# Patient Record
Sex: MALE | Race: BLACK OR AFRICAN AMERICAN | Employment: OTHER | URBAN - METROPOLITAN AREA
[De-identification: names, ages, dates, MRNs, and addresses within clinical notes are randomized per-mention and may not be internally consistent; named-entity substitution may affect disease eponyms.]

---

## 2020-03-24 ENCOUNTER — HOSPITAL ENCOUNTER (INPATIENT)
Age: 50
LOS: 1 days | Discharge: NURSING, REHAB OR LONG-TERM CARE | DRG: 552 | End: 2020-03-26
Attending: EMERGENCY MEDICINE | Admitting: INTERNAL MEDICINE
Payer: MEDICARE

## 2020-03-24 ENCOUNTER — APPOINTMENT (OUTPATIENT)
Dept: GENERAL RADIOLOGY | Age: 50
DRG: 552 | End: 2020-03-24
Payer: MEDICARE

## 2020-03-24 PROBLEM — M54.9 BACK PAIN: Status: ACTIVE | Noted: 2020-03-24

## 2020-03-24 LAB
ANION GAP SERPL CALCULATED.3IONS-SCNC: 14 MMOL/L (ref 3–16)
BASOPHILS ABSOLUTE: 0.1 K/UL (ref 0–0.2)
BASOPHILS RELATIVE PERCENT: 0.6 %
BILIRUBIN URINE: NEGATIVE
BLOOD, URINE: NEGATIVE
BUN BLDV-MCNC: 12 MG/DL (ref 7–20)
CALCIUM SERPL-MCNC: 9.8 MG/DL (ref 8.3–10.6)
CHLORIDE BLD-SCNC: 99 MMOL/L (ref 99–110)
CLARITY: CLEAR
CO2: 26 MMOL/L (ref 21–32)
COLOR: YELLOW
CREAT SERPL-MCNC: 1 MG/DL (ref 0.9–1.3)
EOSINOPHILS ABSOLUTE: 0 K/UL (ref 0–0.6)
EOSINOPHILS RELATIVE PERCENT: 0.4 %
GFR AFRICAN AMERICAN: >60
GFR NON-AFRICAN AMERICAN: >60
GLUCOSE BLD-MCNC: 105 MG/DL (ref 70–99)
GLUCOSE URINE: NEGATIVE MG/DL
HCT VFR BLD CALC: 42.2 % (ref 40.5–52.5)
HEMOGLOBIN: 14.6 G/DL (ref 13.5–17.5)
KETONES, URINE: NEGATIVE MG/DL
LEUKOCYTE ESTERASE, URINE: NEGATIVE
LYMPHOCYTES ABSOLUTE: 2.3 K/UL (ref 1–5.1)
LYMPHOCYTES RELATIVE PERCENT: 22.2 %
MCH RBC QN AUTO: 32.3 PG (ref 26–34)
MCHC RBC AUTO-ENTMCNC: 34.6 G/DL (ref 31–36)
MCV RBC AUTO: 93.2 FL (ref 80–100)
MICROSCOPIC EXAMINATION: NORMAL
MONOCYTES ABSOLUTE: 0.6 K/UL (ref 0–1.3)
MONOCYTES RELATIVE PERCENT: 5.6 %
NEUTROPHILS ABSOLUTE: 7.5 K/UL (ref 1.7–7.7)
NEUTROPHILS RELATIVE PERCENT: 71.2 %
NITRITE, URINE: NEGATIVE
PDW BLD-RTO: 12.9 % (ref 12.4–15.4)
PH UA: 5.5 (ref 5–8)
PLATELET # BLD: 217 K/UL (ref 135–450)
PMV BLD AUTO: 8.9 FL (ref 5–10.5)
POTASSIUM SERPL-SCNC: 4.5 MMOL/L (ref 3.5–5.1)
PROTEIN UA: NEGATIVE MG/DL
RBC # BLD: 4.53 M/UL (ref 4.2–5.9)
SODIUM BLD-SCNC: 139 MMOL/L (ref 136–145)
SPECIFIC GRAVITY UA: 1.01 (ref 1–1.03)
URINE TYPE: NORMAL
UROBILINOGEN, URINE: 0.2 E.U./DL
WBC # BLD: 10.5 K/UL (ref 4–11)

## 2020-03-24 PROCEDURE — 96375 TX/PRO/DX INJ NEW DRUG ADDON: CPT

## 2020-03-24 PROCEDURE — G0378 HOSPITAL OBSERVATION PER HR: HCPCS

## 2020-03-24 PROCEDURE — 85025 COMPLETE CBC W/AUTO DIFF WBC: CPT

## 2020-03-24 PROCEDURE — 99284 EMERGENCY DEPT VISIT MOD MDM: CPT

## 2020-03-24 PROCEDURE — 96372 THER/PROPH/DIAG INJ SC/IM: CPT

## 2020-03-24 PROCEDURE — 6370000000 HC RX 637 (ALT 250 FOR IP): Performed by: EMERGENCY MEDICINE

## 2020-03-24 PROCEDURE — 80048 BASIC METABOLIC PNL TOTAL CA: CPT

## 2020-03-24 PROCEDURE — 96374 THER/PROPH/DIAG INJ IV PUSH: CPT

## 2020-03-24 PROCEDURE — 6360000002 HC RX W HCPCS: Performed by: EMERGENCY MEDICINE

## 2020-03-24 PROCEDURE — 72100 X-RAY EXAM L-S SPINE 2/3 VWS: CPT

## 2020-03-24 PROCEDURE — 81003 URINALYSIS AUTO W/O SCOPE: CPT

## 2020-03-24 RX ORDER — OXYCODONE HYDROCHLORIDE AND ACETAMINOPHEN 5; 325 MG/1; MG/1
1 TABLET ORAL ONCE
Status: COMPLETED | OUTPATIENT
Start: 2020-03-24 | End: 2020-03-24

## 2020-03-24 RX ORDER — ONDANSETRON 4 MG/1
4 TABLET, ORALLY DISINTEGRATING ORAL ONCE
Status: COMPLETED | OUTPATIENT
Start: 2020-03-24 | End: 2020-03-24

## 2020-03-24 RX ORDER — ORPHENADRINE CITRATE 30 MG/ML
60 INJECTION INTRAMUSCULAR; INTRAVENOUS ONCE
Status: COMPLETED | OUTPATIENT
Start: 2020-03-24 | End: 2020-03-24

## 2020-03-24 RX ORDER — DIAZEPAM 5 MG/1
5 TABLET ORAL ONCE
Status: COMPLETED | OUTPATIENT
Start: 2020-03-24 | End: 2020-03-24

## 2020-03-24 RX ORDER — LIDOCAINE 4 G/G
1 PATCH TOPICAL ONCE
Status: COMPLETED | OUTPATIENT
Start: 2020-03-24 | End: 2020-03-25

## 2020-03-24 RX ORDER — PREDNISONE 20 MG/1
60 TABLET ORAL ONCE
Status: COMPLETED | OUTPATIENT
Start: 2020-03-24 | End: 2020-03-24

## 2020-03-24 RX ORDER — ONDANSETRON 2 MG/ML
4 INJECTION INTRAMUSCULAR; INTRAVENOUS ONCE
Status: COMPLETED | OUTPATIENT
Start: 2020-03-24 | End: 2020-03-24

## 2020-03-24 RX ORDER — ACETAMINOPHEN 325 MG/1
650 TABLET ORAL EVERY 6 HOURS PRN
COMMUNITY

## 2020-03-24 RX ADMIN — ORPHENADRINE CITRATE 60 MG: 30 INJECTION INTRAMUSCULAR; INTRAVENOUS at 18:27

## 2020-03-24 RX ADMIN — DIAZEPAM 5 MG: 5 TABLET ORAL at 19:55

## 2020-03-24 RX ADMIN — PREDNISONE 60 MG: 20 TABLET ORAL at 18:27

## 2020-03-24 RX ADMIN — ONDANSETRON 4 MG: 4 TABLET, ORALLY DISINTEGRATING ORAL at 18:26

## 2020-03-24 RX ADMIN — OXYCODONE HYDROCHLORIDE AND ACETAMINOPHEN 1 TABLET: 5; 325 TABLET ORAL at 19:55

## 2020-03-24 RX ADMIN — ONDANSETRON 4 MG: 2 INJECTION, SOLUTION INTRAMUSCULAR; INTRAVENOUS at 21:45

## 2020-03-24 RX ADMIN — HYDROMORPHONE HYDROCHLORIDE 1 MG: 1 INJECTION, SOLUTION INTRAMUSCULAR; INTRAVENOUS; SUBCUTANEOUS at 18:27

## 2020-03-24 RX ADMIN — HYDROMORPHONE HYDROCHLORIDE 1 MG: 1 INJECTION, SOLUTION INTRAMUSCULAR; INTRAVENOUS; SUBCUTANEOUS at 21:45

## 2020-03-24 ASSESSMENT — PAIN SCALES - GENERAL
PAINLEVEL_OUTOF10: 8
PAINLEVEL_OUTOF10: 10
PAINLEVEL_OUTOF10: 8
PAINLEVEL_OUTOF10: 10
PAINLEVEL_OUTOF10: 8

## 2020-03-24 ASSESSMENT — PAIN DESCRIPTION - FREQUENCY: FREQUENCY: CONTINUOUS

## 2020-03-24 ASSESSMENT — PAIN DESCRIPTION - PAIN TYPE: TYPE: ACUTE PAIN

## 2020-03-24 ASSESSMENT — PAIN DESCRIPTION - DESCRIPTORS: DESCRIPTORS: RADIATING

## 2020-03-24 ASSESSMENT — PAIN DESCRIPTION - LOCATION: LOCATION: BACK

## 2020-03-24 ASSESSMENT — PAIN DESCRIPTION - PROGRESSION: CLINICAL_PROGRESSION: GRADUALLY IMPROVING

## 2020-03-24 NOTE — ED PROVIDER NOTES
UA 1.010 1.005 - 1.030    Blood, Urine Negative Negative    pH, UA 5.5 5.0 - 8.0    Protein, UA Negative Negative mg/dL    Urobilinogen, Urine 0.2 <2.0 E.U./dL    Nitrite, Urine Negative Negative    Leukocyte Esterase, Urine Negative Negative    Microscopic Examination Not Indicated     Urine Type NotGiven        Treatment in the department:  Patient received the following while in the ED. Medications   oxyCODONE-acetaminophen (PERCOCET) 5-325 MG per tablet 1 tablet (has no administration in time range)   diazePAM (VALIUM) tablet 5 mg (has no administration in time range)   HYDROmorphone (DILAUDID) injection 1 mg (1 mg Intramuscular Given 3/24/20 1827)   orphenadrine (NORFLEX) injection 60 mg (60 mg Intramuscular Given 3/24/20 1827)   predniSONE (DELTASONE) tablet 60 mg (60 mg Oral Given 3/24/20 1827)   ondansetron (ZOFRAN-ODT) disintegrating tablet 4 mg (4 mg Oral Given 3/24/20 1826)         Repeat exam at [address] [place] [address] shows patient still unable to sit up in bed or stand due to pain. Percocet and valium ordered. Medical decision making:  Patient presents emergency department with low back pain with radicular pain down both legs. Has no significant risk factors for epidural abscess. No signs of cauda equina. He is neurologically intact however continues to have pain despite initial medications. No fractures on x-ray imaging. No UTI. He is not having any urinary retention. I suspect musculoskeletal causes. We will continue with symptomatic management to see if able to go home    8:07 PM: I discussed the history, physical, and treatment plan with Dr. Lucila Wesley. Ender Mello was signed out in stable condition. Please see Dr. Cookie Shaw note for further details, including diagnosis and disposition. Clinical Impression:  1.  Midline low back pain with bilateral sciatica, unspecified chronicity        Dispo:  Pending reassessment    Discharge vitals:  Blood pressure 124/84, pulse 92, temperature 98.6 °F (37 °C), temperature source Oral, resp. rate 18, height 6' 1\" (1.854 m), weight 135.2 kg (298 lb), SpO2 96 %. Prescriptions given:   New Prescriptions    No medications on file         This chart was created using Dragon voice recognition software.         Santiago Giraldo MD  03/24/20 2010

## 2020-03-24 NOTE — ED NOTES
Patient brought in via ambulance for back pain. Patient states that he has had back pain for the past two days that has made him bed ridden. He denies injury. He states that this has happened to him before. He is having lower mid back pain that radiates down bilateral legs. Patient states that he has a history of DDD.       Oz Servin RN  03/24/20 6911

## 2020-03-25 PROCEDURE — 6360000002 HC RX W HCPCS: Performed by: INTERNAL MEDICINE

## 2020-03-25 PROCEDURE — 2580000003 HC RX 258: Performed by: INTERNAL MEDICINE

## 2020-03-25 PROCEDURE — 97530 THERAPEUTIC ACTIVITIES: CPT

## 2020-03-25 PROCEDURE — 97162 PT EVAL MOD COMPLEX 30 MIN: CPT

## 2020-03-25 PROCEDURE — 97535 SELF CARE MNGMENT TRAINING: CPT

## 2020-03-25 PROCEDURE — 6370000000 HC RX 637 (ALT 250 FOR IP): Performed by: INTERNAL MEDICINE

## 2020-03-25 PROCEDURE — 97167 OT EVAL HIGH COMPLEX 60 MIN: CPT

## 2020-03-25 PROCEDURE — G0378 HOSPITAL OBSERVATION PER HR: HCPCS

## 2020-03-25 PROCEDURE — 96372 THER/PROPH/DIAG INJ SC/IM: CPT

## 2020-03-25 RX ORDER — SODIUM CHLORIDE 0.9 % (FLUSH) 0.9 %
10 SYRINGE (ML) INJECTION PRN
Status: DISCONTINUED | OUTPATIENT
Start: 2020-03-25 | End: 2020-03-26 | Stop reason: HOSPADM

## 2020-03-25 RX ORDER — POLYETHYLENE GLYCOL 3350 17 G/17G
17 POWDER, FOR SOLUTION ORAL DAILY PRN
Status: DISCONTINUED | OUTPATIENT
Start: 2020-03-25 | End: 2020-03-26 | Stop reason: HOSPADM

## 2020-03-25 RX ORDER — ACETAMINOPHEN 325 MG/1
650 TABLET ORAL EVERY 6 HOURS PRN
Status: DISCONTINUED | OUTPATIENT
Start: 2020-03-25 | End: 2020-03-26 | Stop reason: HOSPADM

## 2020-03-25 RX ORDER — CYCLOBENZAPRINE HCL 10 MG
10 TABLET ORAL 3 TIMES DAILY PRN
Status: DISCONTINUED | OUTPATIENT
Start: 2020-03-25 | End: 2020-03-26 | Stop reason: HOSPADM

## 2020-03-25 RX ORDER — SODIUM CHLORIDE 0.9 % (FLUSH) 0.9 %
10 SYRINGE (ML) INJECTION EVERY 12 HOURS SCHEDULED
Status: DISCONTINUED | OUTPATIENT
Start: 2020-03-25 | End: 2020-03-26 | Stop reason: HOSPADM

## 2020-03-25 RX ORDER — HYDROCODONE BITARTRATE AND ACETAMINOPHEN 7.5; 325 MG/1; MG/1
1 TABLET ORAL EVERY 4 HOURS PRN
Status: DISCONTINUED | OUTPATIENT
Start: 2020-03-25 | End: 2020-03-26 | Stop reason: HOSPADM

## 2020-03-25 RX ORDER — ONDANSETRON 2 MG/ML
4 INJECTION INTRAMUSCULAR; INTRAVENOUS EVERY 6 HOURS PRN
Status: DISCONTINUED | OUTPATIENT
Start: 2020-03-25 | End: 2020-03-26 | Stop reason: HOSPADM

## 2020-03-25 RX ORDER — ACETAMINOPHEN 650 MG/1
650 SUPPOSITORY RECTAL EVERY 6 HOURS PRN
Status: DISCONTINUED | OUTPATIENT
Start: 2020-03-25 | End: 2020-03-26 | Stop reason: HOSPADM

## 2020-03-25 RX ORDER — PROMETHAZINE HYDROCHLORIDE 12.5 MG/1
12.5 TABLET ORAL EVERY 6 HOURS PRN
Status: DISCONTINUED | OUTPATIENT
Start: 2020-03-25 | End: 2020-03-26 | Stop reason: HOSPADM

## 2020-03-25 RX ADMIN — HYDROCODONE BITARTRATE AND ACETAMINOPHEN 1 TABLET: 7.5; 325 TABLET ORAL at 18:10

## 2020-03-25 RX ADMIN — HYDROCODONE BITARTRATE AND ACETAMINOPHEN 1 TABLET: 7.5; 325 TABLET ORAL at 22:25

## 2020-03-25 RX ADMIN — Medication 10 ML: at 08:02

## 2020-03-25 RX ADMIN — HYDROCODONE BITARTRATE AND ACETAMINOPHEN 1 TABLET: 7.5; 325 TABLET ORAL at 02:05

## 2020-03-25 RX ADMIN — HYDROCODONE BITARTRATE AND ACETAMINOPHEN 1 TABLET: 7.5; 325 TABLET ORAL at 07:13

## 2020-03-25 RX ADMIN — CYCLOBENZAPRINE 10 MG: 10 TABLET, FILM COATED ORAL at 22:25

## 2020-03-25 RX ADMIN — Medication 10 ML: at 21:00

## 2020-03-25 RX ADMIN — CYCLOBENZAPRINE 10 MG: 10 TABLET, FILM COATED ORAL at 02:05

## 2020-03-25 RX ADMIN — HYDROCODONE BITARTRATE AND ACETAMINOPHEN 1 TABLET: 7.5; 325 TABLET ORAL at 11:33

## 2020-03-25 RX ADMIN — ENOXAPARIN SODIUM 40 MG: 40 INJECTION SUBCUTANEOUS at 07:59

## 2020-03-25 ASSESSMENT — PAIN DESCRIPTION - ONSET
ONSET: ON-GOING

## 2020-03-25 ASSESSMENT — PAIN SCALES - GENERAL
PAINLEVEL_OUTOF10: 6
PAINLEVEL_OUTOF10: 7
PAINLEVEL_OUTOF10: 0
PAINLEVEL_OUTOF10: 9
PAINLEVEL_OUTOF10: 8
PAINLEVEL_OUTOF10: 8
PAINLEVEL_OUTOF10: 0
PAINLEVEL_OUTOF10: 7
PAINLEVEL_OUTOF10: 6
PAINLEVEL_OUTOF10: 0

## 2020-03-25 ASSESSMENT — PAIN DESCRIPTION - LOCATION
LOCATION: BACK

## 2020-03-25 ASSESSMENT — PAIN DESCRIPTION - DESCRIPTORS
DESCRIPTORS: ACHING
DESCRIPTORS: ACHING
DESCRIPTORS: ACHING;RADIATING
DESCRIPTORS: ACHING
DESCRIPTORS: ACHING

## 2020-03-25 ASSESSMENT — PAIN DESCRIPTION - PAIN TYPE
TYPE: ACUTE PAIN

## 2020-03-25 ASSESSMENT — PAIN DESCRIPTION - FREQUENCY
FREQUENCY: CONTINUOUS

## 2020-03-25 ASSESSMENT — PAIN - FUNCTIONAL ASSESSMENT
PAIN_FUNCTIONAL_ASSESSMENT: PREVENTS OR INTERFERES SOME ACTIVE ACTIVITIES AND ADLS

## 2020-03-25 ASSESSMENT — PAIN DESCRIPTION - PROGRESSION
CLINICAL_PROGRESSION: GRADUALLY WORSENING
CLINICAL_PROGRESSION: GRADUALLY WORSENING
CLINICAL_PROGRESSION: NOT CHANGED
CLINICAL_PROGRESSION: GRADUALLY WORSENING
CLINICAL_PROGRESSION: GRADUALLY WORSENING

## 2020-03-25 ASSESSMENT — PAIN DESCRIPTION - ORIENTATION
ORIENTATION: LOWER
ORIENTATION: LOWER;MID
ORIENTATION: LOWER

## 2020-03-25 NOTE — CARE COORDINATION
Case Management Assessment           Initial Evaluation                Date / Time of Evaluation: 3/25/2020 11:56 AM                 Assessment Completed by: Markel Luna    Patient Name: Taniya Gastelum     YOB: 1970  Diagnosis: Back pain [M54.9]  Back pain [M54.9]     Date / Time: 3/24/2020  4:38 PM    Patient Admission Status: Inpatient    If patient is discharged prior to next notation, then this note serves as note for discharge by case management. Current PCP: [address] [place] Patient: No    Chart Reviewed: Yes  Patient/ Family Interviewed: No    Initial assessment completed at bedside with: patient    Hospitalization in the last 30 days: No    Emergency Contacts:  Extended Emergency Contact Information  Primary Emergency Contact: Noel Quintana [address] Phone: [phone]  Relation: Parent    Advance Directives:   Code Status: Full Code    Healthcare Power of : No  Agent:   Contact Number:     Copy present: In paper Chart:     Scanned into EMR     Financial  Payor: MEDICARE / Plan: MEDICARE PART A AND B / Product Type: *No Product type* /     Pre-cert required for SNF: no    Pharmacy  No Pharmacies Listed    Potential assistance Purchasing Medications: Potential Assistance Purchasing Medications: No  Does Patient want to participate in local refill/ meds to beds program?: No    Meds To Beds General Rules:  1. Can ONLY be done Monday- Friday between 8:30am-5pm  2. Prescription(s) must be in pharmacy by 3pm to be filled same day  3. Copy of patient's insurance/ prescription drug card and patient face sheet must be sent along with the prescription(s)  4. Cost of Rx cannot be added to hospital bill. If financial assistance is needed, please contact unit  or ;  or  CANNOT provide pharmacy voucher for patients co-pays  5.  Patients can then  the Darvin Richter served Dr. Salvador Lang whom states pt is not ready for discharge today. [facility] can accept pt tomorrow. HENS completed [id number]    The Plan for Transition of Care is related to the following treatment goals of Back pain [M54.9]  Back pain [M54.9]    The Patient and/or patient representative Phoenix Brar and his family were provided with a choice of provider and agrees with the discharge plan Yes    Freedom of choice list was provided with basic dialogue that supports the patient's individualized plan of care/goals and shares the quality data associated with the providers.  Yes      Care Transition patient: No    Markel Luna RN  [facility] [employer]  Case Management Department  Ph: [phone]   Fax:

## 2020-03-25 NOTE — PLAN OF CARE
Problem: Pain:  Goal: Patient's pain/discomfort is manageable  Description: Patient's pain/discomfort is manageable  Outcome: Ongoing   Patient complains of pain 9/10 in his back. Patient medicated per mar, will continue to monitor.

## 2020-03-25 NOTE — PLAN OF CARE
Problem: Daily Care:  Goal: Daily care needs are met  Description: Daily care needs are met  Outcome: Ongoing  Note: Patient's needs are met at this time. Will continue to monitor. Problem: Pain:  Goal: Patient's pain/discomfort is manageable  Description: Patient's pain/discomfort is manageable  3/25/2020 0817 by Reynaldo Dai RN  Outcome: Ongoing  Note: Patient medicated per [facility] for pain control. Patient resting at reassessment. Will continue to monitor. Problem: Falls - Risk of:  Goal: Will remain free from falls  Description: Will remain free from falls  Outcome: Ongoing  Note: Patient has remained free from falls. Bed is low and locked, bed alarm on, side rails up 2/4, non skid socks on patient. Call light and bedside table are within reach. Patient calls out appropriately. Will continue to monitor.

## 2020-03-25 NOTE — ED PROVIDER NOTES
viewed by me:   XR LUMBAR SPINE (2-3 VIEWS)   Final Result      Mild multilevel degenerative disc disease, with no fracture or subluxation. ED COURSE:    Medications administered:  Medications   lidocaine 4 % external patch 1 patch (1 patch Transdermal Patch Applied 3/24/20 2149)   HYDROmorphone (DILAUDID) injection 1 mg (1 mg Intramuscular Given 3/24/20 1827)   orphenadrine (NORFLEX) injection 60 mg (60 mg Intramuscular Given 3/24/20 1827)   predniSONE (DELTASONE) tablet 60 mg (60 mg Oral Given 3/24/20 1827)   ondansetron (ZOFRAN-ODT) disintegrating tablet 4 mg (4 mg Oral Given 3/24/20 1826)   oxyCODONE-acetaminophen (PERCOCET) 5-325 MG per tablet 1 tablet (1 tablet Oral Given 3/24/20 1955)   diazePAM (VALIUM) tablet 5 mg (5 mg Oral Given 3/24/20 1955)   HYDROmorphone (DILAUDID) injection 1 mg (1 mg Intravenous Given 3/24/20 2145)   ondansetron (ZOFRAN) injection 4 mg (4 mg Intravenous Given 3/24/20 2145)     Vitals:    03/24/20 1639 03/24/20 2101 03/24/20 2305   BP: 124/84 128/84 123/79   Pulse: 92 84 80   Resp: 18 18 16   Temp: 98.6 °F (37 °C) 98.1 °F (36.7 °C)    TempSrc: Oral Oral    SpO2: 96% 95% 98%   Weight: 135.2 kg (298 lb)     Height: 6' 1\" (1.854 m)       PROCEDURES:  None    CRITICAL CARE:  None    CONSULTATIONS:  Hospitalist Dr Bernadine Ganser: Gertrude Navas is a 54 y.o. male who presented because of back pain. I believe his pain is radicular in nature but after multiple trials of different medications, he is in too much discomfort to be able to sit up on his own or try to walk. He lives alone. I did speak with the hospitalist and the plan is for admission to [facility]. for further   care. FINAL IMPRESSION:    1.  Intractable low back pain      DISPOSITION Decision To Admit 03/24/2020 09:19:49 PM       Sandhya Trinh MD  03/24/20 8841

## 2020-03-26 VITALS
DIASTOLIC BLOOD PRESSURE: 79 MMHG | HEIGHT: 73 IN | RESPIRATION RATE: 18 BRPM | SYSTOLIC BLOOD PRESSURE: 138 MMHG | HEART RATE: 87 BPM | OXYGEN SATURATION: 95 % | TEMPERATURE: 97.9 F | BODY MASS INDEX: 39.49 KG/M2 | WEIGHT: 298 LBS

## 2020-03-26 PROCEDURE — 6370000000 HC RX 637 (ALT 250 FOR IP): Performed by: INTERNAL MEDICINE

## 2020-03-26 PROCEDURE — G0378 HOSPITAL OBSERVATION PER HR: HCPCS

## 2020-03-26 PROCEDURE — 1200000000 HC SEMI PRIVATE

## 2020-03-26 PROCEDURE — 6360000002 HC RX W HCPCS: Performed by: INTERNAL MEDICINE

## 2020-03-26 PROCEDURE — 2580000003 HC RX 258: Performed by: INTERNAL MEDICINE

## 2020-03-26 PROCEDURE — 96372 THER/PROPH/DIAG INJ SC/IM: CPT

## 2020-03-26 RX ORDER — CYCLOBENZAPRINE HCL 10 MG
10 TABLET ORAL 3 TIMES DAILY PRN
Qty: 9 TABLET | Refills: 0 | Status: SHIPPED | OUTPATIENT
Start: 2020-03-26 | End: 2020-03-29

## 2020-03-26 RX ORDER — HYDROCODONE BITARTRATE AND ACETAMINOPHEN 7.5; 325 MG/1; MG/1
1 TABLET ORAL EVERY 6 HOURS PRN
Qty: 12 TABLET | Refills: 0 | Status: SHIPPED | OUTPATIENT
Start: 2020-03-26 | End: 2020-03-29

## 2020-03-26 RX ADMIN — HYDROCODONE BITARTRATE AND ACETAMINOPHEN 1 TABLET: 7.5; 325 TABLET ORAL at 06:12

## 2020-03-26 RX ADMIN — ENOXAPARIN SODIUM 40 MG: 40 INJECTION SUBCUTANEOUS at 08:05

## 2020-03-26 RX ADMIN — HYDROCODONE BITARTRATE AND ACETAMINOPHEN 1 TABLET: 7.5; 325 TABLET ORAL at 12:45

## 2020-03-26 RX ADMIN — Medication 10 ML: at 08:06

## 2020-03-26 ASSESSMENT — PAIN DESCRIPTION - ORIENTATION
ORIENTATION: LOWER
ORIENTATION: LOWER

## 2020-03-26 ASSESSMENT — PAIN DESCRIPTION - FREQUENCY
FREQUENCY: CONTINUOUS
FREQUENCY: CONTINUOUS

## 2020-03-26 ASSESSMENT — PAIN SCALES - GENERAL
PAINLEVEL_OUTOF10: 8
PAINLEVEL_OUTOF10: 7

## 2020-03-26 ASSESSMENT — PAIN DESCRIPTION - PAIN TYPE
TYPE: ACUTE PAIN
TYPE: ACUTE PAIN

## 2020-03-26 ASSESSMENT — PAIN DESCRIPTION - DESCRIPTORS
DESCRIPTORS: ACHING
DESCRIPTORS: ACHING

## 2020-03-26 ASSESSMENT — PAIN DESCRIPTION - ONSET
ONSET: ON-GOING
ONSET: ON-GOING

## 2020-03-26 ASSESSMENT — PAIN DESCRIPTION - LOCATION
LOCATION: BACK
LOCATION: BACK

## 2020-03-26 ASSESSMENT — PAIN DESCRIPTION - PROGRESSION
CLINICAL_PROGRESSION: GRADUALLY WORSENING
CLINICAL_PROGRESSION: NOT CHANGED

## 2020-03-26 NOTE — PROGRESS NOTES
Physical Therapy    Facility/Department: [facility] 5T ORTHO/NEURO  Initial Assessment    NAME: Catia Gill  : 1970  MRN: [id number]    Date of Service: 3/25/2020    Discharge Recommendations:Sandra Kapoor scored a 10/24 on the AM-PAC short mobility form. Current research shows that an AM-PAC score of 17 or less is typically not associated with a discharge to the patient's home setting. Based on the patients AM-PAC score and their current functional mobility deficits, it is recommended that the patient have 3-5 sessions per week of Physical Therapy at d/c to increase the patients independence. If patient discharges prior to next session this note will serve as a discharge summary. Please see below for the latest assessment towards goals. PT Equipment Recommendations  Equipment Needed: (defer for now)    Assessment   Assessment: 55 yo admitted to observation 3/24/20 for intractable back pain. Pt reports back pain is insidious onset and he had to call 911 to get out of apartment. Pt tolerated PT session poorly due to c/o back and LE pain despite max effort. Pt reports he did walk to BR earlier with nursing staff/rolling walker which may have exacerbated his pain. Pt hopeful to return home but reports he would be unable to care for himself with pain. Safety concerns for pt to return home at this time. Pt would benefit from continued skilled IP PT at discharge. Anticipate pt will progress well with pain controlled. Pt may benefit From ortho/neuro consult. Treatment Diagnosis: mobility impairment due to intractable back pain  Decision Making: Medium Complexity  Patient Education: Pt educated on PT role, importance of OOB mobility, pain control and he verbalized understanding. REQUIRES PT FOLLOW UP: Yes  Activity Tolerance  Activity Tolerance: Patient limited by pain       Patient Diagnosis(es): The encounter diagnosis was Intractable low back pain.      has a past medical history of H/O degenerative
Report called to Daniel at [facility]
clear buttocks only)  Stand to sit: Dependent/Total(max A of 2)     Cognition  Overall Cognitive Status: WNL        Sensation  Overall Sensation Status: (grossly intact B LE-pt c/o numbness B feet and tingling down both LEs R>L)        LUE AROM (degrees)  LUE AROM : WFL  Left Hand AROM (degrees)  Left Hand AROM: WFL  RUE AROM (degrees)  RUE AROM : WFL  Right Hand AROM (degrees)  Right Hand AROM: WFL        Hand Dominance  Hand Dominance: Right     Patient participated in OT eval and tx including ADLs and transfer        Plan   Plan  Times per week: 2-5  Times per day: Daily  Current Treatment Recommendations: Balance Training, Functional Mobility Training, Endurance Training, Self-Care / ADL    G-Code     OutComes Score                                                  AM-PAC Score        AM-[facility] Inpatient Daily Activity Raw Score: 14 (03/25/20 0931)  AM-PAC Inpatient ADL T-Scale Score : 33.39 (03/25/20 0931)  ADL Inpatient CMS 0-100% Score: 59.67 (03/25/20 0931)  ADL Inpatient CMS G-Code Modifier : CK (03/25/20 0931)    Goals  Short term goals  Time Frame for Short term goals: discharge  Short term goal 1: pt to transfer sit><stand with mod A of 2  Short term goal 2: pt to scoot to R and L  with Dani, while seated on side of bed  Short term goal 3: pt to do simple grooming/hygiene tasks indep while seated  Short term goal 4: pt to roll in bed,  to L and R with SBA  Patient Goals   Patient goals : to get pain relief       Therapy Time   Individual Concurrent Group Co-treatment   Time In 0830         Time Out 0909         Minutes 39           Timed Code Treatment Minutes:   24    Total Treatment Minutes:  [address], OTR/L [facility] hospital 19

## 2020-03-26 NOTE — CARE COORDINATION
Case Management Assessment            Discharge Note                    Date / Time of Note: 3/26/2020 11:44 AM                  Discharge Note Completed by: Marcela Jones    Patient Name: Valeri Arnold   YOB: 1970  Diagnosis: Back pain [M54.9]  Back pain [M54.9]  Back pain [M54.9]   Date / Time: 3/24/2020  4:38 PM    Current PCP: [address] [place] patient: No    Hospitalization in the last 30 days: No    Advance Directives:  Code Status: Full Code  [place] DNR form completed and on chart: No    Financial:  Payor: MEDICARE / Plan: MEDICARE PART A AND B / Product Type: *No Product type* /      Pharmacy:  No Pharmacies [address] medications?: Potential Assistance Purchasing Medications: No  Assistance provided by Case Management: None at this time    Does patient want to participate in local refill/ meds to beds program?: No    Meds To [facility] Rules:  1. Can ONLY be done Monday- Friday between 8:30am-5pm  2. Prescription(s) must be in pharmacy by 3pm to be filled same day  3. Copy of patient's insurance/ prescription drug card and patient face sheet must be sent along with the prescription(s)  4. Cost of Rx cannot be added to hospital bill. If financial assistance is needed, please contact unit  or ;  or  CANNOT provide pharmacy voucher for patients co-pays  5.  Patients can then  the prescription on their way out of the hospital at discharge, or pharmacy can deliver to the bedside if staff is available. (payment due at time of pick-up or delivery - cash, check, or card accepted)     Able to afford home medications/ co-pay costs: Yes    ADLS:  Current PT AM-PAC Score: 10 /24  Current OT AM-PAC Score: 14 /24      DISCHARGE Disposition: [place] (SNF): [facility] Phone: [phone] Fax: [phone]    LOC at discharge: Skilled  HARPAL Completed: Yes    Notification completed in HENS/PAS?:  Yes : CM has completed HENS online through secure website for SNF admission at [facility]. Document ID #: [id number]   [id number]  SW just received a call from Ciara/Fransisco at [facility] that Pt's HENS is incomplete by NIEVES Olvera on 3/25/2020. SW was able to look at the incomplete HENS and was able to delete the #[id number] and complete and submit a new HENS at this time for Pt - HENS #[id number]. JAYSHREE called Ciara back and advised her of above. IMM Completed:   Not Indicated    Transportation:  Transportation PLAN for discharge: EMS transportation   Mode of Transport: [address]  Reason for medical transport:   Name of [address] [place] [address],[facility] 530: [address]  Time of Transport: [address] form completed: Yes    Home Care:  [address] ordered at discharge: No  [address]: Not Applicable  Orders faxed: No    Durable Medical Equipment:  DME Provider:   Equipment obtained during hospitalization:     Home Oxygen and Respiratory Equipment:  Oxygen needed at discharge? [address]: Not Applicable  Portable tank available for discharge?: Not Indicated    Dialysis:  Dialysis patient: No    Dialysis Center:  Not Applicable    Hospice Services:  Location: Not Applicable  Agency: Not Applicable    Consents signed: Not Indicated    Referrals made at [facility] for outpatient continued care:  Not Applicable    Additional CM Notes:   SW following Pt today and DC order placed. JAYSHREE spoke with Ciara at [facility] - can accept Pt this afternoon. HARPAL/AVS faxed to [place] [phone]. Pt aware and in agreement with plan. Staff RN also aware of DC plan.      The Plan for Transition of Care is related to the following treatment goals of Back pain [M54.9]  Back pain [M54.9]  Back pain [M54.9]    The Patient and/or patient representative Randall Fish and his family were provided with a choice of provider and agrees with the discharge plan yes    Freedom of choice list was provided with basic dialogue that

## 2020-03-26 NOTE — DISCHARGE INSTR - COC
Continuity of Care Form    Patient Name: Gertrude Navas   :  1970  MRN:  [id number]    Admit date:  3/24/2020  Discharge date:  3/26/2020    Code Status Order: Full Code   Advance Directives:   [facility] Documentation     Date/Time Healthcare Directive Type of Healthcare Directive Copy in [address] Agent's Name Healthcare Agent's Phone Number    20 [phone]  No, patient does not have an advance directive for healthcare treatment -- -- -- -- --          Admitting Physician:  Tee Figueredo MD  PCP: [address] [place] [address]    Discharging Nurse: [facility] Unit/Room#: 3865/7424-29  Discharging Unit Phone Number: [phone]    Emergency Contact:   Extended Emergency Contact Information  Primary Emergency Contact: Noel Quintana [address] [place] [address] Phone: [phone]  Relation: Parent    Past Surgical History:  History reviewed. No pertinent surgical history. Immunization History: There is no immunization history on file for this patient.     Active Problems:  Patient Active Problem List   Diagnosis Code    Back pain M54.9       Isolation/Infection:   Isolation          No Isolation        Patient Infection Status     None to display          Nurse Assessment:  Last Vital Signs: /81   Pulse 79   Temp 98.2 °F (36.8 °C) (Oral)   Resp 17   Ht 6' 1\" (1.854 m)   Wt 298 lb (135.2 kg)   SpO2 92%   BMI 39.32 kg/m²     Last documented pain score (0-10 scale): Pain Level: 8  Last Weight:   Wt Readings from Last 1 Encounters:   20 298 lb (135.2 kg)     Mental Status:  oriented and alert    IV Access:  - None    Nursing Mobility/ADLs:  Walking   Assisted  Transfer  Assisted  Bathing  Assisted  Dressing  Assisted  Toileting  Assisted  Feeding  Independent  Med Admin  Assisted  Med Delivery   whole    Wound Care Documentation and Therapy:        Elimination:  Continence:   · Bowel: treat; Follow up with PCP    Physician Certification: I certify the above information and transfer of Kaur Chan  is necessary for the continuing treatment of the diagnosis listed and that he requires [facility] for less 30 days.      Update Admission H&P: No change in H&P    PHYSICIAN SIGNATURE:  Electronically signed by Madisyn Christian MD on 3/26/20 at 11:12 AM EDT

## 2020-03-26 NOTE — DISCHARGE SUMMARY
review of medications and discharge plan. Signed:    Ethan Woods MD   3/26/2020      Thank you [address] [place] [address] for the opportunity to be involved in this patient's care. If you have any questions or concerns please feel free to contact me at [phone].

## 2021-12-12 ENCOUNTER — HOSPITAL ENCOUNTER (INPATIENT)
Age: 51
LOS: 19 days | Discharge: HOME | DRG: 871 | End: 2021-12-31
Attending: EMERGENCY MEDICINE | Admitting: INTERNAL MEDICINE
Payer: COMMERCIAL

## 2021-12-12 ENCOUNTER — APPOINTMENT (OUTPATIENT)
Dept: GENERAL RADIOLOGY | Age: 51
DRG: 871 | End: 2021-12-12
Payer: COMMERCIAL

## 2021-12-12 ENCOUNTER — APPOINTMENT (OUTPATIENT)
Dept: CT IMAGING | Age: 51
DRG: 871 | End: 2021-12-12
Payer: COMMERCIAL

## 2021-12-12 DIAGNOSIS — E87.20 METABOLIC ACIDOSIS: ICD-10-CM

## 2021-12-12 DIAGNOSIS — R73.9 HYPERGLYCEMIA: ICD-10-CM

## 2021-12-12 DIAGNOSIS — E11.10 DKA, TYPE 2, NOT AT GOAL (HCC): ICD-10-CM

## 2021-12-12 DIAGNOSIS — E08.10 DIABETIC KETOACIDOSIS WITHOUT COMA ASSOCIATED WITH DIABETES MELLITUS DUE TO UNDERLYING CONDITION (HCC): Primary | ICD-10-CM

## 2021-12-12 DIAGNOSIS — D72.820 LYMPHOCYTOSIS: ICD-10-CM

## 2021-12-12 DIAGNOSIS — I95.9 HYPOTENSION, UNSPECIFIED HYPOTENSION TYPE: ICD-10-CM

## 2021-12-12 LAB
A/G RATIO: 1 (ref 1.1–2.2)
ALBUMIN SERPL-MCNC: 4.1 G/DL (ref 3.4–5)
ALP BLD-CCNC: 132 U/L (ref 40–129)
ALT SERPL-CCNC: 46 U/L (ref 10–40)
AMYLASE: 1026 U/L (ref 25–115)
ANION GAP SERPL CALCULATED.3IONS-SCNC: 29 MMOL/L (ref 3–16)
ANION GAP SERPL CALCULATED.3IONS-SCNC: 31 MMOL/L (ref 3–16)
ANION GAP SERPL CALCULATED.3IONS-SCNC: 42 MMOL/L (ref 3–16)
ANION GAP SERPL CALCULATED.3IONS-SCNC: 44 MMOL/L (ref 3–16)
AST SERPL-CCNC: 35 U/L (ref 15–37)
BANDED NEUTROPHILS RELATIVE PERCENT: 9 % (ref 0–7)
BASE EXCESS VENOUS: -28.3 MMOL/L
BASOPHILS ABSOLUTE: 0 K/UL (ref 0–0.2)
BASOPHILS ABSOLUTE: 0.1 K/UL (ref 0–0.2)
BASOPHILS RELATIVE PERCENT: 0 %
BASOPHILS RELATIVE PERCENT: 0.5 %
BETA-HYDROXYBUTYRATE: >8 MMOL/L (ref 0–0.27)
BILIRUB SERPL-MCNC: 0.5 MG/DL (ref 0–1)
BILIRUBIN URINE: ABNORMAL
BLOOD, URINE: ABNORMAL
BUN BLDV-MCNC: 35 MG/DL (ref 7–20)
BUN BLDV-MCNC: 41 MG/DL (ref 7–20)
BUN BLDV-MCNC: 44 MG/DL (ref 7–20)
BUN BLDV-MCNC: 49 MG/DL (ref 7–20)
CALCIUM SERPL-MCNC: 7.2 MG/DL (ref 8.3–10.6)
CALCIUM SERPL-MCNC: 7.4 MG/DL (ref 8.3–10.6)
CALCIUM SERPL-MCNC: 8.9 MG/DL (ref 8.3–10.6)
CALCIUM SERPL-MCNC: 9.4 MG/DL (ref 8.3–10.6)
CARBOXYHEMOGLOBIN: 0.9 %
CHLORIDE BLD-SCNC: 75 MMOL/L (ref 99–110)
CHLORIDE BLD-SCNC: 77 MMOL/L (ref 99–110)
CHLORIDE BLD-SCNC: 85 MMOL/L (ref 99–110)
CHLORIDE BLD-SCNC: 90 MMOL/L (ref 99–110)
CLARITY: ABNORMAL
CO2: 4 MMOL/L (ref 21–32)
CO2: 5 MMOL/L (ref 21–32)
COLOR: YELLOW
CREAT SERPL-MCNC: 3 MG/DL (ref 0.9–1.3)
CREAT SERPL-MCNC: 3.1 MG/DL (ref 0.9–1.3)
CREAT SERPL-MCNC: 3.2 MG/DL (ref 0.9–1.3)
CREAT SERPL-MCNC: 3.4 MG/DL (ref 0.9–1.3)
EKG ATRIAL RATE: 97 BPM
EKG DIAGNOSIS: NORMAL
EKG Q-T INTERVAL: 394 MS
EKG QRS DURATION: 78 MS
EKG QTC CALCULATION (BAZETT): 495 MS
EKG R AXIS: -14 DEGREES
EKG T AXIS: 16 DEGREES
EKG VENTRICULAR RATE: 95 BPM
EOSINOPHILS ABSOLUTE: 0 K/UL (ref 0–0.6)
EOSINOPHILS ABSOLUTE: 0.2 K/UL (ref 0–0.6)
EOSINOPHILS RELATIVE PERCENT: 0 %
EOSINOPHILS RELATIVE PERCENT: 1 %
EPITHELIAL CELLS, UA: 2 /HPF (ref 0–5)
GFR AFRICAN AMERICAN: 23
GFR AFRICAN AMERICAN: 25
GFR AFRICAN AMERICAN: 26
GFR AFRICAN AMERICAN: 27
GFR NON-AFRICAN AMERICAN: 19
GFR NON-AFRICAN AMERICAN: 21
GFR NON-AFRICAN AMERICAN: 21
GFR NON-AFRICAN AMERICAN: 22
GLUCOSE BLD-MCNC: 302 MG/DL (ref 70–99)
GLUCOSE BLD-MCNC: 374 MG/DL (ref 70–99)
GLUCOSE BLD-MCNC: 383 MG/DL (ref 70–99)
GLUCOSE BLD-MCNC: 395 MG/DL (ref 70–99)
GLUCOSE BLD-MCNC: 592 MG/DL (ref 70–99)
GLUCOSE BLD-MCNC: 644 MG/DL (ref 70–99)
GLUCOSE BLD-MCNC: 671 MG/DL (ref 70–99)
GLUCOSE BLD-MCNC: 694 MG/DL (ref 70–99)
GLUCOSE BLD-MCNC: 781 MG/DL (ref 70–99)
GLUCOSE BLD-MCNC: 786 MG/DL (ref 70–99)
GLUCOSE BLD-MCNC: 788 MG/DL (ref 70–99)
GLUCOSE BLD-MCNC: 793 MG/DL (ref 70–99)
GLUCOSE BLD-MCNC: 881 MG/DL (ref 70–99)
GLUCOSE BLD-MCNC: 900 MG/DL (ref 70–99)
GLUCOSE BLD-MCNC: >600 MG/DL (ref 70–99)
GLUCOSE URINE: >=1000 MG/DL
HCO3 VENOUS: 6 MMOL/L (ref 23–29)
HCT VFR BLD CALC: 45 % (ref 40.5–52.5)
HCT VFR BLD CALC: 54.1 % (ref 40.5–52.5)
HEMATOLOGY PATH CONSULT: YES
HEMOGLOBIN: 14.6 G/DL (ref 13.5–17.5)
HEMOGLOBIN: 16.4 G/DL (ref 13.5–17.5)
HYALINE CASTS: 13 /LPF (ref 0–8)
KETONES, URINE: 40 MG/DL
LACTIC ACID, SEPSIS: 3.6 MMOL/L (ref 0.4–1.9)
LACTIC ACID, SEPSIS: 8.5 MMOL/L (ref 0.4–1.9)
LACTIC ACID: 2 MMOL/L (ref 0.4–2)
LEUKOCYTE ESTERASE, URINE: NEGATIVE
LIPASE: >3000 U/L (ref 13–60)
LYMPHOCYTES ABSOLUTE: 1.2 K/UL (ref 1–5.1)
LYMPHOCYTES ABSOLUTE: 1.6 K/UL (ref 1–5.1)
LYMPHOCYTES RELATIVE PERCENT: 5.6 %
LYMPHOCYTES RELATIVE PERCENT: 8 %
MAGNESIUM: 2.3 MG/DL (ref 1.8–2.4)
MAGNESIUM: 2.7 MG/DL (ref 1.8–2.4)
MAGNESIUM: 3.8 MG/DL (ref 1.8–2.4)
MCH RBC QN AUTO: 30.3 PG (ref 26–34)
MCH RBC QN AUTO: 30.4 PG (ref 26–34)
MCHC RBC AUTO-ENTMCNC: 30.3 G/DL (ref 31–36)
MCHC RBC AUTO-ENTMCNC: 32.4 G/DL (ref 31–36)
MCV RBC AUTO: 100.1 FL (ref 80–100)
MCV RBC AUTO: 93.7 FL (ref 80–100)
METAMYELOCYTES RELATIVE PERCENT: 2 %
METHEMOGLOBIN VENOUS: 0.7 %
MICROSCOPIC EXAMINATION: YES
MONOCYTES ABSOLUTE: 1.7 K/UL (ref 0–1.3)
MONOCYTES ABSOLUTE: 2.2 K/UL (ref 0–1.3)
MONOCYTES RELATIVE PERCENT: 11 %
MONOCYTES RELATIVE PERCENT: 7.6 %
MYELOCYTE PERCENT: 1 %
NEUTROPHILS ABSOLUTE: 16.1 K/UL (ref 1.7–7.7)
NEUTROPHILS ABSOLUTE: 19 K/UL (ref 1.7–7.7)
NEUTROPHILS RELATIVE PERCENT: 68 %
NEUTROPHILS RELATIVE PERCENT: 86.3 %
NITRITE, URINE: NEGATIVE
O2 SAT, VEN: 73 %
O2 THERAPY: ABNORMAL
PCO2, VEN: 34.7 MMHG (ref 40–50)
PDW BLD-RTO: 13.4 % (ref 12.4–15.4)
PDW BLD-RTO: 14.9 % (ref 12.4–15.4)
PERFORMED ON: ABNORMAL
PH UA: 5 (ref 5–8)
PH VENOUS: 6.83 (ref 7.35–7.45)
PHOSPHORUS: 12.2 MG/DL (ref 2.5–4.9)
PHOSPHORUS: 2.7 MG/DL (ref 2.5–4.9)
PHOSPHORUS: 5.1 MG/DL (ref 2.5–4.9)
PLATELET # BLD: 185 K/UL (ref 135–450)
PLATELET # BLD: 257 K/UL (ref 135–450)
PMV BLD AUTO: 10.4 FL (ref 5–10.5)
PMV BLD AUTO: 10.8 FL (ref 5–10.5)
PO2, VEN: 56 MMHG
POTASSIUM REFLEX MAGNESIUM: 4.2 MMOL/L (ref 3.5–5.1)
POTASSIUM SERPL-SCNC: 4.2 MMOL/L (ref 3.5–5.1)
POTASSIUM SERPL-SCNC: 5.1 MMOL/L (ref 3.5–5.1)
POTASSIUM SERPL-SCNC: 5.5 MMOL/L (ref 3.5–5.1)
PRO-BNP: 81 PG/ML (ref 0–124)
PROTEIN UA: 100 MG/DL
RBC # BLD: 4.8 M/UL (ref 4.2–5.9)
RBC # BLD: 5.41 M/UL (ref 4.2–5.9)
RBC # BLD: NORMAL 10*6/UL
RBC UA: ABNORMAL /HPF (ref 0–4)
SARS-COV-2, NAAT: NOT DETECTED
SODIUM BLD-SCNC: 121 MMOL/L (ref 136–145)
SODIUM BLD-SCNC: 123 MMOL/L (ref 136–145)
SODIUM BLD-SCNC: 124 MMOL/L (ref 136–145)
SODIUM BLD-SCNC: 124 MMOL/L (ref 136–145)
SPECIFIC GRAVITY UA: 1.02 (ref 1–1.03)
TCO2 CALC VENOUS: 7 MMOL/L
TOTAL CK: 185 U/L (ref 39–308)
TOTAL PROTEIN: 8.2 G/DL (ref 6.4–8.2)
TRIGL SERPL-MCNC: 361 MG/DL (ref 0–150)
URINE TYPE: ABNORMAL
UROBILINOGEN, URINE: 0.2 E.U./DL
WBC # BLD: 20.1 K/UL (ref 4–11)
WBC # BLD: 22 K/UL (ref 4–11)
WBC UA: 2 /HPF (ref 0–5)

## 2021-12-12 PROCEDURE — 2500000003 HC RX 250 WO HCPCS

## 2021-12-12 PROCEDURE — 96365 THER/PROPH/DIAG IV INF INIT: CPT

## 2021-12-12 PROCEDURE — 6360000002 HC RX W HCPCS: Performed by: STUDENT IN AN ORGANIZED HEALTH CARE EDUCATION/TRAINING PROGRAM

## 2021-12-12 PROCEDURE — 6370000000 HC RX 637 (ALT 250 FOR IP): Performed by: INTERNAL MEDICINE

## 2021-12-12 PROCEDURE — 6370000000 HC RX 637 (ALT 250 FOR IP): Performed by: PHYSICIAN ASSISTANT

## 2021-12-12 PROCEDURE — 2500000003 HC RX 250 WO HCPCS: Performed by: PHYSICIAN ASSISTANT

## 2021-12-12 PROCEDURE — 83690 ASSAY OF LIPASE: CPT

## 2021-12-12 PROCEDURE — 2000000000 HC ICU R&B

## 2021-12-12 PROCEDURE — 83880 ASSAY OF NATRIURETIC PEPTIDE: CPT

## 2021-12-12 PROCEDURE — 99283 EMERGENCY DEPT VISIT LOW MDM: CPT

## 2021-12-12 PROCEDURE — 87086 URINE CULTURE/COLONY COUNT: CPT

## 2021-12-12 PROCEDURE — 87040 BLOOD CULTURE FOR BACTERIA: CPT

## 2021-12-12 PROCEDURE — 87635 SARS-COV-2 COVID-19 AMP PRB: CPT

## 2021-12-12 PROCEDURE — 83735 ASSAY OF MAGNESIUM: CPT

## 2021-12-12 PROCEDURE — 83605 ASSAY OF LACTIC ACID: CPT

## 2021-12-12 PROCEDURE — 85025 COMPLETE CBC W/AUTO DIFF WBC: CPT

## 2021-12-12 PROCEDURE — 2580000003 HC RX 258: Performed by: SURGERY

## 2021-12-12 PROCEDURE — 80053 COMPREHEN METABOLIC PANEL: CPT

## 2021-12-12 PROCEDURE — 02HV33Z INSERTION OF INFUSION DEVICE INTO SUPERIOR VENA CAVA, PERCUTANEOUS APPROACH: ICD-10-PCS | Performed by: EMERGENCY MEDICINE

## 2021-12-12 PROCEDURE — 2580000003 HC RX 258: Performed by: INTERNAL MEDICINE

## 2021-12-12 PROCEDURE — 82550 ASSAY OF CK (CPK): CPT

## 2021-12-12 PROCEDURE — 36556 INSERT NON-TUNNEL CV CATH: CPT

## 2021-12-12 PROCEDURE — 36592 COLLECT BLOOD FROM PICC: CPT

## 2021-12-12 PROCEDURE — 71045 X-RAY EXAM CHEST 1 VIEW: CPT

## 2021-12-12 PROCEDURE — 82803 BLOOD GASES ANY COMBINATION: CPT

## 2021-12-12 PROCEDURE — 81001 URINALYSIS AUTO W/SCOPE: CPT

## 2021-12-12 PROCEDURE — 2500000003 HC RX 250 WO HCPCS: Performed by: STUDENT IN AN ORGANIZED HEALTH CARE EDUCATION/TRAINING PROGRAM

## 2021-12-12 PROCEDURE — 84478 ASSAY OF TRIGLYCERIDES: CPT

## 2021-12-12 PROCEDURE — 82947 ASSAY GLUCOSE BLOOD QUANT: CPT

## 2021-12-12 PROCEDURE — 94761 N-INVAS EAR/PLS OXIMETRY MLT: CPT

## 2021-12-12 PROCEDURE — 2580000003 HC RX 258: Performed by: PHYSICIAN ASSISTANT

## 2021-12-12 PROCEDURE — 84100 ASSAY OF PHOSPHORUS: CPT

## 2021-12-12 PROCEDURE — 6360000002 HC RX W HCPCS: Performed by: INTERNAL MEDICINE

## 2021-12-12 PROCEDURE — 93010 ELECTROCARDIOGRAM REPORT: CPT | Performed by: INTERNAL MEDICINE

## 2021-12-12 PROCEDURE — 82150 ASSAY OF AMYLASE: CPT

## 2021-12-12 PROCEDURE — 2700000000 HC OXYGEN THERAPY PER DAY

## 2021-12-12 PROCEDURE — 82010 KETONE BODYS QUAN: CPT

## 2021-12-12 PROCEDURE — 74176 CT ABD & PELVIS W/O CONTRAST: CPT

## 2021-12-12 PROCEDURE — 2500000003 HC RX 250 WO HCPCS: Performed by: INTERNAL MEDICINE

## 2021-12-12 PROCEDURE — 93005 ELECTROCARDIOGRAM TRACING: CPT | Performed by: PHYSICIAN ASSISTANT

## 2021-12-12 PROCEDURE — 36415 COLL VENOUS BLD VENIPUNCTURE: CPT

## 2021-12-12 PROCEDURE — 2580000003 HC RX 258: Performed by: STUDENT IN AN ORGANIZED HEALTH CARE EDUCATION/TRAINING PROGRAM

## 2021-12-12 RX ORDER — DEXTROSE AND SODIUM CHLORIDE 5; .45 G/100ML; G/100ML
INJECTION, SOLUTION INTRAVENOUS CONTINUOUS PRN
Status: DISCONTINUED | OUTPATIENT
Start: 2021-12-12 | End: 2021-12-15

## 2021-12-12 RX ORDER — 0.9 % SODIUM CHLORIDE 0.9 %
1000 INTRAVENOUS SOLUTION INTRAVENOUS ONCE
Status: COMPLETED | OUTPATIENT
Start: 2021-12-12 | End: 2021-12-12

## 2021-12-12 RX ORDER — DEXTROSE MONOHYDRATE 25 G/50ML
12.5 INJECTION, SOLUTION INTRAVENOUS PRN
Status: DISCONTINUED | OUTPATIENT
Start: 2021-12-12 | End: 2021-12-31 | Stop reason: HOSPADM

## 2021-12-12 RX ORDER — POTASSIUM CHLORIDE 7.45 MG/ML
10 INJECTION INTRAVENOUS PRN
Status: DISCONTINUED | OUTPATIENT
Start: 2021-12-12 | End: 2021-12-14

## 2021-12-12 RX ORDER — SODIUM CHLORIDE 9 MG/ML
INJECTION, SOLUTION INTRAVENOUS CONTINUOUS
Status: DISCONTINUED | OUTPATIENT
Start: 2021-12-12 | End: 2021-12-12

## 2021-12-12 RX ORDER — DEXTROSE MONOHYDRATE 50 MG/ML
100 INJECTION, SOLUTION INTRAVENOUS PRN
Status: DISCONTINUED | OUTPATIENT
Start: 2021-12-12 | End: 2021-12-31 | Stop reason: HOSPADM

## 2021-12-12 RX ORDER — NICOTINE POLACRILEX 4 MG
15 LOZENGE BUCCAL PRN
Status: DISCONTINUED | OUTPATIENT
Start: 2021-12-12 | End: 2021-12-31 | Stop reason: HOSPADM

## 2021-12-12 RX ORDER — HEPARIN SODIUM 5000 [USP'U]/ML
5000 INJECTION, SOLUTION INTRAVENOUS; SUBCUTANEOUS EVERY 8 HOURS SCHEDULED
Status: DISCONTINUED | OUTPATIENT
Start: 2021-12-12 | End: 2021-12-13

## 2021-12-12 RX ORDER — ONDANSETRON 2 MG/ML
4 INJECTION INTRAMUSCULAR; INTRAVENOUS EVERY 6 HOURS PRN
Status: DISCONTINUED | OUTPATIENT
Start: 2021-12-12 | End: 2021-12-18

## 2021-12-12 RX ORDER — MAGNESIUM SULFATE 1 G/100ML
1000 INJECTION INTRAVENOUS PRN
Status: DISCONTINUED | OUTPATIENT
Start: 2021-12-12 | End: 2021-12-23

## 2021-12-12 RX ORDER — POLYETHYLENE GLYCOL 3350 17 G/17G
17 POWDER, FOR SOLUTION ORAL DAILY PRN
Status: DISCONTINUED | OUTPATIENT
Start: 2021-12-12 | End: 2021-12-12

## 2021-12-12 RX ORDER — SODIUM CHLORIDE 9 MG/ML
INJECTION, SOLUTION INTRAVENOUS CONTINUOUS
Status: DISCONTINUED | OUTPATIENT
Start: 2021-12-12 | End: 2021-12-14

## 2021-12-12 RX ADMIN — HEPARIN SODIUM 5000 UNITS: 5000 INJECTION INTRAVENOUS; SUBCUTANEOUS at 13:39

## 2021-12-12 RX ADMIN — SODIUM CHLORIDE 27.97 UNITS/HR: 9 INJECTION, SOLUTION INTRAVENOUS at 22:31

## 2021-12-12 RX ADMIN — ONDANSETRON 4 MG: 2 INJECTION INTRAMUSCULAR; INTRAVENOUS at 23:05

## 2021-12-12 RX ADMIN — SODIUM PHOSPHATE, MONOBASIC, MONOHYDRATE 10 MMOL: 276; 142 INJECTION, SOLUTION INTRAVENOUS at 22:12

## 2021-12-12 RX ADMIN — POTASSIUM CHLORIDE 10 MEQ: 7.46 INJECTION, SOLUTION INTRAVENOUS at 15:37

## 2021-12-12 RX ADMIN — POTASSIUM CHLORIDE 10 MEQ: 7.46 INJECTION, SOLUTION INTRAVENOUS at 16:53

## 2021-12-12 RX ADMIN — CEFEPIME HYDROCHLORIDE 1000 MG: 1 INJECTION, POWDER, FOR SOLUTION INTRAMUSCULAR; INTRAVENOUS at 12:10

## 2021-12-12 RX ADMIN — Medication 2 MCG/MIN: at 10:01

## 2021-12-12 RX ADMIN — SODIUM CHLORIDE: 9 INJECTION, SOLUTION INTRAVENOUS at 12:09

## 2021-12-12 RX ADMIN — SODIUM CHLORIDE 22.1 UNITS/HR: 9 INJECTION, SOLUTION INTRAVENOUS at 16:53

## 2021-12-12 RX ADMIN — SODIUM CHLORIDE 1000 ML: 9 INJECTION, SOLUTION INTRAVENOUS at 10:04

## 2021-12-12 RX ADMIN — SODIUM BICARBONATE 25 MEQ: 84 INJECTION, SOLUTION INTRAVENOUS at 10:54

## 2021-12-12 RX ADMIN — HEPARIN SODIUM 5000 UNITS: 5000 INJECTION INTRAVENOUS; SUBCUTANEOUS at 22:06

## 2021-12-12 RX ADMIN — POTASSIUM CHLORIDE 10 MEQ: 7.46 INJECTION, SOLUTION INTRAVENOUS at 19:19

## 2021-12-12 RX ADMIN — SODIUM BICARBONATE 100 MEQ: 84 INJECTION, SOLUTION INTRAVENOUS at 20:30

## 2021-12-12 RX ADMIN — MEROPENEM 500 MG: 500 INJECTION, POWDER, FOR SOLUTION INTRAVENOUS at 20:25

## 2021-12-12 RX ADMIN — METRONIDAZOLE 500 MG: 500 INJECTION, SOLUTION INTRAVENOUS at 20:59

## 2021-12-12 RX ADMIN — SODIUM CHLORIDE: 9 INJECTION, SOLUTION INTRAVENOUS at 16:25

## 2021-12-12 RX ADMIN — POTASSIUM CHLORIDE 10 MEQ: 7.46 INJECTION, SOLUTION INTRAVENOUS at 14:32

## 2021-12-12 RX ADMIN — POTASSIUM CHLORIDE 10 MEQ: 7.46 INJECTION, SOLUTION INTRAVENOUS at 18:12

## 2021-12-12 RX ADMIN — SODIUM BICARBONATE: 84 INJECTION, SOLUTION INTRAVENOUS at 20:28

## 2021-12-12 RX ADMIN — SODIUM CHLORIDE 13.09 UNITS/HR: 9 INJECTION, SOLUTION INTRAVENOUS at 10:46

## 2021-12-12 RX ADMIN — SODIUM CHLORIDE 1000 ML: 9 INJECTION, SOLUTION INTRAVENOUS at 19:49

## 2021-12-12 RX ADMIN — SODIUM CHLORIDE: 9 INJECTION, SOLUTION INTRAVENOUS at 12:03

## 2021-12-12 ASSESSMENT — PAIN DESCRIPTION - FREQUENCY: FREQUENCY: CONTINUOUS

## 2021-12-12 ASSESSMENT — PAIN SCALES - GENERAL
PAINLEVEL_OUTOF10: 0
PAINLEVEL_OUTOF10: 5

## 2021-12-12 ASSESSMENT — PAIN DESCRIPTION - DESCRIPTORS: DESCRIPTORS: DULL

## 2021-12-12 ASSESSMENT — PAIN DESCRIPTION - LOCATION: LOCATION: ABDOMEN

## 2021-12-12 ASSESSMENT — PAIN DESCRIPTION - ONSET: ONSET: ON-GOING

## 2021-12-12 ASSESSMENT — PAIN DESCRIPTION - PAIN TYPE: TYPE: ACUTE PAIN

## 2021-12-12 ASSESSMENT — PAIN - FUNCTIONAL ASSESSMENT: PAIN_FUNCTIONAL_ASSESSMENT: PREVENTS OR INTERFERES SOME ACTIVE ACTIVITIES AND ADLS

## 2021-12-12 ASSESSMENT — PAIN DESCRIPTION - ORIENTATION: ORIENTATION: MID

## 2021-12-12 NOTE — ED PROVIDER NOTES
allergies. FAMILY HISTORY     No family history on file. SOCIAL HISTORY       Social History     Socioeconomic History    Marital status: Single     Spouse name: Not on file    Number of children: Not on file    Years of education: Not on file    Highest education level: Not on file   Occupational History    Not on file   Tobacco Use    Smoking status: Never Smoker    Smokeless tobacco: Never Used   Substance and Sexual Activity    Alcohol use: Yes     Comment: social    Drug use: Never    Sexual activity: Not on file   Other Topics Concern    Not on file   Social History Narrative    Not on file     Social Determinants of Health     Financial Resource Strain:     Difficulty of Paying Living Expenses: Not on file   Food Insecurity:     Worried About Running Out of Food in the Last Year: Not on file    Bienvenido of Food in the Last Year: Not on file   Transportation Needs:     Lack of Transportation (Medical): Not on file    Lack of Transportation (Non-Medical):  Not on file   Physical Activity:     Days of Exercise per Week: Not on file    Minutes of Exercise per Session: Not on file   Stress:     Feeling of Stress : Not on file   Social Connections:     Frequency of Communication with Friends and Family: Not on file    Frequency of Social Gatherings with Friends and Family: Not on file    Attends [employer] Services: Not on file    Active Member of [address] [place] [address] [place] or Organizations: Not on file    Attends Club or Organization Meetings: Not on file    Marital Status: Not on file   Intimate Partner Violence:     Fear of Current or Ex-Partner: Not on file    Emotionally Abused: Not on file    Physically Abused: Not on file    Sexually Abused: Not on file   Housing Stability:     Unable to Pay for Housing in the Last Year: Not on file    Number of [place] in the Last Year: Not on file    Unstable Housing in the Last Year: Not on file       SCREENINGS    [place] Coma Scale  Eye Opening: Spontaneous  Best Verbal Response: Oriented  Best Motor Response: Obeys commands  [place] Coma Scale Score: 15      PHYSICAL EXAM    (up to 7 for level 4, 8 or more for level 5)     ED Triage Vitals [12/12/21 0911]   BP Temp Temp src Pulse Resp SpO2 Height Weight   (!) 70/21 -- -- 99 (!) 36 96 % -- 288 lb 9.3 oz (130.9 kg)       Physical Exam    General: Alert and awake ×3. Nontoxic appearance. Well-developed well-nourished 51-year-old black male in respiratory distress. Patient is hypotensive. Patient still conscious answering questions. HEENT: Normocephalic atraumatic. Neck is supple. Airway intact. No adenopathy  Cardiac: Regular rate and rhythm with no murmurs rubs or gallops  Pulmonary: Lungs are clear in all lung fields. No wheezing. No Rales. Abdomen: Soft and nontender. Negative hepatosplenomegaly. Bowel sounds are active  Extremities: Moving all extremities. No calf tenderness. Peripheral pulses all intact  Skin: No skin lesions. No rashes  Neurologic: Cranial nerves II through XII was grossly intact. Nonfocal neurological exam  Psychiatric: Patient is pleasant. Mood is appropriate. DIAGNOSTIC RESULTS     EKG (Per Emergency Physician):     Accelerated junctional rhythm and rate about 95 low voltage some depression noted probably abnormal.    RADIOLOGY (Per Emergency Physician): Interpretation per the Radiologist below, if available at the time of this note:  CT ABDOMEN PELVIS WO CONTRAST Additional Contrast? None    Result Date: 12/12/2021  EXAMINATION: CT OF THE ABDOMEN AND PELVIS WITHOUT CONTRAST 12/12/2021 1:51 pm TECHNIQUE: CT of the abdomen and pelvis was performed without the administration of intravenous contrast. Multiplanar reformatted images are provided for review. Dose modulation, iterative reconstruction, and/or weight based adjustment of the mA/kV was utilized to reduce the radiation dose to as low as reasonably achievable. COMPARISON: None.  HISTORY: Shock, nausea/vomiting. FINDINGS: Lower Chest: Bibasilar atelectasis/scarring. Organs: Extensive pancreatic edema extending throughout the retroperitoneum. No focal fluid collection. Hepatomegaly with severe steatosis. Hyperdensity within the gallbladder could represent sludge. The kidneys, adrenals, and spleen are unremarkable. GI/Bowel: The distal esophagus is dilated and filled with fluid. Stomach is distended but unremarkable. Duodenal inflammation is likely reactive. No bowel obstruction. Normal appendix. Diverticulosis throughout the colon without acute diverticulitis. Pelvis: Weiss catheter within the urinary bladder. Prostate is unremarkable. No lymphadenopathy or free fluid. Peritoneum/Retroperitoneum: No significant lymphadenopathy. Atherosclerotic disease of the distal abdominal aorta without aneurysm. Bones/Soft Tissues: Moderate-the right mild osteoarthrosis of the left hip. 1. Severe acute interstitial pancreatitis. No focal fluid collection. 2. Hepatomegaly with severe steatosis. 3. Possible gallbladder sludge. 4. Colonic diverticulosis without acute diverticulitis. 5. The distal esophagus is dilated and filled with fluid. RECOMMENDATIONS: Unavailable     XR CHEST PORTABLE    Result Date: 12/12/2021  EXAMINATION: ONE XRAY VIEW OF THE CHEST 12/12/2021 10:16 am COMPARISON: Chest x-ray dated 08/16/2006. HISTORY: ORDERING SYSTEM PROVIDED HISTORY: other TECHNOLOGIST PROVIDED HISTORY: Reason for exam:->other Reason for Exam: Hypotension,SOB FINDINGS: HEART/MEDIASTINUM: The cardiomediastinal silhouette is within normal limits. PLEURA/LUNGS: There are no focal consolidations or pleural effusions. There is no appreciable pneumothorax. There are low lung volumes. There is elevation of the right hemidiaphragm. There is right basilar atelectasis. BONES/SOFT TISSUE: No acute abnormality. No radiographic evidence of acute pulmonary disease.        ED BEDSIDE ULTRASOUND:   Performed by ED Physician - none    LABS:  Labs Reviewed   LACTATE, SEPSIS - Abnormal; Notable for the following components:       Result Value    Lactic Acid, Sepsis 8.5 (*)     All other components within normal limits    Narrative:     Ilya Morales tel. [phone],  Chemistry results called to and read back by Lynda Young RN, 12/12/2021 10:32,  by [facility]  Performed at:  [facility]  [address], [address] [facility] [employer] [address]   Phone [phone]   LACTATE, SEPSIS - Abnormal; Notable for the following components:    Lactic Acid, Sepsis 3.6 (*)     All other components within normal limits    Narrative:     Performed at:  [address] [facility] [employer] [address]   Phone [phone]   BETA-HYDROXYBUTYRATE - Abnormal; Notable for the following components:    Beta-Hydroxybutyrate >8.00 (*)     All other components within normal limits    Narrative:     Ilya Morales tel. [phone],  Chemistry results called to and read back by Lynda Young RN, 12/12/2021 10:32,  by [facility]  Performed at:  [facility]  [address], [address] [facility] [employer] [address]   Phone [phone]   CBC WITH AUTO DIFFERENTIAL - Abnormal; Notable for the following components:    WBC 20.1 (*)     Hematocrit 54.1 (*)     .1 (*)     MCHC 30.3 (*)     MPV 10.8 (*)     Neutrophils Absolute 16.1 (*)     Monocytes Absolute 2.2 (*)     Bands Relative 9 (*)     Metamyelocytes Relative 2 (*)     Myelocyte Percent 1 (*)     All other components within normal limits    Narrative:     Performed at:  [facility]  [address], [address] [facility] [employer] [address]   Phone [phone]   COMPREHENSIVE METABOLIC PANEL W/ REFLEX TO MG FOR LOW K - Abnormal; Notable for the following components:    Sodium 124 (*)     Chloride 75 (*)     CO2 5 (*)     Anion Gap 44 (*)     Glucose 881 (*)     BUN 44 (*)     CREATININE 3.4 (*)     GFR Non- at:  [facility]  [address] [facility] [employer] [address]   Phone [phone]   BASIC METABOLIC PANEL - Abnormal; Notable for the following components:    Sodium 121 (*)     Chloride 85 (*)     CO2 5 (*)     Anion Gap 31 (*)     Glucose 694 (*)     BUN 41 (*)     CREATININE 3.0 (*)     GFR Non- 22 (*)     GFR  27 (*)     Calcium 7.4 (*)     All other components within normal limits    Narrative:     Isatu Friasos  SKK2W tel. [phone],  Chemistry results called to and read back by Dahlia Salinas RN. , 12/12/2021  17:02, by Rabia Sanchez  Performed at:  [facility]  [address] [facility] [employer] [address]   Phone [phone]   BASIC METABOLIC PANEL - Abnormal; Notable for the following components:    Sodium 124 (*)     Potassium 5.5 (*)     Chloride 90 (*)     CO2 5 (*)     Anion Gap 29 (*)     Glucose 592 (*)     BUN 49 (*)     CREATININE 3.1 (*)     GFR Non- 21 (*)     GFR  26 (*)     Calcium 7.2 (*)     All other components within normal limits    Narrative:     Isatu Astorga  SKK2W tel. [phone],  Chemistry results called to and read back by Sonia Lopez RN., 12/12/2021  20:30, by Rabia Sanchez  Performed at:  [facility]  [address] [facility] [employer] [address]   Phone [phone]   MAGNESIUM - Abnormal; Notable for the following components:    Magnesium 3.80 (*)     All other components within normal limits    Narrative:     Diamond Palm tel. [phone],  Chemistry results called to and read back by Scottie Isaac RN, 12/12/2021 13:35,  by [facility]  Chemistry results called to and read back by Scottie Isaac RN, 12/12/2021 13:34,  by [facility]  Performed at:  [facility]  [address] [facility] [employer] [address]   Phone [phone]   MAGNESIUM - Abnormal; Notable for the following components:    Magnesium 2.70 (*)     All other components within normal limits    Narrative:     Aundria Friday tel. [phone],  Chemistry results called to and read back by Cristiane Hodge RN. , 12/12/2021  17:02, by Chago Peacock  Performed at:  [address] [facility] [employer] [address]   Phone [phone]   PHOSPHORUS - Abnormal; Notable for the following components:    Phosphorus 12.2 (*)     All other components within normal limits    Narrative:     Aundria Friday tel. [phone],  Chemistry results called to and read back by Renetta Gaitan RN, 12/12/2021 13:35,  by [facility]  Chemistry results called to and read back by Renetta Gaitan RN, 12/12/2021 13:34,  by [facility]  Performed at:  [address] [facility] [employer] [address]   Phone [phone]   PHOSPHORUS - Abnormal; Notable for the following components:    Phosphorus 5.1 (*)     All other components within normal limits    Narrative:     Aundria Friday tel. [phone],  Chemistry results called to and read back by Cristiane Hodge RN. , 12/12/2021  17:02, by Chago Peacock  Performed at:  [address] [employer] [address]   Phone [phone]   BASIC METABOLIC PANEL - Abnormal; Notable for the following components:    Sodium 131 (*)     Chloride 94 (*)     CO2 12 (*)     Anion Gap 25 (*)     Glucose 350 (*)     BUN 47 (*)     CREATININE 2.8 (*)     GFR Non- 24 (*)     GFR  29 (*)     Calcium 7.0 (*)     All other components within normal limits    Narrative:     CALL  [place]  SKK2 tel. [phone], Previous panic on this admission - call  not needed per SOP  Previous panic on this admission - call not needed per SOP, 12/13/2021 00:09,  by BLUSH  Performed at:  [address] [employer] [address]   Phone [phone]   PHOSPHORUS - Abnormal; Notable for the following components:    Phosphorus 1.1 (*)     All other components within normal limits    Narrative:     CALL  [place]  SKK2W tel. [phone], Previous panic on this admission - call  not needed per SOP  Previous panic on this admission - call not needed per SOP, 12/13/2021 00:09,  by BLUSH  Performed at:  [facility]  [address] [facility] [employer] [address]   Phone [phone]   GLUCOSE - Abnormal; Notable for the following components:    Glucose 781 (*)     All other components within normal limits    Narrative:     Art George tel. [phone],  Chemistry results called to and read back by Ernesto Gilford RN, 12/12/2021 13:47,  by [facility]  Performed at:  [address] [facility] [employer] [address]   Phone [phone]   GLUCOSE - Abnormal; Notable for the following components:    Glucose 786 (*)     All other components within normal limits    Narrative:     Art George tel. [phone],  Chemistry results called to and read back by Ernesto Gilford RN. , 12/12/2021  14:56, by Db Ross  Performed at:  [address] [facility] [employer] [address]   Phone [phone]   GLUCOSE - Abnormal; Notable for the following components:    Glucose 788 (*)     All other components within normal limits    Narrative:     Bennie Lake  SKK2W tel. [phone],  Chemistry results called to and read back by Delories Mcburney RN. , 12/12/2021 16:00,  by Db Ross  Performed at:  [address] [employer] [address]   Phone [phone]   URINALYSIS - Abnormal; Notable for the following components:    Clarity, UA CLOUDY (*)     Glucose, Ur >=1000 (*)     Bilirubin Urine MODERATE (*)     Ketones, Urine 40 (*)     Blood, Urine LARGE (*)     Protein,  (*)     All other components within normal limits    Narrative:     Performed at:  [facility]  [address] [place] [address] [place] [address]   Phone [phone]   BASIC METABOLIC PANEL - Abnormal; Notable for the following components:    Sodium 130 (*)     Chloride 94 (*)     CO2 16 (*)     Anion Gap 20 (*)     Glucose 275 (*)     BUN 47 (*)     CREATININE 2.6 (*)     GFR Non- 26 (*)     GFR  32 (*)     Calcium 7.0 (*)     All other components within normal limits    Narrative:     Performed at:  [facility]  [address], [place] [employer]   Phone [phone]   PHOSPHORUS - Abnormal; Notable for the following components:    Phosphorus 1.5 (*)     All other components within normal limits    Narrative:     Performed at:  [facility]  [address], [place] [address]   Phone [phone]   MICROSCOPIC URINALYSIS - Abnormal; Notable for the following components:    Hyaline Casts, UA 13 (*)     All other components within normal limits    Narrative:     Performed at:  [facility]  [address], [place] [address]   Phone [phone]   GLUCOSE - Abnormal; Notable for the following components:    Glucose 671 (*)     All other components within normal limits    Narrative:     Osvaldo Montero  SKK2W tel. [phone],  Chemistry results called to and read back by Mariposa Sauer RN. , 12/12/2021 18:22,  by Nabeel Beckman  Performed at:  [facility]  [address], [place] [address]   Phone [phone]   CBC WITH AUTO DIFFERENTIAL - Abnormal; Notable for the following components:    WBC 17.4 (*)     Hemoglobin 13.3 (*)     Hematocrit 38.5 (*)     Neutrophils Absolute 14.6 (*)     Lymphocytes Absolute 0.6 (*)     Monocytes Absolute 2.1 (*)     All other components within normal limits    Narrative:     Performed at:  [place] [address], [place] [address]   Phone [phone]   GLUCOSE - Abnormal; Notable for the following components:    Glucose 644 (*)     All other components within normal limits    Narrative:     Paula Whittaker tel. [phone],  Chemistry results called to and read back by Mariposa Sauer RN. , 12/12/2021 19:04,  by Nabeel Beckman  Performed at:  [address] [facility] [address]   Phone [phone]   LIPASE - Abnormal; Notable for the following components:    Lipase >3,000.0 (*)     All other components within normal limits    Narrative:     Performed at:  [address] [address]   Phone [phone]   AMYLASE - Abnormal; Notable for the following components:    Amylase 1,026 (*)     All other components within normal limits    Narrative:     Osvaldo Montero  SKK2W tel. [phone],  Chemistry results called to and read back by Herber Gray RN., 12/12/2021  20:30, by Nabeel Beckman  Performed at:  [address] [address]   Phone [phone]   CBC WITH AUTO DIFFERENTIAL - Abnormal; Notable for the following components:    WBC 22.0 (*)     Neutrophils Absolute 19.0 (*)     Monocytes Absolute 1.7 (*)     All other components within normal limits    Narrative:     Performed at:  [address] [employer]   Phone [phone]   TRIGLYCERIDES - Abnormal; Notable for the following components:    Triglycerides 361 (*)     All other components within normal limits    Narrative:     Performed at:  [address] [employer] [address]   Phone [phone]   OCCULT BLOOD GASTRIC / DUODENUM - Abnormal; Notable for the following components:    Occult Blood, Other   (*)     Value: Result: POSITIVE  Normal range: Negative      All other components within normal limits    Narrative:     ORDER#: [id number]                          ORDERED BY: Via Crocodile Goldsarah beth Strange, GUI  SOURCE: Duodenal Aspirate                  COLLECTED:  12/13/21 00:47  ANTIBIOTICS AT MEET.:                      RECEIVED :  12/13/21 01:09  Performed at:  [facility]  [address] [facility] [employer] [address]   Phone [phone]   POCT GLUCOSE - Abnormal; Notable for the following components:    POC Glucose >600 (*)     All other components within normal limits    Narrative:     Performed at:  [facility]  [address], [place] [facility] [employer] [address]   Phone [phone]   POCT GLUCOSE - Abnormal; Notable for the following components:    POC Glucose 374 (*)     All other components within normal limits    Narrative:     Performed at:  [facility]  [address] [place], [place] [facility] [employer] [address]   Phone [phone]   POCT GLUCOSE - Abnormal; Notable for the following components:    POC Glucose 395 (*)     All other components within normal limits    Narrative:     Performed at:  [facility]  [address], [place] [facility] [employer] [address]   Phone [phone]   POCT GLUCOSE - Abnormal; Notable for the following components:    POC Glucose 383 (*)     All other components within normal limits    Narrative:     Performed at:  [facility]  [address], [place] [facility] [employer] [address]   Phone [phone]   POCT GLUCOSE - Abnormal; Notable for the following components:    POC Glucose 302 (*)     All other components within normal limits    Narrative:     Performed at:  [facility]  [address], [place] [employer] [address]   Phone [phone]   POCT GLUCOSE - Abnormal; Notable for the following components:    POC Glucose 282 (*)     All other components within normal limits    Narrative:     Performed at:  [facility]  [address], [place] [employer] [address]   Phone [phone]   POCT GLUCOSE - Abnormal; Notable for the following components:    POC Glucose 270 (*)     All other components within normal limits    Narrative:     Performed at:  [place] [address] [place], [place] [facility] [employer] [address]   Phone [phone]   POCT GLUCOSE - Abnormal; Notable for the following components:    POC Glucose 241 (*)     All other components within normal limits    Narrative:     Performed at:  [facility]  [address] [place], [place] [facility] [employer] [address]   Phone [phone]   POCT GLUCOSE - Abnormal; Notable for the following components:    POC Glucose 250 (*)     All other components within normal limits    Narrative:     Performed at:  [address] [facility] [employer] [address]   Phone [phone]   POCT GLUCOSE - Abnormal; Notable for the following components:    POC Glucose 250 (*)     All other components within normal limits    Narrative:     Performed at:  [address] [employer] [address]   Phone [phone]   POCT GLUCOSE - Abnormal; Notable for the following components:    POC Glucose 317 (*)     All other components within normal limits    Narrative:     Performed at:  [address] [facility] [employer] [address]   Phone [phone]   POCT GLUCOSE - Abnormal; Notable for the following components:    POC Glucose 332 (*)     All other components within normal limits    Narrative:     Performed at:  [address] [employer] [address]   Phone [phone]   POCT GLUCOSE - Abnormal; Notable for the following components:    POC Glucose 338 (*)     All other components within normal limits    Narrative:     Performed at:  [address] [employer] [address]   Phone [phone]   COVID-19, RAPID    Narrative:     Performed at:  [facility]  [address], [facility] [employer] [address]   Phone [phone]   CULTURE, BLOOD 1   CULTURE, BLOOD 2   CULTURE, URINE   BRAIN NATRIURETIC PEPTIDE    Narrative:     Performed at:  [place] [address] [facility] [employer] [address]   Phone [phone]   CK    Narrative:     Alexander Whiteer tel. [phone],  Chemistry results called to and read back by Luan Wells RN, 12/12/2021 13:35,  by [facility]  Chemistry results called to and read back by Luan Wells RN, 12/12/2021 13:34,  by [facility]  Performed at:  [place] [address] [facility] [employer] [address]   Phone [phone]   MAGNESIUM    Narrative:     Alexander Hernadez tel. [phone],  Chemistry results called to and read back by Jonathan Andrade RN., 12/12/2021  20:30, by Qian Munoz  Performed at:  [place] [address] [facility] [employer] [address]   Phone [phone]   PHOSPHORUS    Narrative:     Alexander Whiteer tel. [phone],  Chemistry results called to and read back by Jonathan Andrade RN., 12/12/2021  20:30, by Qian Munoz  Performed at:  [place] [address], [facility] [place] [address]   Phone [phone]   LACTIC ACID, PLASMA    Narrative:     Performed at:  [address] [place] [address]   Phone [phone]   MAGNESIUM    Narrative:     Vijay Pruett  SKK2W tel. [phone], Previous panic on this admission - call  not needed per SOP  Previous panic on this admission - call not needed per SOP, 12/13/2021 00:09,  by BLUSH  Performed at:  [facility]  [address], [facility] [place] [address]   Phone [phone]   LACTIC ACID, PLASMA    Narrative:     Performed at:  [facility]  [place] [address] technique: All elements of maximal sterile technique followed      Skin preparation:  2% chlorhexidine    Skin preparation agent: Skin preparation agent completely dried prior to procedure    Anesthesia (see MAR for exact dosages): Anesthesia method:  Local infiltration    Local anesthetic:  Lidocaine 1% w/o epi  Procedure details:     Location:  R femoral    Site selection rationale:  [place]    Patient position:  Flat    Procedural supplies:  Triple lumen    Catheter size:  7 Fr    Landmarks identified: yes      Ultrasound guidance: yes      Sterile ultrasound techniques: Sterile gel and sterile probe covers were used      Number of attempts:  1    Successful placement: yes    Post-procedure details:     Post-procedure:  Dressing applied and line sutured    Assessment:  Blood return through all ports    Patient tolerance of procedure:   Tolerated well, no immediate complications          EMERGENCY DEPARTMENT COURSE and DIFFERENTIAL DIAGNOSIS/MDM:   Vitals:    Vitals:    12/13/21 0700 12/13/21 0729 12/13/21 0741 12/13/21 0745   BP: 107/62 92/68  95/70   Pulse: 113 110  114   Resp: 21 25 23   Temp:       TempSrc:       SpO2: 92% (!) 88% 92% 91%   Weight:           Medications   norepinephrine (LEVOPHED) 16 mg in dextrose 5% 250 mL infusion (0 mcg/min IntraVENous Stopped 12/13/21 0506)   glucose (GLUTOSE) 40 % oral gel 15 g (has no administration in time range)   dextrose 50 % IV solution (has no administration in time range)   glucagon (rDNA) injection 1 mg (has no administration in time range)   dextrose 5 % solution (has no administration in time range)   insulin regular (HUMULIN R;NOVOLIN R) 100 Units in sodium chloride 0.9 % 100 mL infusion (22.24 Units/hr IntraVENous Rate/Dose Change 12/13/21 0713)   dextrose 50 % IV solution (has no administration in time range)   potassium chloride 10 mEq/100 mL IVPB (Peripheral Line) (10 mEq IntraVENous New Bag 12/13/21 0741)   magnesium sulfate 1000 mg in dextrose 5% 100 mL IVPB (has no administration in time range)   sodium phosphate 10 mmol in dextrose 5 % 250 mL IVPB (0 mmol IntraVENous Stopped 12/13/21 0223)     Or   sodium phosphate 15 mmol in dextrose 5 % 250 mL IVPB ( IntraVENous See Alternative 12/13/21 0223)     Or   sodium phosphate 20 mmol in dextrose 5 % 500 mL IVPB ( IntraVENous See Alternative 12/13/21 0223)   dextrose 5 % and 0.45 % sodium chloride infusion (has no administration in time range)   vancomycin (VANCOCIN) intermittent dosing (placeholder) ( Other Canceled Entry 12/12/21 1203)   0.9 % sodium chloride infusion ( IntraVENous Stopped 12/12/21 1210)   vasopressin 20 Units in dextrose 5 % 100 mL infusion (0 Units/min IntraVENous Held 12/12/21 1731)   metronidazole (FLAGYL) 500 mg in NaCl 100 mL IVPB premix (0 mg IntraVENous Stopped 12/13/21 0545)   meropenem (MERREM) 500 mg IVPB (mini-bag) (0 mg IntraVENous Stopped 12/13/21 0429)   ondansetron (ZOFRAN) injection 4 mg (4 mg IntraVENous Given 12/12/21 2305)   pantoprazole (PROTONIX) 80 mg in sodium chloride 0.9 % 100 mL infusion (8 mg/hr IntraVENous New Bag 12/13/21 0312)   sodium bicarbonate 100 mEq in dextrose 5 % 1,000 mL infusion ( IntraVENous New Bag 12/13/21 0340)   0.9 % sodium chloride bolus (0 mLs IntraVENous Stopped 12/12/21 1104)   sodium bicarbonate 8.4 % injection 25 mEq (25 mEq IntraVENous Given 12/12/21 1054)   vancomycin (VANCOCIN) 1500 mg in dextrose 5 % 250 mL IVPB (0 mg IntraVENous Stopped 12/12/21 1509)   0.9 % sodium chloride bolus (0 mLs IntraVENous Stopped 12/12/21 2056)   sodium bicarbonate 8.4 % injection 100 mEq (100 mEq IntraVENous Given 12/12/21 2030)   pantoprazole (PROTONIX) 80 mg in sodium chloride 0.9 % 100 mL bolus (0 mg IntraVENous Stopped 12/13/21 0313)       MDM. Patient is a 29-year-old male not a diabetic presents with a high sugar and hypertension. Patient has clue small breathing. Was very short of breath. Patient slightly altered. Looks very lethargic.   Unknown if he is septic. Patient has no source at this time but will need to be admitted. Discussed with the hospitalist.  He did require central line placement and IV fluids his it was life-threatening with metabolic acidosis his pH was 6.8. He was fluid resuscitated with about 4 L normal saline central line placed. X-ray pending. Patient will need to go to the ICU. Hospitalist discussion at length. REVAL:         CRITICAL CARE TIME   Total CriticalCare time was 40 minutes, excluding separately reportable procedures. There was a high probability of clinically significant/life threatening deterioration in the patient's condition which required my urgent intervention. CONSULTS:  PHARMACY TO DOSE VANCOMYCIN  IP CONSULT TO CRITICAL CARE  IP CONSULT TO GI  IP CONSULT TO GENERAL SURGERY  IP CONSULT TO GENERAL SURGERY  IP CONSULT TO NEPHROLOGY    PROCEDURES:  Unless otherwise noted below, none     [unfilled]    FINAL IMPRESSION      1. Diabetic ketoacidosis without coma associated with diabetes mellitus due to underlying condition ([facility] [address]    2. Metabolic acidosis    3. Hypotension, unspecified hypotension type    4. Hyperglycemia    5. Lymphocytosis          DISPOSITION/PLAN   DISPOSITION        PATIENT REFERRED TO:  No follow-up provider specified. DISCHARGE MEDICATIONS:  Current Discharge Medication List             (Please note:  Portions of this note were completed with a voice recognition program.Efforts were made to edit the dictations but occasionally words and phrases are mis-transcribed.)  Form v2016. J.[id number]    Jesus SANTOS MD (electronically signed)  Emergency Medicine Provider       Misty José MD  12/13/21 3762

## 2021-12-12 NOTE — ED NOTES
Pt arrived via squad c/o trouble breathing. Pt tachypneic, breathing 35 RPM. SpO 97%. Placed on 6 liters NC. BP 70/21. PA and MD notified. Pt AOx4. 20ga IV access left AC, fluid bolus initiated. EMS reported [id number] blood glucose and no history of diabetes. Pt reports heavy thirst and urination that last 2 days. Blood work collected and sent. Providers at bedside placing a central line.      Marylee Meres, RN  12/12/21 0112

## 2021-12-12 NOTE — ED NOTES
Notified You Norman RN ICU that we were en route. ED RN transported pt to ICU on monitor for bedside report, pt tolerated well, no additional needs at this time.       Wing Owen RN  12/12/21 8644

## 2021-12-12 NOTE — ED PROVIDER NOTES
[facility]        Pt Name: Marcos Rosenthal  MRN: [id number]  [place] 1970  Date of evaluation: 12/12/2021  Provider: Dian Richter  PCP: [id number] [place] [address]  Note Started: 10:50 AM EST      JACE. I have evaluated this patient. My supervising physician was available for consultation. Triage CHIEF COMPLAINT       Chief Complaint   Patient presents with    Shortness of Breath     trouble breathing since today. Feeling unwell since yesterday. EMS blood glucose 548. Pt denies DM history. HISTORY OF PRESENT ILLNESS   (Location/Symptom, Timing/Onset, Context/Setting, Quality, Duration, Modifying Factors, Severity)  Note limiting factors. Chief Complaint: Shortness of breath    Marcos Rosenthal is a 54 y.o. male who presents and I am called emergently into the room to help evaluate him. He is stating that he has shortness of breath yesterday and today and has no energy cannot get up. He denies any recent cough or congestion fevers or chills. No abdominal pain nausea or vomiting. He states he is not certain what is going on but he just feels very sick. Patient denying any pain in the chest.  No extremity acute loss of sensation or movement but positive for crushing fatigue throughout. When asked about his super elevated glucose found in the squad, he indicates he has never had high sugars before or any diagnosis of diabetes. Nursing Notes were all reviewed and agreed with or any disagreements were addressed in the HPI. REVIEW OF SYSTEMS    (2-9 systems for level 4, 10 or more for level 5)     Review of Systems  Positive history as above. Secondary to emergent nature of this patient's visit requiring active intervention for his life and safety, no further review of systems able to be obtained at this time.       PAST MEDICAL HISTORY     Past Medical History:   Diagnosis Date    H/O degenerative disc disease        SURGICAL HISTORY   No past surgical history on file. CURRENTMEDICATIONS       Previous Medications    ACETAMINOPHEN (TYLENOL) 325 MG TABLET    Take 650 mg by mouth every 6 hours as needed for Pain       ALLERGIES     Patient has no known allergies. FAMILYHISTORY     No family history on file. SOCIAL HISTORY       Social History     Socioeconomic History    Marital status: Single     Spouse name: Not on file    Number of children: Not on file    Years of education: Not on file    Highest education level: Not on file   Occupational History    Not on file   Tobacco Use    Smoking status: Never Smoker    Smokeless tobacco: Never Used   Substance and Sexual Activity    Alcohol use: Yes     Comment: social    Drug use: Never    Sexual activity: Not on file   Other Topics Concern    Not on file   Social History Narrative    Not on file     Social Determinants of Health     Financial Resource Strain:     Difficulty of Paying Living Expenses: Not on file   Food Insecurity:     Worried About Running Out of Food in the Last Year: Not on file    Bienvenido of Food in the Last Year: Not on file   Transportation Needs:     Lack of Transportation (Medical): Not on file    Lack of Transportation (Non-Medical):  Not on file   Physical Activity:     Days of Exercise per Week: Not on file    Minutes of Exercise per Session: Not on file   Stress:     Feeling of Stress : Not on file   Social Connections:     Frequency of Communication with Friends and Family: Not on file    Frequency of Social Gatherings with Friends and Family: Not on file    Attends Holiness Services: Not on file    Active Member of Clubs or Organizations: Not on file    Attends Club or Organization Meetings: Not on file    Marital Status: Not on file   Intimate Partner Violence:     Fear of Current or Ex-Partner: Not on file    Emotionally Abused: Not on file    Physically Abused: Not on file    Sexually Abused: Not normal.         DIAGNOSTIC RESULTS   LABS:    Labs Reviewed   LACTATE, SEPSIS - Abnormal; Notable for the following components:       Result Value    Lactic Acid, Sepsis 8.5 (*)     All other components within normal limits    Narrative:     Yvonne Jeffries tel. [phone],  Chemistry results called to and read back by Mann Levine RN, 12/12/2021 10:32,  by Radha Romano  Performed at:  [address] [address]   Phone [phone]   BETA-HYDROXYBUTYRATE - Abnormal; Notable for the following components:    Beta-Hydroxybutyrate >8.00 (*)     All other components within normal limits    Narrative:     Yvonne Jeffries tel. [phone],  Chemistry results called to and read back by Mann Levine RN, 12/12/2021 10:32,  by Radha Romano  Performed at:  [address] [address]   Phone [phone]   CBC WITH AUTO DIFFERENTIAL - Abnormal; Notable for the following components:    WBC 20.1 (*)     Hematocrit 54.1 (*)     .1 (*)     MCHC 30.3 (*)     MPV 10.8 (*)     All other components within normal limits    Narrative:     Performed at:  [address] [address]   Phone [phone]   COMPREHENSIVE METABOLIC PANEL W/ REFLEX TO MG FOR LOW K - Abnormal; Notable for the following components:    Sodium 124 (*)     Chloride 75 (*)     CO2 5 (*)     Anion Gap 44 (*)     Glucose 881 (*)     BUN 44 (*)     CREATININE 3.4 (*)     GFR Non- 19 (*)     GFR African American 23 (*)     Albumin/Globulin Ratio 1.0 (*)     Alkaline Phosphatase 132 (*)     ALT 46 (*)     All other components within normal limits    Narrative:     Yvonne Jeffries tel. [phone],  Chemistry results called to and read back by Mann Levine RN, 12/12/2021 10:32,  by Radha Romano  Chemistry results called to and read back by Mann Levine RN, 12/12/2021 10:32,  by POWHE  Performed at:  [place] [address] [address] [facility] [place] [address]   Phone [phone]   BLOOD GAS, VENOUS - Abnormal; Notable for the following components:    pH, Angelito 6.831 (*)     pCO2, Angelito 34.7 (*)     HCO3, Venous 6 (*)     All other components within normal limits    Narrative:     Kennedy Saleh tel. [phone],  Chemistry results called to and read back by Lan Almendarez RN, 12/12/2021 09:58,  by Vivi Seth  Performed at:  [address] [place] [address]   Phone [phone]   POCT GLUCOSE - Abnormal; Notable for the following components:    POC Glucose >600 (*)     All other components within normal limits    Narrative:     Performed at:  [address] [place] [address]   Phone [phone], RAPID    Narrative:     Performed at:  [address] [place] [address]   Phone [phone]   CULTURE, BLOOD 1   CULTURE, BLOOD 2   BRAIN NATRIURETIC PEPTIDE    Narrative:     Performed at:  [address] [place] [address]   Phone [phone]   LACTATE, SEPSIS   PROCALCITONIN   POCT GLUCOSE   POCT GLUCOSE   POCT GLUCOSE   POCT GLUCOSE   POCT GLUCOSE   POCT GLUCOSE   POCT GLUCOSE   POCT GLUCOSE   POCT GLUCOSE   POCT GLUCOSE   POCT GLUCOSE   POCT GLUCOSE   POCT GLUCOSE       When ordered, only abnormal lab results are displayed. All other labs were within normal range or not returned as of this dictation. EKG: When ordered, EKG's are interpreted by the Emergency Department Physician in the absence of a cardiologist.  Please see their note for interpretation of EKG.       RADIOLOGY:   Non-plain film images such as CT, Ultrasound and MRI are read by the radiologist. Plain radiographic images are visualized andpreliminarily interpreted by the ED Provider with the below findings:        Interpretation [facility] Radiologist below, if available at the time of this note:    XR CHEST PORTABLE   Final Result   No radiographic evidence of acute pulmonary disease. XR CHEST PORTABLE    Result Date: 12/12/2021  EXAMINATION: ONE XRAY VIEW OF THE CHEST 12/12/2021 10:16 am COMPARISON: Chest x-ray dated 08/16/2006. HISTORY: ORDERING SYSTEM PROVIDED HISTORY: other TECHNOLOGIST PROVIDED HISTORY: Reason for exam:->other Reason for Exam: Hypotension,SOB FINDINGS: HEART/MEDIASTINUM: The cardiomediastinal silhouette is within normal limits. PLEURA/LUNGS: There are no focal consolidations or pleural effusions. There is no appreciable pneumothorax. There are low lung volumes. There is elevation of the right hemidiaphragm. There is right basilar atelectasis. BONES/SOFT TISSUE: No acute abnormality. No radiographic evidence of acute pulmonary disease. PROCEDURES   Unless otherwise noted below, none     Procedures    CRITICAL CARE TIME   Critical Care  There was a high probability of life-threatening clinical deterioration in the patient's condition requiring my urgent intervention. Total critical care time with the patient was 40 minutes excluding separately reportable procedures.   Critical care require due to patients diabetic ketoacidosis requiring large amounts of IV hydration, Levophed drip, bicarb and insulin drip and admission to the hospitalist.    CONSULTS:  [address] and DIFFERENTIAL DIAGNOSIS/MDM:   Vitals:    Vitals:    12/12/21 0911 12/12/21 1004 12/12/21 1015 12/12/21 1016   BP: (!) 70/21 (!) 83/44 (!) 86/55    Pulse: 99 89  99   Resp: (!) 36 24  24   SpO2: 96% 97% 95% (!) 81%   Weight: 288 lb 9.3 oz (130.9 kg)          Patient was given thefollowing medications:  Medications   0.9 % sodium chloride bolus (1,000 mLs IntraVENous New Bag 12/12/21 1004)   norepinephrine (LEVOPHED) 16 mg in dextrose 5% 250 mL infusion (4.053 mcg/min IntraVENous Rate/Dose Change 12/12/21 1047)   glucose (GLUTOSE) 40 % oral gel 15 g (has no administration in time range)   dextrose 50 % IV solution (has no administration in time range)   glucagon (rDNA) injection 1 mg (has no administration in time range)   dextrose 5 % solution (has no administration in time range)   insulin regular (HUMULIN R;NOVOLIN R) 100 Units in sodium chloride 0.9 % 100 mL infusion (13.09 Units/hr IntraVENous New Bag 12/12/21 1046)   sodium bicarbonate 8.4 % injection 25 mEq (25 mEq IntraVENous Given 12/12/21 1054)          I was called emergently into the patient's room by nursing because patient having difficulty breathing, with shortness of shortness of breath and hypotension. Patient does have the elevated blood glucose in the 500s and electronic medical record reveals  previous blood work but no prior elevated glucose. Patient with Kussmaul breathing, hypotensive, 2 IVs begun with fluids open wide and septic work-up as well as diabetic ketoacidosis work-up begun. Patient is placed in reverse Trendelenburg. I also got Dr. Ramos Ma and he placed a central line. Now Levophed pressor begun. Patient's beta hydroxybutyrate comes back quite elevated at greater than 8, lactic acid level 8.5, glucose 881 and an anion gap of 44 with CO2 v critically low at 5. Venous gas comes back at 6.83, critically low. Bicarb bolus given as well as beginning the ED insulin infusion for DKA protocol. On recheck patient's blood pressure is improving and he is more comfortable. Left-sided ptosis with white cell count around 20,000 comes back and this is also consistent with new onset diabetes with diabetic ketoacidosis. No source of infection present at this time. Chest x-ray negative as above. Patient is admitted to the hospitalist.  He is in fair condition at this time. FINAL IMPRESSION      1.  Diabetic ketoacidosis without coma associated with diabetes mellitus due to underlying condition ([facility] [address]    2. Metabolic acidosis    3. Hypotension, unspecified hypotension type    4. Hyperglycemia    5. Lymphocytosis          DISPOSITION/PLAN   DISPOSITION Admitted 12/12/2021 10:46:46 AM      PATIENT REFERREDTO: Chest x-ray negative as above. No follow-up provider specified.     DISCHARGE MEDICATIONS:  New Prescriptions    No medications on file       DISCONTINUED MEDICATIONS:  Discontinued Medications    No medications on file              (Please note that portions ofthis note were completed with a voice recognition program.  Efforts were made to edit the dictations but occasionally words are mis-transcribed.)    Cherrie Banerjee PA-C (electronically signed)             Cherrie Banerjee PA-C  12/12/21 1106

## 2021-12-12 NOTE — PROGRESS NOTES
1254: Unable to obtain pts temperature. Pt 96.6 degrees in ED. Clinton hugger applied. 1400: Clinton hugger removed. Pt temp 97.3  1628: Dr. Jimmy Garza made aware of current BG and pressor requirements. Call to pharmacy to discuss BGs and what meds can be taken out of dextrose. 1637: Message to Dr. Lillian Mendoza to make him aware of consult. 1830: Dr. Jimmy Garza made aware of Pts CT results. See new orders for GI results. 1900: Discuss pts status with Dr. Shelagh Cooks with GI. See new orders. He states to have hospitalist call him  1915: Updates to Dr. Elon Nageotte in regards to pts status. See new orders. 1930: Handoff with Angela Park RN.

## 2021-12-12 NOTE — CONSULTS
Clinical Pharmacy Note  Vancomycin Consult    Attila Castorena is a 54 y.o. male ordered Vancomycin for sepsis; consult received from Dr. Maggie Rubin to manage therapy. Also receiving cefepime. Patient Active Problem List   Diagnosis    Back pain    DKA, type 2, not at goal [facility])       Allergies:  Patient has no known allergies. Temp max:  Temp (24hrs), Av.6 °F (35.9 °C), Min:96.6 °F (35.9 °C), Max:96.6 °F (35.9 °C)      Recent Labs     21  0934   WBC 20.1*       Recent Labs     21  0934   BUN 44*   CREATININE 3.4*       No intake or output data in the 24 hours ending 21 1202    Culture Results:  pending    Ht Readings from Last 1 Encounters:   20 6' 1\" (1.854 m)        Wt Readings from Last 1 Encounters:   21 288 lb 9.3 oz (130.9 kg)         CrCl cannot be calculated (Unknown ideal weight. ). Assessment/Plan:    Vanco day # 1  SCr 3.4 mg/dL today  Will give 1500 mg x 1 now and then check a random vanco level tomorrow am.     Thank you for the consult. Yamile Wilcox, PharmD.   2021  12:03 PM

## 2021-12-12 NOTE — H&P
Hospital Medicine History & Physical      PCP: [address] [place] [address]    Date of Admission: 12/12/2021    Date of Service: Pt seen/examined on 12/12/2021 and Admitted to Inpatient. Chief Complaint:  Increased thirst, urination, nausea, vomiting      History Of Present Illness: The patient is a 54 y.o. male with no significant past medical history who presents to [facility] with increased thirst, urination, nausea, and vomiting. Patient stated that over the past several days, he has had intractable nausea, vomiting, with increased thirst and urination. He denies fever, chills, chest pain, shortness of breath, constipation, diarrhea, and dysuria. In the ED, labs were significant for a sodium of 123, chloride of 77, bicarb of 4, BUN/Cr of 35/3.2, glucose of 900, lactic acid of 8.5, WBC count of 20.1K with a pH of 6.831 and beta-hydroxyturate > 8. CXR showed no acute disease. Past Medical History:        Diagnosis Date    H/O degenerative disc disease        Past Surgical History:    No past surgical history on file. Medications Prior to Admission:    Prior to Admission medications    Medication Sig Start Date End Date Taking? Authorizing Provider   acetaminophen (TYLENOL) 325 MG tablet Take 650 mg by mouth every 6 hours as needed for Pain    Historical Provider, MD       Allergies:  Patient has no known allergies. Social History:  The patient currently lives at home. TOBACCO:   reports that he has never smoked. He has never used smokeless tobacco.  ETOH:   reports current alcohol use. Family History:  Reviewed in detail and negative for DM, Early CAD, Cancer, CVA. Positive as follows:    No family history on file. REVIEW OF SYSTEMS:   Positive for and as noted in the HPI. All other systems reviewed and negative.     PHYSICAL EXAM:    BP (!) 86/55   Pulse 99   Resp 24   Wt 288 lb 9.3 oz (130.9 kg)   SpO2 (!) 81%   BMI 38.07 kg/m²     General appearance: No apparent distress appears stated age and cooperative. HEENT Normal cephalic, atraumatic without obvious deformity. Pupils equal, round, and reactive to light. Extra ocular muscles intact. Conjunctivae/corneas clear. Neck: Supple, No jugular venous distention/bruits. Trachea midline without thyromegaly or adenopathy with full range of motion. Lungs: Clear to auscultation, bilaterally without Rales/Wheezes/Rhonchi with good respiratory effort. Heart: Regular rate and rhythm with Normal S1/S2 without murmurs, rubs or gallops, point of maximum impulse non-displaced  Abdomen: Soft, non-tender or non-distended without rigidity or guarding and positive bowel sounds all four quadrants. Extremities: No clubbing, cyanosis, or edema bilaterally. Full range of motion without deformity and normal gait intact. Skin: Skin color, texture, turgor normal.  No rashes or lesions. Neurologic: Alert and oriented X 3, neurovascularly intact with sensory/motor intact upper extremities/lower extremities, bilaterally. Cranial nerves: II-XII intact, grossly non-focal.  Mental status: Alert, oriented, thought content appropriate. Capillary Refill: Acceptable  < 3 seconds  Peripheral Pulses: +3 Easily felt, not easily obliterated with pressure      CXR:  I have reviewed the CXR with the following interpretation: no acute disease  EKG:  I have reviewed the EKG with the following interpretation: sinus rhythm    XR CHEST PORTABLE   Final Result   No radiographic evidence of acute pulmonary disease.          CT ABDOMEN PELVIS WO CONTRAST Additional Contrast? None    (Results Pending)         CBC   Recent Labs     12/12/21  0934   WBC 20.1*   HGB 16.4   HCT 54.1*         RENAL  Recent Labs     12/12/21  0934   *   K 4.2   CL 75*   CO2 5*   BUN 44*   CREATININE 3.4*     LFT'S  Recent Labs 12/12/21  0934   AST 35   ALT 46*   BILITOT 0.5   ALKPHOS 132*     COAG  No results for input(s): INR in the last 72 hours. CARDIAC ENZYMES  No results for input(s): CKTOTAL, CKMB, CKMBINDEX, TROPONINI in the last 72 hours. U/A:    Lab Results   Component Value Date    COLORU Yellow 03/24/2020    CLARITYU Clear 03/24/2020    SPECGRAV 1.010 03/24/2020    LEUKOCYTESUR Negative 03/24/2020    BLOODU Negative 03/24/2020    GLUCOSEU Negative 03/24/2020       ABG  No results found for: NSZ9YUF, BEART, J9VCLUZJ, PHART, THGBART, JMH5GDJ, PO2ART, KFV8LUP        Active Hospital Problems    Diagnosis Date Noted    DKA, type 2, not at goal [facility]) [E11.10] 12/12/2021         PHYSICIANS CERTIFICATION:    I certify that Kaylah Boston is expected to be hospitalized for more than 2 midnights based on the following assessment and plan:      ASSESSMENT/PLAN:      DKA - suspect undiagnosed type 2 DM  -check Hgb A1c  -NPO  -DKA protocol  -insulin gtt  -BMP, Mg, and Phos q4h and replace PRN  -NS IVF  -switch to D5 0.45% NS once blood glucose level is below 250    Anion gap metabolic acidosis likely 2/2 DKA  -management as above    SIRS - with elevated HR, WBC count  -placed on empiric Vanc and cefepime  -check urinalysis, urine culture and CT Ab/Pelvis without contrast    Shock -   -Levophed gtt  -empiric Vanc and cefepime until infectious workup returns    CHAPIN - suspect prerenal  -IVF  -Levophed gtt  -trend  -avoid nephrotoxic medications    Obesity - BMI 38.68  -nutritional counseling provided  -weight loss encouraged  -complicating medical management      DVT Prophylaxis: heparin  Diet: Diet NPO  Code Status: Full Code  PT/OT Eval Status: defer    Dispo - admit ICU inpatient    I spent 45 minutes of critical care time due to life threatening DKA requiring admission to the ICU. Ana Laura Jaime MD    Thank you [employer] [facility] [address] for the opportunity to be involved in this patient's care.  If you have any questions or concerns please feel free to contact me at [phone].

## 2021-12-13 ENCOUNTER — APPOINTMENT (OUTPATIENT)
Dept: ULTRASOUND IMAGING | Age: 51
DRG: 871 | End: 2021-12-13
Payer: COMMERCIAL

## 2021-12-13 LAB
ANION GAP SERPL CALCULATED.3IONS-SCNC: 15 MMOL/L (ref 3–16)
ANION GAP SERPL CALCULATED.3IONS-SCNC: 15 MMOL/L (ref 3–16)
ANION GAP SERPL CALCULATED.3IONS-SCNC: 18 MMOL/L (ref 3–16)
ANION GAP SERPL CALCULATED.3IONS-SCNC: 19 MMOL/L (ref 3–16)
ANION GAP SERPL CALCULATED.3IONS-SCNC: 20 MMOL/L (ref 3–16)
ANION GAP SERPL CALCULATED.3IONS-SCNC: 25 MMOL/L (ref 3–16)
BASOPHILS ABSOLUTE: 0 K/UL (ref 0–0.2)
BASOPHILS RELATIVE PERCENT: 0.1 %
BUN BLDV-MCNC: 40 MG/DL (ref 7–20)
BUN BLDV-MCNC: 43 MG/DL (ref 7–20)
BUN BLDV-MCNC: 47 MG/DL (ref 7–20)
BUN BLDV-MCNC: 51 MG/DL (ref 7–20)
CALCIUM SERPL-MCNC: 6.8 MG/DL (ref 8.3–10.6)
CALCIUM SERPL-MCNC: 7 MG/DL (ref 8.3–10.6)
CALCIUM SERPL-MCNC: 7 MG/DL (ref 8.3–10.6)
CALCIUM SERPL-MCNC: 7.1 MG/DL (ref 8.3–10.6)
CALCIUM SERPL-MCNC: 7.3 MG/DL (ref 8.3–10.6)
CALCIUM SERPL-MCNC: 7.9 MG/DL (ref 8.3–10.6)
CHLORIDE BLD-SCNC: 91 MMOL/L (ref 99–110)
CHLORIDE BLD-SCNC: 93 MMOL/L (ref 99–110)
CHLORIDE BLD-SCNC: 94 MMOL/L (ref 99–110)
CHLORIDE BLD-SCNC: 95 MMOL/L (ref 99–110)
CO2: 12 MMOL/L (ref 21–32)
CO2: 16 MMOL/L (ref 21–32)
CO2: 17 MMOL/L (ref 21–32)
CO2: 19 MMOL/L (ref 21–32)
CO2: 21 MMOL/L (ref 21–32)
CO2: 21 MMOL/L (ref 21–32)
CREAT SERPL-MCNC: 2.3 MG/DL (ref 0.9–1.3)
CREAT SERPL-MCNC: 2.4 MG/DL (ref 0.9–1.3)
CREAT SERPL-MCNC: 2.5 MG/DL (ref 0.9–1.3)
CREAT SERPL-MCNC: 2.6 MG/DL (ref 0.9–1.3)
CREAT SERPL-MCNC: 2.6 MG/DL (ref 0.9–1.3)
CREAT SERPL-MCNC: 2.8 MG/DL (ref 0.9–1.3)
EOSINOPHILS ABSOLUTE: 0 K/UL (ref 0–0.6)
EOSINOPHILS RELATIVE PERCENT: 0 %
ESTIMATED AVERAGE GLUCOSE: 309.2 MG/DL
GFR AFRICAN AMERICAN: 29
GFR AFRICAN AMERICAN: 32
GFR AFRICAN AMERICAN: 32
GFR AFRICAN AMERICAN: 33
GFR AFRICAN AMERICAN: 35
GFR AFRICAN AMERICAN: 37
GFR NON-AFRICAN AMERICAN: 24
GFR NON-AFRICAN AMERICAN: 26
GFR NON-AFRICAN AMERICAN: 26
GFR NON-AFRICAN AMERICAN: 28
GFR NON-AFRICAN AMERICAN: 29
GFR NON-AFRICAN AMERICAN: 30
GLUCOSE BLD-MCNC: 166 MG/DL (ref 70–99)
GLUCOSE BLD-MCNC: 167 MG/DL (ref 70–99)
GLUCOSE BLD-MCNC: 184 MG/DL (ref 70–99)
GLUCOSE BLD-MCNC: 195 MG/DL (ref 70–99)
GLUCOSE BLD-MCNC: 202 MG/DL (ref 70–99)
GLUCOSE BLD-MCNC: 204 MG/DL (ref 70–99)
GLUCOSE BLD-MCNC: 209 MG/DL (ref 70–99)
GLUCOSE BLD-MCNC: 215 MG/DL (ref 70–99)
GLUCOSE BLD-MCNC: 221 MG/DL (ref 70–99)
GLUCOSE BLD-MCNC: 230 MG/DL (ref 70–99)
GLUCOSE BLD-MCNC: 230 MG/DL (ref 70–99)
GLUCOSE BLD-MCNC: 237 MG/DL (ref 70–99)
GLUCOSE BLD-MCNC: 237 MG/DL (ref 70–99)
GLUCOSE BLD-MCNC: 241 MG/DL (ref 70–99)
GLUCOSE BLD-MCNC: 250 MG/DL (ref 70–99)
GLUCOSE BLD-MCNC: 250 MG/DL (ref 70–99)
GLUCOSE BLD-MCNC: 254 MG/DL (ref 70–99)
GLUCOSE BLD-MCNC: 254 MG/DL (ref 70–99)
GLUCOSE BLD-MCNC: 265 MG/DL (ref 70–99)
GLUCOSE BLD-MCNC: 267 MG/DL (ref 70–99)
GLUCOSE BLD-MCNC: 270 MG/DL (ref 70–99)
GLUCOSE BLD-MCNC: 275 MG/DL (ref 70–99)
GLUCOSE BLD-MCNC: 282 MG/DL (ref 70–99)
GLUCOSE BLD-MCNC: 298 MG/DL (ref 70–99)
GLUCOSE BLD-MCNC: 317 MG/DL (ref 70–99)
GLUCOSE BLD-MCNC: 330 MG/DL (ref 70–99)
GLUCOSE BLD-MCNC: 332 MG/DL (ref 70–99)
GLUCOSE BLD-MCNC: 338 MG/DL (ref 70–99)
GLUCOSE BLD-MCNC: 350 MG/DL (ref 70–99)
HBA1C MFR BLD: 12.4 %
HCT VFR BLD CALC: 38.5 % (ref 40.5–52.5)
HEMATOLOGY PATH CONSULT: NO
HEMATOLOGY PATH CONSULT: NORMAL
HEMOGLOBIN: 13.3 G/DL (ref 13.5–17.5)
LACTIC ACID: 1.5 MMOL/L (ref 0.4–2)
LACTIC ACID: 1.7 MMOL/L (ref 0.4–2)
LYMPHOCYTES ABSOLUTE: 0.6 K/UL (ref 1–5.1)
LYMPHOCYTES RELATIVE PERCENT: 3.7 %
MAGNESIUM: 2 MG/DL (ref 1.8–2.4)
MAGNESIUM: 2.1 MG/DL (ref 1.8–2.4)
MAGNESIUM: 2.1 MG/DL (ref 1.8–2.4)
MCH RBC QN AUTO: 30.7 PG (ref 26–34)
MCHC RBC AUTO-ENTMCNC: 34.5 G/DL (ref 31–36)
MCV RBC AUTO: 89.2 FL (ref 80–100)
MONOCYTES ABSOLUTE: 2.1 K/UL (ref 0–1.3)
MONOCYTES RELATIVE PERCENT: 12 %
NEUTROPHILS ABSOLUTE: 14.6 K/UL (ref 1.7–7.7)
NEUTROPHILS RELATIVE PERCENT: 84.2 %
OCCULT BLOOD, OTHER: ABNORMAL
PDW BLD-RTO: 13.1 % (ref 12.4–15.4)
PERFORMED ON: ABNORMAL
PH, GASTRIC: ABNORMAL
PHOSPHORUS: 1.1 MG/DL (ref 2.5–4.9)
PHOSPHORUS: 1.2 MG/DL (ref 2.5–4.9)
PHOSPHORUS: 1.5 MG/DL (ref 2.5–4.9)
PHOSPHORUS: 1.6 MG/DL (ref 2.5–4.9)
PHOSPHORUS: 1.6 MG/DL (ref 2.5–4.9)
PHOSPHORUS: 1.8 MG/DL (ref 2.5–4.9)
PLATELET # BLD: 140 K/UL (ref 135–450)
PMV BLD AUTO: 10.1 FL (ref 5–10.5)
POTASSIUM SERPL-SCNC: 3.7 MMOL/L (ref 3.5–5.1)
POTASSIUM SERPL-SCNC: 3.7 MMOL/L (ref 3.5–5.1)
POTASSIUM SERPL-SCNC: 3.8 MMOL/L (ref 3.5–5.1)
POTASSIUM SERPL-SCNC: 3.8 MMOL/L (ref 3.5–5.1)
POTASSIUM SERPL-SCNC: 4 MMOL/L (ref 3.5–5.1)
POTASSIUM SERPL-SCNC: 4 MMOL/L (ref 3.5–5.1)
RBC # BLD: 4.31 M/UL (ref 4.2–5.9)
SODIUM BLD-SCNC: 128 MMOL/L (ref 136–145)
SODIUM BLD-SCNC: 129 MMOL/L (ref 136–145)
SODIUM BLD-SCNC: 130 MMOL/L (ref 136–145)
SODIUM BLD-SCNC: 130 MMOL/L (ref 136–145)
SODIUM BLD-SCNC: 131 MMOL/L (ref 136–145)
SODIUM BLD-SCNC: 131 MMOL/L (ref 136–145)
URINE CULTURE, ROUTINE: NORMAL
VANCOMYCIN RANDOM: 8.9 UG/ML
WBC # BLD: 17.4 K/UL (ref 4–11)

## 2021-12-13 PROCEDURE — APPNB180 APP NON BILLABLE TIME > 60 MINS: Performed by: PHYSICIAN ASSISTANT

## 2021-12-13 PROCEDURE — 76705 ECHO EXAM OF ABDOMEN: CPT

## 2021-12-13 PROCEDURE — 2500000003 HC RX 250 WO HCPCS: Performed by: STUDENT IN AN ORGANIZED HEALTH CARE EDUCATION/TRAINING PROGRAM

## 2021-12-13 PROCEDURE — 2580000003 HC RX 258: Performed by: INTERNAL MEDICINE

## 2021-12-13 PROCEDURE — 36592 COLLECT BLOOD FROM PICC: CPT

## 2021-12-13 PROCEDURE — 6360000002 HC RX W HCPCS: Performed by: STUDENT IN AN ORGANIZED HEALTH CARE EDUCATION/TRAINING PROGRAM

## 2021-12-13 PROCEDURE — 83735 ASSAY OF MAGNESIUM: CPT

## 2021-12-13 PROCEDURE — 6370000000 HC RX 637 (ALT 250 FOR IP): Performed by: INTERNAL MEDICINE

## 2021-12-13 PROCEDURE — 2580000003 HC RX 258: Performed by: STUDENT IN AN ORGANIZED HEALTH CARE EDUCATION/TRAINING PROGRAM

## 2021-12-13 PROCEDURE — 6360000002 HC RX W HCPCS: Performed by: INTERNAL MEDICINE

## 2021-12-13 PROCEDURE — 2580000003 HC RX 258: Performed by: NURSE PRACTITIONER

## 2021-12-13 PROCEDURE — 6360000002 HC RX W HCPCS

## 2021-12-13 PROCEDURE — 2500000003 HC RX 250 WO HCPCS: Performed by: INTERNAL MEDICINE

## 2021-12-13 PROCEDURE — 2000000000 HC ICU R&B

## 2021-12-13 PROCEDURE — 80048 BASIC METABOLIC PNL TOTAL CA: CPT

## 2021-12-13 PROCEDURE — 83036 HEMOGLOBIN GLYCOSYLATED A1C: CPT

## 2021-12-13 PROCEDURE — 94761 N-INVAS EAR/PLS OXIMETRY MLT: CPT

## 2021-12-13 PROCEDURE — 2500000003 HC RX 250 WO HCPCS: Performed by: NURSE PRACTITIONER

## 2021-12-13 PROCEDURE — 85025 COMPLETE CBC W/AUTO DIFF WBC: CPT

## 2021-12-13 PROCEDURE — APPSS180 APP SPLIT SHARED TIME > 60 MINUTES: Performed by: PHYSICIAN ASSISTANT

## 2021-12-13 PROCEDURE — 99223 1ST HOSP IP/OBS HIGH 75: CPT | Performed by: SURGERY

## 2021-12-13 PROCEDURE — 83605 ASSAY OF LACTIC ACID: CPT

## 2021-12-13 PROCEDURE — C9113 INJ PANTOPRAZOLE SODIUM, VIA: HCPCS | Performed by: STUDENT IN AN ORGANIZED HEALTH CARE EDUCATION/TRAINING PROGRAM

## 2021-12-13 PROCEDURE — 82271 OCCULT BLOOD OTHER SOURCES: CPT

## 2021-12-13 PROCEDURE — 84100 ASSAY OF PHOSPHORUS: CPT

## 2021-12-13 PROCEDURE — 80202 ASSAY OF VANCOMYCIN: CPT

## 2021-12-13 PROCEDURE — 99291 CRITICAL CARE FIRST HOUR: CPT | Performed by: INTERNAL MEDICINE

## 2021-12-13 RX ORDER — CALCIUM GLUCONATE 20 MG/ML
2000 INJECTION, SOLUTION INTRAVENOUS ONCE
Status: COMPLETED | OUTPATIENT
Start: 2021-12-13 | End: 2021-12-13

## 2021-12-13 RX ORDER — HEPARIN SODIUM 5000 [USP'U]/ML
5000 INJECTION, SOLUTION INTRAVENOUS; SUBCUTANEOUS 2 TIMES DAILY
Status: DISCONTINUED | OUTPATIENT
Start: 2021-12-13 | End: 2021-12-31 | Stop reason: HOSPADM

## 2021-12-13 RX ADMIN — POTASSIUM CHLORIDE 10 MEQ: 7.46 INJECTION, SOLUTION INTRAVENOUS at 07:41

## 2021-12-13 RX ADMIN — HEPARIN SODIUM 5000 UNITS: 5000 INJECTION INTRAVENOUS; SUBCUTANEOUS at 21:08

## 2021-12-13 RX ADMIN — SODIUM CHLORIDE 80 MG: 9 INJECTION, SOLUTION INTRAVENOUS at 02:36

## 2021-12-13 RX ADMIN — POTASSIUM CHLORIDE 10 MEQ: 7.46 INJECTION, SOLUTION INTRAVENOUS at 02:04

## 2021-12-13 RX ADMIN — POTASSIUM CHLORIDE 10 MEQ: 7.46 INJECTION, SOLUTION INTRAVENOUS at 22:15

## 2021-12-13 RX ADMIN — POTASSIUM CHLORIDE 10 MEQ: 7.46 INJECTION, SOLUTION INTRAVENOUS at 16:02

## 2021-12-13 RX ADMIN — SODIUM PHOSPHATE, MONOBASIC, MONOHYDRATE 20 MMOL: 276; 142 INJECTION, SOLUTION INTRAVENOUS at 02:19

## 2021-12-13 RX ADMIN — MEROPENEM 500 MG: 500 INJECTION, POWDER, FOR SOLUTION INTRAVENOUS at 03:30

## 2021-12-13 RX ADMIN — ONDANSETRON 4 MG: 2 INJECTION INTRAMUSCULAR; INTRAVENOUS at 09:45

## 2021-12-13 RX ADMIN — SODIUM PHOSPHATE, MONOBASIC, MONOHYDRATE 15 MMOL: 276; 142 INJECTION, SOLUTION INTRAVENOUS at 14:34

## 2021-12-13 RX ADMIN — SODIUM BICARBONATE: 84 INJECTION, SOLUTION INTRAVENOUS at 10:38

## 2021-12-13 RX ADMIN — POTASSIUM CHLORIDE 10 MEQ: 7.46 INJECTION, SOLUTION INTRAVENOUS at 14:56

## 2021-12-13 RX ADMIN — SODIUM BICARBONATE: 84 INJECTION, SOLUTION INTRAVENOUS at 01:19

## 2021-12-13 RX ADMIN — METRONIDAZOLE 500 MG: 500 INJECTION, SOLUTION INTRAVENOUS at 04:23

## 2021-12-13 RX ADMIN — MEROPENEM 500 MG: 500 INJECTION, POWDER, FOR SOLUTION INTRAVENOUS at 15:12

## 2021-12-13 RX ADMIN — SODIUM CHLORIDE 25.56 UNITS/HR: 9 INJECTION, SOLUTION INTRAVENOUS at 20:11

## 2021-12-13 RX ADMIN — SODIUM CHLORIDE 8 MG/HR: 9 INJECTION, SOLUTION INTRAVENOUS at 11:32

## 2021-12-13 RX ADMIN — SODIUM PHOSPHATE, MONOBASIC, MONOHYDRATE 15 MMOL: 276; 142 INJECTION, SOLUTION INTRAVENOUS at 21:20

## 2021-12-13 RX ADMIN — SODIUM CHLORIDE 5.34 UNITS/HR: 9 INJECTION, SOLUTION INTRAVENOUS at 02:22

## 2021-12-13 RX ADMIN — SODIUM CHLORIDE 19.47 UNITS/HR: 9 INJECTION, SOLUTION INTRAVENOUS at 10:05

## 2021-12-13 RX ADMIN — POTASSIUM CHLORIDE 10 MEQ: 7.46 INJECTION, SOLUTION INTRAVENOUS at 10:36

## 2021-12-13 RX ADMIN — POTASSIUM CHLORIDE 10 MEQ: 7.46 INJECTION, SOLUTION INTRAVENOUS at 17:06

## 2021-12-13 RX ADMIN — POTASSIUM CHLORIDE 10 MEQ: 7.46 INJECTION, SOLUTION INTRAVENOUS at 23:16

## 2021-12-13 RX ADMIN — POTASSIUM CHLORIDE 10 MEQ: 7.46 INJECTION, SOLUTION INTRAVENOUS at 05:13

## 2021-12-13 RX ADMIN — POTASSIUM CHLORIDE 10 MEQ: 7.46 INJECTION, SOLUTION INTRAVENOUS at 12:04

## 2021-12-13 RX ADMIN — POTASSIUM CHLORIDE 10 MEQ: 7.46 INJECTION, SOLUTION INTRAVENOUS at 18:07

## 2021-12-13 RX ADMIN — SODIUM CHLORIDE 27.15 UNITS/HR: 9 INJECTION, SOLUTION INTRAVENOUS at 15:28

## 2021-12-13 RX ADMIN — POTASSIUM CHLORIDE 10 MEQ: 7.46 INJECTION, SOLUTION INTRAVENOUS at 00:33

## 2021-12-13 RX ADMIN — MEROPENEM 500 MG: 500 INJECTION, POWDER, FOR SOLUTION INTRAVENOUS at 21:13

## 2021-12-13 RX ADMIN — POTASSIUM CHLORIDE 10 MEQ: 7.46 INJECTION, SOLUTION INTRAVENOUS at 09:34

## 2021-12-13 RX ADMIN — POTASSIUM CHLORIDE 10 MEQ: 7.46 INJECTION, SOLUTION INTRAVENOUS at 13:51

## 2021-12-13 RX ADMIN — ONDANSETRON 4 MG: 2 INJECTION INTRAMUSCULAR; INTRAVENOUS at 21:08

## 2021-12-13 RX ADMIN — VANCOMYCIN HYDROCHLORIDE 1250 MG: 10 INJECTION, POWDER, LYOPHILIZED, FOR SOLUTION INTRAVENOUS at 18:08

## 2021-12-13 RX ADMIN — SODIUM BICARBONATE: 84 INJECTION, SOLUTION INTRAVENOUS at 03:40

## 2021-12-13 RX ADMIN — POTASSIUM CHLORIDE 10 MEQ: 7.46 INJECTION, SOLUTION INTRAVENOUS at 06:18

## 2021-12-13 RX ADMIN — CALCIUM GLUCONATE 2000 MG: 20 INJECTION, SOLUTION INTRAVENOUS at 09:20

## 2021-12-13 RX ADMIN — SODIUM CHLORIDE 8 MG/HR: 9 INJECTION, SOLUTION INTRAVENOUS at 03:12

## 2021-12-13 RX ADMIN — CALCIUM GLUCONATE 2000 MG: 20 INJECTION, SOLUTION INTRAVENOUS at 14:01

## 2021-12-13 RX ADMIN — MEROPENEM 500 MG: 500 INJECTION, POWDER, FOR SOLUTION INTRAVENOUS at 08:38

## 2021-12-13 RX ADMIN — POTASSIUM CHLORIDE 10 MEQ: 7.46 INJECTION, SOLUTION INTRAVENOUS at 19:07

## 2021-12-13 RX ADMIN — POTASSIUM CHLORIDE 10 MEQ: 7.46 INJECTION, SOLUTION INTRAVENOUS at 21:13

## 2021-12-13 RX ADMIN — SODIUM PHOSPHATE, MONOBASIC, MONOHYDRATE 20 MMOL: 276; 142 INJECTION, SOLUTION INTRAVENOUS at 09:36

## 2021-12-13 RX ADMIN — SODIUM BICARBONATE: 84 INJECTION, SOLUTION INTRAVENOUS at 18:47

## 2021-12-13 RX ADMIN — SODIUM CHLORIDE 8 MG/HR: 9 INJECTION, SOLUTION INTRAVENOUS at 21:39

## 2021-12-13 RX ADMIN — POTASSIUM CHLORIDE 10 MEQ: 7.46 INJECTION, SOLUTION INTRAVENOUS at 03:28

## 2021-12-13 ASSESSMENT — PAIN DESCRIPTION - LOCATION: LOCATION: ABDOMEN

## 2021-12-13 ASSESSMENT — PAIN DESCRIPTION - ORIENTATION: ORIENTATION: MID

## 2021-12-13 ASSESSMENT — PAIN SCALES - GENERAL
PAINLEVEL_OUTOF10: 4
PAINLEVEL_OUTOF10: 0
PAINLEVEL_OUTOF10: 0

## 2021-12-13 ASSESSMENT — PAIN DESCRIPTION - PAIN TYPE: TYPE: ACUTE PAIN

## 2021-12-13 NOTE — PROGRESS NOTES
Physician Progress Note      Thony Isaac  [facility] #:                  [id number]  :                       1970  ADMIT DATE:       2021 9:08 AM  DISCH DATE:  RESPONDING  PROVIDER #:        Melisa Kilgore MD          QUERY TEXT:    Patient admitted with DKA and SIRS. If possible, please document in progress   notes and discharge summary if you are evaluating and/or treating any of the   following: The medical record reflects the following:  Risk Factors: DKA  Clinical Indicators: per H&P \"SIRS - with elevated HR, WBC count. .. Sandy Gin CHAPIN\"  Treatment: ICU admit, Insulin continuous infusion , IVFs and lab monitoring  Options provided:  -- SIRS of non-infectious origin due to DKA with CHAPIN (associated acute organ   dysfunction)  -- Other - I will add my own diagnosis  -- Disagree - Not applicable / Not valid  -- Disagree - Clinically unable to determine / Unknown  -- Refer to Clinical Documentation Reviewer    PROVIDER RESPONSE TEXT:    Septic shock    Query created by: Estephanie Estrada on 2021 5:40 PM      Electronically signed by:  Melisa Kilgore MD 2021 4:23 PM

## 2021-12-13 NOTE — PROGRESS NOTES
Clinical Pharmacy Note  Vancomycin Consult    Mikki Durand is a 54 y.o. male ordered Vancomycin for sepsis; consult received from Dr. Kylah Jackson to manage therapy. Also receiving meropenem. Patient Active Problem List   Diagnosis    Back pain    DKA, type 2, not at goal [facility])       Allergies:  Patient has no known allergies. Temp max:  Temp (24hrs), Av.7 °F (36.5 °C), Min:97.6 °F (36.4 °C), Max:97.7 °F (36.5 °C)      Recent Labs     21  0934 21  1942 21  0423   WBC 20.1* 22.0* 17.4*       Recent Labs     21  0845 21  1210 21  1610   BUN 47* 51* 43*   CREATININE 2.5* 2.6* 2.4*         Intake/Output Summary (Last 24 hours) at 2021 1731  Last data filed at 2021 1609  Gross per 24 hour   Intake 9921.3 ml   Output 2220 ml   Net 7701.3 ml       Culture Results:  pending    Ht Readings from Last 1 Encounters:   21 6' 1\" (1.854 m)        Wt Readings from Last 1 Encounters:   21 296 lb 11.8 oz (134.6 kg)         Estimated Creatinine Clearance: 54 mL/min (A) (based on SCr of 2.4 mg/dL (H)). Assessment/Plan:    Vanco day #2  Vanc random level 8.9mg/L 23 hours post 1500mg dose   SCr 2.4mg/dL today  Result of level received 5.5 hours after drawn   Will give 1250 mg x 1 now @ 1800  and then check a random vanco level tomorrow at 1200. Thank you for the consult.      Destini Luna, [address] [place] [address], [address] [place] [address] 2021 5:35 PM

## 2021-12-13 NOTE — CONSULTS
 Drug use: Never    Sexual activity: Not on file   Other Topics Concern    Not on file   Social History Narrative    Not on file     Social Determinants of Health     Financial Resource Strain:     Difficulty of Paying Living Expenses: Not on file   Food Insecurity:     Worried About Running Out of Food in the Last Year: Not on file    Bienvenido of Food in the Last Year: Not on file   Transportation Needs:     Lack of Transportation (Medical): Not on file    Lack of Transportation (Non-Medical):  Not on file   Physical Activity:     Days of Exercise per Week: Not on file    Minutes of Exercise per Session: Not on file   Stress:     Feeling of Stress : Not on file   Social Connections:     Frequency of Communication with Friends and Family: Not on file    Frequency of Social Gatherings with Friends and Family: Not on file    Attends Synagogue Services: Not on file    Active Member of [address] [place] [address] [email or web address] or Organizations: Not on file    Attends Club or Organization Meetings: Not on file    Marital Status: Not on file   Intimate Partner Violence:     Fear of Current or Ex-Partner: Not on file    Emotionally Abused: Not on file    Physically Abused: Not on file    Sexually Abused: Not on file   Housing Stability:     Unable to Pay for Housing in the Last Year: Not on file    Number of [place] in the Last Year: Not on file    Unstable Housing in the Last Year: Not on file       MEDICATIONS   SCHEDULED:  vancomycin (VANCOCIN) intermittent dosing (placeholder), , RX Placeholder  metroNIDAZOLE, 500 mg, Q8H  meropenem, 500 mg, Q6H      FLUIDS/DRIPS:     pantoprazole 8 mg/hr (12/13/21 0312)    sodium bicarbonate infusion 150 mL/hr at 12/13/21 0340    norepinephrine Stopped (12/13/21 0506)    dextrose      insulin 22.24 Units/hr (12/13/21 0713)    dextrose 5 % and 0.45 % NaCl      sodium chloride Stopped (12/12/21 1210)    vasopressin (Septic Shock) infusion Stopped (12/12/21 9583)     PRNs: glucose, 15 g, PRN  dextrose, 12.5 g, PRN  glucagon (rDNA), 1 mg, PRN  dextrose, 100 mL/hr, PRN  dextrose, 12.5 g, PRN  potassium chloride, 10 mEq, PRN  magnesium sulfate, 1,000 mg, PRN  sodium phosphate IVPB, 10 mmol, PRN   Or  sodium phosphate IVPB, 15 mmol, PRN   Or  sodium phosphate IVPB, 20 mmol, PRN  dextrose 5 % and 0.45 % NaCl, , Continuous PRN  ondansetron, 4 mg, Q6H PRN      ALLERGIES:  He No Known Allergies    REVIEW OF SYSTEMS   Pertinent ROS noted in HPI    PHYSICAL EXAM     Vitals:    12/13/21 0729 12/13/21 0741 12/13/21 0745 12/13/21 0800   BP: 92/68  95/70 99/63   Pulse: 110  114 113   Resp: 25  23 25   Temp:       TempSrc:       SpO2: (!) 88% 92% 91% 93%   Weight:           I/O last 3 completed shifts: In: 3997.9 [I.V.:2445.7; IV Piggyback:1552.1]  Out: 2864 [Urine:1710]      Physical Exam:  General appearance: alert, cooperative  Eyes: Anicteric  Head: Normocephalic, without obvious abnormality  Lungs: clear to auscultation bilaterally, Normal Effort  Heart: regular rate and rhythm  Abdomen: soft, non-distended, TTP RUQ  Extremities: atraumatic, no cyanosis or edema  Skin: warm and dry, no jaundice  Neuro: Grossly intact, A&OX3      LABS AND IMAGING   Laboratory   Recent Labs     12/12/21  0934 12/12/21 1942 12/13/21  0423   WBC 20.1* 22.0* 17.4*   HGB 16.4 14.6 13.3*   HCT 54.1* 45.0 38.5*   .1* 93.7 89.2    185 140     Recent Labs     12/12/21 1942 12/12/21  2332 12/13/21  0423   * 131* 130*   K 5.5* 4.0 4.0   CL 90* 94* 94*   CO2 5* 12* 16*   PHOS 2.7 1.1* 1.5*   BUN 49* 47* 47*   CREATININE 3.1* 2.8* 2.6*     Recent Labs     12/12/21  0934   AST 35   ALT 46*   BILITOT 0.5   ALKPHOS 132*     Recent Labs     12/12/21  1942   LIPASE >3,000.0*   AMYLASE 1,026*     No results for input(s): PROTIME, INR in the last 72 hours. Imaging  CT ABDOMEN PELVIS WO CONTRAST Additional Contrast? None   Final Result   1. Severe acute interstitial pancreatitis. No focal fluid collection.    2. Hepatomegaly with severe steatosis. 3. Possible gallbladder sludge. 4. Colonic diverticulosis without acute diverticulitis. 5. The distal esophagus is dilated and filled with fluid. RECOMMENDATIONS:   Unavailable         XR CHEST PORTABLE   Final Result   No radiographic evidence of acute pulmonary disease. US GALLBLADDER RUQ    (Results Pending)         ASSESSMENT AND RECOMMENDATIONS   54 y.o. male who presented on 12/12/2021 with shortness of breath, nausea, vomiting, increased thirst and urination. Found with DKA, CHAPIN, hypotension/shock, leukocytosis, and acute pancreatitis. GI consulted regarding acute pancreatitis    IMPRESSION:  1. Acute pancreatitis  -lipase >3000. CT with severe acute interstitial pancreatitis.  No focal fluid collection. Etiology unclear at this time. TG level elevated at 361 which does not seem significant to cause this degree of pancreatitis. Ca normal.  CT with gallbladder sludge and lfts mildly elevated therefore checking RUQ US. No hx of heavy alcohol use. 2. Coffee ground emesis  -1 episode yesterday. No melena.  hgb 13.3. Will monitor for now. Agree with PPI gtt. 3. DKA  4. CHAPIN  5. Septic shock  -on levophed and broad spectum antibiotics    RECOMMENDATIONS:    RUQ US  Continue PPI gtt  Continue antibiotics and IVFs  DKA management per Primary and critical care team    If you have any questions or need any further information, please feel free to contact our consult team.  Thank you for allowing us to participate in the care of [facility]. The note was completed using Dragon voice recognition transcription. Every effort was made to ensure accuracy; however, inadvertent transcription errors may be present despite my best efforts to edit errors. Payton Foy PA-C    Attending physician addendum:    I have personally seen and examined the patient, reviewed the patient's medical record and pertinent labs and clinical imaging.   I have personally staffed the case with Venkatesh TODD. I agree with her consultation note, exam findings, assessment and plans  as written above. I have made appropriate modifications and edited her assessment and plan where needed to reflect my impression and plans for this patient. Antolin Chery is a 55 YO Male who presents with nausea, vomiting, and increase thrist. Patient was found to be in DKA with BG over 900. Patient was found to have acute pancreatitis based on Lipase/CT results. Etiology unclear at this point. His Triglyceride level was 396 and typically level needs to be >500 to cause pancreatitis but may have been higher prior to admission. Check RUQ US. LFT normal so unlikely to be bilary source. He denies any significant alcohol use and denies any medication. Pressors have been weaned off. Continue to keep NPO. Continue IV fluids as ordered. We will continue antibiotics for now pending US results. He has also had some coffee ground emesis likely related to vomiting/esophagitis. Recommend continuing on PPI for now. We will defer EGD until more stable unless worsening bleeding. Mangement of DKA per primary team.     Thank you for allowing me to participate in this patient's care. If there are any questions or concerns regarding this patient, or the plan we have set in place, please feel free to contact me at [phone].      Juan Lozano MD

## 2021-12-13 NOTE — PROGRESS NOTES
Hospitalist Progress Note      PCP: [address] [place] [address]    Date of Admission: 12/12/2021    Chief Complaint: increased thirst, urination, nausea, vomiting    Hospital Course: The patient is a 54 y.o. male with no significant past medical history who presents to [facility] with increased thirst, urination, nausea, and vomiting. Patient stated that over the past several days, he has had intractable nausea, vomiting, with increased thirst and urination. He denies fever, chills, chest pain, shortness of breath, constipation, diarrhea, and dysuria.     In the ED, labs were significant for a sodium of 123, chloride of 77, bicarb of 4, BUN/Cr of 35/3.2, glucose of 900, lactic acid of 8.5, WBC count of 20.1K with a pH of 6.831 and beta-hydroxyturate > 8. CXR showed no acute disease. Subjective: CT scan showed severe interstitial pancreatitis. GI and general surgery were both consulted. Abx were broadened and fluid composition changed overnight. Also had an episode of coffee ground emesis and Protonix gtt was started. Patient this morning states that he is feeling better. Still with some abdominal pain but can't localize where the pain is located. Some nausea as well.        Medications:  Reviewed    Infusion Medications    pantoprazole 8 mg/hr (12/13/21 0921)    sodium bicarbonate infusion 150 mL/hr at 12/13/21 0340    norepinephrine Stopped (12/13/21 0506)    dextrose      insulin 0.158 Units/kg/hr (12/13/21 0921)    dextrose 5 % and 0.45 % NaCl      sodium chloride Stopped (12/12/21 1210)    vasopressin (Septic Shock) infusion Stopped (12/12/21 1731)     Scheduled Medications    calcium gluconate-NaCl  2,000 mg IntraVENous Once    sodium phosphate IVPB  20 mmol IntraVENous Once    vancomycin (VANCOCIN) intermittent dosing (placeholder)   Other RX Placeholder    metroNIDAZOLE  500 mg IntraVENous Q8H    meropenem  500 mg IntraVENous Q6H     PRN Meds: glucose, dextrose, glucagon (rDNA), dextrose, dextrose, potassium chloride, magnesium sulfate, sodium phosphate IVPB **OR** sodium phosphate IVPB **OR** sodium phosphate IVPB, dextrose 5 % and 0.45 % NaCl, ondansetron      Intake/Output Summary (Last 24 hours) at 12/13/2021 0932  Last data filed at 12/13/2021 3860  Gross per 24 hour   Intake 7519.61 ml   Output 1935 ml   Net 5584.61 ml       Exam:    /77   Pulse 115   Temp 97.7 °F (36.5 °C) (Axillary)   Resp 20   Wt 296 lb 11.8 oz (134.6 kg)   SpO2 92%   BMI 39.15 kg/m²     General appearance: No apparent distress, appears stated age and cooperative. HEENT: Pupils equal, round, and reactive to light. Conjunctivae/corneas clear. Neck: Supple, with full range of motion. No jugular venous distention. Trachea midline. Respiratory:  Normal respiratory effort. Clear to auscultation, bilaterally without Rales/Wheezes/Rhonchi. Cardiovascular: Regular rate and rhythm with normal S1/S2 without murmurs, rubs or gallops. Abdomen: Soft, non-tender, non-distended with normal bowel sounds. Musculoskeletal: No clubbing, cyanosis or edema bilaterally. Full range of motion without deformity. Skin: Skin color, texture, turgor normal.  No rashes or lesions. Neurologic:  Neurovascularly intact without any focal sensory/motor deficits.  Cranial nerves: II-XII intact, grossly non-focal.  Psychiatric: Alert and oriented, thought content appropriate, normal insight  Capillary Refill: Brisk,< 3 seconds   Peripheral Pulses: +2 palpable, equal bilaterally       Labs:   Recent Labs     12/12/21  0934 12/12/21  1942 12/13/21 0423   WBC 20.1* 22.0* 17.4*   HGB 16.4 14.6 13.3*   HCT 54.1* 45.0 38.5*    185 140     Recent Labs     12/12/21  2332 12/13/21  0423 12/13/21  0845   * 130* 128*   K 4.0 4.0 3.7   CL 94* 94* 93*   CO2 12* 16* 17*   BUN 47* 47* 47*   CREATININE 2.8* 2.6* 2.5*   CALCIUM 7.0* 7.0* 6.8*   PHOS 1.1* 1.5* 1.2*     Recent Labs     12/12/21  0934   AST 35   ALT 46*   BILITOT 0.5 ALKPHOS 132*     No results for input(s): INR in the last 72 hours. Recent Labs     12/12/21  1150   CKTOTAL 185       Urinalysis:      Lab Results   Component Value Date    NITRU Negative 12/12/2021    WBCUA 2 12/12/2021    RBCUA 0-2 12/12/2021    BLOODU LARGE 12/12/2021    SPECGRAV 1.023 12/12/2021    GLUCOSEU >=1000 12/12/2021       Radiology:  CT ABDOMEN PELVIS WO CONTRAST Additional Contrast? None   Final Result   1. Severe acute interstitial pancreatitis. No focal fluid collection. 2. Hepatomegaly with severe steatosis. 3. Possible gallbladder sludge. 4. Colonic diverticulosis without acute diverticulitis. 5. The distal esophagus is dilated and filled with fluid. RECOMMENDATIONS:   Unavailable         XR CHEST PORTABLE   Final Result   No radiographic evidence of acute pulmonary disease.          US GALLBLADDER RUQ    (Results Pending)           Assessment/Plan:    Active Hospital Problems    Diagnosis Date Noted    DKA, type 2, not at goal [facility]) [E11.10] 12/12/2021       DKA - suspect undiagnosed type 2 DM  -check Hgb A1c  -NPO  -DKA protocol  -insulin gtt  -BMP, Mg, and Phos q4h and replace PRN  -IVF with bicarb  -switch to D5 0.45% NS once blood glucose level is below 250     Anion gap metabolic acidosis likely 2/2 DKA  -management as above     Severe interstitial pancreatitis  -NPO  -aggressive IVF  -GI and general surgery consulted  -check RUQ U/S today     Septic Shock -   -Levophed gtt  -Vanc, meropenem, and Flagyl     CHAPIN - suspect prerenal  -IVF  -Levophed gtt  -trend  -avoid nephrotoxic medications  -nephrology following    Coffee ground emesis  -start PPI gtt  -GI following  -Hgb stable     Obesity - BMI 38.68  -nutritional counseling provided  -weight loss encouraged  -complicating medical management      DVT Prophylaxis: SCDs  Diet: Diet NPO  Code Status: Full Code    PT/OT Eval Status: defer    Dispo - continue care ICU    Maren Rogers MD

## 2021-12-13 NOTE — FLOWSHEET NOTE
12/13/21 1723   Provider Notification   Reason for Communication Critical Value (comment)  (positive blood culture)   Provider Name Courtney Mckeon   Provider Notification Physician   Method of Communication Other (Comment)  (perfect serve)   Response No new orders   Notification Time [phone]

## 2021-12-13 NOTE — PLAN OF CARE
Problem: Falls - Risk of:  Goal: Will remain free from falls  Description: Will remain free from falls  Outcome: Ongoing  Note: A: Sound com dome light, bed/chair alarm, bed in lowest position, wheels of bed/chair locked, non skid footwear, call light in reach, fall precaution education, intentional rounding     Problem: Pain:  Goal: Pain level will decrease  Description: Pain level will decrease  Outcome: Ongoing  Note: A: Assess for pain using appropriate scale. Reposition/non-pharmaceutical interventions and/or medicate as needed for pain. Reassess for effectiveness of intervention. Notify physician of unmanaged pain. Notify surgeon of increased abdominal pain. Goal: Control of acute pain  Description: Control of acute pain  Outcome: Ongoing     Problem: Infection - Central Venous Catheter-Associated Bloodstream Infection:  Goal: Will show no infection signs and symptoms  Description: Will show no infection signs and symptoms  Outcome: Ongoing  Note: A: Assess need for continued use. Assess and educate for signs/symptoms of infection. Occlusive dressing with antimicrobial patch in place. Alcohol swab cap on unused port. Aseptic technique when accessing ports. Problem: Discharge Planning:  Goal: Participates in care planning  Description: Participates in care planning  Outcome: Ongoing  Note: A: Will assess for potential needs at discharge. Will consult SW as needed. Problem: Cardiac Output - Decreased:  Goal: Hemodynamic stability will improve  Description: Hemodynamic stability will improve  Outcome: Ongoing  Note: A: Continuous cardiac telemetry monitor. VS per routine. Administer treatments and medications as ordered. Monitor patient's weight. Repeat 12 EKGs as ordered for rhythm changes or chest pain. Notify physician of hemodynamic or rhythm changes.        Problem: Fluid Volume - Imbalance:  Goal: Absence of imbalanced fluid volume signs and symptoms  Description: Absence of imbalanced fluid volume signs and symptoms  Outcome: Ongoing  Note: A: Strict I/O, daily weight, assess for edema. Fluid replacement per nephrology orders       Problem: Serum Glucose Level - Abnormal:  Goal: Ability to maintain appropriate glucose levels will improve to within specified parameters  Description: Ability to maintain appropriate glucose levels will improve to within specified parameters  Outcome: Ongoing  Note: A: Glucose monitoring per DKA insulin gtt protocol orders. Adjust calculation multiplier to titrate insulin gtt to keep Glucose within ordered parameters. Notify physician of hypoglycemic episodes. Goal: Ability to maintain appropriate glucose levels will improve  Description: Ability to maintain appropriate glucose levels will improve  Outcome: Ongoing     Problem: Tissue Perfusion, Altered:  Goal: Circulatory function within specified parameters  Description: Circulatory function within specified parameters  Outcome: Ongoing  Note: A: Assess peripheral color, skin temp, sensation, pulses, and cap refill. Notify physician of changes. Problem: Tissue Perfusion - Cardiopulmonary, Altered:  Goal: Hemodynamic stability will improve  Description: Hemodynamic stability will improve  Outcome: Ongoing  Note: A: Continuous cardiac telemetry monitor. VS per routine. Administer treatments and medications as ordered. Monitor patient's weight. Repeat 12 EKGs as ordered for rhythm changes or chest pain. Notify physician of hemodynamic or rhythm changes. Problem: Urinary Elimination:  Goal: Complications related to the disease process, condition or treatment will be avoided or minimized  Description: Complications related to the disease process, condition or treatment will be avoided or minimized  Outcome: Ongoing  Note: A: Assess need for continued use. Assess and educate for signs/symptoms of infection. Monitor quality and quantity of urine output.  Stat lock in place, drainage bag hanging below level of bladder, routine catheter care. Problem: Injury - Acid Base Imbalance:  Goal: Acid-base balance  Description: Acid-base balance  Outcome: Ongoing  Note: A: Nephrology following. Review lab results. Notify physician of abnormal results.        Problem: Tissue Perfusion - Renal, Altered:  Goal: Electrolytes within specified parameters  Description: Electrolytes within specified parameters  Outcome: Ongoing  Note: A: Electrolyte replacement per DKA replacement orders  Goal: Urine creatinine clearance will be within specified parameters  Description: Urine creatinine clearance will be within specified parameters  Outcome: Ongoing  Goal: Serum creatinine will be within specified parameters  Description: Serum creatinine will be within specified parameters  Outcome: Ongoing  Note: A: Nephrology following  Goal: Ability to achieve a balanced intake and output will improve  Description: Ability to achieve a balanced intake and output will improve  Outcome: Ongoing  Note: A: Fluid replacement per nephrology orders

## 2021-12-13 NOTE — PROGRESS NOTES
Late Entry d/t care:  Assessment completed @ 6256. Pt drowsy, awakens easily, states some improvement of nausea and general malaise from yesterday. Binu Price PA bedside for GI rounds @ 0820  Dr Kaitlin Reyes and Jimmy Ornelas CNP bedside for critical care rounds @ [facility] bedside for surgical rounds @ 4868  DKA labs drawn per central line and sent to lab  Dr Gage Landin bedside for hospitalist rounds @ 3596  Dr Leighton Abrams bedside for nephrology rounds @ 3317  Status reviewed, questions answered and new orders noted with provider rounds. Replacing electrolytes per PRN orders see eMAR.

## 2021-12-13 NOTE — CONSULTS
Pulmonary Critical Care Consult Note     Patient's name:  Morgan Howard  Medical Record Number: [id number]  Patient's account/billing number: [de-identified]  Patient's YOB: 1970  Age: 54 y.o. Date of Admission: 12/12/2021  9:08 AM  Date of Consult: 12/13/2021      Primary Care Physician: [address]      Code Status: Full Code    Reason for consult: septic shock    Assessment and Plan     1. Septic shock  2. Acute pancreatitis  3. Severe DKA  4. ARF  5. Bibasilar atelectasis       Plan:  NPO  NG if persistent vomiting  IV hydration, Hco3 gtt  DKA protocol, check A1c level. Broad spectrum antibiotics, meropenem. Pressors to keep map > 65  Avoid nephrotoxic's. GI and DVT prophylaxis. CC time 35 min       HISTORY OF PRESENT ILLNESS:   /Ms. Morgan Howard is a 54 y.o. -American gentleman with past medical history stated below significant for chronic lower back pain, smoker, no prior history of diabetes, no history of excessive alcohol use presented with generalized fatigue, intractable nausea vomiting, increased urination and thirst, no fever no chills no chest pain no excessive coughing no diarrhea. Found to have acute pancreatitis and DKA. Hypotensive requiring pressors. Past Medical History:        Diagnosis Date    H/O degenerative disc disease        Past Surgical History:  No past surgical history on file. Allergies:    No Known Allergies      Home Meds:   Prior to Admission medications    Medication Sig Start Date End Date Taking? Authorizing Provider   acetaminophen (TYLENOL) 325 MG tablet Take 650 mg by mouth every 6 hours as needed for Pain    Historical Provider, MD       Family History:   No family history on file. Social History:   TOBACCO:   reports that he has never smoked. He has never used smokeless tobacco.  ETOH:   reports current alcohol use.   DRUGS:  reports no history of drug 17.4*   HGB 13.3*        BMP:    Recent Labs     12/12/21  2332 12/12/21  2332 12/13/21  0423 12/13/21  0423 12/13/21  0845   *   < > 130*   < > 128*   K 4.0   < > 4.0   < > 3.7   CL 94*   < > 94*   < > 93*   CO2 12*   < > 16*   < > 17*   BUN 47*   < > 47*   < > 47*   CREATININE 2.8*  --  2.6*  --  2.5*   GLUCOSE 350*   < > 275*   < > 298*    < > = values in this interval not displayed. S. Calcium:  Recent Labs     12/13/21  0845   CALCIUM 6.8*     S. Ionized Calcium:No results for input(s): IONCA in the last 72 hours. S. Magnesium:  Recent Labs     12/13/21  0845   MG 2.00     S. Phosphorus:  Recent Labs     12/13/21  0845   PHOS 1.2*     S. Glucose:  Recent Labs     12/13/21  0825 12/13/21  0914 12/13/21  1002   POCGLU 330* 267* 237*     Glycosylated hemoglobin A1C: No results for input(s): LABA1C in the last 72 hours. INR: No results for input(s): INR in the last 72 hours. Hepatic functions:   Recent Labs     12/12/21  0934   ALKPHOS 132*   ALT 46*   AST 35   PROT 8.2   BILITOT 0.5   LABALBU 4.1     Pancreatic functions:  Recent Labs     12/12/21  1942 12/12/21  2332 12/13/21  0423   LACTA  --    < > 1.5   AMYLASE 1,026*  --   --     < > = values in this interval not displayed. S. Lactic Acid:   Recent Labs     12/13/21  0423   LACTA 1.5     Cardiac enzymes:  Recent Labs     12/12/21  1150   CKTOTAL 185     BNP:No results for input(s): BNP in the last 72 hours. Lipid profile:   Recent Labs     12/12/21  2134   TRIG 361*     Blood Gases: No results found for: PH, PCO2, PO2, HCO3, O2SAT  Thyroid functions: No results found for: TSH       Radiology Review:  Pertinent images / reports were reviewed as a part of this visit. CT Chest w/ contrast: No results found for this or any previous visit. CT Chest w/o contrast: No results found for this or any previous visit. CTPA: No results found for this or any previous visit.       CXR PA/LAT: No results found for this or any previous visit.      CXR portable: Results for orders placed during the hospital encounter of 12/12/21    XR CHEST PORTABLE    Narrative  EXAMINATION:  ONE XRAY VIEW OF THE CHEST    12/12/2021 10:16 am    COMPARISON:  Chest x-ray dated 08/16/2006. HISTORY:  ORDERING SYSTEM PROVIDED HISTORY: other  TECHNOLOGIST PROVIDED HISTORY:  Reason for exam:->other  Reason for Exam: Hypotension,SOB    FINDINGS:  HEART/MEDIASTINUM: The cardiomediastinal silhouette is within normal limits. PLEURA/LUNGS: There are no focal consolidations or pleural effusions. There  is no appreciable pneumothorax. There are low lung volumes. There is  elevation of the right hemidiaphragm. There is right basilar atelectasis. BONES/SOFT TISSUE: No acute abnormality. Impression  No radiographic evidence of acute pulmonary disease.                      Tanisha Gray MD, M.D.            12/13/2021, 11:05 AM

## 2021-12-13 NOTE — CONSULTS
Nephrology Consult Note   [facility]      Chief Complaint: Nausea/vomiting/pancreatitis    History of Present Illness: Mr Eusebia Cantor is a is a 54 y.o. male with no significant past medical history who presents to [facility] with increased thirst, urination, nausea, and vomiting. He was noted to have a BS level of 900 mg/dl. CT scan abdomen and pelvis revealed severe acute Pancreatitis. He was Hyperkalemic, with serum HCO level of 5 and creatinine of 3.4 mg/dl. Patient was started on insulin gtt, aggressively volume resuscitated - santiago was placed     Subjective:    Resting in bed; appears sick; no shortness of breath    ROS: + nausea, + vomiting, + abdominal distension, + abdominal pain. All other ROS negative     Scheduled Meds:   calcium gluconate-NaCl  2,000 mg IntraVENous Once    sodium phosphate IVPB  20 mmol IntraVENous Once    vancomycin (VANCOCIN) intermittent dosing (placeholder)   Other RX Placeholder    metroNIDAZOLE  500 mg IntraVENous Q8H    meropenem  500 mg IntraVENous Q6H        pantoprazole 8 mg/hr (21 0921)    sodium bicarbonate infusion 150 mL/hr at 21 0340    norepinephrine Stopped (21 0506)    dextrose      insulin 0.158 Units/kg/hr (21 0921)    dextrose 5 % and 0.45 % NaCl      sodium chloride Stopped (21 1210)    vasopressin (Septic Shock) infusion Stopped (21 1731)       PRN Meds:.glucose, dextrose, glucagon (rDNA), dextrose, dextrose, potassium chloride, magnesium sulfate, sodium phosphate IVPB **OR** sodium phosphate IVPB **OR** sodium phosphate IVPB, dextrose 5 % and 0.45 % NaCl, ondansetron    Physical Exam:    TEMPERATURE:  Current - Temp: 97.7 °F (36.5 °C);  Max - Temp  Av.3 °F (36.3 °C)  Min: 96.6 °F (35.9 °C)  Max: 97.7 °F (36.5 °C)  RESPIRATIONS RANGE: Resp  Av.9  Min: 18  Max: 30  PULSE RANGE: Pulse  Av  Min: 89  Max: 117  BLOOD PRESSURE RANGE:  Systolic (18ZWH), WJO:725 , Min:73 , QPQ:739   ; Diastolic (24hrs), Av, Min:41, Max:139    24HR INTAKE/OUTPUT:      Intake/Output Summary (Last 24 hours) at 2021 0943  Last data filed at 2021 9961  Gross per 24 hour   Intake 7519.61 ml   Output 1935 ml   Net 5584.61 ml         Patient Vitals for the past 96 hrs (Last 3 readings):   Weight   21 0400 296 lb 11.8 oz (134.6 kg)   21 1237 293 lb 3.4 oz (133 kg)   21 0911 288 lb 9.3 oz (130.9 kg)       General: Alert, Awake, NAD, Obese  HEENT: Normocephalic, atraumatic, Nose and ears appear externally without deformity, MMM  Neck: No Thyromegaly, Trachea is midline, No Carotid bruit  Chest: clear to auscultation, no intercostal retractions  CVS: RRR, no murmur, no rub  Abdomen: distended, generalized tenderness, no bruit appreciated  Extremities: no edema, no cyanosis. Skin: normal texture, normal skin turgor, no rash  Musculoskeletal: normal ROM, no joint swelling, no visible deformity  Neurological: CN intact, no focal motor neurological deficit  Psych: flat affect  : santiago in place; no hematuria noted    Allergies:  No Known Allergies     Past Medical History:   Diagnosis Date    H/O degenerative disc disease      No past surgical history on file. No family history on file.     Social History     Socioeconomic History    Marital status: Single     Spouse name: Not on file    Number of children: Not on file    Years of education: Not on file    Highest education level: Not on file   Occupational History    Not on file   Tobacco Use    Smoking status: Never Smoker    Smokeless tobacco: Never Used   Substance and Sexual Activity    Alcohol use: Yes     Comment: social    Drug use: Never    Sexual activity: Not on file   Other Topics Concern    Not on file   Social History Narrative    Not on file     Social Determinants of Health     Financial Resource Strain:     Difficulty of Paying Living Expenses: Not on file   Food Insecurity:     Worried About [address] in the Last Year: Not on file    Ran Out of Food in the Last Year: Not on file   Transportation Needs:     Lack of Transportation (Medical): Not on file    Lack of Transportation (Non-Medical):  Not on file   Physical Activity:     Days of Exercise per Week: Not on file    Minutes of Exercise per Session: Not on file   Stress:     Feeling of Stress : Not on file   Social Connections:     Frequency of Communication with Friends and Family: Not on file    Frequency of Social Gatherings with Friends and Family: Not on file    Attends [employer] Services: Not on file    Active Member of [address] [place] [address] [employer] or Organizations: Not on file    Attends Club or Organization Meetings: Not on file    Marital Status: Not on file   Intimate Partner Violence:     Fear of Current or Ex-Partner: Not on file    Emotionally Abused: Not on file    Physically Abused: Not on file    Sexually Abused: Not on file   Housing Stability:     Unable to Pay for Housing in the Last Year: Not on file    Number of [place] in the Last Year: Not on file    Unstable Housing in the Last Year: Not on file       LAB DATA:    CBC:   Lab Results   Component Value Date    WBC 17.4 12/13/2021    RBC 4.31 12/13/2021    HGB 13.3 12/13/2021    HCT 38.5 12/13/2021    MCV 89.2 12/13/2021    MCH 30.7 12/13/2021    MCHC 34.5 12/13/2021    RDW 13.1 12/13/2021     12/13/2021    MPV 10.1 12/13/2021     BMP:    Lab Results   Component Value Date     12/13/2021    K 3.7 12/13/2021    K 4.2 12/12/2021    CL 93 12/13/2021    CO2 17 12/13/2021    BUN 47 12/13/2021    CREATININE 2.5 12/13/2021    CALCIUM 6.8 12/13/2021    GFRAA 33 12/13/2021    LABGLOM 28 12/13/2021    GLUCOSE 298 12/13/2021     Ionized Calcium:  No results found for: IONCA  Magnesium:    Lab Results   Component Value Date    MG 2.00 12/13/2021     Phosphorus:    Lab Results   Component Value Date    PHOS 1.2 12/13/2021     U/A:    Lab Results   Component Value Date    COLORU YELLOW 12/12/2021 PHUR 5.0 12/12/2021    WBCUA 2 12/12/2021    RBCUA 0-2 12/12/2021    CLARITYU CLOUDY 12/12/2021    SPECGRAV 1.023 12/12/2021    LEUKOCYTESUR Negative 12/12/2021    UROBILINOGEN 0.2 12/12/2021    BILIRUBINUR MODERATE 12/12/2021    BLOODU LARGE 12/12/2021    GLUCOSEU >=1000 12/12/2021         IMPRESSION/RECOMMENDATIONS:      Active Problems:    DKA, type 2, not at goal [facility])  Resolved Problems:    * No resolved hospital problems. *    1. CHAPIN - likely prerenal - occurring in the setting of DKA/pancreatitis  - Continue aggressive volume resc - change composition  - no immediate indication for RRT   - CT scan on admission did not reveal Hydronephrosis  - maintain santiago  - avoid exposure to Nephrotoxic agents    2. Critical life threatening acidosis   - Secondary to DKA and CHAPIN  - Continue HCO gtt  - Improving    3.  Hyperkalemia - improving with medical management    Total critical care time spent 35 mins

## 2021-12-13 NOTE — CONSULTS
Surgery Consult Note     Katey Head PA-C  Pt Name: Milan Flowers  MRN: [id number]  [place]: 1970  Date of evaluation: 12/13/2021  Primary Care Physician: [facility] [address] [place] [address]  IMPRESSIONS:   1. DKA: in ICU glucose improving  2. CHAPIN: Improving with IVF  3. Pancreatitis: Lipase: >3,000; Amylase: 1,026  4. Severe acute interstitial pancreatitis on CT. 5. Leukocytosis: WBC count 20.1-->22.0-->17.4  6. Hypotensive, Tachycardia: improved. 7. BMI: 39.15  PLANS:   1. Monitor and control pain  2. Monitor Pancreatic enzymes  3. Ultrasound today  4. DKA, CHAPIN, shock being followed by critical care team, nephrology. SUBJECTIVE:   History of Chief Complaint:    Milan Flowers is a 54 y.o. male who presents with nausea, emesis, mild abdominal pain, increased thirst and urination. These symptoms began several days ago. They had been increasing in intensity and he came into the ER yesterday. He was found to have DKA and shock and was admitted to the ICU. Started on DKA protocol and on levophed. He was also found to have elevated LFT's and pancreatic enzymes. He had a CT scan performed yesterday and that showed him to have severe acute interstitial pancreatitis. He does admits to occasionally consuming alcohol but nothing frequently. HE did have a few beers prior to the pain starting. He denies having ever been diagnoses with pancreatitis in the past.  He denies having any other pain. Denies any CP, SOB, cough, lightheadedness or dizziness. Past Medical History  Reviewed  has a past medical history of H/O degenerative disc disease. Past Surgical History  Reviewed has no past surgical history on file. Medications  Prior to Admission medications    Medication Sig Start Date End Date Taking?  Authorizing Provider   acetaminophen (TYLENOL) 325 MG tablet Take 650 mg by mouth every 6 hours as needed for Pain    Historical Provider, MD    Scheduled Meds:   vancomycin (VANCOCIN) intermittent dosing (placeholder) Other RX Placeholder    metroNIDAZOLE  500 mg IntraVENous Q8H    meropenem  500 mg IntraVENous Q6H     Continuous Infusions:   pantoprazole 8 mg/hr (12/13/21 0312)    sodium bicarbonate infusion 150 mL/hr at 12/13/21 0340    norepinephrine Stopped (12/13/21 0506)    dextrose      insulin 24.3 Units/hr (12/13/21 0825)    dextrose 5 % and 0.45 % NaCl      sodium chloride Stopped (12/12/21 1210)    vasopressin (Septic Shock) infusion Stopped (12/12/21 1731)     PRN Meds:.glucose, dextrose, glucagon (rDNA), dextrose, dextrose, potassium chloride, magnesium sulfate, sodium phosphate IVPB **OR** sodium phosphate IVPB **OR** sodium phosphate IVPB, dextrose 5 % and 0.45 % NaCl, ondansetron  Allergies  has No Known Allergies. Family History  Reviewedfamily history is not on file. Social History   reports that he has never smoked. He has never used smokeless tobacco. He reports current alcohol use. He reports that he does not use drugs. EDUCATION  Patient educated about their illness/diagnosis, stated above, and all questions answered. We discussed the importance of nutrition, medications they are taking, and healthy lifestyle. Review of Systems:  General Denies any fever or chills  HEENT Denies any diplopia, tinnitus or vertigo  Resp Denies any shortness of breath, cough or wheezing  Cardiac Denies any chest pain, palpitations, claudication or edema  GI Denies any melena, hematochezia, hematemesis or pyrosis   Denies any frequency, urgency, hesitancy or incontinence  Heme Denies bruising or bleeding easily  Neuro Denies any focal motor or sensory deficits  OBJECTIVE:   VITALS:  weight is 296 lb 11.8 oz (134.6 kg). His axillary temperature is 97.7 °F (36.5 °C). His blood pressure is 99/63 and his pulse is 113. His respiration is 25 and oxygen saturation is 93%. CONSTITUTIONAL: Alert and oriented times 3, no acute distress and cooperative to examination with proper mood and affect.   SKIN: Skin color, texture, turgor normal. No rashes or lesions. LYMPH: no cervical nodes, no inguinal nodes  HEENT: Head is normocephalic, atraumatic. EOMI, PERRLA. NECK: Supple, symmetrical, trachea midline, no adenopathy, thyroid symmetric, not enlarged and no tenderness, skin normal.  CHEST/LUNGS: chest symmetric with normal A/P diameter, normal respiratory rate and rhythm  CARDIOVASCULAR: Tachycardia  ABDOMEN: Normal shape. Tenderness: non-tender at this time. RECTAL: deferred, not clinically indicated  NEUROLOGIC: There are no focalizing motor or sensory deficits. CN II-XII are grossly intact. [facility] EXTREMITIES: no cyanosis, no clubbing and no edema. LABS:     Recent Labs     12/12/21  0934 12/12/21  1150 12/12/21  1607 12/12/21  1611 12/12/21  1942 12/12/21  2332 12/13/21  0423   WBC 20.1*  --   --   --  22.0*  --  17.4*   HGB 16.4  --   --   --  14.6  --  13.3*   HCT 54.1*  --   --   --  45.0  --  38.5*     --   --   --  185  --  140   *   < >  --    < > 124* 131* 130*   K 4.2   < >  --    < > 5.5* 4.0 4.0   CL 75*   < >  --    < > 90* 94* 94*   CO2 5*   < >  --    < > 5* 12* 16*   BUN 44*   < >  --    < > 49* 47* 47*   CREATININE 3.4*   < >  --    < > 3.1* 2.8* 2.6*   MG  --    < >  --    < > 2.30 2.10 2.00   PHOS  --    < >  --    < > 2.7 1.1* 1.5*   CALCIUM 8.9   < >  --    < > 7.2* 7.0* 7.0*   AST 35  --   --   --   --   --   --    ALT 46*  --   --   --   --   --   --    BILITOT 0.5  --   --   --   --   --   --    NITRU  --   --  Negative  --   --   --   --    COLORU  --   --  YELLOW  --   --   --   --     < > = values in this interval not displayed.      Recent Labs     12/12/21  0934 12/12/21 1942   ALKPHOS 132*  --    ALT 46*  --    AST 35  --    BILITOT 0.5  --    LABALBU 4.1  --    AMYLASE  --  1,026*   LIPASE  --  >3,000.0*     CBC:   Lab Results   Component Value Date    WBC 17.4 12/13/2021    RBC 4.31 12/13/2021    HGB 13.3 12/13/2021    HCT 38.5 12/13/2021    MCV 89.2 12/13/2021    MCH 30.7 12/13/2021    MCHC 34.5 12/13/2021    RDW 13.1 12/13/2021     12/13/2021    MPV 10.1 12/13/2021     CMP:    Lab Results   Component Value Date     12/13/2021    K 4.0 12/13/2021    K 4.2 12/12/2021    CL 94 12/13/2021    CO2 16 12/13/2021    BUN 47 12/13/2021    CREATININE 2.6 12/13/2021    GFRAA 32 12/13/2021    AGRATIO 1.0 12/12/2021    LABGLOM 26 12/13/2021    GLUCOSE 275 12/13/2021    PROT 8.2 12/12/2021    LABALBU 4.1 12/12/2021    CALCIUM 7.0 12/13/2021    BILITOT 0.5 12/12/2021    ALKPHOS 132 12/12/2021    AST 35 12/12/2021    ALT 46 12/12/2021     Urine Culture:  No components found for: CURINE  Blood Culture:  No components found for: CBLOOD, CFUNGUSBL  RADIOLOGY:     CT scan abd/pelvis:   1. Severe acute interstitial pancreatitis.  No focal fluid collection. 2. Hepatomegaly with severe steatosis. 3. Possible gallbladder sludge. 4. Colonic diverticulosis without acute diverticulitis. 5. The distal esophagus is dilated and filled with fluid. Thank you for the interesting evaluation. Further recommendations to follow. Angelia Gulydia Manual Manuel  General and Vascular Surgery [phone]  Electronically signed by Lilian Martin PA-C on 12/13/2021 at 8:35 AM    Agree with above note. The patient was personally seen and examined. Addy Lopez is a 55 yo male with previously undiagnosed DM who presented with worsening nausea and abdominal pain. No fevers or chills. No change in bowel habits. He was found to have DKA and in shock requiring levophed gtt. Further workup revealed pancreatitis. No prior abdominal surgeries.     NAD, appears stated age  [de-identified], regular  Normal respiratory effort, no accessory muscle use  Abd soft, Minimally distended, no significant tenderness  No cyanosis or clubbing    WBC 17.4, down from 22 last evening  Na up to 129  HCO3 up to 19 from 5 on admission  Cr down to 2.6 from 3.4 on admission  Lactic acid down to 2 from 8.5 on admission  Glucose down to 230 from 881 on admission    CT abd/pelvis personally reveiwed, showing pancreatitis without bubble of gas to suggest infectious process  RUQ US shows no gallstones    A/P: 55 yo male with DKA due to undiagnosed DM, acute pancreatis, shock with acute kidney injury    Acute pancreatitis - continue aggressive IV hydration. Trend lipase. RUQ US negative for gallstones    Acute kidney injury - Good UOP, trend Cr. Continue santiago catheter    Acidosis - Bicarb correcting with NaHCO3    Shock - secondary to acute pancreatitis - resolved.   On vancomycin, meropenem    DKA, undiagnosed DM - on insulin gtt, management per primary team    Cece Coronel MD

## 2021-12-13 NOTE — PROGRESS NOTES
Patient had a GI consult and was admitted to ICU for combination of severe pancreatitis with combination of possible septic shock requiring pressors in addition was in DKA and started on insulin IV infusion, I spoke with the GI physician Dr. Dylon Tyson, he had concern for possible severe pancreatic necrosis and possible worsening infection and requested that I speak with general surgeon on-call for any further evaluation or management options. He felt as though patient may need to be transferred for possible intervention at another facility. I did speak with the on-call physician on general surgery, Dr. Carisa Vo, we discussed the patient's overall clinical status, nephrology consult was also thought to be a good option for patient's critical status. General surgery did agree to broaden patient's antibiotic coverage and keep an eye on his acidosis and see for any improvements but that he would follow in the morning, did not feel the patient required any urgent surgical intervention and that there was less concern for possible necrotic pancreas given the lack of free gas on the CT abdomen findings although it was noted to be without contrast.  Patient was placed on a bicarbonate IV infusion in sterile water and progressed to 250/h for hydration with the insulin infusion per DKA protocol. After sugars were improving, decided to change patient to a 100 M EQ infusion with D5 solution after sugars had come down while patient was on the insulin drip. Patient was having good urine output and although was on pressor seem to be stabilizing per repeat discussions with nursing. Nursing notified me later on that patient did have possible emesis with dark coloration, it was sent for evaluation for blood and did come back occult positive. Decide to start patient on Protonix IV bolus and infusion for possible GI bleed. GI is following and would request them to evaluate for possible endoscopy in the morning.

## 2021-12-14 LAB
ANION GAP SERPL CALCULATED.3IONS-SCNC: 12 MMOL/L (ref 3–16)
ANION GAP SERPL CALCULATED.3IONS-SCNC: 13 MMOL/L (ref 3–16)
ANION GAP SERPL CALCULATED.3IONS-SCNC: 13 MMOL/L (ref 3–16)
ANION GAP SERPL CALCULATED.3IONS-SCNC: 14 MMOL/L (ref 3–16)
ANION GAP SERPL CALCULATED.3IONS-SCNC: 14 MMOL/L (ref 3–16)
ANION GAP SERPL CALCULATED.3IONS-SCNC: 16 MMOL/L (ref 3–16)
BASOPHILS ABSOLUTE: 0 K/UL (ref 0–0.2)
BASOPHILS RELATIVE PERCENT: 0.1 %
BUN BLDV-MCNC: 31 MG/DL (ref 7–20)
BUN BLDV-MCNC: 31 MG/DL (ref 7–20)
BUN BLDV-MCNC: 33 MG/DL (ref 7–20)
BUN BLDV-MCNC: 35 MG/DL (ref 7–20)
BUN BLDV-MCNC: 37 MG/DL (ref 7–20)
BUN BLDV-MCNC: 42 MG/DL (ref 7–20)
CALCIUM IONIZED: 0.94 MMOL/L (ref 1.12–1.32)
CALCIUM SERPL-MCNC: 6.2 MG/DL (ref 8.3–10.6)
CALCIUM SERPL-MCNC: 6.3 MG/DL (ref 8.3–10.6)
CALCIUM SERPL-MCNC: 6.6 MG/DL (ref 8.3–10.6)
CALCIUM SERPL-MCNC: 6.7 MG/DL (ref 8.3–10.6)
CALCIUM SERPL-MCNC: 6.8 MG/DL (ref 8.3–10.6)
CALCIUM SERPL-MCNC: 6.9 MG/DL (ref 8.3–10.6)
CHLORIDE BLD-SCNC: 94 MMOL/L (ref 99–110)
CHLORIDE BLD-SCNC: 94 MMOL/L (ref 99–110)
CHLORIDE BLD-SCNC: 96 MMOL/L (ref 99–110)
CHLORIDE BLD-SCNC: 97 MMOL/L (ref 99–110)
CHLORIDE BLD-SCNC: 98 MMOL/L (ref 99–110)
CHLORIDE BLD-SCNC: 98 MMOL/L (ref 99–110)
CO2: 21 MMOL/L (ref 21–32)
CO2: 23 MMOL/L (ref 21–32)
CO2: 26 MMOL/L (ref 21–32)
CO2: 27 MMOL/L (ref 21–32)
CREAT SERPL-MCNC: 2.2 MG/DL (ref 0.9–1.3)
CREAT SERPL-MCNC: 2.3 MG/DL (ref 0.9–1.3)
CREAT SERPL-MCNC: 2.5 MG/DL (ref 0.9–1.3)
EOSINOPHILS ABSOLUTE: 0 K/UL (ref 0–0.6)
EOSINOPHILS RELATIVE PERCENT: 0 %
GFR AFRICAN AMERICAN: 33
GFR AFRICAN AMERICAN: 37
GFR AFRICAN AMERICAN: 39
GFR NON-AFRICAN AMERICAN: 28
GFR NON-AFRICAN AMERICAN: 30
GFR NON-AFRICAN AMERICAN: 32
GLUCOSE BLD-MCNC: 130 MG/DL (ref 70–99)
GLUCOSE BLD-MCNC: 134 MG/DL (ref 70–99)
GLUCOSE BLD-MCNC: 140 MG/DL (ref 70–99)
GLUCOSE BLD-MCNC: 141 MG/DL (ref 70–99)
GLUCOSE BLD-MCNC: 145 MG/DL (ref 70–99)
GLUCOSE BLD-MCNC: 151 MG/DL (ref 70–99)
GLUCOSE BLD-MCNC: 154 MG/DL (ref 70–99)
GLUCOSE BLD-MCNC: 159 MG/DL (ref 70–99)
GLUCOSE BLD-MCNC: 160 MG/DL (ref 70–99)
GLUCOSE BLD-MCNC: 160 MG/DL (ref 70–99)
GLUCOSE BLD-MCNC: 166 MG/DL (ref 70–99)
GLUCOSE BLD-MCNC: 169 MG/DL (ref 70–99)
GLUCOSE BLD-MCNC: 170 MG/DL (ref 70–99)
GLUCOSE BLD-MCNC: 172 MG/DL (ref 70–99)
GLUCOSE BLD-MCNC: 179 MG/DL (ref 70–99)
GLUCOSE BLD-MCNC: 188 MG/DL (ref 70–99)
GLUCOSE BLD-MCNC: 188 MG/DL (ref 70–99)
GLUCOSE BLD-MCNC: 197 MG/DL (ref 70–99)
GLUCOSE BLD-MCNC: 233 MG/DL (ref 70–99)
GLUCOSE BLD-MCNC: 240 MG/DL (ref 70–99)
GLUCOSE BLD-MCNC: 243 MG/DL (ref 70–99)
GLUCOSE BLD-MCNC: 305 MG/DL (ref 70–99)
GLUCOSE BLD-MCNC: 341 MG/DL (ref 70–99)
GLUCOSE BLD-MCNC: 366 MG/DL (ref 70–99)
GLUCOSE BLD-MCNC: 367 MG/DL (ref 70–99)
GLUCOSE BLD-MCNC: 379 MG/DL (ref 70–99)
GLUCOSE BLD-MCNC: 388 MG/DL (ref 70–99)
HCT VFR BLD CALC: 32.3 % (ref 40.5–52.5)
HEMOGLOBIN: 10.9 G/DL (ref 13.5–17.5)
LYMPHOCYTES ABSOLUTE: 1.3 K/UL (ref 1–5.1)
LYMPHOCYTES RELATIVE PERCENT: 9.9 %
MAGNESIUM: 1.5 MG/DL (ref 1.8–2.4)
MAGNESIUM: 1.7 MG/DL (ref 1.8–2.4)
MAGNESIUM: 1.9 MG/DL (ref 1.8–2.4)
MAGNESIUM: 2 MG/DL (ref 1.8–2.4)
MAGNESIUM: 2.1 MG/DL (ref 1.8–2.4)
MAGNESIUM: 2.2 MG/DL (ref 1.8–2.4)
MCH RBC QN AUTO: 30.1 PG (ref 26–34)
MCHC RBC AUTO-ENTMCNC: 33.8 G/DL (ref 31–36)
MCV RBC AUTO: 89 FL (ref 80–100)
MONOCYTES ABSOLUTE: 1.1 K/UL (ref 0–1.3)
MONOCYTES RELATIVE PERCENT: 8.5 %
NEUTROPHILS ABSOLUTE: 10.9 K/UL (ref 1.7–7.7)
NEUTROPHILS RELATIVE PERCENT: 81.5 %
PDW BLD-RTO: 13.1 % (ref 12.4–15.4)
PERFORMED ON: ABNORMAL
PH VENOUS: 7.43 (ref 7.35–7.45)
PHOSPHORUS: 1.7 MG/DL (ref 2.5–4.9)
PHOSPHORUS: 1.7 MG/DL (ref 2.5–4.9)
PHOSPHORUS: 1.9 MG/DL (ref 2.5–4.9)
PHOSPHORUS: 2 MG/DL (ref 2.5–4.9)
PHOSPHORUS: 2 MG/DL (ref 2.5–4.9)
PHOSPHORUS: 2.8 MG/DL (ref 2.5–4.9)
PLATELET # BLD: 109 K/UL (ref 135–450)
PMV BLD AUTO: 10.6 FL (ref 5–10.5)
POTASSIUM SERPL-SCNC: 3 MMOL/L (ref 3.5–5.1)
POTASSIUM SERPL-SCNC: 3 MMOL/L (ref 3.5–5.1)
POTASSIUM SERPL-SCNC: 3.3 MMOL/L (ref 3.5–5.1)
POTASSIUM SERPL-SCNC: 3.4 MMOL/L (ref 3.5–5.1)
POTASSIUM SERPL-SCNC: 3.4 MMOL/L (ref 3.5–5.1)
POTASSIUM SERPL-SCNC: 3.7 MMOL/L (ref 3.5–5.1)
RBC # BLD: 3.62 M/UL (ref 4.2–5.9)
SODIUM BLD-SCNC: 131 MMOL/L (ref 136–145)
SODIUM BLD-SCNC: 131 MMOL/L (ref 136–145)
SODIUM BLD-SCNC: 132 MMOL/L (ref 136–145)
SODIUM BLD-SCNC: 135 MMOL/L (ref 136–145)
SODIUM BLD-SCNC: 136 MMOL/L (ref 136–145)
SODIUM BLD-SCNC: 137 MMOL/L (ref 136–145)
TRIGL SERPL-MCNC: 164 MG/DL (ref 0–150)
VANCOMYCIN RANDOM: 10.2 UG/ML
WBC # BLD: 13.4 K/UL (ref 4–11)

## 2021-12-14 PROCEDURE — 99232 SBSQ HOSP IP/OBS MODERATE 35: CPT | Performed by: SURGERY

## 2021-12-14 PROCEDURE — APPNB45 APP NON BILLABLE 31-45 MINUTES: Performed by: NURSE PRACTITIONER

## 2021-12-14 PROCEDURE — 99291 CRITICAL CARE FIRST HOUR: CPT | Performed by: INTERNAL MEDICINE

## 2021-12-14 PROCEDURE — 6360000002 HC RX W HCPCS: Performed by: INTERNAL MEDICINE

## 2021-12-14 PROCEDURE — 2580000003 HC RX 258: Performed by: INTERNAL MEDICINE

## 2021-12-14 PROCEDURE — 6370000000 HC RX 637 (ALT 250 FOR IP): Performed by: INTERNAL MEDICINE

## 2021-12-14 PROCEDURE — 94761 N-INVAS EAR/PLS OXIMETRY MLT: CPT

## 2021-12-14 PROCEDURE — 2580000003 HC RX 258: Performed by: STUDENT IN AN ORGANIZED HEALTH CARE EDUCATION/TRAINING PROGRAM

## 2021-12-14 PROCEDURE — APPNB45 APP NON BILLABLE 31-45 MINUTES: Performed by: PHYSICIAN ASSISTANT

## 2021-12-14 PROCEDURE — 85025 COMPLETE CBC W/AUTO DIFF WBC: CPT

## 2021-12-14 PROCEDURE — 36415 COLL VENOUS BLD VENIPUNCTURE: CPT

## 2021-12-14 PROCEDURE — 36592 COLLECT BLOOD FROM PICC: CPT

## 2021-12-14 PROCEDURE — 2000000000 HC ICU R&B

## 2021-12-14 PROCEDURE — 82330 ASSAY OF CALCIUM: CPT

## 2021-12-14 PROCEDURE — 2500000003 HC RX 250 WO HCPCS: Performed by: STUDENT IN AN ORGANIZED HEALTH CARE EDUCATION/TRAINING PROGRAM

## 2021-12-14 PROCEDURE — 80048 BASIC METABOLIC PNL TOTAL CA: CPT

## 2021-12-14 PROCEDURE — 83735 ASSAY OF MAGNESIUM: CPT

## 2021-12-14 PROCEDURE — 2500000003 HC RX 250 WO HCPCS: Performed by: INTERNAL MEDICINE

## 2021-12-14 PROCEDURE — 2500000003 HC RX 250 WO HCPCS: Performed by: NURSE PRACTITIONER

## 2021-12-14 PROCEDURE — 84100 ASSAY OF PHOSPHORUS: CPT

## 2021-12-14 PROCEDURE — C9113 INJ PANTOPRAZOLE SODIUM, VIA: HCPCS | Performed by: STUDENT IN AN ORGANIZED HEALTH CARE EDUCATION/TRAINING PROGRAM

## 2021-12-14 PROCEDURE — 2700000000 HC OXYGEN THERAPY PER DAY

## 2021-12-14 PROCEDURE — APPSS45 APP SPLIT SHARED TIME 31-45 MINUTES: Performed by: NURSE PRACTITIONER

## 2021-12-14 PROCEDURE — 80202 ASSAY OF VANCOMYCIN: CPT

## 2021-12-14 PROCEDURE — 6360000002 HC RX W HCPCS: Performed by: STUDENT IN AN ORGANIZED HEALTH CARE EDUCATION/TRAINING PROGRAM

## 2021-12-14 PROCEDURE — 84478 ASSAY OF TRIGLYCERIDES: CPT

## 2021-12-14 RX ORDER — CALCIUM GLUCONATE 20 MG/ML
1000 INJECTION, SOLUTION INTRAVENOUS ONCE
Status: COMPLETED | OUTPATIENT
Start: 2021-12-14 | End: 2021-12-14

## 2021-12-14 RX ORDER — POTASSIUM CHLORIDE 29.8 MG/ML
20 INJECTION INTRAVENOUS
Status: ACTIVE | OUTPATIENT
Start: 2021-12-14 | End: 2021-12-14

## 2021-12-14 RX ORDER — MORPHINE SULFATE 2 MG/ML
2 INJECTION, SOLUTION INTRAMUSCULAR; INTRAVENOUS EVERY 4 HOURS PRN
Status: DISCONTINUED | OUTPATIENT
Start: 2021-12-14 | End: 2021-12-23

## 2021-12-14 RX ORDER — POTASSIUM CHLORIDE 29.8 MG/ML
20 INJECTION INTRAVENOUS PRN
Status: DISCONTINUED | OUTPATIENT
Start: 2021-12-14 | End: 2021-12-15

## 2021-12-14 RX ORDER — MORPHINE SULFATE 4 MG/ML
4 INJECTION, SOLUTION INTRAMUSCULAR; INTRAVENOUS EVERY 4 HOURS PRN
Status: DISCONTINUED | OUTPATIENT
Start: 2021-12-14 | End: 2021-12-23

## 2021-12-14 RX ADMIN — HEPARIN SODIUM 5000 UNITS: 5000 INJECTION INTRAVENOUS; SUBCUTANEOUS at 20:26

## 2021-12-14 RX ADMIN — POTASSIUM CHLORIDE 10 MEQ: 7.46 INJECTION, SOLUTION INTRAVENOUS at 01:42

## 2021-12-14 RX ADMIN — POTASSIUM CHLORIDE 20 MEQ: 29.8 INJECTION, SOLUTION INTRAVENOUS at 18:48

## 2021-12-14 RX ADMIN — SODIUM BICARBONATE: 84 INJECTION, SOLUTION INTRAVENOUS at 17:16

## 2021-12-14 RX ADMIN — POTASSIUM CHLORIDE 20 MEQ: 29.8 INJECTION, SOLUTION INTRAVENOUS at 11:12

## 2021-12-14 RX ADMIN — SODIUM CHLORIDE 8 MG/HR: 9 INJECTION, SOLUTION INTRAVENOUS at 11:56

## 2021-12-14 RX ADMIN — POTASSIUM CHLORIDE 10 MEQ: 7.46 INJECTION, SOLUTION INTRAVENOUS at 05:43

## 2021-12-14 RX ADMIN — POTASSIUM CHLORIDE 10 MEQ: 7.46 INJECTION, SOLUTION INTRAVENOUS at 02:45

## 2021-12-14 RX ADMIN — POTASSIUM CHLORIDE 20 MEQ: 29.8 INJECTION, SOLUTION INTRAVENOUS at 21:59

## 2021-12-14 RX ADMIN — HEPARIN SODIUM 5000 UNITS: 5000 INJECTION INTRAVENOUS; SUBCUTANEOUS at 08:44

## 2021-12-14 RX ADMIN — CALCIUM GLUCONATE 1000 MG: 20 INJECTION, SOLUTION INTRAVENOUS at 11:58

## 2021-12-14 RX ADMIN — SODIUM BICARBONATE: 84 INJECTION, SOLUTION INTRAVENOUS at 14:12

## 2021-12-14 RX ADMIN — ONDANSETRON 4 MG: 2 INJECTION INTRAMUSCULAR; INTRAVENOUS at 05:51

## 2021-12-14 RX ADMIN — SODIUM BICARBONATE: 84 INJECTION, SOLUTION INTRAVENOUS at 03:43

## 2021-12-14 RX ADMIN — POTASSIUM CHLORIDE 10 MEQ: 7.46 INJECTION, SOLUTION INTRAVENOUS at 04:13

## 2021-12-14 RX ADMIN — ONDANSETRON 4 MG: 2 INJECTION INTRAMUSCULAR; INTRAVENOUS at 14:20

## 2021-12-14 RX ADMIN — SODIUM CHLORIDE 39 UNITS/HR: 9 INJECTION, SOLUTION INTRAVENOUS at 14:35

## 2021-12-14 RX ADMIN — SODIUM CHLORIDE 16.2 UNITS/HR: 9 INJECTION, SOLUTION INTRAVENOUS at 18:27

## 2021-12-14 RX ADMIN — MEROPENEM 500 MG: 500 INJECTION, POWDER, FOR SOLUTION INTRAVENOUS at 03:35

## 2021-12-14 RX ADMIN — CALCIUM CHLORIDE 1000 MG: 100 INJECTION, SOLUTION INTRAVENOUS; INTRAVENTRICULAR at 02:04

## 2021-12-14 RX ADMIN — ONDANSETRON 4 MG: 2 INJECTION INTRAMUSCULAR; INTRAVENOUS at 22:00

## 2021-12-14 RX ADMIN — SODIUM CHLORIDE 26.03 UNITS/HR: 9 INJECTION, SOLUTION INTRAVENOUS at 01:07

## 2021-12-14 RX ADMIN — SODIUM CHLORIDE 24.3 UNITS/HR: 9 INJECTION, SOLUTION INTRAVENOUS at 05:35

## 2021-12-14 RX ADMIN — MEROPENEM 500 MG: 500 INJECTION, POWDER, FOR SOLUTION INTRAVENOUS at 10:28

## 2021-12-14 RX ADMIN — Medication 1250 MG: at 17:36

## 2021-12-14 RX ADMIN — SODIUM CHLORIDE 8 MG/HR: 9 INJECTION, SOLUTION INTRAVENOUS at 21:56

## 2021-12-14 RX ADMIN — POTASSIUM CHLORIDE 20 MEQ: 29.8 INJECTION, SOLUTION INTRAVENOUS at 08:33

## 2021-12-14 RX ADMIN — MAGNESIUM SULFATE HEPTAHYDRATE 1000 MG: 1 INJECTION, SOLUTION INTRAVENOUS at 08:29

## 2021-12-14 RX ADMIN — POTASSIUM CHLORIDE 20 MEQ: 29.8 INJECTION, SOLUTION INTRAVENOUS at 16:34

## 2021-12-14 RX ADMIN — MEROPENEM 500 MG: 500 INJECTION, POWDER, FOR SOLUTION INTRAVENOUS at 20:23

## 2021-12-14 RX ADMIN — SODIUM CHLORIDE 0.45 UNITS/KG/HR: 9 INJECTION, SOLUTION INTRAVENOUS at 12:06

## 2021-12-14 RX ADMIN — SODIUM CHLORIDE 20 UNITS/HR: 9 INJECTION, SOLUTION INTRAVENOUS at 20:47

## 2021-12-14 RX ADMIN — SODIUM CHLORIDE 59 UNITS/HR: 9 INJECTION, SOLUTION INTRAVENOUS at 10:12

## 2021-12-14 RX ADMIN — SODIUM PHOSPHATE, MONOBASIC, MONOHYDRATE 15 MMOL: 276; 142 INJECTION, SOLUTION INTRAVENOUS at 02:03

## 2021-12-14 RX ADMIN — MAGNESIUM SULFATE HEPTAHYDRATE 1000 MG: 1 INJECTION, SOLUTION INTRAVENOUS at 05:42

## 2021-12-14 RX ADMIN — MEROPENEM 500 MG: 500 INJECTION, POWDER, FOR SOLUTION INTRAVENOUS at 14:34

## 2021-12-14 RX ADMIN — POTASSIUM CHLORIDE 20 MEQ: 29.8 INJECTION, SOLUTION INTRAVENOUS at 22:58

## 2021-12-14 RX ADMIN — POTASSIUM CHLORIDE 10 MEQ: 7.46 INJECTION, SOLUTION INTRAVENOUS at 06:55

## 2021-12-14 RX ADMIN — SODIUM PHOSPHATE, MONOBASIC, MONOHYDRATE 15 MMOL: 276; 142 INJECTION, SOLUTION INTRAVENOUS at 06:07

## 2021-12-14 RX ADMIN — SODIUM PHOSPHATE, MONOBASIC, MONOHYDRATE 15 MMOL: 276; 142 INJECTION, SOLUTION INTRAVENOUS at 23:21

## 2021-12-14 ASSESSMENT — PAIN SCALES - GENERAL
PAINLEVEL_OUTOF10: 0

## 2021-12-14 NOTE — PROGRESS NOTES
Clinical Pharmacy Note  Vancomycin Consult    Uriel Keller is a 54 y.o. male ordered Vancomycin; consult received from Dr. Alejandro Allen to manage therapy. Also receiving meropenem. Patient Active Problem List   Diagnosis    Back pain    DKA, type 2, not at goal [facility])    Idiopathic acute pancreatitis       Allergies:  Patient has no known allergies. Temp max:  Temp (24hrs), Av.3 °F (36.8 °C), Min:98 °F (36.7 °C), Max:99 °F (37.2 °C)      Recent Labs     21  1942 21  0423 21  0416   WBC 22.0* 17.4* 13.4*       Recent Labs     21  0416 21  0800 21  1200   BUN 37* 31* 35*   CREATININE 2.3* 2.2* 2.3*         Intake/Output Summary (Last 24 hours) at 2021 1603  Last data filed at 2021 1400  Gross per 24 hour   Intake 6175.31 ml   Output 2765 ml   Net 3410.31 ml       Culture Results:  pending    Ht Readings from Last 1 Encounters:   21 6' 1\" (1.854 m)        Wt Readings from Last 1 Encounters:   21 296 lb 11.8 oz (134.6 kg)         Estimated Creatinine Clearance: 56 mL/min (A) (based on SCr of 2.3 mg/dL (H)). Assessment:  Day # 2 of vancomycin. Current regimen: Intermittent dosing based on levels due to CHAPIN  Random level: 10.2 mg/L    Plan:  Vancomycin 1250 mg IV x 1 today  Random vancomycin level tomorrow in AM.    Thank you for the consult.      Rivera Braxton, [facility], PharmD, BCCCP

## 2021-12-14 NOTE — PROGRESS NOTES
Hospitalist Progress Note      PCP: [address] [place] [address]    Date of Admission: 12/12/2021    Chief Complaint: increased thirst, urination, nausea, vomiting    Hospital Course: The patient is a 54 y.o. male with no significant past medical history who presents to [facility] with increased thirst, urination, nausea, and vomiting. Patient stated that over the past several days, he has had intractable nausea, vomiting, with increased thirst and urination. He denies fever, chills, chest pain, shortness of breath, constipation, diarrhea, and dysuria.     In the ED, labs were significant for a sodium of 123, chloride of 77, bicarb of 4, BUN/Cr of 35/3.2, glucose of 900, lactic acid of 8.5, WBC count of 20.1K with a pH of 6.831 and beta-hydroxyturate > 8. CXR showed no acute disease. Subjective:     Feels better. Abd pain improved. Remains on insulin gtt.            Medications:  Reviewed    Infusion Medications    pantoprazole 8 mg/hr (12/14/21 1156)    sodium bicarbonate infusion 150 mL/hr at 12/14/21 0343    dextrose      insulin 0.451 Units/kg/hr (12/14/21 1206)    dextrose 5 % and 0.45 % NaCl      sodium chloride Stopped (12/12/21 1210)     Scheduled Medications    calcium gluconate-NaCl  1,000 mg IntraVENous Once    heparin (porcine)  5,000 Units SubCUTAneous BID    vancomycin (VANCOCIN) intermittent dosing (placeholder)   Other RX Placeholder    meropenem  500 mg IntraVENous Q6H     PRN Meds: potassium chloride, glucose, dextrose, glucagon (rDNA), dextrose, dextrose, magnesium sulfate, sodium phosphate IVPB **OR** sodium phosphate IVPB **OR** sodium phosphate IVPB, dextrose 5 % and 0.45 % NaCl, ondansetron      Intake/Output Summary (Last 24 hours) at 12/14/2021 1222  Last data filed at 12/14/2021 0935  Gross per 24 hour   Intake 7550.17 ml   Output 2695 ml   Net 4855.17 ml       Exam:    BP 97/67   Pulse 92   Temp 98 °F (36.7 °C) (Oral)   Resp 20   Ht 6' 1\" (1.854 m)   Wt 296 lb 11.8 oz (134.6 kg)   SpO2 91%   BMI 39.15 kg/m²     General appearance: No apparent distress, appears stated age and cooperative. HEENT: Pupils equal, round, and reactive to light. Conjunctivae/corneas clear. Neck: Supple, with full range of motion. No jugular venous distention. Trachea midline. Respiratory:  Normal respiratory effort. Clear to auscultation, bilaterally without Rales/Wheezes/Rhonchi. Cardiovascular: Regular rate and rhythm with normal S1/S2 without murmurs, rubs or gallops. Abdomen: Soft, non-tender, non-distended with normal bowel sounds. Musculoskeletal: No clubbing, cyanosis or edema bilaterally. Full range of motion without deformity. Skin: Skin color, texture, turgor normal.  No rashes or lesions. Neurologic:  Neurovascularly intact without any focal sensory/motor deficits. Cranial nerves: II-XII intact, grossly non-focal.  Psychiatric: Alert and oriented, thought content appropriate, normal insight  Capillary Refill: Brisk,< 3 seconds   Peripheral Pulses: +2 palpable, equal bilaterally       Labs:   Recent Labs     12/12/21  1942 12/13/21  0423 12/14/21  0416   WBC 22.0* 17.4* 13.4*   HGB 14.6 13.3* 10.9*   HCT 45.0 38.5* 32.3*    140 109*     Recent Labs     12/14/21  0007 12/14/21  0416 12/14/21  0800   * 135* 131*   K 3.4* 3.0* 3.3*   CL 96* 98* 94*   CO2 23 23 23   BUN 42* 37* 31*   CREATININE 2.5* 2.3* 2.2*   CALCIUM 6.9* 6.8* 6.7*   PHOS 1.9* 2.0* 2.8     Recent Labs     12/12/21  0934   AST 35   ALT 46*   BILITOT 0.5   ALKPHOS 132*     No results for input(s): INR in the last 72 hours. Recent Labs     12/12/21  1150   CKTOTAL 185       Urinalysis:      Lab Results   Component Value Date    NITRU Negative 12/12/2021    WBCUA 2 12/12/2021    RBCUA 0-2 12/12/2021    BLOODU LARGE 12/12/2021    SPECGRAV 1.023 12/12/2021    GLUCOSEU >=1000 12/12/2021       Radiology:  [address]   Final Result   Pancreas not well visualized.   This is likely due to the peripancreatic   inflammatory changes seen on recent CT. Hepatic steatosis. CT ABDOMEN PELVIS WO CONTRAST Additional Contrast? None   Final Result   1. Severe acute interstitial pancreatitis. No focal fluid collection. 2. Hepatomegaly with severe steatosis. 3. Possible gallbladder sludge. 4. Colonic diverticulosis without acute diverticulitis. 5. The distal esophagus is dilated and filled with fluid. RECOMMENDATIONS:   Unavailable         XR CHEST PORTABLE   Final Result   No radiographic evidence of acute pulmonary disease. Assessment/Plan:    Active Hospital Problems    Diagnosis Date Noted    Idiopathic acute pancreatitis [K85.00]     DKA, type 2, not at goal [facility]) [E11.10] 12/12/2021       DKA - suspect undiagnosed type 2 DM  -check Hgb A1c - 12.4  -NPO  -DKA protocol  -insulin gtt  -IVF with bicarb  -switch to D5 0.45% NS once blood glucose level is below 250  - check CARI and islet cell Abs         Anion gap metabolic acidosis likely 2/2 DKA  -management as above       Severe interstitial pancreatitis  -NPO  -aggressive IVF  -GI and general surgery consulted  -check RUQ U/S - pancreas not well visualized due to surrounding inflammatory changes.        Septic Shock -   -Levophed gtt  -Vanc, meropenem,   - no need for Flagyl       CHAPIN - suspect prerenal  -IVF  -Levophed gtt  -avoid nephrotoxic medications  -nephrology following      Coffee ground emesis  -started PPI  -GI following  -Hgb stable       Obesity - BMI 38.68  -nutritional counseling provided  -weight loss encouraged  -complicating medical management      DVT Prophylaxis: SCDs  Diet: ADULT DIET;  Clear Liquid; 5 carb choices (75 gm/meal)  Code Status: Full Code    PT/OT Eval Status: defer    Dispo - continue care ICU    Andreia Hartley MD

## 2021-12-14 NOTE — PROGRESS NOTES
Pulmonary Critical Care progress Note     Patient's name:  Addy Lopez  Medical Record Number: [id number]  Patient's account/billing number: [de-identified]  Patient's YOB: 1970  Age: 54 y.o. Date of Admission: 12/12/2021  9:08 AM  Date of Consult: 12/14/2021      Primary Care Physician: [address]      Code Status: Full Code    Reason for consult: septic shock    Assessment and Plan     1. Septic shock  2. Acute pancreatitis  3. Severe DKA  4. ARF  5. Bibasilar atelectasis   6. Hypokalemia       Plan:  DKA protocol, check A1c level. Broad spectrum antibiotics, meropenem. Pressors to keep map > 65  Avoid nephrotoxic's. GI and DVT prophylaxis. CC time 35 min     Subjective:  Still c/o nausea but no vomiting  On insuline gtt  Off pressors      REVIEW OF SYSTEMS:  Review of Systems -   General ROS: Fatigue. Psychological ROS: negative  Ophthalmic ROS: negative  ENT ROS: negative  Allergy and Immunology ROS: negative  Hematological and Lymphatic ROS: negative  Endocrine ROS: negative  Breast ROS: negative  Respiratory ROS: no cough, shortness of breath, or wheezing  Cardiovascular ROS: no chest pain or dyspnea on exertion  Gastrointestinal ROS:nausea, no vomiting, mild abdominal pain  Genito-Urinary ROS: negative  Musculoskeletal ROS: negative  Neurological ROS: negative  Dermatological ROS: negative        Physical Exam:    Vitals: /75   Pulse 103   Temp 98.3 °F (36.8 °C) (Oral)   Resp 22   Ht 6' 1\" (1.854 m)   Wt 296 lb 11.8 oz (134.6 kg)   SpO2 97%   BMI 39.15 kg/m²     Last Body weight:   Wt Readings from Last 3 Encounters:   12/13/21 296 lb 11.8 oz (134.6 kg)   03/24/20 298 lb (135.2 kg)       Body Mass Index : Body mass index is 39.15 kg/m².       Intake and Output summary:     Intake/Output Summary (Last 24 hours) at 12/14/2021 1033  Last data filed at 12/14/2021 0935  Gross per 24 hour   Intake 7550.17 ml   Output 2815 ml Net 4735.17 ml       Physical Examination:     PHYSICAL EXAM:    Gen: Moderate acute distress  Eyes: PERRL. Anicteric sclera. No conjunctival injection. ENT: No discharge. Posterior oropharynx clear. External appearance of ears and nose normal.  Neck: Trachea midline. No mass, no lymphadenopathy    Resp: No excessive work of breathing, diminished at the bases. CV: Regular rate. Regular rhythm. No murmur or rub. No edema. GI: Soft, distended, sluggish bowel movement   skin: Warm, dry, w/o erythema. Lymph: No cervical or supraclavicular LAD. M/S: No cyanosis. No clubbing. Neuro:  CN 2-12 tested, no focal neurologic deficit. Moves all extremities  Psych: Awake and lethargic, Oriented x 3. Judgement and insight appropriate. Mood stable. Laboratory findings:-    CBC:   Recent Labs     12/14/21 0416   WBC 13.4*   HGB 10.9*   *     BMP:    Recent Labs     12/14/21  0007 12/14/21  0007 12/14/21  0416 12/14/21  0416 12/14/21  0800   *   < > 135*   < > 131*   K 3.4*   < > 3.0*   < > 3.3*   CL 96*   < > 98*   < > 94*   CO2 23   < > 23   < > 23   BUN 42*   < > 37*   < > 31*   CREATININE 2.5*  --  2.3*  --  2.2*   GLUCOSE 179*   < > 160*   < > 243*    < > = values in this interval not displayed. S. Calcium:  Recent Labs     12/14/21  0800   CALCIUM 6.7*     S. Ionized Calcium:No results for input(s): IONCA in the last 72 hours. S. Magnesium:  Recent Labs     12/14/21  0800   MG 2.10     S. Phosphorus:  Recent Labs     12/14/21  0800   PHOS 2.8     S. Glucose:  Recent Labs     12/14/21  0801 12/14/21  0924 12/14/21  1014   POCGLU 240* 341* 366*     Glycosylated hemoglobin A1C:   Recent Labs     12/13/21  1210   LABA1C 12.4     INR: No results for input(s): INR in the last 72 hours.   Hepatic functions:   Recent Labs     12/12/21  0934   ALKPHOS 132*   ALT 46*   AST 35   PROT 8.2   BILITOT 0.5   LABALBU 4.1     Pancreatic functions:  Recent Labs     12/12/21 1942 12/12/21  4760

## 2021-12-14 NOTE — PROGRESS NOTES
INPATIENT PROGRESS NOTE        IDENTIFYING DATA/REASON FOR CONSULTATION   PATIENT:  Dank Inman  MRN:  [id number]  ADMIT DATE: 2021  TIME OF EVALUATION: 2021 9:16 AM  HOSPITAL STAY:   LOS: 2 days   CONSULTING PHYSICIAN: Don Grimes MD   REASON FOR CONSULTATION: Severe pancreatitis    Subjective:    Patient seen in follow up for pancreatitis. Remains on insulin drip. Off pressors. He has no new complaints. MEDICATIONS   SCHEDULED:  potassium chloride, 20 mEq, Q1H  calcium gluconate-NaCl, 1,000 mg, Once  heparin (porcine), 5,000 Units, BID  vancomycin (VANCOCIN) intermittent dosing (placeholder), , RX Placeholder  meropenem, 500 mg, Q6H      FLUIDS/DRIPS:     pantoprazole 8 mg/hr (21 0552)    sodium bicarbonate infusion 150 mL/hr at 21 0343    dextrose      insulin 36 Units/hr (21 0802)    dextrose 5 % and 0.45 % NaCl      sodium chloride Stopped (21 1210)     PRNs: potassium chloride, 20 mEq, PRN  glucose, 15 g, PRN  dextrose, 12.5 g, PRN  glucagon (rDNA), 1 mg, PRN  dextrose, 100 mL/hr, PRN  dextrose, 12.5 g, PRN  magnesium sulfate, 1,000 mg, PRN  sodium phosphate IVPB, 10 mmol, PRN   Or  sodium phosphate IVPB, 15 mmol, PRN   Or  sodium phosphate IVPB, 20 mmol, PRN  dextrose 5 % and 0.45 % NaCl, , Continuous PRN  ondansetron, 4 mg, Q6H PRN      ALLERGIES:  No Known Allergies      PHYSICAL EXAM   VITALS:  /75   Pulse 103   Temp 98.3 °F (36.8 °C) (Oral)   Resp 22   Ht 6' 1\" (1.854 m)   Wt 296 lb 11.8 oz (134.6 kg)   SpO2 97%   BMI 39.15 kg/m²   TEMPERATURE:  Current - Temp: 98.3 °F (36.8 °C);  Max - Temp  Av.4 °F (36.9 °C)  Min: 98 °F (36.7 °C)  Max: 99 °F (37.2 °C)    Physical Exam:  General appearance: alert, cooperative, no distress, appears stated age  Eyes: Anicteric  Head: Normocephalic, without obvious abnormality  Lungs: clear to auscultation bilaterally, Normal Effort  Heart: regular rate and rhythm, normal S1 and S2, no murmurs or rubs  Abdomen: soft, non-distended, non-tender. Bowel sounds normal.   Extremities: atraumatic, no cyanosis or edema  Skin: warm and dry, no jaundice  Neuro: Grossly intact, A&OX3    LABS AND IMAGING   Laboratory   Recent Labs     12/12/21 1942 12/13/21 0423 12/14/21 0416   WBC 22.0* 17.4* 13.4*   HGB 14.6 13.3* 10.9*   HCT 45.0 38.5* 32.3*   MCV 93.7 89.2 89.0    140 109*     Recent Labs     12/14/21  0007 12/14/21  0416 12/14/21  0800   * 135* 131*   K 3.4* 3.0* 3.3*   CL 96* 98* 94*   CO2 23 23 23   PHOS 1.9* 2.0* 2.8   BUN 42* 37* 31*   CREATININE 2.5* 2.3* 2.2*     Recent Labs     12/12/21  0934   AST 35   ALT 46*   BILITOT 0.5   ALKPHOS 132*     Recent Labs     12/12/21 1942   LIPASE >3,000.0*   AMYLASE 1,026*     No results for input(s): PROTIME, INR in the last 72 hours. Imaging  US GALLBLADDER RUQ   Final Result   Pancreas not well visualized. This is likely due to the peripancreatic   inflammatory changes seen on recent CT. Hepatic steatosis. CT ABDOMEN PELVIS WO CONTRAST Additional Contrast? None   Final Result   1. Severe acute interstitial pancreatitis. No focal fluid collection. 2. Hepatomegaly with severe steatosis. 3. Possible gallbladder sludge. 4. Colonic diverticulosis without acute diverticulitis. 5. The distal esophagus is dilated and filled with fluid. RECOMMENDATIONS:   Unavailable         XR CHEST PORTABLE   Final Result   No radiographic evidence of acute pulmonary disease. Endoscopy      ASSESSMENT AND RECOMMENDATIONS   Morgan Artist is a 54 y.o. male who presented on 12/12/21 with  with shortness of breath, nausea, vomiting, increased thirst and urination. Found with acute pancreatitis, DKA, and hypotension/shock requiring pressors. GI consulted regarding acute pancreatitis    1. Acute pancreatitis  -lipase >3000. CT with severe acute interstitial pancreatitis.  No focal fluid collection.   Etiology unclear, was found with TG levels of 361. Usually this would not be significant enough to cause this degree of pancreatitis but wonder if it was higher prior to starting him on insulin. Ca normal.  CT with gallbladder sludge and lfts mildly elevated however US shows normal gallbladder and bile ducts, no stones  2. Coffee ground emesis  -1 episode. No melena.  hgb 13.3-10.9. On PPI gtt. Monitor for now. defer EGD until more stable unless worsening bleeding. 3. DKA: remains on insulin gtt  4. CHAPIN  5. Shock: off pressors     RECOMMENDATIONS:    Continue antibiotics and IVFs  Continue PPI gtt. Monitor h/h and for signs of active gi bleeding  Ok from GI standpoint to start clear liquids once off insulin drip    If you have any questions or need any further information, please feel free to contact us [phone]. Thank you for allowing us to participate in the care of Cristiane Watkins. The note was completed using Dragon voice recognition transcription. Every effort was made to ensure accuracy; however, inadvertent transcription errors may be present despite my best efforts to edit errors. Jodi TODD    Attending physician addendum:    I have personally seen and examined the patient, reviewed the patient's medical record and pertinent labs and clinical imaging. I have personally staffed the case with Jodi TODD. I agree with her consultation note, exam findings, assessment and plans  as written above. I have made appropriate modifications and edited her assessment and plan where needed to reflect my impression and plans for this patient. Cristiane Watkins is a 55 YO Male who presents with nausea, vomiting, and increase thrist. Patient was found to be in DKA with BG over 900. Patient was found to have acute pancreatitis based on Lipase/CT results. Acute pancreatitis- Concern related to hypertriglyceridemia.  Triglyceride level was 396 and typically level needs to be >500 to cause pancreatitis but may have been higher prior to admission. RUQ US normal GB. He denies any significant alcohol use and denies any medication. Clinically improving. Weaned off pressors. Tachycardia improving. Continue IV fluids. Advance diet to clears. Coffee Ground Emesis: H/H have dropped but no further bleeding. Continue PPI drip. Gram Negative Bacteremia: Continue Meropenem. Management per Primary team.   DKA Management per primary team.     Thank you for allowing me to participate in this patient's care. If there are any questions or concerns regarding this patient, or the plan we have set in place, please feel free to contact me at [phone].      Sonya Moore MD

## 2021-12-14 NOTE — PROGRESS NOTES
Dr. Kathie medina. Discussed that glucose uncontrolled despite insulin gtt, new orders to change IVF (see MAR).

## 2021-12-14 NOTE — PROGRESS NOTES
[facility] General Surgery                                   Daily Progress Note                                                         Pt Name: Antolin Chery  Medical Record Number: [id number]  Date of Birth 1970   Today's Date: 12/14/2021  Chief Complaint   Patient presents with    Shortness of Breath     trouble breathing since today. Feeling unwell since yesterday. EMS blood glucose 548. Pt denies DM history. ASSESSMENT/PLAN  A/P: 53 yo male with DKA due to undiagnosed DM, acute pancreatis, shock with acute kidney injury     Acute pancreatitis - continue aggressive IV hydration. Trend lipase. RUQ US negative for gallstones. OK for clear liquid diet.      Acute kidney injury - Good UOP, trend Cr. Continue santiago catheter     Acidosis - Bicarb correcting with NaHCO3     Shock - secondary to acute pancreatitis - resolved. On vancomycin, meropenem     DKA, undiagnosed DM - on insulin gtt, management per primary team            Debra Stark is resting in bed, no acute distress. Abdominal pain only with palpation. Denies nausea though has an emesis bag in bed next to him. OBJECTIVE  VITALS:  height is 6' 1\" (1.854 m) and weight is 296 lb 11.8 oz (134.6 kg). His oral temperature is 98.3 °F (36.8 °C). His blood pressure is 124/75 and his pulse is 103. His respiration is 22 and oxygen saturation is 97%. INTAKE/OUTPUT:      Intake/Output Summary (Last 24 hours) at 12/14/2021 0900  Last data filed at 12/14/2021 0600  Gross per 24 hour   Intake 75413.92 ml   Output 2060 ml   Net 9011.92 ml     GENERAL: alert, cooperative, no distress  LUNGS: clear to ausculation, without wheezes, rales or rhonci  HEART: normal rate and regular rhythm  ABDOMEN: Soft,  non distended. Mild to moderate periumbilical pain with palpation. EXTREMITY: no cyanosis, clubbing or edema    I/O last 3 completed shifts:   In: 62712.9 [I.V.:5954.6; IV Piggyback:5117.4]  Out: 2285 [Urine:2285]      LABS  Recent Labs     12/12/21  0934 12/12/21  1150 12/12/21  1607 12/12/21  1611 12/14/21  0416 12/14/21  0416 12/14/21  0800   WBC 20.1*  --   --    < > 13.4*  --   --    HGB 16.4  --   --    < > 10.9*  --   --    HCT 54.1*  --   --    < > 32.3*  --   --      --   --    < > 109*  --   --    *   < >  --    < > 135*   < > 131*   K 4.2   < >  --    < > 3.0*   < > 3.3*   CL 75*   < >  --    < > 98*   < > 94*   CO2 5*   < >  --    < > 23   < > 23   BUN 44*   < >  --    < > 37*   < > 31*   CREATININE 3.4*   < >  --    < > 2.3*   < > 2.2*   MG  --    < >  --    < > 1.50*   < > 2.10   PHOS  --    < >  --    < > 2.0*   < > 2.8   CALCIUM 8.9   < >  --    < > 6.8*   < > 6.7*   AST 35  --   --   --   --   --   --    ALT 46*  --   --   --   --   --   --    BILITOT 0.5  --   --   --   --   --   --    NITRU  --   --  Negative  --   --   --   --    COLORU  --   --  YELLOW  --   --   --   --     < > = values in this interval not displayed. EDUCATION  Patient educated about Disease Process, Medications, Smoking Cessation, Oxygenation, Incentive Spirometry and Deep Breath and Cough, Diabetes, Hyperlipidemia, Smoking Cessation, Nutrition, Exercise and Hypertension    Electronically signed by MICKI Traylor CNP on 12/14/2021 at 9:00 [address] [place] [address],[facility]   [phone] Office  [phone] Fax    Agree with above note. The patient was personally seen and examined. Antolin Chery is doing better. Minimal epigastric pain. Denies nausea. + flatus, no BM. Abd soft, ND, minimal epigastric tenderness, no peritonitis    WBC 13.4  Cr down to 2.2  Glucose 172-388 today    RUQ US negative for gallstones    A/P: 55 yo male with DKA, severe acute pancreatitis, acute kidney injury, shock - resolved    Severe acute pancreatitis - leukocytosis improving. Pain improving. Having some GI function. Continue IV hydration. Advance to clears.   RUQ negative for gallstones, no plans for cholecystectomy. Triglycerides elevated on admission. No clear role for antibiotics. Recommend to stop after 5 days. Acute kidney injury - continue IV hydration.   Cr improving    DKA - glucose control per primary team    Shock - resolved, suspect from hypovolemia secondary to pancreatitis and DKA    Torin Jj MD

## 2021-12-14 NOTE — PROGRESS NOTES
Nephrology Progress Note   [facility]      Chief Complaint: Nausea/vomiting/pancreatitis    History of Present Illness: Mr Sommer Orozco is a is a 54 y.o. male with no significant past medical history who presents to [facility] with increased thirst, urination, nausea, and vomiting. He was noted to have a BS level of 900 mg/dl. CT scan abdomen and pelvis revealed severe acute Pancreatitis. He was Hyperkalemic, with serum HCO level of 5 and creatinine of 3.4 mg/dl. Patient was started on insulin gtt, aggressively volume resuscitated - santiago was placed     Subjective:    Resting in bed; appears sick; no shortness of breath    ROS: + nausea, + vomiting, + abdominal distension, + abdominal pain. All other ROS negative     Scheduled Meds:   calcium gluconate-NaCl  1,000 mg IntraVENous Once    heparin (porcine)  5,000 Units SubCUTAneous BID    vancomycin (VANCOCIN) intermittent dosing (placeholder)   Other RX Placeholder    meropenem  500 mg IntraVENous Q6H        sodium bicarbonate infusion      pantoprazole 8 mg/hr (21 1156)    dextrose      insulin 0.451 Units/kg/hr (21 1206)    dextrose 5 % and 0.45 % NaCl      sodium chloride Stopped (21 1210)       PRN Meds:.potassium chloride, glucose, dextrose, glucagon (rDNA), dextrose, dextrose, magnesium sulfate, sodium phosphate IVPB **OR** sodium phosphate IVPB **OR** sodium phosphate IVPB, dextrose 5 % and 0.45 % NaCl, ondansetron    Physical Exam:    TEMPERATURE:  Current - Temp: 98 °F (36.7 °C);  Max - Temp  Av.5 °F (36.9 °C)  Min: 98 °F (36.7 °C)  Max: 99 °F (37.2 °C)  RESPIRATIONS RANGE: Resp  Av.5  Min: 14  Max: 28  PULSE RANGE: Pulse  Av.3  Min: 92  Max: 122  BLOOD PRESSURE RANGE:  Systolic (24VOU), AHX:114 , Min:94 , HAV:399   ; Diastolic (89CTK), IOT:94, Min:65, Max:95    24HR INTAKE/OUTPUT:      Intake/Output Summary (Last 24 hours) at 2021 1228  Last data filed at 2021 0935  Gross per 24 hour   Intake 7550.17 ml   Output 2580 ml   Net 4970.17 ml         Patient Vitals for the past 96 hrs (Last 3 readings):   Weight   12/13/21 0400 296 lb 11.8 oz (134.6 kg)   12/12/21 1237 293 lb 3.4 oz (133 kg)   12/12/21 0911 288 lb 9.3 oz (130.9 kg)       General: Alert, Awake, NAD, Obese  HEENT: Normocephalic, atraumatic, Nose and ears appear externally without deformity, MMM  Neck: No Thyromegaly, Trachea is midline, No Carotid bruit  Chest: clear to auscultation, no intercostal retractions  CVS: RRR, no murmur, no rub  Abdomen: distended, generalized tenderness, no bruit appreciated  Extremities: no edema, no cyanosis. Skin: normal texture, normal skin turgor, no rash  Musculoskeletal: normal ROM, no joint swelling, no visible deformity  Neurological: CN intact, no focal motor neurological deficit  Psych: flat affect  : santiago in place; no hematuria noted    Allergies:  No Known Allergies     Past Medical History:   Diagnosis Date    H/O degenerative disc disease      No past surgical history on file. No family history on file. Social History     Socioeconomic History    Marital status: Single     Spouse name: Not on file    Number of children: Not on file    Years of education: Not on file    Highest education level: Not on file   Occupational History    Not on file   Tobacco Use    Smoking status: Never Smoker    Smokeless tobacco: Never Used   Substance and Sexual Activity    Alcohol use: Yes     Comment: social    Drug use: Never    Sexual activity: Not on file   Other Topics Concern    Not on file   Social History Narrative    Not on file     Social Determinants of Health     Financial Resource Strain:     Difficulty of Paying Living Expenses: Not on file   Food Insecurity:     Worried About Running Out of Food in the Last Year: Not on file    Bienvenido of Food in the Last Year: Not on file   Transportation Needs:     Lack of Transportation (Medical):  Not on file    Lack of Transportation (Non-Medical):  Not on file   Physical Activity:     Days of Exercise per Week: Not on file    Minutes of Exercise per Session: Not on file   Stress:     Feeling of Stress : Not on file   Social Connections:     Frequency of Communication with Friends and Family: Not on file    Frequency of Social Gatherings with Friends and Family: Not on file    Attends Holiness Services: Not on file    Active Member of [address] [place] [address] or Organizations: Not on file    Attends Club or Organization Meetings: Not on file    Marital Status: Not on file   Intimate Partner Violence:     Fear of Current or Ex-Partner: Not on file    Emotionally Abused: Not on file    Physically Abused: Not on file    Sexually Abused: Not on file   Housing Stability:     Unable to Pay for Housing in the Last Year: Not on file    Number of [place] in the Last Year: Not on file    Unstable Housing in the Last Year: Not on file       LAB DATA:    CBC:   Lab Results   Component Value Date    WBC 13.4 12/14/2021    RBC 3.62 12/14/2021    HGB 10.9 12/14/2021    HCT 32.3 12/14/2021    MCV 89.0 12/14/2021    MCH 30.1 12/14/2021    MCHC 33.8 12/14/2021    RDW 13.1 12/14/2021     12/14/2021    MPV 10.6 12/14/2021     BMP:    Lab Results   Component Value Date     12/14/2021    K 3.3 12/14/2021    K 4.2 12/12/2021    CL 94 12/14/2021    CO2 23 12/14/2021    BUN 31 12/14/2021    CREATININE 2.2 12/14/2021    CALCIUM 6.7 12/14/2021    GFRAA 39 12/14/2021    LABGLOM 32 12/14/2021    GLUCOSE 243 12/14/2021     Ionized Calcium:  No results found for: IONCA  Magnesium:    Lab Results   Component Value Date    MG 2.10 12/14/2021     Phosphorus:    Lab Results   Component Value Date    PHOS 2.8 12/14/2021     U/A:    Lab Results   Component Value Date    COLORU YELLOW 12/12/2021    PHUR 5.0 12/12/2021    WBCUA 2 12/12/2021    RBCUA 0-2 12/12/2021    CLARITYU CLOUDY 12/12/2021    SPECGRAV 1.023 12/12/2021    LEUKOCYTESUR Negative 12/12/2021 UROBILINOGEN 0.2 12/12/2021    BILIRUBINUR MODERATE 12/12/2021    BLOODU LARGE 12/12/2021    GLUCOSEU >=1000 12/12/2021         IMPRESSION/RECOMMENDATIONS:      Active Problems:    DKA, type 2, not at goal [facility])    Idiopathic acute pancreatitis  Resolved Problems:    * No resolved hospital problems. *    1. CHAPIN - likely prerenal - occurring in the setting of DKA/pancreatitis  - Continue aggressive volume resc - change composition  - no immediate indication for RRT   - CT scan on admission did not reveal Hydronephrosis  - maintain santiago  - avoid exposure to Nephrotoxic agents    2. Critical life threatening acidosis   - Secondary to DKA and CHAPIN  - Continue HCO gtt  - Improving    3. Hyperkalemia - Resolved - now hypokalemic - replace    4.  Pancreatitis - management per Medicine    Total critical care time spent 35 mins

## 2021-12-15 LAB
ANION GAP SERPL CALCULATED.3IONS-SCNC: 10 MMOL/L (ref 3–16)
ANION GAP SERPL CALCULATED.3IONS-SCNC: 11 MMOL/L (ref 3–16)
ANION GAP SERPL CALCULATED.3IONS-SCNC: 11 MMOL/L (ref 3–16)
ANION GAP SERPL CALCULATED.3IONS-SCNC: 14 MMOL/L (ref 3–16)
BASOPHILS ABSOLUTE: 0.1 K/UL (ref 0–0.2)
BASOPHILS RELATIVE PERCENT: 0.6 %
BLOOD CULTURE, ROUTINE: ABNORMAL
BUN BLDV-MCNC: 19 MG/DL (ref 7–20)
BUN BLDV-MCNC: 24 MG/DL (ref 7–20)
BUN BLDV-MCNC: 24 MG/DL (ref 7–20)
BUN BLDV-MCNC: 28 MG/DL (ref 7–20)
CALCIUM IONIZED: 0.86 MMOL/L (ref 1.12–1.32)
CALCIUM IONIZED: 0.94 MMOL/L (ref 1.12–1.32)
CALCIUM SERPL-MCNC: 5.9 MG/DL (ref 8.3–10.6)
CALCIUM SERPL-MCNC: 6.1 MG/DL (ref 8.3–10.6)
CALCIUM SERPL-MCNC: 6.2 MG/DL (ref 8.3–10.6)
CALCIUM SERPL-MCNC: 6.5 MG/DL (ref 8.3–10.6)
CHLORIDE BLD-SCNC: 100 MMOL/L (ref 99–110)
CHLORIDE BLD-SCNC: 97 MMOL/L (ref 99–110)
CHLORIDE BLD-SCNC: 98 MMOL/L (ref 99–110)
CHLORIDE BLD-SCNC: 99 MMOL/L (ref 99–110)
CO2: 26 MMOL/L (ref 21–32)
CO2: 26 MMOL/L (ref 21–32)
CO2: 28 MMOL/L (ref 21–32)
CO2: 28 MMOL/L (ref 21–32)
CREAT SERPL-MCNC: 1.8 MG/DL (ref 0.9–1.3)
CREAT SERPL-MCNC: 2 MG/DL (ref 0.9–1.3)
CREAT SERPL-MCNC: 2 MG/DL (ref 0.9–1.3)
CREAT SERPL-MCNC: 2.1 MG/DL (ref 0.9–1.3)
EOSINOPHILS ABSOLUTE: 0 K/UL (ref 0–0.6)
EOSINOPHILS RELATIVE PERCENT: 0.1 %
GFR AFRICAN AMERICAN: 41
GFR AFRICAN AMERICAN: 43
GFR AFRICAN AMERICAN: 43
GFR AFRICAN AMERICAN: 49
GFR NON-AFRICAN AMERICAN: 34
GFR NON-AFRICAN AMERICAN: 36
GFR NON-AFRICAN AMERICAN: 36
GFR NON-AFRICAN AMERICAN: 40
GLUCOSE BLD-MCNC: 124 MG/DL (ref 70–99)
GLUCOSE BLD-MCNC: 125 MG/DL (ref 70–99)
GLUCOSE BLD-MCNC: 127 MG/DL (ref 70–99)
GLUCOSE BLD-MCNC: 133 MG/DL (ref 70–99)
GLUCOSE BLD-MCNC: 134 MG/DL (ref 70–99)
GLUCOSE BLD-MCNC: 136 MG/DL (ref 70–99)
GLUCOSE BLD-MCNC: 136 MG/DL (ref 70–99)
GLUCOSE BLD-MCNC: 146 MG/DL (ref 70–99)
GLUCOSE BLD-MCNC: 154 MG/DL (ref 70–99)
GLUCOSE BLD-MCNC: 155 MG/DL (ref 70–99)
GLUCOSE BLD-MCNC: 157 MG/DL (ref 70–99)
GLUCOSE BLD-MCNC: 161 MG/DL (ref 70–99)
GLUCOSE BLD-MCNC: 164 MG/DL (ref 70–99)
GLUCOSE BLD-MCNC: 172 MG/DL (ref 70–99)
GLUCOSE BLD-MCNC: 184 MG/DL (ref 70–99)
GLUCOSE BLD-MCNC: 187 MG/DL (ref 70–99)
GLUCOSE BLD-MCNC: 197 MG/DL (ref 70–99)
GLUCOSE BLD-MCNC: 316 MG/DL (ref 70–99)
HCT VFR BLD CALC: 29.3 % (ref 40.5–52.5)
HEMOGLOBIN: 9.9 G/DL (ref 13.5–17.5)
LIPASE: 466 U/L (ref 13–60)
LYMPHOCYTES ABSOLUTE: 1.6 K/UL (ref 1–5.1)
LYMPHOCYTES RELATIVE PERCENT: 10.4 %
MAGNESIUM: 1.9 MG/DL (ref 1.8–2.4)
MAGNESIUM: 1.9 MG/DL (ref 1.8–2.4)
MAGNESIUM: 2 MG/DL (ref 1.8–2.4)
MAGNESIUM: 2.1 MG/DL (ref 1.8–2.4)
MCH RBC QN AUTO: 30.4 PG (ref 26–34)
MCHC RBC AUTO-ENTMCNC: 33.9 G/DL (ref 31–36)
MCV RBC AUTO: 89.7 FL (ref 80–100)
MONOCYTES ABSOLUTE: 1.2 K/UL (ref 0–1.3)
MONOCYTES RELATIVE PERCENT: 7.7 %
NEUTROPHILS ABSOLUTE: 12.4 K/UL (ref 1.7–7.7)
NEUTROPHILS RELATIVE PERCENT: 81.2 %
ORGANISM: ABNORMAL
PDW BLD-RTO: 13.2 % (ref 12.4–15.4)
PERFORMED ON: ABNORMAL
PH VENOUS: 7.45 (ref 7.35–7.45)
PH VENOUS: 7.45 (ref 7.35–7.45)
PHOSPHORUS: 2.1 MG/DL (ref 2.5–4.9)
PHOSPHORUS: 2.2 MG/DL (ref 2.5–4.9)
PHOSPHORUS: 2.2 MG/DL (ref 2.5–4.9)
PHOSPHORUS: 2.3 MG/DL (ref 2.5–4.9)
PLATELET # BLD: 121 K/UL (ref 135–450)
PMV BLD AUTO: 10.3 FL (ref 5–10.5)
POTASSIUM SERPL-SCNC: 3.6 MMOL/L (ref 3.5–5.1)
POTASSIUM SERPL-SCNC: 3.9 MMOL/L (ref 3.5–5.1)
POTASSIUM SERPL-SCNC: 4 MMOL/L (ref 3.5–5.1)
POTASSIUM SERPL-SCNC: 4.9 MMOL/L (ref 3.5–5.1)
RBC # BLD: 3.27 M/UL (ref 4.2–5.9)
SODIUM BLD-SCNC: 135 MMOL/L (ref 136–145)
SODIUM BLD-SCNC: 135 MMOL/L (ref 136–145)
SODIUM BLD-SCNC: 139 MMOL/L (ref 136–145)
SODIUM BLD-SCNC: 139 MMOL/L (ref 136–145)
VANCOMYCIN RANDOM: 11.3 UG/ML
WBC # BLD: 15.3 K/UL (ref 4–11)

## 2021-12-15 PROCEDURE — 2060000000 HC ICU INTERMEDIATE R&B

## 2021-12-15 PROCEDURE — 2580000003 HC RX 258: Performed by: INTERNAL MEDICINE

## 2021-12-15 PROCEDURE — 2500000003 HC RX 250 WO HCPCS: Performed by: INTERNAL MEDICINE

## 2021-12-15 PROCEDURE — 83690 ASSAY OF LIPASE: CPT

## 2021-12-15 PROCEDURE — 6360000002 HC RX W HCPCS: Performed by: INTERNAL MEDICINE

## 2021-12-15 PROCEDURE — 2580000003 HC RX 258: Performed by: STUDENT IN AN ORGANIZED HEALTH CARE EDUCATION/TRAINING PROGRAM

## 2021-12-15 PROCEDURE — 80048 BASIC METABOLIC PNL TOTAL CA: CPT

## 2021-12-15 PROCEDURE — 2700000000 HC OXYGEN THERAPY PER DAY

## 2021-12-15 PROCEDURE — 83735 ASSAY OF MAGNESIUM: CPT

## 2021-12-15 PROCEDURE — 97162 PT EVAL MOD COMPLEX 30 MIN: CPT

## 2021-12-15 PROCEDURE — 97116 GAIT TRAINING THERAPY: CPT

## 2021-12-15 PROCEDURE — APPNB45 APP NON BILLABLE 31-45 MINUTES: Performed by: NURSE PRACTITIONER

## 2021-12-15 PROCEDURE — 36415 COLL VENOUS BLD VENIPUNCTURE: CPT

## 2021-12-15 PROCEDURE — 94761 N-INVAS EAR/PLS OXIMETRY MLT: CPT

## 2021-12-15 PROCEDURE — 6360000002 HC RX W HCPCS: Performed by: STUDENT IN AN ORGANIZED HEALTH CARE EDUCATION/TRAINING PROGRAM

## 2021-12-15 PROCEDURE — 85025 COMPLETE CBC W/AUTO DIFF WBC: CPT

## 2021-12-15 PROCEDURE — 99233 SBSQ HOSP IP/OBS HIGH 50: CPT | Performed by: INTERNAL MEDICINE

## 2021-12-15 PROCEDURE — 97530 THERAPEUTIC ACTIVITIES: CPT

## 2021-12-15 PROCEDURE — 6370000000 HC RX 637 (ALT 250 FOR IP): Performed by: PHYSICIAN ASSISTANT

## 2021-12-15 PROCEDURE — 6370000000 HC RX 637 (ALT 250 FOR IP): Performed by: INTERNAL MEDICINE

## 2021-12-15 PROCEDURE — 93971 EXTREMITY STUDY: CPT

## 2021-12-15 PROCEDURE — APPSS45 APP SPLIT SHARED TIME 31-45 MINUTES: Performed by: NURSE PRACTITIONER

## 2021-12-15 PROCEDURE — 97166 OT EVAL MOD COMPLEX 45 MIN: CPT

## 2021-12-15 PROCEDURE — APPNB45 APP NON BILLABLE 31-45 MINUTES: Performed by: PHYSICIAN ASSISTANT

## 2021-12-15 PROCEDURE — C9113 INJ PANTOPRAZOLE SODIUM, VIA: HCPCS | Performed by: STUDENT IN AN ORGANIZED HEALTH CARE EDUCATION/TRAINING PROGRAM

## 2021-12-15 PROCEDURE — 82330 ASSAY OF CALCIUM: CPT

## 2021-12-15 PROCEDURE — 80202 ASSAY OF VANCOMYCIN: CPT

## 2021-12-15 PROCEDURE — 99232 SBSQ HOSP IP/OBS MODERATE 35: CPT | Performed by: SURGERY

## 2021-12-15 PROCEDURE — 84100 ASSAY OF PHOSPHORUS: CPT

## 2021-12-15 PROCEDURE — 36592 COLLECT BLOOD FROM PICC: CPT

## 2021-12-15 RX ORDER — INSULIN GLARGINE 100 [IU]/ML
10 INJECTION, SOLUTION SUBCUTANEOUS 2 TIMES DAILY
Status: DISCONTINUED | OUTPATIENT
Start: 2021-12-15 | End: 2021-12-16

## 2021-12-15 RX ORDER — CALCIUM GLUCONATE 20 MG/ML
2000 INJECTION, SOLUTION INTRAVENOUS ONCE
Status: COMPLETED | OUTPATIENT
Start: 2021-12-15 | End: 2021-12-15

## 2021-12-15 RX ORDER — FENOFIBRATE 160 MG/1
160 TABLET ORAL DAILY
Status: DISCONTINUED | OUTPATIENT
Start: 2021-12-15 | End: 2021-12-31 | Stop reason: HOSPADM

## 2021-12-15 RX ADMIN — MEROPENEM 500 MG: 500 INJECTION, POWDER, FOR SOLUTION INTRAVENOUS at 15:04

## 2021-12-15 RX ADMIN — ONDANSETRON 4 MG: 2 INJECTION INTRAMUSCULAR; INTRAVENOUS at 04:20

## 2021-12-15 RX ADMIN — POTASSIUM CHLORIDE 20 MEQ: 29.8 INJECTION, SOLUTION INTRAVENOUS at 02:57

## 2021-12-15 RX ADMIN — MEROPENEM 500 MG: 500 INJECTION, POWDER, FOR SOLUTION INTRAVENOUS at 21:45

## 2021-12-15 RX ADMIN — POTASSIUM CHLORIDE 20 MEQ: 29.8 INJECTION, SOLUTION INTRAVENOUS at 09:06

## 2021-12-15 RX ADMIN — SODIUM CHLORIDE 9.88 UNITS/HR: 9 INJECTION, SOLUTION INTRAVENOUS at 09:31

## 2021-12-15 RX ADMIN — POTASSIUM CHLORIDE 20 MEQ: 29.8 INJECTION, SOLUTION INTRAVENOUS at 06:52

## 2021-12-15 RX ADMIN — SODIUM CHLORIDE 13.3 UNITS/HR: 9 INJECTION, SOLUTION INTRAVENOUS at 00:56

## 2021-12-15 RX ADMIN — POTASSIUM CHLORIDE 20 MEQ: 29.8 INJECTION, SOLUTION INTRAVENOUS at 10:54

## 2021-12-15 RX ADMIN — MORPHINE SULFATE 2 MG: 2 INJECTION, SOLUTION INTRAMUSCULAR; INTRAVENOUS at 02:44

## 2021-12-15 RX ADMIN — Medication: at 15:15

## 2021-12-15 RX ADMIN — CALCIUM GLUCONATE 2000 MG: 20 INJECTION, SOLUTION INTRAVENOUS at 06:52

## 2021-12-15 RX ADMIN — SODIUM PHOSPHATE, MONOBASIC, MONOHYDRATE 15 MMOL: 276; 142 INJECTION, SOLUTION INTRAVENOUS at 10:56

## 2021-12-15 RX ADMIN — INSULIN GLARGINE 10 UNITS: 100 INJECTION, SOLUTION SUBCUTANEOUS at 14:51

## 2021-12-15 RX ADMIN — POTASSIUM CHLORIDE 20 MEQ: 29.8 INJECTION, SOLUTION INTRAVENOUS at 04:48

## 2021-12-15 RX ADMIN — HEPARIN SODIUM 5000 UNITS: 5000 INJECTION INTRAVENOUS; SUBCUTANEOUS at 08:34

## 2021-12-15 RX ADMIN — HEPARIN SODIUM 5000 UNITS: 5000 INJECTION INTRAVENOUS; SUBCUTANEOUS at 21:45

## 2021-12-15 RX ADMIN — SODIUM BICARBONATE: 84 INJECTION, SOLUTION INTRAVENOUS at 00:51

## 2021-12-15 RX ADMIN — MEROPENEM 500 MG: 500 INJECTION, POWDER, FOR SOLUTION INTRAVENOUS at 04:48

## 2021-12-15 RX ADMIN — POTASSIUM CHLORIDE 20 MEQ: 29.8 INJECTION, SOLUTION INTRAVENOUS at 12:11

## 2021-12-15 RX ADMIN — POTASSIUM CHLORIDE 20 MEQ: 29.8 INJECTION, SOLUTION INTRAVENOUS at 13:50

## 2021-12-15 RX ADMIN — ONDANSETRON 4 MG: 2 INJECTION INTRAMUSCULAR; INTRAVENOUS at 15:05

## 2021-12-15 RX ADMIN — INSULIN LISPRO 4 UNITS: 100 INJECTION, SOLUTION INTRAVENOUS; SUBCUTANEOUS at 21:47

## 2021-12-15 RX ADMIN — MEROPENEM 500 MG: 500 INJECTION, POWDER, FOR SOLUTION INTRAVENOUS at 08:39

## 2021-12-15 RX ADMIN — FENOFIBRATE 160 MG: 160 TABLET ORAL at 15:05

## 2021-12-15 RX ADMIN — SODIUM BICARBONATE: 84 INJECTION, SOLUTION INTRAVENOUS at 09:02

## 2021-12-15 RX ADMIN — CALCIUM CHLORIDE 1000 MG: 100 INJECTION, SOLUTION INTRAVENOUS; INTRAVENTRICULAR at 15:01

## 2021-12-15 RX ADMIN — SODIUM CHLORIDE 8 MG/HR: 9 INJECTION, SOLUTION INTRAVENOUS at 21:44

## 2021-12-15 RX ADMIN — SODIUM CHLORIDE 8 MG/HR: 9 INJECTION, SOLUTION INTRAVENOUS at 08:25

## 2021-12-15 ASSESSMENT — PAIN DESCRIPTION - FREQUENCY: FREQUENCY: CONTINUOUS

## 2021-12-15 ASSESSMENT — PAIN DESCRIPTION - PAIN TYPE: TYPE: CHRONIC PAIN

## 2021-12-15 ASSESSMENT — PAIN SCALES - GENERAL
PAINLEVEL_OUTOF10: 0
PAINLEVEL_OUTOF10: 0
PAINLEVEL_OUTOF10: 6
PAINLEVEL_OUTOF10: 0
PAINLEVEL_OUTOF10: 0

## 2021-12-15 ASSESSMENT — PAIN DESCRIPTION - DESCRIPTORS: DESCRIPTORS: ACHING

## 2021-12-15 ASSESSMENT — PAIN DESCRIPTION - ORIENTATION: ORIENTATION: LOWER

## 2021-12-15 ASSESSMENT — PAIN DESCRIPTION - ONSET: ONSET: ON-GOING

## 2021-12-15 ASSESSMENT — PAIN DESCRIPTION - LOCATION: LOCATION: BACK

## 2021-12-15 NOTE — PROGRESS NOTES
Hospitalist Progress Note      PCP: [address] [place] [address]    Date of Admission: 12/12/2021    Chief Complaint: increased thirst, urination, nausea, vomiting    Hospital Course: The patient is a 54 y.o. male with no significant past medical history who presents to [facility] with increased thirst, urination, nausea, and vomiting. Patient stated that over the past several days, he has had intractable nausea, vomiting, with increased thirst and urination. He denies fever, chills, chest pain, shortness of breath, constipation, diarrhea, and dysuria.     In the ED, labs were significant for a sodium of 123, chloride of 77, bicarb of 4, BUN/Cr of 35/3.2, glucose of 900, lactic acid of 8.5, WBC count of 20.1K with a pH of 6.831 and beta-hydroxyturate > 8. CXR showed no acute disease. Subjective:     Continues to feels better and abd pain improving. Remains on bicarb infusion and insulin gtt. Medications:  Reviewed    Infusion Medications    sodium bicarbonate infusion 150 mL/hr at 12/15/21 0902    pantoprazole 8 mg/hr (12/15/21 0825)    dextrose      insulin 16.12 Units/hr (12/15/21 1344)     Scheduled Medications    fenofibrate  160 mg Oral Daily    heparin (porcine)  5,000 Units SubCUTAneous BID    meropenem  500 mg IntraVENous Q6H     PRN Meds: potassium chloride, morphine **OR** morphine, glucose, dextrose, glucagon (rDNA), dextrose, dextrose, magnesium sulfate, sodium phosphate IVPB **OR** sodium phosphate IVPB **OR** sodium phosphate IVPB, ondansetron      Intake/Output Summary (Last 24 hours) at 12/15/2021 1355  Last data filed at 12/15/2021 0934  Gross per 24 hour   Intake 5233.89 ml   Output 2970 ml   Net 2263.89 ml       Exam:    BP (!) 168/99   Pulse 106   Temp 98.6 °F (37 °C) (Oral)   Resp 19   Ht 6' 1\" (1.854 m)   Wt (!) 336 lb 6.8 oz (152.6 kg)   SpO2 97%   BMI 44.39 kg/m²     General appearance: No apparent distress, appears stated age and cooperative.   HEENT: Pupils equal, round, and reactive to light. Conjunctivae/corneas clear. Neck: Supple, with full range of motion. No jugular venous distention. Trachea midline. Respiratory:  Normal respiratory effort. Clear to auscultation, bilaterally without Rales/Wheezes/Rhonchi. Cardiovascular: Regular rate and rhythm with normal S1/S2 without murmurs, rubs or gallops. Abdomen: Soft, non-tender, non-distended with normal bowel sounds. Musculoskeletal: No clubbing, cyanosis or edema bilaterally. Full range of motion without deformity. Skin: Skin color, texture, turgor normal.  No rashes or lesions. Neurologic:  Neurovascularly intact without any focal sensory/motor deficits. Cranial nerves: II-XII intact, grossly non-focal.  Psychiatric: Alert and oriented, thought content appropriate, normal insight  Capillary Refill: Brisk,< 3 seconds   Peripheral Pulses: +2 palpable, equal bilaterally       Labs:   Recent Labs     12/13/21  0423 12/14/21  0416 12/15/21  0530   WBC 17.4* 13.4* 15.3*   HGB 13.3* 10.9* 9.9*   HCT 38.5* 32.3* 29.3*    109* 121*     Recent Labs     12/15/21  0530 12/15/21  0821 12/15/21  1204    135* 135*   K 3.9 4.0 4.9    97* 98*   CO2 28 28 26   BUN 24* 19 24*   CREATININE 2.0* 1.8* 2.0*   CALCIUM 5.9* 6.5* 6.2*   PHOS 2.1* 2.2* 2.3*     No results for input(s): AST, ALT, BILIDIR, BILITOT, ALKPHOS in the last 72 hours. No results for input(s): INR in the last 72 hours. No results for input(s): Bennetta Downy in the last 72 hours. Urinalysis:      Lab Results   Component Value Date    NITRU Negative 12/12/2021    WBCUA 2 12/12/2021    RBCUA 0-2 12/12/2021    BLOODU LARGE 12/12/2021    SPECGRAV 1.023 12/12/2021    GLUCOSEU >=1000 12/12/2021       Radiology:  [address]   Final Result   Pancreas not well visualized. This is likely due to the peripancreatic   inflammatory changes seen on recent CT. Hepatic steatosis.          CT ABDOMEN PELVIS WO CONTRAST Additional Contrast? None   Final Result   1. Severe acute interstitial pancreatitis. No focal fluid collection. 2. Hepatomegaly with severe steatosis. 3. Possible gallbladder sludge. 4. Colonic diverticulosis without acute diverticulitis. 5. The distal esophagus is dilated and filled with fluid. RECOMMENDATIONS:   Unavailable         XR CHEST PORTABLE   Final Result   No radiographic evidence of acute pulmonary disease. VL Extremity Venous Right    (Results Pending)           Assessment/Plan:    Active Hospital Problems    Diagnosis Date Noted    Idiopathic acute pancreatitis [K85.00]     DKA, type 2, not at goal [facility]) [E11.10] 12/12/2021       DKA resolved - suspect undiagnosed type 2 DM  -check Hgb A1c - 12.4  -NPO - to full liquid diet per GI  -DKA protocol  -insulin gtt  - check CARI and islet cell Abs   - if we are able to come off d5 infusion - will likely be able to transition to SQ insulin.          Anion gap metabolic acidosis likely 2/2 DKA  -management as above       Severe interstitial pancreatitis  -NPO - to full liquid  -aggressive IVF  -GI and general surgery consulted  -check RUQ U/S - pancreas not well visualized due to surrounding inflammatory changes. - pt reports 3 beers and hard liquor shots prior to onset of pancreatitis, but denies chronic alcohol abuse.        Septic Shock - resolved   -Levophed gtt  -Vanc, meropenem,   - no need for Flagyl   - culture with diphtheroids - likely contaminant.        CHAPIN - CR platoed around 2  -IVF  -avoid nephrotoxic medications  -nephrology following      Coffee ground emesis  -cont PPI  -GI following  -Hgb stable       Obesity - BMI 38.68  -nutritional counseling provided  -weight loss encouraged  -complicating medical management      Hypocalcemia - replete IV. Check iCa - trend. DVT Prophylaxis: SCDs  Diet: ADULT DIET;  Full Liquid; 5 carb choices (75 gm/meal)  Code Status: Full Code    PT/OT Eval Status: defer    Dispo - continue care ICU    Amanda Ballard MD

## 2021-12-15 NOTE — PROGRESS NOTES
Physical Therapy    Facility/Department: [address] ICU  Initial Assessment    NAME: Milan Flowers  : 1970  MRN: [id number]    Date of Service: 12/15/2021    Discharge Recommendations:  Continue to assess pending progress   PT Equipment Recommendations  Other: Will monitor for potential equipt needs. Assessment   Body structures, Functions, Activity limitations: Decreased functional mobility ; Decreased strength; Decreased safe awareness; Decreased endurance  Assessment: 53 y/o male admit 2021 with Acute Pancreatitis,CHAPIN, DKA and Shock. PMH insign otherwise. PTA Pt living alone in apt setting; independent daily care and functional mobility. Pt lori oob, few steps to chair with Leartis Ano assist; abit delayed response initially although improving as proceed oob. Fatigued following. At this time, need to consider cont PT Services (3-5) upon d/c. Will cont to monitor pt's progress. Prognosis: Good  Decision Making: Medium Complexity  History: 53 y/o male admit 2021 with Acute Pancreatitis,CHAPIN, DKA and Shock. PMH insign otherwise. Exam: See above. Clinical Presentation: See above. Patient Education: Role of PT, POC, Need to call for assist, Safe use of Walker. Barriers to Learning: Cognitive, Safety Awareness. REQUIRES PT FOLLOW UP: Yes  Activity Tolerance  Activity Tolerance: Patient limited by cognitive status; Patient limited by endurance  Activity Tolerance: Pt lori oob, few steps to chair with Leartis Ano assist; abit delayed response initially although improving as proceed oob. Fatigued following. Patient Diagnosis(es): The primary encounter diagnosis was Diabetic ketoacidosis without coma associated with diabetes mellitus due to underlying condition ([facility] [address] Diagnoses of Metabolic acidosis, Hypotension, unspecified hypotension type, Hyperglycemia, and Lymphocytosis were also pertinent to this visit. has a past medical history of H/O degenerative disc disease.    has no past surgical history on file. Restrictions  Restrictions/Precautions  Restrictions/Precautions: Fall Risk  Position Activity Restriction  Other position/activity restrictions: O2 3L via NC. Diet : Clear Liquid. Vision/Hearing  Vision: Impaired  Vision Exceptions: Wears glasses at all times  Hearing: Exceptions to [facility]  Hearing Exceptions: Hard of hearing/hearing concerns; No hearing aid     Subjective  General  Chart Reviewed: Yes  Patient assessed for rehabilitation services?: Yes  Additional Pertinent Hx: 55 y/o male admit 12/12/2021 with Acute Pancreatitis,CHAPIN, DKA and Shock. PMH insign otherwise. Family / Caregiver Present: No  Referring Practitioner: Benjamin Ryan NP. Diagnosis: Acute Pancreatitis, CHAPIN, DKA and Shock. Other (Comment): Pt follows simple commands; delayed processing at times. Subjective  Subjective: Pt agreeable to PT Eval/Rx. Pain Screening  Patient Currently in Pain: Denies          Orientation  Orientation  Overall Orientation Status: Impaired  Orientation Level: Oriented to person; Oriented to time; Oriented to situation (Pt aware of hospital setting; confused to recent events/situation.)  Social/Functional History  Social/Functional History  Lives With: Alone  Type of Home: Apartment  Home Layout: Two level, Laundry in basement (1 story with basement.)  Home Access: Stairs to enter with rails  Entrance Stairs - Number of Steps: Primarily enters garage then has 12 PATRICE to main level with bed/bathroom; or could go in front door with 1 PATRICE.   Bathroom Shower/Tub: Tub/Shower unit  Bathroom Toilet:  (Comfort height toilet)  Bathroom Accessibility: Accessible  Home Equipment:  (No DME.)  Receives Help From: Home health (HHA 5 days/week for 2-3 hours for house chores)  ADL Assistance: Independent  Homemaking Assistance: Independent  Ambulation Assistance: Independent (Without assist device pta.)  Transfer Assistance: Independent  Active : Yes  Occupation: On disability  Additional Comments: [de-identified] y/o father and brother stay in the same building. Cognition   Cognition  Overall Cognitive Status: Exceptions  Arousal/Alertness: Inconsistent responses to stimuli  Following Commands: Follows one step commands with increased time; Follows one step commands with repetition  Attention Span: Attends with cues to redirect  Memory: Decreased recall of recent events  Safety Judgement: Decreased awareness of need for safety; Decreased awareness of need for assistance  Problem Solving: Decreased awareness of errors  Insights: Decreased awareness of deficits  Initiation: Requires cues for some  Sequencing: Requires cues for some    Objective          AROM RLE (degrees)  RLE AROM: WFL  AROM LLE (degrees)  LLE AROM : WFL  AROM RUE (degrees)  RUE AROM : WFL  AROM LUE (degrees)  LUE AROM : WFL  Strength RLE  Strength RLE: WFL  Strength LLE  Strength LLE: WFL  Strength RUE  Strength RUE: WFL  Strength LUE  Strength LUE: WFL     Sensation  Overall Sensation Status: WFL  Bed mobility  Supine to Sit: Moderate assistance; 2 Person assistance (HOB elevated.)  Comment: Slow/delayed with mvt to eob. Transfers  Sit to Stand: Minimal Assistance  Stand to sit: Minimal Assistance  Comment: Cues for safe hand placement with use of Walker. Additional Transfer from chair with Ledell Jews assist.  Ambulation  Ambulation?: Yes  Ambulation 1  Surface: level tile  Device: Rolling Walker  Distance: 4-5 steps bed to chair with Ledell Jews assist.  Cues for walker negotiation. Diminished step length/clearance; fatigued following. Balance  Comments: EOB CGA. Stand with Jordan Eaton assist.  Additional Stand/Alternating LE lift with Walker at chair ~ 1 min Min assist.        Plan   Plan  Times per week: 3-5x week while in acute care setting.   Current Treatment Recommendations: Strengthening, Functional Mobility Training, Transfer Training, Gait Training, Safety Education & Training, Patient/Caregiver Education & Training  Safety Devices  Type of devices: Call light within reach, Chair alarm in place, Left in chair, Nurse notified      AM-PAC Score  AM-PAC Inpatient Mobility Raw Score : 15 (12/15/21 1453)  AM-PAC Inpatient T-Scale Score : 39.45 (12/15/21 1453)  Mobility Inpatient CMS 0-100% Score: 57.7 (12/15/21 1453)  Mobility Inpatient CMS G-Code Modifier : CK (12/15/21 1453)          Goals  Short term goals  Time Frame for Short term goals: Upon d/c acute care setting. Short term goal 1: Bed Mob Supervision. Short term goal 2: Transfers with/without assist device SBA. Short term goal 3: Amb with/without assist device 50' SBA. Short term goal 4: Pt participating in approp Strength Exs. Patient Goals   Patient goals : Return home.        Therapy Time   Individual Concurrent Group Co-treatment   Time In 21          Time Out 1130         Minutes [address] [facility] Zulema Halee

## 2021-12-15 NOTE — CARE COORDINATION
Case Management Assessment           Daily Note                 Date/ Time of Note: 12/15/2021 10:30 AM         Note completed by: Emilee Gerardo RN    Patient Name: Lynda Pearl  YOB: 1970    Diagnosis:Lymphocytosis [S35.095]  Metabolic acidosis [T38.4]  Hyperglycemia [R73.9]  DKA, type 2, not at goal [facility]) [E11.10]  Hypotension, unspecified hypotension type [I95.9]  Diabetic ketoacidosis without coma associated with diabetes mellitus due to underlying condition ([facility] [address] [E08.10]  Patient Admission Status: Inpatient    Date of Admission:12/12/2021  9:08 AM Length of Stay: 3 GLOS: GMLOS: 4.8    Current Plan of Care: Continue insulin gtt, bicarb gtt, protonix gtt, RLE doppler studies today    Discharge Plan: Home alone v/s SNF    Case Management Notes:  CM continues to follow for DC planning needs. Pt remains in the ICU on multiple gtt's.  He will need PT/OT eval's once medically stable to assist with plans for home v/s SNF    Emilee Gerardo RN  Case Management  [phone]

## 2021-12-15 NOTE — PROGRESS NOTES
Occupational Therapy   Occupational Therapy Initial Assessment  Date: 12/15/2021   Patient Name: Emre Uribe  MRN: [id number]     : 1970    Date of Service: 12/15/2021    Discharge Recommendations:  Patient would benefit from continued therapy after discharge, 3-5 sessions per week  OT Equipment Recommendations  Other: defer to MD facility    Emre Uribe scored a 15/24 on the AM-PAC ADL Inpatient form. Current research shows that an AM-PAC score of 17 or less is typically not associated with a discharge to the patient's home setting. Based on the patient's AM-PAC score and their current ADL deficits, it is recommended that the patient have 3-5 sessions per week of Occupational Therapy at d/c to increase the patient's independence. Please see assessment section for further patient specific details. If patient discharges prior to next session this note will serve as a discharge summary. Please see below for the latest assessment towards goals. Assessment   Performance deficits / Impairments: Decreased functional mobility ; Decreased strength; Decreased endurance; Decreased ADL status; Decreased safe awareness; Decreased high-level IADLs; Decreased balance  Assessment: 53 y/o male admitted 2021 with DKA, severe acute pancreatitis, acute kidney injury, and shock. PTA pt reports lives at home alone and was independent with ADLs and functional mobility. Today, pt required min A to stand and take a few steps bed > chair with RW. Pt fatigued easily. Pt reporting intermittent dizziness with position changes that resolved with times. Pt with functional UE ROM for self care and anticipate will require up to max A for LB ADLs at this time. Pt is functioning below baseline and benefit from skilled therapy (3-5); however, if makes good progress may be safe to return home with family.   Prognosis: Good  Decision Making: Medium Complexity  OT Education: OT Role; Transfer Training; Plan of Care  REQUIRES Tub/Shower unit  [employer]:  (comfort height toilet)  Bathroom Accessibility: Accessible  Home Equipment:  (no DME)  Receives Help From: Home health (HHA 5 days/week for 2-3 hours for house chores)  ADL Assistance: Independent  Homemaking Assistance: Independent  Ambulation Assistance: Independent (no AD)  Transfer Assistance: Independent  Active : Yes  Occupation: On disability  Additional Comments: [de-identified] y/o father and brother stay in the same building       Objective   Vision: Impaired  Vision Exceptions: Wears glasses at all times  [facility]  Hearing Exceptions: Hard of hearing/hearing concerns; No hearing aid    Orientation  Overall Orientation Status:  [facility] setting but unable to [address])  Orientation Level: Oriented to person; Oriented to time; Oriented to situation     Balance  Sitting Balance: Contact guard assistance (EOB in prep for transfer)  Standing Balance: Minimal assistance  Standing Balance  Time: stood prn to transfer to chair; stood ~ 1 min for marching in place with RW support  Functional Mobility  Functional Mobility Comments: few steps bed > chair with Rw and min A     ADL  Toileting: Dependent/Total (santiago)  Additional Comments: Anticipate pt will require max A for LB bathing/dressing, SBA for UB bathing/dressing and grooming when seated based on balance and endurance observed        Bed mobility  Supine to Sit: 2 Person assistance; Moderate assistance (HOB elevated)  Sit to Supine:  (pt in chair at end of session)  Transfers  Sit to stand: Minimal assistance  Stand to sit: Minimal assistance  Transfer Comments: stood from bed and chair to/from RW- cuing for hand placement     Cognition  Overall Cognitive Status: Exceptions  Arousal/Alertness: Inconsistent responses to stimuli  Following Commands: Follows one step commands with increased time;  Follows one step commands with repetition  Attention Span: Attends with cues to redirect  Memory: Decreased recall of recent events  Safety Judgement: Decreased awareness of need for safety; Decreased awareness of need for assistance  Problem Solving: Decreased awareness of errors  Insights: Decreased awareness of deficits  Initiation: Requires cues for some  Sequencing: Requires cues for some  Perception  Overall Perceptual Status: WFL     Sensation  Overall Sensation Status: WFL        LUE AROM (degrees)  LUE AROM : WFL  Left Hand AROM (degrees)  Left Hand AROM: WFL  RUE AROM (degrees)  RUE AROM : WFL  Right Hand AROM (degrees)  Right Hand AROM: WFL        Plan   Plan  Times per week: 3-5  Current Treatment Recommendations: Strengthening, Endurance Training, Balance Training, Gait Training, Functional Mobility Training, Self-Care / ADL, ROM    AM-PAC Score  AM-PAC Inpatient Daily Activity Raw Score: 15 (12/15/21 1407)  AM-PAC Inpatient ADL T-Scale Score : 34.69 (12/15/21 1407)  ADL Inpatient CMS 0-100% Score: 56.46 (12/15/21 1407)  ADL Inpatient CMS G-Code Modifier : CK (12/15/21 1407)    Goals  Short term goals  Time Frame for Short term goals: Prior to DC: Short term goal 1: Pt will complete ADL transfers/mobility with supervision  Short term goal 2: Pt will complete toileting with supervision  Short term goal 3: Pt will complete LB dressing with supervision  Short term goal 4: Pt will tolerate standing > 5 min for functional task with supervision  Short term goal 5: Pt will complete UE exercises in all planes to inc strength/endurance  Patient Goals   Patient goals : to return home       Therapy Time   Individual Concurrent Group Co-treatment   Time In 1050         Time Out 1130         Minutes 40         Timed Code Treatment Minutes: 25 Minutes     This note to serve as OT d/c summary if pt is d/c-ed prior to next therapy session.     Phan Lang, OTR/L

## 2021-12-15 NOTE — PROGRESS NOTES
Pulmonary Critical Care progress Note     Patient's name:  Kurt Keating  Medical Record Number: [id number]  Patient's account/billing number: [de-identified]  Patient's YOB: 1970  Age: 54 y.o. Date of Admission: 12/12/2021  9:08 AM  Date of Consult: 12/15/2021      Primary Care Physician: [address] [facility] [address]      Code Status: Full Code    Reason for consult: septic shock    Assessment and Plan     1. Septic shock  2. Acute pancreatitis  3. Severe DKA  4. ARF  5. Bibasilar atelectasis   6. Hypokalemia       Plan:  DKA protocol  Replace lytes  Broad spectrum antibiotics, meropenem x 5 days. d/c vanc. Avoid nephrotoxic's. GI and DVT prophylaxis. Advance activities     Subjective:  No nausea or vomiting today   On insuline gtt  Off pressors      REVIEW OF SYSTEMS:  Review of Systems -   General ROS: Fatigue. Psychological ROS: negative  Ophthalmic ROS: negative  ENT ROS: negative  Allergy and Immunology ROS: negative  Hematological and Lymphatic ROS: negative  Endocrine ROS: negative  Breast ROS: negative  Respiratory ROS: no cough, shortness of breath, or wheezing  Cardiovascular ROS: no chest pain or dyspnea on exertion  Gastrointestinal ROS: no n v or D. Genito-Urinary ROS: negative  Musculoskeletal ROS: negative  Neurological ROS: negative  Dermatological ROS: negative        Physical Exam:    Vitals: /72   Pulse 101   Temp 98.2 °F (36.8 °C) (Oral)   Resp 16   Ht 6' 1\" (1.854 m)   Wt (!) 336 lb 6.8 oz (152.6 kg)   SpO2 96%   BMI 44.39 kg/m²     Last Body weight:   Wt Readings from Last 3 Encounters:   12/15/21 (!) 336 lb 6.8 oz (152.6 kg)   03/24/20 298 lb (135.2 kg)       Body Mass Index : Body mass index is 44.39 kg/m².       Intake and Output summary:     Intake/Output Summary (Last 24 hours) at 12/15/2021 1206  Last data filed at 12/15/2021 0934  Gross per 24 hour   Intake 5233.89 ml   Output 2970 ml   Net 2263.89 ml       Physical Examination:     PHYSICAL EXAM:    Gen: Moderate acute distress  Eyes: PERRL. Anicteric sclera. No conjunctival injection. ENT: No discharge. Posterior oropharynx clear. External appearance of ears and nose normal.  Neck: Trachea midline. No mass, no lymphadenopathy    Resp: No excessive work of breathing, diminished at the bases. CV: Regular rate. Regular rhythm. No murmur or rub. No edema. GI: Soft, distended, sluggish bowel movement   skin: Warm, dry, w/o erythema. Lymph: No cervical or supraclavicular LAD. M/S: No cyanosis. No clubbing. Neuro:  CN 2-12 tested, no focal neurologic deficit. Moves all extremities  Psych: Awake and lethargic, Oriented x 3. Judgement and insight appropriate. Mood stable. Laboratory findings:-    CBC:   Recent Labs     12/15/21  0530   WBC 15.3*   HGB 9.9*   *     BMP:    Recent Labs     12/15/21  0045 12/15/21  0045 12/15/21  0530 12/15/21  0530 12/15/21  0821      < > 139   < > 135*   K 3.6   < > 3.9   < > 4.0   CL 99   < > 100   < > 97*   CO2 26   < > 28   < > 28   BUN 28*   < > 24*   < > 19   CREATININE 2.1*  --  2.0*  --  1.8*   GLUCOSE 155*   < > 154*   < > 124*    < > = values in this interval not displayed. S. Calcium:  Recent Labs     12/15/21  0821   CALCIUM 6.5*     S. Ionized Calcium:No results for input(s): IONCA in the last 72 hours. S. Magnesium:  Recent Labs     12/15/21  0821   MG 2.10     S. Phosphorus:  Recent Labs     12/15/21  0821   PHOS 2.2*     S. Glucose:  Recent Labs     12/15/21  0819 12/15/21  0929 12/15/21  1053   POCGLU 133* 136* 164*     Glycosylated hemoglobin A1C:   Recent Labs     12/13/21  1210   LABA1C 12.4     INR: No results for input(s): INR in the last 72 hours. Hepatic functions:   No results for input(s): ALKPHOS, ALT, AST, PROT, BILITOT, BILIDIR, LABALBU in the last 72 hours.   Pancreatic functions:  Recent Labs     12/12/21  1942 12/12/21  2332 12/13/21  0423   LACTA  --    < > 1.5   AMYLASE 1,026*  -- --     < > = values in this interval not displayed. S. Lactic Acid:   Recent Labs     12/13/21  0423   LACTA 1.5     Cardiac enzymes:  No results for input(s): CKTOTAL, CKMB, CKMBINDEX, TROPONINI in the last 72 hours. BNP:No results for input(s): BNP in the last 72 hours. Lipid profile:   Recent Labs     12/14/21  0800   TRIG 164*     Blood Gases: No results found for: PH, PCO2, PO2, HCO3, O2SAT  Thyroid functions: No results found for: TSH       Radiology Review:  Pertinent images / reports were reviewed as a part of this visit. CT Chest w/ contrast: No results found for this or any previous visit. CT Chest w/o contrast: No results found for this or any previous visit. CTPA: No results found for this or any previous visit. CXR PA/LAT: No results found for this or any previous visit. CXR portable: Results for orders placed during the hospital encounter of 12/12/21    XR CHEST PORTABLE    Narrative  EXAMINATION:  ONE XRAY VIEW OF THE CHEST    12/12/2021 10:16 am    COMPARISON:  Chest x-ray dated 08/16/2006. HISTORY:  ORDERING SYSTEM PROVIDED HISTORY: other  TECHNOLOGIST PROVIDED HISTORY:  Reason for exam:->other  Reason for Exam: Hypotension,SOB    FINDINGS:  HEART/MEDIASTINUM: The cardiomediastinal silhouette is within normal limits. PLEURA/LUNGS: There are no focal consolidations or pleural effusions. There  is no appreciable pneumothorax. There are low lung volumes. There is  elevation of the right hemidiaphragm. There is right basilar atelectasis. BONES/SOFT TISSUE: No acute abnormality. Impression  No radiographic evidence of acute pulmonary disease.                      Fanny Thurman MD, M.D.            12/15/2021, 12:06 PM

## 2021-12-15 NOTE — CARE COORDINATION
[facility]  Diabetes Education   Progress Note       NAME:  [address] [place] [address] RECORD NUMBER:  [id number]  AGE: 54 y.o. GENDER: male  : 1970  TODAY'S DATE:  12/15/2021    Subjective   Reason for Diabetic Education Evaluation and Assessment: general diabetes support    Jamin Suarez agrees to meet with me for diabetes education. He reports that diabetes is a new diagnosis. Visit Type: evaluation      Mikki Durand is a 54 y.o. male referred by:     [] Physician  [] Nursing  [x] Chart Review   [] Other:     PAST MEDICAL HISTORY        Diagnosis Date    H/O degenerative disc disease        PAST SURGICAL HISTORY    No past surgical history on file. FAMILY HISTORY    No family history on file. SOCIAL HISTORY    Social History     Tobacco Use    Smoking status: Never Smoker    Smokeless tobacco: Never Used   Substance Use Topics    Alcohol use: Yes     Comment: social    Drug use: Never       ALLERGIES    No Known Allergies    MEDICATIONS     heparin (porcine)  5,000 Units SubCUTAneous BID    meropenem  500 mg IntraVENous Q6H       Objective        Patient Active Problem List   Diagnosis Code    Back pain M54.9    DKA, type 2, not at goal [facility]) E11.10    Idiopathic acute pancreatitis K85.00        /72   Pulse 101   Temp 98.2 °F (36.8 °C) (Oral)   Resp 16   Ht 6' 1\" (1.854 m)   Wt (!) 336 lb 6.8 oz (152.6 kg)   SpO2 96%   BMI 44.39 kg/m²     HgBA1c:    Lab Results   Component Value Date    LABA1C 12.4 2021       Recent Labs     12/15/21  0436 12/15/21  0528 12/15/21  0705 12/15/21  0819   POCGLU 136* 157* 127* 133*       BUN/Creatinine:    Lab Results   Component Value Date    BUN 19 12/15/2021    CREATININE 1.8 12/15/2021       Assessment        Diabetes Management and Education    Does the patient have a Primary Care Physician? Yes, [address]       Does the patient require new medication instruction? Yes, anticipate insulin at discharge. diabetes needs.          Teaching Time Diabetes Education:  20 minutes     Electronically signed by Champ [place] on 12/15/2021 at 10:23 AM

## 2021-12-15 NOTE — PROGRESS NOTES
1700 - Perfect Serve message sent to Dr. Hema Ctoter regarding patient's ionized calcium of 0.94. Awaiting response. 1750 - Message read, no new orders received. 1841 - Right femoral TLC catheter removed per protocol. 22g PIV placed in left hand. 1850 - Report called to RN receiving patient in room 5129.      Electronically signed by Tacos Geronimo RN on 12/15/2021 at 6:59 PM

## 2021-12-15 NOTE — PROGRESS NOTES
[facility] General Surgery                                   Daily Progress Note                                                         Pt Name: Angela Stubbs  Medical Record Number: [id number]  Date of Birth 1970   Today's Date: 12/15/2021  Chief Complaint   Patient presents with    Shortness of Breath     trouble breathing since today. Feeling unwell since yesterday. EMS blood glucose 548. Pt denies DM history. ASSESSMENT/PLAN  /P: 55 yo male with DKA, severe acute pancreatitis, acute kidney injury, shock - resolved     Severe acute pancreatitis - leukocytosis 13.4-->15.3. Pain improving. Having some GI function. Continue IV hydration. OK for clear liquids. RUQ negative for gallstones, no plans for cholecystectomy. Triglycerides elevated on admission. No clear role for antibiotics. Recommend to stop after 5 days.     Acute kidney injury - continue IV hydration. Cr improving     DKA - glucose control per primary team     Shock - resolved, suspect from hypovolemia secondary to pancreatitis and DKA        SUBJECTIVE  Rice Camps is resting in bed, no acute distress. Minimal abdominal pain. Hungry. Passing flatus. OBJECTIVE  VITALS:  height is 6' 1\" (1.854 m) and weight is 336 lb 6.8 oz (152.6 kg) (abnormal). His oral temperature is 98.2 °F (36.8 °C). His blood pressure is 113/72 and his pulse is 101. His respiration is 16 and oxygen saturation is 96%. INTAKE/OUTPUT:      Intake/Output Summary (Last 24 hours) at 12/15/2021 0837  Last data filed at 12/15/2021 5915  Gross per 24 hour   Intake 4440.52 ml   Output 3390 ml   Net 1050.52 ml     GENERAL: alert, cooperative, no distress  LUNGS: clear to ausculation, without wheezes, rales or rhonci  HEART: normal rate and regular rhythm  ABDOMEN: Soft,  non distended. Mild to moderate periumbilical pain with palpation.    EXTREMITY: no cyanosis, clubbing or edema    I/O last 3 completed shifts: In: 4440.5 [P.O.:240; I.V.:2569.9; IV Piggyback:1630.6]  Out: 3635 [Urine:3635]      LABS  Recent Labs     12/12/21  0934 12/12/21  1150 12/12/21  1607 12/12/21  1611 12/15/21  0530   WBC 20.1*  --   --    < > 15.3*   HGB 16.4  --   --    < > 9.9*   HCT 54.1*  --   --    < > 29.3*     --   --    < > 121*   *   < >  --    < > 139   K 4.2   < >  --    < > 3.9   CL 75*   < >  --    < > 100   CO2 5*   < >  --    < > 28   BUN 44*   < >  --    < > 24*   CREATININE 3.4*   < >  --    < > 2.0*   MG  --    < >  --    < > 1.90   PHOS  --    < >  --    < > 2.1*   CALCIUM 8.9   < >  --    < > 5.9*   AST 35  --   --   --   --    ALT 46*  --   --   --   --    BILITOT 0.5  --   --   --   --    NITRU  --   --  Negative  --   --    COLORU  --   --  YELLOW  --   --     < > = values in this interval not displayed. EDUCATION  Patient educated about Disease Process, Medications, Smoking Cessation, Oxygenation, Incentive Spirometry and Deep Breath and Cough, Diabetes, Hyperlipidemia, Smoking Cessation, Nutrition, Exercise and Hypertension    Electronically signed by MICKI Dumont CNP on 12/15/2021 at 8:37 AM      [facility]   [phone] Office  [phone] Fax    Agree with above note. The patient was personally seen and examined. Jessica Mcgrath is doing better. Pain improving. Minimal nausea yesterday, no nausea today. + flatus, no BM. Abd soft, ND, NT    WBC up to 15.3  Cr 2  Lipase 466    A/P: 55 yo male with acute pancreatitis, acute kidney injury, DKA    Acute pancreatitis - lipase improving. Monitor leukocytosis. Continue IV hydration. Tolerating clears, advance to full liquids    Acute kidney injury - Cr 2.   Nephrology on board    DKA - per primary team, on lantus and SSI    Ambulate/OOB    Taylor Armendariz MD

## 2021-12-15 NOTE — PROGRESS NOTES
Nephrology Progress Note   [facility]      Chief Complaint: Nausea/vomiting/pancreatitis    History of Present Illness: Mr Ilda Black is a is a 54 y.o. male with no significant past medical history who presents to [facility] with increased thirst, urination, nausea, and vomiting. He was noted to have a BS level of 900 mg/dl. CT scan abdomen and pelvis revealed severe acute Pancreatitis. He was Hyperkalemic, with serum HCO level of 5 and creatinine of 3.4 mg/dl. Patient was started on insulin gtt, aggressively volume resuscitated - santiago was placed     Subjective:    Resting in recliner; diet advanced to clears; feeling better; no shortness of breath    ROS: + nausea, + vomiting, + abdominal distension, + abdominal pain. All other ROS negative     Scheduled Meds:   heparin (porcine)  5,000 Units SubCUTAneous BID    meropenem  500 mg IntraVENous Q6H        sodium bicarbonate infusion 150 mL/hr at 12/15/21 0902    pantoprazole 8 mg/hr (12/15/21 0825)    dextrose      insulin 16.51 Units/hr (12/15/21 1201)       PRN Meds:.potassium chloride, morphine **OR** morphine, glucose, dextrose, glucagon (rDNA), dextrose, dextrose, magnesium sulfate, sodium phosphate IVPB **OR** sodium phosphate IVPB **OR** sodium phosphate IVPB, ondansetron    Physical Exam:    TEMPERATURE:  Current - Temp: 98.2 °F (36.8 °C);  Max - Temp  Av.3 °F (37.4 °C)  Min: 98 °F (36.7 °C)  Max: 100.8 °F (38.2 °C)  RESPIRATIONS RANGE: Resp  Av.8  Min: 14  Max: 20  PULSE RANGE: Pulse  Av.7  Min: 90  Max: 115  BLOOD PRESSURE RANGE:  Systolic (97ZIT), JMA:521 , Min:106 , KOD:468   ; Diastolic (25XXU), WMV:34, Min:55, Max:87    24HR INTAKE/OUTPUT:      Intake/Output Summary (Last 24 hours) at 12/15/2021 1210  Last data filed at 12/15/2021 0934  Gross per 24 hour   Intake 5233.89 ml   Output 2970 ml   Net 2263.89 ml         Patient Vitals for the past 96 hrs (Last 3 readings):   Weight   12/15/21 0600 (!) 336 lb 6.8 oz (152.6 kg) 12/13/21 0400 296 lb 11.8 oz (134.6 kg)   12/12/21 1237 293 lb 3.4 oz (133 kg)       General: Alert, Awake, NAD, Obese  HEENT: Normocephalic, atraumatic, Nose and ears appear externally without deformity, MMM  Neck: No Thyromegaly, Trachea is midline, No Carotid bruit  Chest: clear to auscultation, no intercostal retractions  CVS: RRR, no murmur, no rub  Abdomen: distended, generalized tenderness, no bruit appreciated  Extremities: no edema, no cyanosis. Skin: normal texture, normal skin turgor, no rash  Musculoskeletal: normal ROM, no joint swelling, no visible deformity  Neurological: CN intact, no focal motor neurological deficit  Psych: normal affect  : santiago in place; no hematuria noted    Allergies:  No Known Allergies     Past Medical History:   Diagnosis Date    H/O degenerative disc disease      No past surgical history on file. No family history on file. Social History     Socioeconomic History    Marital status: Single     Spouse name: Not on file    Number of children: Not on file    Years of education: Not on file    Highest education level: Not on file   Occupational History    Not on file   Tobacco Use    Smoking status: Never Smoker    Smokeless tobacco: Never Used   Substance and Sexual Activity    Alcohol use: Yes     Comment: social    Drug use: Never    Sexual activity: Not on file   Other Topics Concern    Not on file   Social History Narrative    Not on file     Social Determinants of Health     Financial Resource Strain:     Difficulty of Paying Living Expenses: Not on file   Food Insecurity:     Worried About Running Out of Food in the Last Year: Not on file    Bienvenido of Food in the Last Year: Not on file   Transportation Needs:     Lack of Transportation (Medical): Not on file    Lack of Transportation (Non-Medical):  Not on file   Physical Activity:     Days of Exercise per Week: Not on file    Minutes of Exercise per Session: Not on file   Stress:     Active Problems:    DKA, type 2, not at goal [facility])    Idiopathic acute pancreatitis  Resolved Problems:    * No resolved hospital problems. *    1. CHAPIN - likely prerenal - occurring in the setting of DKA/pancreatitis  - Continue aggressive volume resc   - CT scan on admission did not reveal Hydronephrosis  - avoid exposure to Nephrotoxic agents    2. Critical life threatening acidosis   - Secondary to DKA and CHAPIN  - Continue HCO gtt  - Improving    3. Hyperkalemia - Resolved    4.  Pancreatitis - management per Medicine

## 2021-12-15 NOTE — PROGRESS NOTES
INPATIENT PROGRESS NOTE        IDENTIFYING DATA/REASON FOR CONSULTATION   PATIENT:  Addy Lopez  MRN:  [id number]  ADMIT DATE: 2021  TIME OF EVALUATION: 12/15/2021 10:59 AM  HOSPITAL STAY:   LOS: 3 days   CONSULTING PHYSICIAN: Kathie Lundberg MD   REASON FOR CONSULTATION: Severe pancreatitis    Subjective:    Patient seen in follow up for pancreatitis. Remains on insulin drip. Off pressors. He has no new complaints. abd pain improving. No fevers    MEDICATIONS   SCHEDULED:  heparin (porcine), 5,000 Units, BID  meropenem, 500 mg, Q6H      FLUIDS/DRIPS:     sodium bicarbonate infusion 150 mL/hr at 12/15/21 0902    pantoprazole 8 mg/hr (12/15/21 0825)    dextrose      insulin 13.52 Units/hr (12/15/21 1053)    dextrose 5 % and 0.45 % NaCl       PRNs: potassium chloride, 20 mEq, PRN  morphine, 2 mg, Q4H PRN   Or  morphine, 4 mg, Q4H PRN  glucose, 15 g, PRN  dextrose, 12.5 g, PRN  glucagon (rDNA), 1 mg, PRN  dextrose, 100 mL/hr, PRN  dextrose, 12.5 g, PRN  magnesium sulfate, 1,000 mg, PRN  sodium phosphate IVPB, 10 mmol, PRN   Or  sodium phosphate IVPB, 15 mmol, PRN   Or  sodium phosphate IVPB, 20 mmol, PRN  dextrose 5 % and 0.45 % NaCl, , Continuous PRN  ondansetron, 4 mg, Q6H PRN      ALLERGIES:  No Known Allergies      PHYSICAL EXAM   VITALS:  /72   Pulse 101   Temp 98.2 °F (36.8 °C) (Oral)   Resp 16   Ht 6' 1\" (1.854 m)   Wt (!) 336 lb 6.8 oz (152.6 kg)   SpO2 96%   BMI 44.39 kg/m²   TEMPERATURE:  Current - Temp: 98.2 °F (36.8 °C); Max - Temp  Av.1 °F (37.3 °C)  Min: 98 °F (36.7 °C)  Max: 100.8 °F (38.2 °C)    Physical Exam:  General appearance: alert, cooperative, no distress, appears stated age  Eyes: Anicteric  Head: Normocephalic, without obvious abnormality  Lungs: clear to auscultation bilaterally, Normal Effort  Heart: regular rate and rhythm, normal S1 and S2, no murmurs or rubs  Abdomen: soft, non-distended, non-tender.  Bowel sounds normal.   Extremities: atraumatic, no cyanosis or edema  Skin: warm and dry, no jaundice  Neuro: Grossly intact, A&OX3    LABS AND IMAGING   Laboratory   Recent Labs     12/13/21  0423 12/14/21  0416 12/15/21  0530   WBC 17.4* 13.4* 15.3*   HGB 13.3* 10.9* 9.9*   HCT 38.5* 32.3* 29.3*   MCV 89.2 89.0 89.7    109* 121*     Recent Labs     12/15/21  0045 12/15/21  0530 12/15/21  0821    139 135*   K 3.6 3.9 4.0   CL 99 100 97*   CO2 26 28 28   PHOS 2.2* 2.1* 2.2*   BUN 28* 24* 19   CREATININE 2.1* 2.0* 1.8*     No results for input(s): AST, ALT, ALB, BILIDIR, BILITOT, ALKPHOS in the last 72 hours. Recent Labs     12/12/21  1942 12/15/21  0530   LIPASE >3,000.0* 466.0*   AMYLASE 1,026*  --      No results for input(s): PROTIME, INR in the last 72 hours. Imaging  US GALLBLADDER RUQ   Final Result   Pancreas not well visualized. This is likely due to the peripancreatic   inflammatory changes seen on recent CT. Hepatic steatosis. CT ABDOMEN PELVIS WO CONTRAST Additional Contrast? None   Final Result   1. Severe acute interstitial pancreatitis. No focal fluid collection. 2. Hepatomegaly with severe steatosis. 3. Possible gallbladder sludge. 4. Colonic diverticulosis without acute diverticulitis. 5. The distal esophagus is dilated and filled with fluid. RECOMMENDATIONS:   Unavailable         XR CHEST PORTABLE   Final Result   No radiographic evidence of acute pulmonary disease. VL Extremity Venous Right    (Results Pending)       Endoscopy      ASSESSMENT AND RECOMMENDATIONS   Ade Moran is a 54 y.o. male who presented on 12/12/21 with  with shortness of breath, nausea, vomiting, increased thirst and urination. Found with acute pancreatitis, DKA, and hypotension/shock requiring pressors. GI consulted regarding acute pancreatitis    1. Acute pancreatitis  -lipase >3000. CT with severe acute interstitial pancreatitis.  No focal fluid collection. Etiology unclear, was found with TG levels of 361. Usually this would not be significant enough to cause this degree of pancreatitis but wonder if it was higher prior to starting him on insulin. Ca normal.  CT with gallbladder sludge and lfts mildly elevated however US shows normal gallbladder and bile ducts, no stones  2. Coffee ground emesis  -1 episode. No melena.  hgb 13.3-10.9->9.9. On PPI gtt. Monitor for now. defer EGD until more stable unless worsening bleeding. 3. DKA: remains on insulin gtt  4. CHAPIN  5. Shock: weaned off pressors. On Vanc and merrem. RECOMMENDATIONS:    Adv diet to full liquids. Monitor tolerance  DKA management per Primary team  Continue PPI gtt. Monitor h/h and for signs of active gi bleeding    If you have any questions or need any further information, please feel free to contact us [phone]. Thank you for allowing us to participate in the care of Morgan Howard. The note was completed using Dragon voice recognition transcription. Every effort was made to ensure accuracy; however, inadvertent transcription errors may be present despite my best efforts to edit errors. Ivette TODD    Attending physician addendum:     I have personally seen and examined the patient, reviewed the patient's medical record and pertinent labs and clinical imaging. I have personally staffed the case with Ivette TODD. I agree with her consultation note, exam findings, assessment and plans  as written above. I have made appropriate modifications and edited her assessment and plan where needed to reflect my impression and plans for this patient. Morgan Howard is a 53 YO Male who presents with nausea, vomiting, and increase thrist. Patient was found to be in DKA with BG over 900.  Patient was found to have acute pancreatitis based on Lipase/CT results.   Acute pancreatitis- Concern related to hypertriglyceridemia.  Triglyceride level was 396 and typically level needs to be >500 to cause pancreatitis but may have been higher prior to admission. RUQ US normal GB. Normal LFT. He denies any significant alcohol use and denies any medication. Clinically improving. Weaned off pressors. Tachycardia improving. Continue IV fluids. He tolerated clear liquids and will advance to full liquid diet. Will start on Fenofibrate now that eating. Coffee Ground Emesis: H/H stable. Continue PPI drip. Gram Positive Armaan Bacteremia: Continue Meropenem. Management per Primary team.   DKA Management per primary team.      Thank you for allowing me to participate in this patient's care. If there are any questions or concerns regarding this patient, or the plan we have set in place, please feel free to contact me at [phone].      Virgil Pelletier MD

## 2021-12-16 ENCOUNTER — APPOINTMENT (OUTPATIENT)
Dept: CT IMAGING | Age: 51
DRG: 871 | End: 2021-12-16
Payer: COMMERCIAL

## 2021-12-16 LAB
ALBUMIN SERPL-MCNC: 2.4 G/DL (ref 3.4–5)
ALBUMIN SERPL-MCNC: 2.7 G/DL (ref 3.4–5)
ANION GAP SERPL CALCULATED.3IONS-SCNC: 14 MMOL/L (ref 3–16)
ANION GAP SERPL CALCULATED.3IONS-SCNC: 16 MMOL/L (ref 3–16)
ANION GAP SERPL CALCULATED.3IONS-SCNC: 24 MMOL/L (ref 3–16)
BANDED NEUTROPHILS RELATIVE PERCENT: 9 % (ref 0–7)
BASOPHILS ABSOLUTE: 0.2 K/UL (ref 0–0.2)
BASOPHILS RELATIVE PERCENT: 1 %
BUN BLDV-MCNC: 19 MG/DL (ref 7–20)
BUN BLDV-MCNC: 26 MG/DL (ref 7–20)
BUN BLDV-MCNC: 28 MG/DL (ref 7–20)
CALCIUM SERPL-MCNC: 6.3 MG/DL (ref 8.3–10.6)
CALCIUM SERPL-MCNC: 6.6 MG/DL (ref 8.3–10.6)
CALCIUM SERPL-MCNC: 6.9 MG/DL (ref 8.3–10.6)
CHLORIDE BLD-SCNC: 91 MMOL/L (ref 99–110)
CHLORIDE BLD-SCNC: 93 MMOL/L (ref 99–110)
CHLORIDE BLD-SCNC: 97 MMOL/L (ref 99–110)
CO2: 19 MMOL/L (ref 21–32)
CO2: 24 MMOL/L (ref 21–32)
CO2: 25 MMOL/L (ref 21–32)
CREAT SERPL-MCNC: 1.6 MG/DL (ref 0.9–1.3)
CREAT SERPL-MCNC: 1.9 MG/DL (ref 0.9–1.3)
CREAT SERPL-MCNC: 2.1 MG/DL (ref 0.9–1.3)
CULTURE, BLOOD 2: NORMAL
EOSINOPHILS ABSOLUTE: 0 K/UL (ref 0–0.6)
EOSINOPHILS RELATIVE PERCENT: 0 %
GFR AFRICAN AMERICAN: 41
GFR AFRICAN AMERICAN: 46
GFR AFRICAN AMERICAN: 56
GFR NON-AFRICAN AMERICAN: 34
GFR NON-AFRICAN AMERICAN: 38
GFR NON-AFRICAN AMERICAN: 46
GLUCOSE BLD-MCNC: 340 MG/DL (ref 70–99)
GLUCOSE BLD-MCNC: 369 MG/DL (ref 70–99)
GLUCOSE BLD-MCNC: 373 MG/DL (ref 70–99)
GLUCOSE BLD-MCNC: 380 MG/DL (ref 70–99)
GLUCOSE BLD-MCNC: 396 MG/DL (ref 70–99)
GLUCOSE BLD-MCNC: 396 MG/DL (ref 70–99)
GLUCOSE BLD-MCNC: 405 MG/DL (ref 70–99)
GLUCOSE BLD-MCNC: 421 MG/DL (ref 70–99)
GLUCOSE BLD-MCNC: 425 MG/DL (ref 70–99)
HCT VFR BLD CALC: 30.1 % (ref 40.5–52.5)
HEMATOLOGY PATH CONSULT: NO
HEMOGLOBIN: 10 G/DL (ref 13.5–17.5)
LYMPHOCYTES ABSOLUTE: 1.8 K/UL (ref 1–5.1)
LYMPHOCYTES RELATIVE PERCENT: 10 %
MAGNESIUM: 1.8 MG/DL (ref 1.8–2.4)
MCH RBC QN AUTO: 30.4 PG (ref 26–34)
MCHC RBC AUTO-ENTMCNC: 33.2 G/DL (ref 31–36)
MCV RBC AUTO: 91.4 FL (ref 80–100)
MONOCYTES ABSOLUTE: 0.9 K/UL (ref 0–1.3)
MONOCYTES RELATIVE PERCENT: 5 %
NEUTROPHILS ABSOLUTE: 15.1 K/UL (ref 1.7–7.7)
NEUTROPHILS RELATIVE PERCENT: 75 %
PDW BLD-RTO: 13.7 % (ref 12.4–15.4)
PERFORMED ON: ABNORMAL
PHOSPHORUS: 2.1 MG/DL (ref 2.5–4.9)
PHOSPHORUS: 2.3 MG/DL (ref 2.5–4.9)
PHOSPHORUS: 3.1 MG/DL (ref 2.5–4.9)
PLATELET # BLD: 163 K/UL (ref 135–450)
PMV BLD AUTO: 9.5 FL (ref 5–10.5)
POTASSIUM SERPL-SCNC: 4.1 MMOL/L (ref 3.5–5.1)
POTASSIUM SERPL-SCNC: 4.6 MMOL/L (ref 3.5–5.1)
POTASSIUM SERPL-SCNC: 4.7 MMOL/L (ref 3.5–5.1)
RBC # BLD: 3.3 M/UL (ref 4.2–5.9)
RBC # BLD: NORMAL 10*6/UL
SODIUM BLD-SCNC: 134 MMOL/L (ref 136–145)
SODIUM BLD-SCNC: 134 MMOL/L (ref 136–145)
SODIUM BLD-SCNC: 135 MMOL/L (ref 136–145)
WBC # BLD: 18 K/UL (ref 4–11)

## 2021-12-16 PROCEDURE — 97530 THERAPEUTIC ACTIVITIES: CPT

## 2021-12-16 PROCEDURE — 2580000003 HC RX 258: Performed by: INTERNAL MEDICINE

## 2021-12-16 PROCEDURE — 6360000002 HC RX W HCPCS: Performed by: INTERNAL MEDICINE

## 2021-12-16 PROCEDURE — 99232 SBSQ HOSP IP/OBS MODERATE 35: CPT | Performed by: SURGERY

## 2021-12-16 PROCEDURE — 2500000003 HC RX 250 WO HCPCS: Performed by: INTERNAL MEDICINE

## 2021-12-16 PROCEDURE — 6370000000 HC RX 637 (ALT 250 FOR IP): Performed by: INTERNAL MEDICINE

## 2021-12-16 PROCEDURE — 74176 CT ABD & PELVIS W/O CONTRAST: CPT

## 2021-12-16 PROCEDURE — 2060000000 HC ICU INTERMEDIATE R&B

## 2021-12-16 PROCEDURE — 94761 N-INVAS EAR/PLS OXIMETRY MLT: CPT

## 2021-12-16 PROCEDURE — 6370000000 HC RX 637 (ALT 250 FOR IP): Performed by: PHYSICIAN ASSISTANT

## 2021-12-16 PROCEDURE — 6360000002 HC RX W HCPCS: Performed by: STUDENT IN AN ORGANIZED HEALTH CARE EDUCATION/TRAINING PROGRAM

## 2021-12-16 PROCEDURE — 80069 RENAL FUNCTION PANEL: CPT

## 2021-12-16 PROCEDURE — 2700000000 HC OXYGEN THERAPY PER DAY

## 2021-12-16 PROCEDURE — 6370000000 HC RX 637 (ALT 250 FOR IP): Performed by: SURGERY

## 2021-12-16 PROCEDURE — APPNB45 APP NON BILLABLE 31-45 MINUTES: Performed by: NURSE PRACTITIONER

## 2021-12-16 PROCEDURE — 97535 SELF CARE MNGMENT TRAINING: CPT

## 2021-12-16 PROCEDURE — APPSS45 APP SPLIT SHARED TIME 31-45 MINUTES: Performed by: NURSE PRACTITIONER

## 2021-12-16 PROCEDURE — 85025 COMPLETE CBC W/AUTO DIFF WBC: CPT

## 2021-12-16 PROCEDURE — 6360000004 HC RX CONTRAST MEDICATION: Performed by: SURGERY

## 2021-12-16 PROCEDURE — 97116 GAIT TRAINING THERAPY: CPT

## 2021-12-16 PROCEDURE — 36415 COLL VENOUS BLD VENIPUNCTURE: CPT

## 2021-12-16 RX ORDER — INSULIN GLARGINE 100 [IU]/ML
20 INJECTION, SOLUTION SUBCUTANEOUS 2 TIMES DAILY
Status: DISCONTINUED | OUTPATIENT
Start: 2021-12-16 | End: 2021-12-16

## 2021-12-16 RX ORDER — CALCIUM GLUCONATE 20 MG/ML
2000 INJECTION, SOLUTION INTRAVENOUS ONCE
Status: COMPLETED | OUTPATIENT
Start: 2021-12-16 | End: 2021-12-16

## 2021-12-16 RX ORDER — INSULIN GLARGINE 100 [IU]/ML
30 INJECTION, SOLUTION SUBCUTANEOUS 2 TIMES DAILY
Status: DISCONTINUED | OUTPATIENT
Start: 2021-12-16 | End: 2021-12-17

## 2021-12-16 RX ORDER — PANTOPRAZOLE SODIUM 40 MG/1
40 TABLET, DELAYED RELEASE ORAL
Status: DISCONTINUED | OUTPATIENT
Start: 2021-12-16 | End: 2021-12-31 | Stop reason: HOSPADM

## 2021-12-16 RX ORDER — DOCUSATE SODIUM 100 MG/1
100 CAPSULE, LIQUID FILLED ORAL 2 TIMES DAILY
Status: DISCONTINUED | OUTPATIENT
Start: 2021-12-16 | End: 2021-12-31 | Stop reason: HOSPADM

## 2021-12-16 RX ADMIN — INSULIN LISPRO 12 UNITS: 100 INJECTION, SOLUTION INTRAVENOUS; SUBCUTANEOUS at 18:09

## 2021-12-16 RX ADMIN — PANTOPRAZOLE SODIUM 40 MG: 40 TABLET, DELAYED RELEASE ORAL at 17:03

## 2021-12-16 RX ADMIN — INSULIN LISPRO 10 UNITS: 100 INJECTION, SOLUTION INTRAVENOUS; SUBCUTANEOUS at 12:25

## 2021-12-16 RX ADMIN — DOCUSATE SODIUM 100 MG: 100 CAPSULE ORAL at 22:06

## 2021-12-16 RX ADMIN — HEPARIN SODIUM 5000 UNITS: 5000 INJECTION INTRAVENOUS; SUBCUTANEOUS at 22:03

## 2021-12-16 RX ADMIN — INSULIN GLARGINE 10 UNITS: 100 INJECTION, SOLUTION SUBCUTANEOUS at 09:04

## 2021-12-16 RX ADMIN — Medication: at 23:34

## 2021-12-16 RX ADMIN — SODIUM PHOSPHATE, MONOBASIC, MONOHYDRATE 10 MMOL: 276; 142 INJECTION, SOLUTION INTRAVENOUS at 05:45

## 2021-12-16 RX ADMIN — MEROPENEM 500 MG: 500 INJECTION, POWDER, FOR SOLUTION INTRAVENOUS at 10:08

## 2021-12-16 RX ADMIN — MEROPENEM 500 MG: 500 INJECTION, POWDER, FOR SOLUTION INTRAVENOUS at 03:43

## 2021-12-16 RX ADMIN — INSULIN GLARGINE 30 UNITS: 100 INJECTION, SOLUTION SUBCUTANEOUS at 22:03

## 2021-12-16 RX ADMIN — CALCIUM GLUCONATE 2000 MG: 20 INJECTION, SOLUTION INTRAVENOUS at 18:20

## 2021-12-16 RX ADMIN — HEPARIN SODIUM 5000 UNITS: 5000 INJECTION INTRAVENOUS; SUBCUTANEOUS at 10:14

## 2021-12-16 RX ADMIN — MORPHINE SULFATE 2 MG: 2 INJECTION, SOLUTION INTRAMUSCULAR; INTRAVENOUS at 22:20

## 2021-12-16 RX ADMIN — INSULIN LISPRO 10 UNITS: 100 INJECTION, SOLUTION INTRAVENOUS; SUBCUTANEOUS at 18:10

## 2021-12-16 RX ADMIN — INSULIN LISPRO 10 UNITS: 100 INJECTION, SOLUTION INTRAVENOUS; SUBCUTANEOUS at 12:28

## 2021-12-16 RX ADMIN — Medication: at 06:43

## 2021-12-16 RX ADMIN — MEROPENEM 500 MG: 500 INJECTION, POWDER, FOR SOLUTION INTRAVENOUS at 23:31

## 2021-12-16 RX ADMIN — INSULIN LISPRO 4 UNITS: 100 INJECTION, SOLUTION INTRAVENOUS; SUBCUTANEOUS at 22:06

## 2021-12-16 RX ADMIN — ONDANSETRON 4 MG: 2 INJECTION INTRAMUSCULAR; INTRAVENOUS at 15:45

## 2021-12-16 RX ADMIN — INSULIN LISPRO 10 UNITS: 100 INJECTION, SOLUTION INTRAVENOUS; SUBCUTANEOUS at 09:06

## 2021-12-16 RX ADMIN — MEROPENEM 500 MG: 500 INJECTION, POWDER, FOR SOLUTION INTRAVENOUS at 17:01

## 2021-12-16 RX ADMIN — FENOFIBRATE 160 MG: 160 TABLET ORAL at 10:29

## 2021-12-16 RX ADMIN — IOHEXOL 50 ML: 240 INJECTION, SOLUTION INTRATHECAL; INTRAVASCULAR; INTRAVENOUS; ORAL at 09:51

## 2021-12-16 RX ADMIN — MORPHINE SULFATE 4 MG: 4 INJECTION, SOLUTION INTRAMUSCULAR; INTRAVENOUS at 10:29

## 2021-12-16 RX ADMIN — INSULIN GLARGINE 10 UNITS: 100 INJECTION, SOLUTION SUBCUTANEOUS at 10:03

## 2021-12-16 RX ADMIN — INSULIN HUMAN 7 UNITS: 100 INJECTION, SOLUTION PARENTERAL at 10:59

## 2021-12-16 ASSESSMENT — PAIN DESCRIPTION - DESCRIPTORS: DESCRIPTORS: ACHING

## 2021-12-16 ASSESSMENT — PAIN SCALES - GENERAL
PAINLEVEL_OUTOF10: 8
PAINLEVEL_OUTOF10: 8
PAINLEVEL_OUTOF10: 0
PAINLEVEL_OUTOF10: 8

## 2021-12-16 ASSESSMENT — PAIN DESCRIPTION - ONSET: ONSET: ON-GOING

## 2021-12-16 ASSESSMENT — PAIN DESCRIPTION - LOCATION: LOCATION: BACK;ABDOMEN

## 2021-12-16 ASSESSMENT — PAIN - FUNCTIONAL ASSESSMENT: PAIN_FUNCTIONAL_ASSESSMENT: PREVENTS OR INTERFERES SOME ACTIVE ACTIVITIES AND ADLS

## 2021-12-16 ASSESSMENT — PAIN DESCRIPTION - PROGRESSION: CLINICAL_PROGRESSION: GRADUALLY WORSENING

## 2021-12-16 ASSESSMENT — PAIN DESCRIPTION - PAIN TYPE: TYPE: CHRONIC PAIN

## 2021-12-16 ASSESSMENT — PAIN DESCRIPTION - ORIENTATION: ORIENTATION: LOWER

## 2021-12-16 ASSESSMENT — PAIN DESCRIPTION - FREQUENCY: FREQUENCY: CONTINUOUS

## 2021-12-16 NOTE — PROGRESS NOTES
[facility] General Surgery                                   Daily Progress Note                                                         Pt Name: Artie Deleno  Medical Record Number: [id number]  Date of Birth 1970   Today's Date: 12/16/2021  Chief Complaint   Patient presents with    Shortness of Breath     trouble breathing since today. Feeling unwell since yesterday. EMS blood glucose 548. Pt denies DM history. ASSESSMENT/PLAN  A/P: 53 yo male with DKA, severe acute pancreatitis, acute kidney injury, shock - resolved     Severe acute pancreatitis - leukocytosis 13.4-->15.3-->18. CT today.    Acute kidney injury - continue IV hydration. Cr back up a little to 2.1     DKA - glucose control per primary team. FSBS still high 300-400s.    Shock - resolved, suspect from hypovolemia secondary to pancreatitis and DKA        SUBJECTIVE  Lisa Arana is resting in bed, no acute distress. unchanged abdominal pain. Hungry. Passing flatus. OBJECTIVE  VITALS:  height is 6' 1\" (1.854 m) and weight is 336 lb 6.8 oz (152.6 kg) (abnormal). His temperature is 97.9 °F (36.6 °C). His blood pressure is 137/84 and his pulse is 104. His respiration is 20 and oxygen saturation is 91%. INTAKE/OUTPUT:      Intake/Output Summary (Last 24 hours) at 12/16/2021 1334  Last data filed at 12/16/2021 1051  Gross per 24 hour   Intake 2417.83 ml   Output 3250 ml   Net -832.17 ml     GENERAL: alert, cooperative, no distress  LUNGS: clear to ausculation, without wheezes, rales or rhonci  HEART: normal rate and regular rhythm  ABDOMEN: Soft,  non distended. Mild to moderate periumbilical pain with palpation. EXTREMITY: no cyanosis, clubbing or edema    I/O last 3 completed shifts: In: 3211.2 [P.O.:1260; I.V.:810.7;  IV Piggyback:1140.5]  Out: 5904 [Urine:3270]      LABS  Recent Labs     12/15/21  0530 12/15/21  2335 12/15/21  2335 12/16/21  0510 12/16/21  1040   WBC   < >  -- --  18.0*  --    HGB   < >  --   --  10.0*  --    HCT   < >  --   --  30.1*  --    PLT   < >  --   --  163  --    NA  --  135*   < >  --  134*   K  --  4.6   < >  --  4.7   CL  --  97*   < >  --  91*   CO2  --  24   < >  --  19*   BUN  --  19   < >  --  28*   CREATININE  --  1.9*   < >  --  2.1*   MG  --  1.80  --   --   --    PHOS  --  2.3*   < >  --  3.1   CALCIUM  --  6.3*   < >  --  6.6*    < > = values in this interval not displayed. EDUCATION  Patient educated about Disease Process, Medications, Smoking Cessation, Oxygenation, Incentive Spirometry and Deep Breath and Cough, Diabetes, Hyperlipidemia, Smoking Cessation, Nutrition, Exercise and Hypertension    Electronically signed by MICKI Rosenberg CNP on 12/16/2021 at 1:34 PM      [facility]   [phone] Office  [phone] Fax    Agree with above note. The patient was personally seen and examined. Reggiea Artist is doing better today. Tolerating full liquids. Denies any abdominal pain. Abd soft, ND, NT    WBC up to 18  Cr 2.1    CT abd/pelvis personally reviewed, showing improving pancreatitis without pseudocyst or evidence of infection    A/P: 55 yo male with DKA, severe pancreatitis, acute kidney injury    Severe pancreatitis - improving without complication seen on CT. Advance to low fat diet. GI on board    Acute kidney injury - Cr 2.1.   IV hydration per nephrology    DKA - per primary team    Vargas Thurman MD

## 2021-12-16 NOTE — PROGRESS NOTES
Occupational Therapy  Facility/Department: [facility] PROGRESSIVE CARE  Daily Treatment Note  NAME: Francisco Palmer  : 1970  MRN: [id number]    Date of Service: 2021    Discharge Recommendations:  Patient would benefit from continued therapy after discharge, 3-5 sessions per week  OT Equipment Recommendations  Other: defer to [address] scored a  on the AM-PAC ADL Inpatient form. Current research shows that an AM-PAC score of 17 or less is typically not associated with a discharge to the patient's home setting. Based on the patient's AM-PAC score and their current ADL deficits, it is recommended that the patient have 3-5 sessions per week of Occupational Therapy at d/c to increase the patient's independence. Please see assessment section for further patient specific details. If patient discharges prior to next session this note will serve as a discharge summary. Please see below for the latest assessment towards goals. Assessment   Performance deficits / Impairments: Decreased functional mobility ; Decreased strength; Decreased endurance; Decreased ADL status; Decreased safe awareness; Decreased high-level IADLs; Decreased balance  Assessment: 53 y/o male admitted 2021 with DKA, severe acute pancreatitis, acute kidney injury, and shock. PTA pt reports lives at home alone and was independent with ADLs and functional mobility. Today, pt required min A to stand from bed and CGA from chair. Pt progressing with functional mobility and ambulated short distance in room with walker and CGA. Pt fatigued easily. Pt completed grooming tasks seated in chair d/t decreased standing balance/tolerance. Pt with functional UE ROM for self care and anticipate will require up to max A for LB ADLs at this time. Pt is functioning below baseline and benefit from skilled therapy (3-5); however, if makes good progress may be safe to return home with family.   Prognosis: Good  OT Education: OT Role; Transfer Training; Plan of Care  REQUIRES OT FOLLOW UP: Yes  Activity Tolerance  Activity Tolerance: Patient limited by fatigue  Activity Tolerance: Fatigues with activity; HR 120s and spO2 90% on 2L with activity  Safety Devices  Safety Devices in place: Yes  Type of devices: Nurse notified; Call light within reach; Gait belt; Chair alarm in place; Left in chair         Patient Diagnosis(es): The primary encounter diagnosis was Diabetic ketoacidosis without coma associated with diabetes mellitus due to underlying condition ([facility] [address] Diagnoses of Metabolic acidosis, Hypotension, unspecified hypotension type, Hyperglycemia, and Lymphocytosis were also pertinent to this visit. has a past medical history of H/O degenerative disc disease. has no past surgical history on file. Restrictions  Restrictions/Precautions  Restrictions/Precautions: Fall Risk  Position Activity Restriction  Other position/activity restrictions: O2 2L via NC. Subjective   General  Chart Reviewed: Yes  Patient assessed for rehabilitation services?: Yes  Additional Pertinent Hx: 55 y/o male admitted 12/12/2021 with DKA, severe acute pancreatitis, acute kidney injury, and shock. Family / Caregiver Present: No  Referring Practitioner: MICKI Nuñez CNP  Diagnosis: DKA, severe acute pancreatitis  Subjective  Subjective: Pt seen bedside and agreeable to therapy. Pt reporting 8/10 abdominal/back pain  General Comment  Comments: Per RN ok for therapy.  SpO2 above 90% on 2L with activity, HR 120s with activity      Orientation  Orientation  Overall Orientation Status: Within Functional Limits  Objective    ADL  Grooming: Supervision (brushed teeth seated in chair)  Toileting:  (Attempted to use urinal EOB)  Additional Comments: Anticipate pt will require max A for LB bathing/dressing, SBA for UB bathing/dressing and grooming when seated based on balance and endurance observed        Balance  Sitting Balance: Contact guard assistance (EOB in prep for transfer)  Standing Balance: Contact guard assistance  Standing Balance  Time: stood prn to transfer to chair  Functional Mobility  Functional Mobility Comments: few steps bed > chair + chair <> doorway with RW and CGA. HR 120s and pt SOB with activity despite spO2 90% on 2L.      Bed mobility  Supine to Sit: Moderate assistance; 2 Person assistance  Sit to Supine:  (Pt in chair at end of session)  Transfers  Sit to stand: Minimal assistance; Contact guard assistance  Stand to sit: Contact guard assistance  Transfer Comments: stood from bed with min A and chair with CGA to/from RW- cuing for initial hand placement        Cognition  Overall Cognitive Status: WFL        LUE AROM (degrees)  LUE AROM : WFL  Left Hand AROM (degrees)  Left Hand AROM: WFL  RUE AROM (degrees)  RUE AROM : WFL  Right Hand AROM (degrees)  Right Hand AROM: WFL     Plan   Plan  Times per week: 3-5  Current Treatment Recommendations: Strengthening, Endurance Training, Balance Training, Gait Training, Functional Mobility Training, Self-Care / ADL, ROM    AM-[facility] Score  AM-[facility] Inpatient Daily Activity Raw Score: 16 (12/16/21 0925)  AM-PAC Inpatient ADL T-Scale Score : 35.96 (12/16/21 0925)  ADL Inpatient CMS 0-100% Score: 53.32 (12/16/21 0925)  ADL Inpatient CMS G-Code Modifier : CK (12/16/21 0925)    Goals  Short term goals  Time Frame for Short term goals: Prior to DC: goals ongoing  Short term goal 1: Pt will complete ADL transfers/mobility with supervision  Short term goal 2: Pt will complete toileting with supervision  Short term goal 3: Pt will complete LB dressing with supervision  Short term goal 4: Pt will tolerate standing > 5 min for functional task with supervision  Short term goal 5: Pt will complete UE exercises in all planes to inc strength/endurance  Patient Goals   Patient goals : to return home       Therapy Time   Individual Concurrent Group Co-treatment   Time In 6986         Time Out 4752 Minutes 40         Timed Code Treatment Minutes: 40 Minutes     This note to serve as OT d/c summary if pt is d/c-ed prior to next therapy session.     Fred Edward, OTR/L

## 2021-12-16 NOTE — PROGRESS NOTES
Received patient from ICU. Patient awake, coherent, ongoing IVF fluids on left arm. Oxygen at 2lpm via nasal canula. Transferred safely to bed. Vital signs acquired. Call light within patient's reach. Will monitor.

## 2021-12-16 NOTE — PROGRESS NOTES
Nephrology Progress Note   [facility]      Chief Complaint: Nausea/vomiting/pancreatitis    History of Present Illness: Mr Agueda Orozco is a is a 54 y.o. male with no significant past medical history who presents to [facility] with increased thirst, urination, nausea, and vomiting. He was noted to have a BS level of 900 mg/dl. CT scan abdomen and pelvis revealed severe acute Pancreatitis. He was Hyperkalemic, with serum HCO level of 5 and creatinine of 3.4 mg/dl. Patient was started on insulin gtt, aggressively volume resuscitated - santiago was placed     Subjective:    Resting in bed; diet advanced to clears; feeling better; no shortness of breath    ROS: + nausea, + vomiting, + abdominal distension, + abdominal pain. All other ROS negative     Scheduled Meds:   insulin glargine  20 Units SubCUTAneous BID    insulin lispro  10 Units SubCUTAneous TID WC    iohexol        pantoprazole  40 mg Oral BID AC    fenofibrate  160 mg Oral Daily    insulin lispro  0-12 Units SubCUTAneous TID WC    insulin lispro  0-6 Units SubCUTAneous Nightly    heparin (porcine)  5,000 Units SubCUTAneous BID    meropenem  500 mg IntraVENous Q6H        IV infusion builder 75 mL/hr at 21 0643    dextrose         PRN Meds:.morphine **OR** morphine, glucose, dextrose, glucagon (rDNA), dextrose, dextrose, magnesium sulfate, sodium phosphate IVPB **OR** sodium phosphate IVPB **OR** sodium phosphate IVPB, ondansetron    Physical Exam:    TEMPERATURE:  Current - Temp: 97.9 °F (36.6 °C);  Max - Temp  Av.5 °F (36.9 °C)  Min: 97.9 °F (36.6 °C)  Max: 99.1 °F (37.3 °C)  RESPIRATIONS RANGE: Resp  Av.9  Min: 10  Max: 20  PULSE RANGE: Pulse  Av.3  Min: 98  Max: 116  BLOOD PRESSURE RANGE:  Systolic (22OTO), ZJD:276 , Min:106 , RHN:610   ; Diastolic (07FYU), KSE:94, Min:64, Max:99    24HR INTAKE/OUTPUT:      Intake/Output Summary (Last 24 hours) at 2021 1201  Last data filed at 2021 1051  Gross per 24 hour Intake 2417.83 ml   Output 3650 ml   Net -1232.17 ml         Patient Vitals for the past 96 hrs (Last 3 readings):   Weight   12/16/21 0321 (!) 336 lb 6.8 oz (152.6 kg)   12/15/21 0600 (!) 336 lb 6.8 oz (152.6 kg)   12/13/21 0400 296 lb 11.8 oz (134.6 kg)       General: Alert, Awake, NAD, Obese  HEENT: Normocephalic, atraumatic, Nose and ears appear externally without deformity, MMM  Neck: No Thyromegaly, Trachea is midline, No Carotid bruit  Chest: clear to auscultation, no intercostal retractions  CVS: RRR, no murmur, no rub  Abdomen: distended, generalized tenderness, no bruit appreciated  Extremities: no edema, no cyanosis. Skin: normal texture, normal skin turgor, no rash  Musculoskeletal: normal ROM, no joint swelling, no visible deformity  Neurological: CN intact, no focal motor neurological deficit  Psych: normal affect      Allergies:  No Known Allergies     Past Medical History:   Diagnosis Date    H/O degenerative disc disease      No past surgical history on file. No family history on file. Social History     Socioeconomic History    Marital status: Single     Spouse name: Not on file    Number of children: Not on file    Years of education: Not on file    Highest education level: Not on file   Occupational History    Not on file   Tobacco Use    Smoking status: Never Smoker    Smokeless tobacco: Never Used   Substance and Sexual Activity    Alcohol use: Yes     Comment: social    Drug use: Never    Sexual activity: Not on file   Other Topics Concern    Not on file   Social History Narrative    Not on file     Social Determinants of Health     Financial Resource Strain:     Difficulty of Paying Living Expenses: Not on file   Food Insecurity:     Worried About Running Out of Food in the Last Year: Not on file    Bienvenido of Food in the Last Year: Not on file   Transportation Needs:     Lack of Transportation (Medical): Not on file    Lack of Transportation (Non-Medical):  Not on file   Physical Activity:     Days of Exercise per Week: Not on file    Minutes of Exercise per Session: Not on file   Stress:     Feeling of Stress : Not on file   Social Connections:     Frequency of Communication with Friends and Family: Not on file    Frequency of Social Gatherings with Friends and Family: Not on file    Attends Voodoo Services: Not on file    Active Member of [address] [place] [address] or Organizations: Not on file    Attends Club or Organization Meetings: Not on file    Marital Status: Not on file   Intimate Partner Violence:     Fear of Current or Ex-Partner: Not on file    Emotionally Abused: Not on file    Physically Abused: Not on file    Sexually Abused: Not on file   Housing Stability:     Unable to Pay for Housing in the Last Year: Not on file    Number of [place] in the Last Year: Not on file    Unstable Housing in the Last Year: Not on file       LAB DATA:    CBC:   Lab Results   Component Value Date    WBC 18.0 12/16/2021    RBC 3.30 12/16/2021    HGB 10.0 12/16/2021    HCT 30.1 12/16/2021    MCV 91.4 12/16/2021    MCH 30.4 12/16/2021    MCHC 33.2 12/16/2021    RDW 13.7 12/16/2021     12/16/2021    MPV 9.5 12/16/2021     BMP:    Lab Results   Component Value Date     12/16/2021    K 4.7 12/16/2021    K 4.2 12/12/2021    CL 91 12/16/2021    CO2 19 12/16/2021    BUN 28 12/16/2021    CREATININE 2.1 12/16/2021    CALCIUM 6.6 12/16/2021    GFRAA 41 12/16/2021    LABGLOM 34 12/16/2021    GLUCOSE 421 12/16/2021     Ionized Calcium:  No results found for: IONCA  Magnesium:    Lab Results   Component Value Date    MG 1.80 12/15/2021     Phosphorus:    Lab Results   Component Value Date    PHOS 3.1 12/16/2021     U/A:    Lab Results   Component Value Date    COLORU YELLOW 12/12/2021    PHUR 5.0 12/12/2021    WBCUA 2 12/12/2021    RBCUA 0-2 12/12/2021    CLARITYU CLOUDY 12/12/2021    SPECGRAV 1.023 12/12/2021    LEUKOCYTESUR Negative 12/12/2021    UROBILINOGEN 0.2 12/12/2021 BILIRUBINUR MODERATE 12/12/2021    BLOODU LARGE 12/12/2021    GLUCOSEU >=1000 12/12/2021         IMPRESSION/RECOMMENDATIONS:      Active Problems:    DKA, type 2, not at goal [facility])    Idiopathic acute pancreatitis  Resolved Problems:    * No resolved hospital problems. *    1. CHAPIN - likely prerenal - occurring in the setting of DKA/pancreatitis  - Continue IVF - creatinine slowly trending down   - CT scan on admission did not reveal Hydronephrosis  - avoid exposure to Nephrotoxic agents    2. Critical life threatening acidosis   - Secondary to DKA and CHAPIN  - Continue HCO gtt    3. Hyperkalemia - Resolved    4.  Pancreatitis - management per Medicine

## 2021-12-16 NOTE — PROGRESS NOTES
INPATIENT PROGRESS NOTE        IDENTIFYING DATA/REASON FOR CONSULTATION   PATIENT:  Ophelia Chau  MRN:  [id number]  ADMIT DATE: 2021  TIME OF EVALUATION: 2021 11:40 AM  HOSPITAL STAY:   LOS: 4 days   CONSULTING PHYSICIAN: Hayder Franco MD   REASON FOR CONSULTATION: Severe pancreatitis    Subjective:    Patient seen in follow up for pancreatitis. Abdominal pain is improving. Tolerated liquids yesterday. MEDICATIONS   SCHEDULED:  insulin glargine, 20 Units, BID  insulin lispro, 10 Units, TID WC  iohexol, ,   fenofibrate, 160 mg, Daily  insulin lispro, 0-12 Units, TID WC  insulin lispro, 0-6 Units, Nightly  heparin (porcine), 5,000 Units, BID  meropenem, 500 mg, Q6H      FLUIDS/DRIPS:     IV infusion builder 75 mL/hr at 21 0643    dextrose       PRNs: morphine, 2 mg, Q4H PRN   Or  morphine, 4 mg, Q4H PRN  glucose, 15 g, PRN  dextrose, 12.5 g, PRN  glucagon (rDNA), 1 mg, PRN  dextrose, 100 mL/hr, PRN  dextrose, 12.5 g, PRN  magnesium sulfate, 1,000 mg, PRN  sodium phosphate IVPB, 10 mmol, PRN   Or  sodium phosphate IVPB, 15 mmol, PRN   Or  sodium phosphate IVPB, 20 mmol, PRN  ondansetron, 4 mg, Q6H PRN      ALLERGIES:  No Known Allergies      PHYSICAL EXAM   VITALS:  /84   Pulse 104   Temp 97.9 °F (36.6 °C)   Resp 20   Ht 6' 1\" (1.854 m)   Wt (!) 336 lb 6.8 oz (152.6 kg)   SpO2 91%   BMI 44.39 kg/m²   TEMPERATURE:  Current - Temp: 97.9 °F (36.6 °C); Max - Temp  Av.5 °F (36.9 °C)  Min: 97.9 °F (36.6 °C)  Max: 99.1 °F (37.3 °C)    Physical Exam:  General appearance: alert, cooperative, no distress, appears stated age  Eyes: Anicteric  Head: Normocephalic, without obvious abnormality  Lungs: clear to auscultation bilaterally, Normal Effort  Heart: regular rate and rhythm, normal S1 and S2, no murmurs or rubs  Abdomen: soft, non-distended, non-tender.  Bowel sounds normal.   Extremities: atraumatic, no cyanosis or edema  Skin: warm and dry, no jaundice  Neuro: Grossly intact, A&OX3    LABS AND IMAGING   Laboratory   Recent Labs     12/14/21  0416 12/15/21  0530 12/16/21  0510   WBC 13.4* 15.3* 18.0*   HGB 10.9* 9.9* 10.0*   HCT 32.3* 29.3* 30.1*   MCV 89.0 89.7 91.4   * 121* 163     Recent Labs     12/15/21  0821 12/15/21  1204 12/15/21  2335   * 135* 135*   K 4.0 4.9 4.6   CL 97* 98* 97*   CO2 28 26 24   PHOS 2.2* 2.3* 2.3*   BUN 19 24* 19   CREATININE 1.8* 2.0* 1.9*     No results for input(s): AST, ALT, ALB, BILIDIR, BILITOT, ALKPHOS in the last 72 hours. Recent Labs     12/15/21  0530   LIPASE 466.0*     No results for input(s): PROTIME, INR in the last 72 hours. Imaging  VL Extremity Venous Right   Final Result      US GALLBLADDER RUQ   Final Result   Pancreas not well visualized. This is likely due to the peripancreatic   inflammatory changes seen on recent CT. Hepatic steatosis. CT ABDOMEN PELVIS WO CONTRAST Additional Contrast? None   Final Result   1. Severe acute interstitial pancreatitis. No focal fluid collection. 2. Hepatomegaly with severe steatosis. 3. Possible gallbladder sludge. 4. Colonic diverticulosis without acute diverticulitis. 5. The distal esophagus is dilated and filled with fluid. RECOMMENDATIONS:   Unavailable         XR CHEST PORTABLE   Final Result   No radiographic evidence of acute pulmonary disease. CT ABDOMEN PELVIS WO CONTRAST Additional Contrast? Oral    (Results Pending)       Endoscopy      ASSESSMENT AND RECOMMENDATIONS   Taryn Liao is a 54 y.o. male who presented on 12/12/21 with  with shortness of breath, nausea, vomiting, increased thirst and urination. Found with acute pancreatitis, DKA, and hypotension/shock requiring pressors. GI consulted regarding acute pancreatitis    1. Acute pancreatitis  -lipase >3000. CT with severe acute interstitial pancreatitis.  No focal fluid collection. Etiology unclear, was found with TG levels of 361.   Usually this would not be significant enough to cause this degree of pancreatitis but wonder if it was higher prior to starting him on insulin. Ca normal.  CT with gallbladder sludge and lfts mildly elevated however US shows normal gallbladder and bile ducts, no stones  2. Coffee ground emesis  -1 episode. No melena.  hgb 13.3-10.9->9.9, now stable at 10.0. Monitor for now. defer EGD until more stable unless worsening bleeding. 3. DKA: off insulin gtt  4. CHAPIN  5. Shock: weaned off pressors. On Vanc and merrem. RECOMMENDATIONS:    Advance diet to low fat. Monitor tolerance  DKA management per Primary team  Will transition PPI to PO BID. Monitor h/h and for signs of active gi bleeding    If you have any questions or need any further information, please feel free to contact us [phone]. Thank you for allowing us to participate in the care of Attila Castorena. The note was completed using Dragon voice recognition transcription. Every effort was made to ensure accuracy; however, inadvertent transcription errors may be present despite my best efforts to edit errors. Onelia Nance PA-C    Attending physician addendum:    I have personally seen and examined the patient, reviewed the patient's medical record and pertinent labs and clinical imaging. I have personally staffed the case with Ochoa TODD. I agree with her consultation note, exam findings, assessment and plans  as written above.   I have made appropriate modifications and edited her assessment and plan where needed to reflect my impression and plans for this patient.        Attila Castorena is a 55 YO Male who presents with nausea, vomiting, and increase thrist. Patient was found to be in DKA with BG over 900.  Patient was found to have acute pancreatitis based on Lipase/CT results.     Acute pancreatitis- Concern related to hypertriglyceridemia. Triglyceride level was 396 and typically level needs to be >500 to cause pancreatitis but may have been higher prior to admission. RUQ US normal GB. Normal LFT. He denies any significant alcohol use and denies any medication. Continue Fenofibrate now that eating. Diet advanced to low fat diet. Coffee Ground Emesis: H/H stable. Continue PPI drip. Gram Positive Armaan Bacteremia: Continue Meropenem. Management per Primary team.   DKA Management per primary team.      Thank you for allowing me to participate in this patient's care. If there are any questions or concerns regarding this patient, or the plan we have set in place, please feel free to contact me at [phone].      Jorje Albert MD

## 2021-12-16 NOTE — PLAN OF CARE
Problem: Falls - Risk of:  Goal: Will remain free from falls  Description: Will remain free from falls  Outcome: Ongoing  Goal: Absence of physical injury  Description: Absence of physical injury  Outcome: Ongoing     Problem: Pain:  Goal: Pain level will decrease  Description: Pain level will decrease  Outcome: Ongoing  Goal: Control of acute pain  Description: Control of acute pain  Outcome: Ongoing  Goal: Control of chronic pain  Description: Control of chronic pain  Outcome: Ongoing     Problem:  Bowel Function - Altered:  Goal: Bowel elimination is within specified parameters  Description: Bowel elimination is within specified parameters  Outcome: Ongoing     Problem: Cardiac Output - Decreased:  Goal: Hemodynamic stability will improve  Description: Hemodynamic stability will improve  Outcome: Ongoing     Problem: Fluid Volume - Imbalance:  Goal: Absence of imbalanced fluid volume signs and symptoms  Description: Absence of imbalanced fluid volume signs and symptoms  Outcome: Ongoing  Goal: Will remain free of signs and symptoms of dehydration  Description: Will remain free of signs and symptoms of dehydration  Outcome: Ongoing

## 2021-12-16 NOTE — PROGRESS NOTES
Hospitalist Progress Note      PCP: [id number] [place] [address]    Date of Admission: 12/12/2021    Chief Complaint: increased thirst, urination, nausea, vomiting    Hospital Course: The patient is a 54 y.o. male with no significant past medical history who presents to [facility] with increased thirst, urination, nausea, and vomiting. Patient stated that over the past several days, he has had intractable nausea, vomiting, with increased thirst and urination. He denies fever, chills, chest pain, shortness of breath, constipation, diarrhea, and dysuria.     In the ED, labs were significant for a sodium of 123, chloride of 77, bicarb of 4, BUN/Cr of 35/3.2, glucose of 900, lactic acid of 8.5, WBC count of 20.1K with a pH of 6.831 and beta-hydroxyturate > 8. CXR showed no acute disease. Subjective:     Continues to feels better and abd pain improving. BG elevated today - increasing insulin coverage.          Medications:  Reviewed    Infusion Medications    IV infusion builder 75 mL/hr at 12/16/21 0643    dextrose       Scheduled Medications    insulin glargine  20 Units SubCUTAneous BID    insulin lispro  10 Units SubCUTAneous TID WC    iohexol        pantoprazole  40 mg Oral BID AC    fenofibrate  160 mg Oral Daily    insulin lispro  0-12 Units SubCUTAneous TID WC    insulin lispro  0-6 Units SubCUTAneous Nightly    heparin (porcine)  5,000 Units SubCUTAneous BID    meropenem  500 mg IntraVENous Q6H     PRN Meds: morphine **OR** morphine, glucose, dextrose, glucagon (rDNA), dextrose, dextrose, magnesium sulfate, sodium phosphate IVPB **OR** sodium phosphate IVPB **OR** sodium phosphate IVPB, ondansetron      Intake/Output Summary (Last 24 hours) at 12/16/2021 1323  Last data filed at 12/16/2021 1051  Gross per 24 hour   Intake 2417.83 ml   Output 3650 ml   Net -1232.17 ml       Exam:    /84   Pulse 104   Temp 97.9 °F (36.6 °C)   Resp 20   Ht 6' 1\" (1.854 m)   Wt (!) 336 lb 6.8 oz (152.6 kg)   SpO2 91%   BMI 44.39 kg/m²     General appearance: No apparent distress, appears stated age and cooperative. HEENT: Pupils equal, round, and reactive to light. Conjunctivae/corneas clear. Neck: Supple, with full range of motion. No jugular venous distention. Trachea midline. Respiratory:  Normal respiratory effort. Clear to auscultation, bilaterally without Rales/Wheezes/Rhonchi. Cardiovascular: Regular rate and rhythm with normal S1/S2 without murmurs, rubs or gallops. Abdomen: Soft, non-tender, non-distended with normal bowel sounds. Musculoskeletal: No clubbing, cyanosis or edema bilaterally. Full range of motion without deformity. Skin: Skin color, texture, turgor normal.  No rashes or lesions. Neurologic:  Neurovascularly intact without any focal sensory/motor deficits. Cranial nerves: II-XII intact, grossly non-focal.  Psychiatric: Alert and oriented, thought content appropriate, normal insight  Capillary Refill: Brisk,< 3 seconds   Peripheral Pulses: +2 palpable, equal bilaterally       Labs:   Recent Labs     12/14/21  0416 12/15/21  0530 12/16/21  0510   WBC 13.4* 15.3* 18.0*   HGB 10.9* 9.9* 10.0*   HCT 32.3* 29.3* 30.1*   * 121* 163     Recent Labs     12/15/21  1204 12/15/21  2335 12/16/21  1040   * 135* 134*   K 4.9 4.6 4.7   CL 98* 97* 91*   CO2 26 24 19*   BUN 24* 19 28*   CREATININE 2.0* 1.9* 2.1*   CALCIUM 6.2* 6.3* 6.6*   PHOS 2.3* 2.3* 3.1     No results for input(s): AST, ALT, BILIDIR, BILITOT, ALKPHOS in the last 72 hours. No results for input(s): INR in the last 72 hours. No results for input(s): Ana Divine in the last 72 hours.     Urinalysis:      Lab Results   Component Value Date    NITRU Negative 12/12/2021    WBCUA 2 12/12/2021    RBCUA 0-2 12/12/2021    BLOODU LARGE 12/12/2021    SPECGRAV 1.023 12/12/2021    GLUCOSEU >=1000 12/12/2021       Radiology:  CT ABDOMEN PELVIS WO CONTRAST Additional Contrast? Oral   Final Result   Persistent but decreased peripancreatic fluid and edema, in keeping with the   clinical diagnosis of pancreatitis      Increased body wall anasarca with increased pleural-parenchymal disease at   the lung bases, likely due to fluid overload      Fatty liver. Diverticulosis and normal caliber appendix      Subtle increased density is seen in the right femoral vein. It is uncertain   as whether not this is artifactual or due to a small amount of thrombus. Recommend correlation with lower extremity venous duplex ultrasound      RECOMMENDATIONS:   Unavailable         VL Extremity Venous Right   Final Result      US GALLBLADDER RUQ   Final Result   Pancreas not well visualized. This is likely due to the peripancreatic   inflammatory changes seen on recent CT. Hepatic steatosis. CT ABDOMEN PELVIS WO CONTRAST Additional Contrast? None   Final Result   1. Severe acute interstitial pancreatitis. No focal fluid collection. 2. Hepatomegaly with severe steatosis. 3. Possible gallbladder sludge. 4. Colonic diverticulosis without acute diverticulitis. 5. The distal esophagus is dilated and filled with fluid. RECOMMENDATIONS:   Unavailable         XR CHEST PORTABLE   Final Result   No radiographic evidence of acute pulmonary disease. Assessment/Plan:    Active Hospital Problems    Diagnosis Date Noted    Idiopathic acute pancreatitis [K85.00]     DKA, type 2, not at goal [facility]) [E11.10] 12/12/2021       DKA resolved - suspect undiagnosed type 2 DM  -check Hgb A1c - 12.4  -NPO - to full liquid to low fat diet per GI  -DKA protocol completed  -insulin gtt - to SQ insulin  - check CARI and islet cell Abs   - increase lantus 20BID. - prand bolus 10 TID   - ISS coverage. - ccd         Anion gap metabolic acidosis likely 2/2 DKA  -management as above       Severe interstitial pancreatitis  - diet as above  -aggressive IVF   - IV merrem due to ongoing fevers - now resolving.    -GI and general surgery consulted  -check RUQ U/S - pancreas not well visualized due to surrounding inflammatory changes. - pt reports 3 beers and hard liquor shots prior to onset of pancreatitis, but denies chronic alcohol abuse.        Septic Shock - resolved   -Levophed gtt  -Vanc, meropenem,   - no need for Flagyl   - culture with diphtheroids - likely contaminant.        CHAPIN - CR platoed around 2  -IVF  -avoid nephrotoxic medications  -nephrology following      Coffee ground emesis  -cont PPI  -GI following  -Hgb stable       Obesity - BMI 38.68  -nutritional counseling provided  -weight loss encouraged  -complicating medical management      Hypocalcemia - replete IV. Check iCa - trend. Give additional Ca IV today. He is starting to shows signs of fluid overload - monitor closely  With just recovering pancreatitis I am concerned he is not ready for diuresis. DVT Prophylaxis: SCDs  Diet: ADULT DIET; Regular; Low Fat/Low Chol/High Fiber/OCTAVIANO  Code Status: Full Code    PT/OT Eval Status: defer    Dispo - continue care.        Gisele Gee MD

## 2021-12-16 NOTE — CARE COORDINATION
[facility]  Diabetes Education   Progress Note       NAME:  [address] [place] [address] RECORD NUMBER:  [id number]  AGE: 54 y.o. GENDER: male  : 1970  TODAY'S DATE:  2021    Subjective   Reason for Diabetic Education Evaluation and Assessment: new onset diabetes     Kamaljit Chris is hoping to eat after CT scan is done this morning. Visit Type: follow-up      Lynda Pearl is a 54 y.o. male referred by:     [] Physician  [] Nursing  [x] Chart Review   [] Other:     PAST MEDICAL HISTORY        Diagnosis Date    H/O degenerative disc disease        PAST SURGICAL HISTORY    No past surgical history on file. FAMILY HISTORY    No family history on file.     SOCIAL HISTORY    Social History     Tobacco Use    Smoking status: Never Smoker    Smokeless tobacco: Never Used   Substance Use Topics    Alcohol use: Yes     Comment: social    Drug use: Never       ALLERGIES    No Known Allergies    MEDICATIONS     insulin glargine  20 Units SubCUTAneous BID    insulin regular  7 Units IntraVENous Once    insulin lispro  10 Units SubCUTAneous TID WC    iohexol        fenofibrate  160 mg Oral Daily    insulin lispro  0-12 Units SubCUTAneous TID WC    insulin lispro  0-6 Units SubCUTAneous Nightly    heparin (porcine)  5,000 Units SubCUTAneous BID    meropenem  500 mg IntraVENous Q6H       Objective        Patient Active Problem List   Diagnosis Code    Back pain M54.9    DKA, type 2, not at goal [facility]) E11.10    Idiopathic acute pancreatitis K85.00        /74   Pulse 109   Temp 98.5 °F (36.9 °C) (Oral)   Resp 18   Ht 6' 1\" (1.854 m)   Wt (!) 336 lb 6.8 oz (152.6 kg)   SpO2 91%   BMI 44.39 kg/m²     HgBA1c:    Lab Results   Component Value Date    LABA1C 12.4 2021       Recent Labs     12/15/21  1712 12/15/21  2131 21  0351 21  0733   POCGLU 134* 316* 373* 405*       BUN/Creatinine:    Lab Results   Component Value Date    BUN 19 12/15/2021    CREATININE 1.9 12/15/2021       Assessment        Diabetes Management and Education    Does the patient have a Primary Care Physician? Yes, [address]     Does the patient require new medication instruction? Yes  Reinforced need for insulin at discharge. Level of patient/caregiver understanding able to:      [] Verbalized Understanding   [] Demonstrated Understanding       [] Teach Back       [x] Needs Reinforcement     []  Other:        Does the patient/caregiver understand S/S of Hyperglycemia? No: Experiencing increased thirst and urination. Reviewed symptoms, prevention and treatment. Level of patient/caregiver understanding able to:        [] Verbalized Understanding   [] Demonstrated Understanding       [] Teach Back       [x] Needs Reinforcement     []  Other:           Plan        Ongoing diabetes education and blood glucose monitoring. Increased insulin noted. Recommend meds to beds.              Teaching Time Diabetes Education:  10 minutes     Electronically signed by Genoveva Olmos on 12/16/2021 at 10:53 AM

## 2021-12-16 NOTE — PROGRESS NOTES
Physical Therapy  Facility/Department: [facility]  Daily Treatment Note  NAME: Anthony Lopez  : 1970  MRN: [id number]    Date of Service: 2021    Discharge Recommendations:  Continue to assess pending progress, 3-5 sessions per week        Assessment   Body structures, Functions, Activity limitations: Decreased functional mobility ; Decreased strength; Decreased safe awareness; Decreased endurance  Assessment: 53 y/o male admit 2021 with Acute Pancreatitis,CHAPIN, DKA and Shock. PMH insign otherwise. PTA Pt living alone in apt setting; independent daily care and functional mobility. Today () pt more alert and oriented, with pain in low back and abdomen with mobility, performed bed mobility with mod A x 2 and transfers with min to CGA. Pt was able to walk 35' with walker CGA with no LOB. Fatigued following. Pt is progressing but at this time, still need to consider cont PT Services (3-5) upon d/c. Will cont to monitor pt's progress. Prognosis: Good  REQUIRES PT FOLLOW UP: Yes  Activity Tolerance  Activity Tolerance: Patient limited by cognitive status; Patient limited by endurance     Patient Diagnosis(es): The primary encounter diagnosis was Diabetic ketoacidosis without coma associated with diabetes mellitus due to underlying condition ([facility] [address] Diagnoses of Metabolic acidosis, Hypotension, unspecified hypotension type, Hyperglycemia, and Lymphocytosis were also pertinent to this visit. has a past medical history of H/O degenerative disc disease. has no past surgical history on file. Restrictions  Restrictions/Precautions  Restrictions/Precautions: Fall Risk  Position Activity Restriction  Other position/activity restrictions: O2 2L via NC. Subjective   General  Chart Reviewed: Yes  Additional Pertinent Hx: 53 y/o male admit 2021 with Acute Pancreatitis,CHAPIN, DKA and Shock. PMH insign otherwise.   Family / Caregiver Present: No  Referring Practitioner: L Oj NP. Subjective  Subjective: Pt agreeable to PT Rx, pt with clearer orientation today          Orientation     Cognition      Objective   Bed mobility  Supine to Sit: Moderate assistance; 2 Person assistance  Transfers  Sit to Stand: Minimal Assistance; Contact guard assistance (from bed min A, from armchair CGA)  Stand to sit: Contact guard assistance  Ambulation  Ambulation?: Yes  More Ambulation?: No  Ambulation 1  Surface: level tile  Device: Standard Walker (bariatric walker, would benefit from a wh bariatric walker but none available at this time)  Distance: 4-5 steps bed to chair with walker CGA, 35' in room  Comments: pt removed O2 due to it falling off, O2 sat in bed = 92%, reapplied properly when sitting, pt amb with O2 2L and O2 sat = 91% with mild SOB, rec to pt to continue wearing O2  Stairs/Curb  Stairs?: No   Exercises  Hip Flexion: 10  Knee Long Arc Quad: 10  Ankle Pumps: 10  Comments: instructed to perform above ex in sitting every hour and tolerated    Comment: brushed teeth with OT   AM-PAC Score  AM-PAC Inpatient Mobility Raw Score : 16 (12/16/21 0927)  AM-PAC Inpatient T-Scale Score : 40.78 (12/16/21 0927)  Mobility Inpatient CMS 0-100% Score: 54.16 (12/16/21 0927)  Mobility Inpatient CMS G-Code Modifier : CK (12/16/21 0927)   Goals  Short term goals  Time Frame for Short term goals: Upon d/c acute care setting. (pt progressing but goals ongoing 12-16)  Short term goal 1: Bed Mob Supervision. Short term goal 2: Transfers with/without assist device SBA. Short term goal 3: Amb with/without assist device 50' SBA. Short term goal 4: Pt participating in approp Strength Exs. Patient Goals   Patient goals : Return home. Plan    Plan  Times per week: 3-5x week while in acute care setting.   Current Treatment Recommendations: Strengthening, Functional Mobility Training, Transfer Training, Gait Training, Safety Education & Training, Patient/Caregiver Education & Training  Safety Devices  Type of devices: All fall risk precautions in place, Call light within reach, Chair alarm in place, Gait belt, Left in chair, Nurse notified     Therapy Time   Individual Concurrent Group Co-treatment   Time In       0845   Time Out       0927   Minutes       42   Timed Code Treatment Minutes: 42 Minutes   charges = 15 min gt 27 min theract  If pt is discharged before subsequent therapy sessions, this note will serve as the discharge summary.   Varinder Pak, [address]

## 2021-12-17 ENCOUNTER — APPOINTMENT (OUTPATIENT)
Dept: GENERAL RADIOLOGY | Age: 51
DRG: 871 | End: 2021-12-17
Payer: COMMERCIAL

## 2021-12-17 LAB
ALBUMIN SERPL-MCNC: 2.2 G/DL (ref 3.4–5)
ALBUMIN SERPL-MCNC: 2.3 G/DL (ref 3.4–5)
ANION GAP SERPL CALCULATED.3IONS-SCNC: 13 MMOL/L (ref 3–16)
ANION GAP SERPL CALCULATED.3IONS-SCNC: 15 MMOL/L (ref 3–16)
BANDED NEUTROPHILS RELATIVE PERCENT: 7 % (ref 0–7)
BASOPHILS ABSOLUTE: 0 K/UL (ref 0–0.2)
BASOPHILS RELATIVE PERCENT: 0 %
BUN BLDV-MCNC: 21 MG/DL (ref 7–20)
BUN BLDV-MCNC: 22 MG/DL (ref 7–20)
CALCIUM SERPL-MCNC: 7.2 MG/DL (ref 8.3–10.6)
CALCIUM SERPL-MCNC: 7.4 MG/DL (ref 8.3–10.6)
CHLORIDE BLD-SCNC: 93 MMOL/L (ref 99–110)
CHLORIDE BLD-SCNC: 95 MMOL/L (ref 99–110)
CO2: 24 MMOL/L (ref 21–32)
CO2: 26 MMOL/L (ref 21–32)
CREAT SERPL-MCNC: 1.6 MG/DL (ref 0.9–1.3)
CREAT SERPL-MCNC: 1.7 MG/DL (ref 0.9–1.3)
EOSINOPHILS ABSOLUTE: 0.2 K/UL (ref 0–0.6)
EOSINOPHILS RELATIVE PERCENT: 1 %
GFR AFRICAN AMERICAN: 52
GFR AFRICAN AMERICAN: 56
GFR NON-AFRICAN AMERICAN: 43
GFR NON-AFRICAN AMERICAN: 46
GLUCOSE BLD-MCNC: 266 MG/DL (ref 70–99)
GLUCOSE BLD-MCNC: 275 MG/DL (ref 70–99)
GLUCOSE BLD-MCNC: 275 MG/DL (ref 70–99)
GLUCOSE BLD-MCNC: 276 MG/DL (ref 70–99)
GLUCOSE BLD-MCNC: 297 MG/DL (ref 70–99)
GLUCOSE BLD-MCNC: 371 MG/DL (ref 70–99)
HCT VFR BLD CALC: 29.9 % (ref 40.5–52.5)
HEMOGLOBIN: 9.8 G/DL (ref 13.5–17.5)
LYMPHOCYTES ABSOLUTE: 1.3 K/UL (ref 1–5.1)
LYMPHOCYTES RELATIVE PERCENT: 7 %
MCH RBC QN AUTO: 29.9 PG (ref 26–34)
MCHC RBC AUTO-ENTMCNC: 32.8 G/DL (ref 31–36)
MCV RBC AUTO: 91.1 FL (ref 80–100)
METAMYELOCYTES RELATIVE PERCENT: 2 %
MONOCYTES ABSOLUTE: 1.9 K/UL (ref 0–1.3)
MONOCYTES RELATIVE PERCENT: 10 %
NEUTROPHILS ABSOLUTE: 15.6 K/UL (ref 1.7–7.7)
NEUTROPHILS RELATIVE PERCENT: 73 %
NUCLEATED RED BLOOD CELLS: 1 /100 WBC
PDW BLD-RTO: 13.6 % (ref 12.4–15.4)
PERFORMED ON: ABNORMAL
PHOSPHORUS: 2.4 MG/DL (ref 2.5–4.9)
PHOSPHORUS: 2.5 MG/DL (ref 2.5–4.9)
PLATELET # BLD: 198 K/UL (ref 135–450)
PLATELET SLIDE REVIEW: ADEQUATE
PMV BLD AUTO: 9.6 FL (ref 5–10.5)
POLYCHROMASIA: ABNORMAL
POTASSIUM SERPL-SCNC: 3.8 MMOL/L (ref 3.5–5.1)
POTASSIUM SERPL-SCNC: 3.9 MMOL/L (ref 3.5–5.1)
RBC # BLD: 3.28 M/UL (ref 4.2–5.9)
SLIDE REVIEW: ABNORMAL
SODIUM BLD-SCNC: 132 MMOL/L (ref 136–145)
SODIUM BLD-SCNC: 134 MMOL/L (ref 136–145)
TOXIC GRANULATION: PRESENT
WBC # BLD: 19 K/UL (ref 4–11)

## 2021-12-17 PROCEDURE — 2060000000 HC ICU INTERMEDIATE R&B

## 2021-12-17 PROCEDURE — APPNB30 APP NON BILLABLE TIME 0-30 MINS: Performed by: NURSE PRACTITIONER

## 2021-12-17 PROCEDURE — 2580000003 HC RX 258: Performed by: INTERNAL MEDICINE

## 2021-12-17 PROCEDURE — 2500000003 HC RX 250 WO HCPCS: Performed by: INTERNAL MEDICINE

## 2021-12-17 PROCEDURE — 71045 X-RAY EXAM CHEST 1 VIEW: CPT

## 2021-12-17 PROCEDURE — 6370000000 HC RX 637 (ALT 250 FOR IP): Performed by: PHYSICIAN ASSISTANT

## 2021-12-17 PROCEDURE — 99232 SBSQ HOSP IP/OBS MODERATE 35: CPT | Performed by: SURGERY

## 2021-12-17 PROCEDURE — 6360000002 HC RX W HCPCS: Performed by: INTERNAL MEDICINE

## 2021-12-17 PROCEDURE — 36415 COLL VENOUS BLD VENIPUNCTURE: CPT

## 2021-12-17 PROCEDURE — 6370000000 HC RX 637 (ALT 250 FOR IP): Performed by: SURGERY

## 2021-12-17 PROCEDURE — 6360000002 HC RX W HCPCS: Performed by: STUDENT IN AN ORGANIZED HEALTH CARE EDUCATION/TRAINING PROGRAM

## 2021-12-17 PROCEDURE — 80069 RENAL FUNCTION PANEL: CPT

## 2021-12-17 PROCEDURE — APPNB45 APP NON BILLABLE 31-45 MINUTES: Performed by: NURSE PRACTITIONER

## 2021-12-17 PROCEDURE — 6370000000 HC RX 637 (ALT 250 FOR IP): Performed by: INTERNAL MEDICINE

## 2021-12-17 PROCEDURE — 85025 COMPLETE CBC W/AUTO DIFF WBC: CPT

## 2021-12-17 PROCEDURE — 2700000000 HC OXYGEN THERAPY PER DAY

## 2021-12-17 PROCEDURE — APPSS45 APP SPLIT SHARED TIME 31-45 MINUTES: Performed by: NURSE PRACTITIONER

## 2021-12-17 PROCEDURE — 94761 N-INVAS EAR/PLS OXIMETRY MLT: CPT

## 2021-12-17 RX ORDER — INSULIN GLARGINE 100 [IU]/ML
35 INJECTION, SOLUTION SUBCUTANEOUS 2 TIMES DAILY
Status: DISCONTINUED | OUTPATIENT
Start: 2021-12-17 | End: 2021-12-28

## 2021-12-17 RX ORDER — FUROSEMIDE 10 MG/ML
40 INJECTION INTRAMUSCULAR; INTRAVENOUS ONCE
Status: COMPLETED | OUTPATIENT
Start: 2021-12-17 | End: 2021-12-17

## 2021-12-17 RX ADMIN — DOCUSATE SODIUM 100 MG: 100 CAPSULE ORAL at 20:13

## 2021-12-17 RX ADMIN — MORPHINE SULFATE 4 MG: 4 INJECTION, SOLUTION INTRAMUSCULAR; INTRAVENOUS at 18:20

## 2021-12-17 RX ADMIN — PANTOPRAZOLE SODIUM 40 MG: 40 TABLET, DELAYED RELEASE ORAL at 06:11

## 2021-12-17 RX ADMIN — MEROPENEM 500 MG: 500 INJECTION, POWDER, FOR SOLUTION INTRAVENOUS at 11:00

## 2021-12-17 RX ADMIN — Medication: at 15:39

## 2021-12-17 RX ADMIN — PANTOPRAZOLE SODIUM 40 MG: 40 TABLET, DELAYED RELEASE ORAL at 15:39

## 2021-12-17 RX ADMIN — INSULIN LISPRO 6 UNITS: 100 INJECTION, SOLUTION INTRAVENOUS; SUBCUTANEOUS at 17:41

## 2021-12-17 RX ADMIN — MORPHINE SULFATE 4 MG: 4 INJECTION, SOLUTION INTRAMUSCULAR; INTRAVENOUS at 14:09

## 2021-12-17 RX ADMIN — ONDANSETRON 4 MG: 2 INJECTION INTRAMUSCULAR; INTRAVENOUS at 21:14

## 2021-12-17 RX ADMIN — INSULIN LISPRO 6 UNITS: 100 INJECTION, SOLUTION INTRAVENOUS; SUBCUTANEOUS at 08:51

## 2021-12-17 RX ADMIN — FENOFIBRATE 160 MG: 160 TABLET ORAL at 08:56

## 2021-12-17 RX ADMIN — MEROPENEM 500 MG: 500 INJECTION, POWDER, FOR SOLUTION INTRAVENOUS at 05:37

## 2021-12-17 RX ADMIN — MEROPENEM 500 MG: 500 INJECTION, POWDER, FOR SOLUTION INTRAVENOUS at 17:49

## 2021-12-17 RX ADMIN — INSULIN LISPRO 6 UNITS: 100 INJECTION, SOLUTION INTRAVENOUS; SUBCUTANEOUS at 12:44

## 2021-12-17 RX ADMIN — INSULIN GLARGINE 30 UNITS: 100 INJECTION, SOLUTION SUBCUTANEOUS at 08:50

## 2021-12-17 RX ADMIN — INSULIN LISPRO 5 UNITS: 100 INJECTION, SOLUTION INTRAVENOUS; SUBCUTANEOUS at 20:14

## 2021-12-17 RX ADMIN — DOCUSATE SODIUM 100 MG: 100 CAPSULE ORAL at 08:57

## 2021-12-17 RX ADMIN — HEPARIN SODIUM 5000 UNITS: 5000 INJECTION INTRAVENOUS; SUBCUTANEOUS at 20:13

## 2021-12-17 RX ADMIN — FUROSEMIDE 40 MG: 10 INJECTION, SOLUTION INTRAMUSCULAR; INTRAVENOUS at 12:45

## 2021-12-17 RX ADMIN — INSULIN GLARGINE 35 UNITS: 100 INJECTION, SOLUTION SUBCUTANEOUS at 20:14

## 2021-12-17 RX ADMIN — INSULIN LISPRO 15 UNITS: 100 INJECTION, SOLUTION INTRAVENOUS; SUBCUTANEOUS at 17:41

## 2021-12-17 ASSESSMENT — PAIN SCALES - GENERAL
PAINLEVEL_OUTOF10: 0
PAINLEVEL_OUTOF10: 9
PAINLEVEL_OUTOF10: 0
PAINLEVEL_OUTOF10: 8

## 2021-12-17 NOTE — PROGRESS NOTES
[facility] General Surgery                                   Daily Progress Note                                                         Pt Name: Kaylah Boston  Medical Record Number: [id number]  Date of Birth 1970   Today's Date: 12/17/2021  Chief Complaint   Patient presents with    Shortness of Breath     trouble breathing since today. Feeling unwell since yesterday. EMS blood glucose 548. Pt denies DM history. ASSESSMENT/PLAN  A/P: 53 yo male with DKA, severe acute pancreatitis, acute kidney injury, shock - resolved     -CT with improving pancreatitis. Continue supportive care  -WBC slightly up. Continue abx  -low fat diet per GI. Dionisio Rodríguez is resting in bed, no acute distress. unchanged abdominal pain. Hungry. Passing flatus. OBJECTIVE  VITALS:  height is 6' 1\" (1.854 m) and weight is 325 lb 6.4 oz (147.6 kg) (abnormal). His temperature is 97.2 °F (36.2 °C). His blood pressure is 136/89 and his pulse is 115. His respiration is 16 and oxygen saturation is 86% (abnormal). INTAKE/OUTPUT:      Intake/Output Summary (Last 24 hours) at 12/17/2021 1409  Last data filed at 12/17/2021 1359  Gross per 24 hour   Intake 1300 ml   Output 6350 ml   Net -5050 ml     GENERAL: alert, cooperative, no distress  LUNGS: clear to ausculation, without wheezes, rales or rhonci  HEART: normal rate and regular rhythm  ABDOMEN: Soft,  non distended. Mild to moderate periumbilical pain with palpation. EXTREMITY: no cyanosis, clubbing or edema    I/O last 3 completed shifts:   In: 1300 [P.O.:1300]  Out: 6075 [Urine:6075]      LABS  Recent Labs     12/15/21  2335 12/16/21  0510 12/17/21  0531   WBC  --    < > 19.0*   HGB  --    < > 9.8*   HCT  --    < > 29.9*   PLT  --    < > 198   *   < > 132*   K 4.6   < > 3.9   CL 97*   < > 93*   CO2 24   < > 24   BUN 19   < > 21*   CREATININE 1.9*   < > 1.6*   MG 1.80  --   --    PHOS 2.3*   < > 2. 5   CALCIUM 6.3*   < > 7.2*    < > = values in this interval not displayed.        EDUCATION  Patient educated about Disease Process, Medications, Smoking Cessation, Oxygenation, Incentive Spirometry and Deep Breath and Cough, Diabetes, Hyperlipidemia, Smoking Cessation, Nutrition, Exercise and Hypertension    Electronically signed by Lydia Stinson MD on 12/17/2021 at 2:09 PM      [address] and Vascular Surgery   [phone] Office  [phone] Fax

## 2021-12-17 NOTE — PROGRESS NOTES
Nephrology Progress Note   [facility]      Chief Complaint: Nausea/vomiting/pancreatitis    History of Present Illness: Mr Hilda Mcguire is a is a 54 y.o. male with no significant past medical history who presents to [facility] with increased thirst, urination, nausea, and vomiting. He was noted to have a BS level of 900 mg/dl. CT scan abdomen and pelvis revealed severe acute Pancreatitis. He was Hyperkalemic, with serum HCO level of 5 and creatinine of 3.4 mg/dl. Patient was started on insulin gtt, aggressively volume resuscitated - santiago was placed     Subjective:    Resting in bed; diet advanced to clears; feeling better; no shortness of breath    ROS: + nausea, + vomiting, + abdominal distension, + abdominal pain. All other ROS negative     Scheduled Meds:   insulin lispro  15 Units SubCUTAneous TID WC    insulin glargine  35 Units SubCUTAneous BID    pantoprazole  40 mg Oral BID AC    docusate sodium  100 mg Oral BID    fenofibrate  160 mg Oral Daily    insulin lispro  0-12 Units SubCUTAneous TID WC    insulin lispro  0-6 Units SubCUTAneous Nightly    heparin (porcine)  5,000 Units SubCUTAneous BID    meropenem  500 mg IntraVENous Q6H        IV infusion builder 75 mL/hr at 21 2334    dextrose         PRN Meds:.morphine **OR** morphine, glucose, dextrose, glucagon (rDNA), dextrose, dextrose, magnesium sulfate, sodium phosphate IVPB **OR** sodium phosphate IVPB **OR** sodium phosphate IVPB, ondansetron    Physical Exam:    TEMPERATURE:  Current - Temp: 98.3 °F (36.8 °C);  Max - Temp  Av.7 °F (37.1 °C)  Min: 97.9 °F (36.6 °C)  Max: 99.4 °F (37.4 °C)  RESPIRATIONS RANGE: Resp  Av  Min: 16  Max: 20  PULSE RANGE: Pulse  Av  Min: 101  Max: 137  BLOOD PRESSURE RANGE:  Systolic (04SIA), FIS:707 , Min:86 , KCK:965   ; Diastolic (26CVG), NHK:31, Min:58, Max:104    24HR INTAKE/OUTPUT:      Intake/Output Summary (Last 24 hours) at 2021 1013  Last data filed at 2021 9006  Gross per 24 hour   Intake 1300 ml   Output 5900 ml   Net -4600 ml         Patient Vitals for the past 96 hrs (Last 3 readings):   Weight   12/17/21 0334 (!) 325 lb 6.4 oz (147.6 kg)   12/16/21 0321 (!) 336 lb 6.8 oz (152.6 kg)   12/15/21 0600 (!) 336 lb 6.8 oz (152.6 kg)       General: Alert, Awake, NAD, Obese  HEENT: Normocephalic, atraumatic, Nose and ears appear externally without deformity, MMM  Neck: No Thyromegaly, Trachea is midline, No Carotid bruit  Chest: clear to auscultation, no intercostal retractions  CVS: RRR, no murmur, no rub  Abdomen: distended, generalized tenderness, no bruit appreciated  Extremities: no edema, no cyanosis. Skin: normal texture, normal skin turgor, no rash  Musculoskeletal: normal ROM, no joint swelling, no visible deformity  Neurological: CN intact, no focal motor neurological deficit  Psych: normal affect      Allergies:  No Known Allergies     Past Medical History:   Diagnosis Date    H/O degenerative disc disease      No past surgical history on file. No family history on file. Social History     Socioeconomic History    Marital status: Single     Spouse name: Not on file    Number of children: Not on file    Years of education: Not on file    Highest education level: Not on file   Occupational History    Not on file   Tobacco Use    Smoking status: Never Smoker    Smokeless tobacco: Never Used   Substance and Sexual Activity    Alcohol use: Yes     Comment: social    Drug use: Never    Sexual activity: Not on file   Other Topics Concern    Not on file   Social History Narrative    Not on file     Social Determinants of Health     Financial Resource Strain:     Difficulty of Paying Living Expenses: Not on file   Food Insecurity:     Worried About Running Out of Food in the Last Year: Not on file    Bienvenido of Food in the Last Year: Not on file   Transportation Needs:     Lack of Transportation (Medical):  Not on file    Lack of Transportation (Non-Medical):  Not on file   Physical Activity:     Days of Exercise per Week: Not on file    Minutes of Exercise per Session: Not on file   Stress:     Feeling of Stress : Not on file   Social Connections:     Frequency of Communication with Friends and Family: Not on file    Frequency of Social Gatherings with Friends and Family: Not on file    Attends Nondenominational Services: Not on file    Active Member of [address] [place] [address] or Organizations: Not on file    Attends Club or Organization Meetings: Not on file    Marital Status: Not on file   Intimate Partner Violence:     Fear of Current or Ex-Partner: Not on file    Emotionally Abused: Not on file    Physically Abused: Not on file    Sexually Abused: Not on file   Housing Stability:     Unable to Pay for Housing in the Last Year: Not on file    Number of [place] in the Last Year: Not on file    Unstable Housing in the Last Year: Not on file       LAB DATA:    CBC:   Lab Results   Component Value Date    WBC 19.0 12/17/2021    RBC 3.28 12/17/2021    HGB 9.8 12/17/2021    HCT 29.9 12/17/2021    MCV 91.1 12/17/2021    MCH 29.9 12/17/2021    MCHC 32.8 12/17/2021    RDW 13.6 12/17/2021     12/17/2021    MPV 9.6 12/17/2021     BMP:    Lab Results   Component Value Date     12/17/2021    K 3.9 12/17/2021    K 4.2 12/12/2021    CL 93 12/17/2021    CO2 24 12/17/2021    BUN 21 12/17/2021    CREATININE 1.6 12/17/2021    CALCIUM 7.2 12/17/2021    GFRAA 56 12/17/2021    LABGLOM 46 12/17/2021    GLUCOSE 276 12/17/2021     Ionized Calcium:  No results found for: IONCA  Magnesium:    Lab Results   Component Value Date    MG 1.80 12/15/2021     Phosphorus:    Lab Results   Component Value Date    PHOS 2.5 12/17/2021     U/A:    Lab Results   Component Value Date    COLORU YELLOW 12/12/2021    PHUR 5.0 12/12/2021    WBCUA 2 12/12/2021    RBCUA 0-2 12/12/2021    CLARITYU CLOUDY 12/12/2021    SPECGRAV 1.023 12/12/2021    LEUKOCYTESUR Negative 12/12/2021 UROBILINOGEN 0.2 12/12/2021    BILIRUBINUR MODERATE 12/12/2021    BLOODU LARGE 12/12/2021    GLUCOSEU >=1000 12/12/2021         IMPRESSION/RECOMMENDATIONS:      Active Problems:    DKA, type 2, not at goal [facility])    Acute pancreatitis without infection or necrosis    Acute kidney injury ([facility] [address]  Resolved Problems:    * No resolved hospital problems. *    1. CHAPIN - likely prerenal/ATN - occurring in the setting of DKA/pancreatitis  - Continue IVF - creatinine slowly trending down   - CT scan on admission did not reveal Hydronephrosis  - avoid exposure to Nephrotoxic agents    2. Critical life threatening acidosis   - Secondary to DKA and CHAPIN  - Continue HCO gtt    3. Hyperkalemia - Resolved    4.  Pancreatitis - management per Medicine

## 2021-12-17 NOTE — PROGRESS NOTES
Occupational Therapy Attempt    Name: Magnus Nance  : 1970  MRN: [id number]      Attempted to see pt for OT this morning however pt laying in bed stating he \"doesn't feel well\". Pt pleasantly declined even with gentle encouragement and education on benefits of therapy. Pt requesting to stay in bed at this time. Will re-attempt at a later time as schedule permits.     Electronically signed by SHIRIN Yu/KLARISSA  License # [id number]

## 2021-12-17 NOTE — PLAN OF CARE
Problem: Falls - Risk of:  Goal: Will remain free from falls  Description: Will remain free from falls  Outcome: Ongoing  Goal: Absence of physical injury  Description: Absence of physical injury  Outcome: Ongoing     Problem: Pain:  Goal: Pain level will decrease  Description: Pain level will decrease  Outcome: Ongoing  Goal: Control of acute pain  Description: Control of acute pain  Outcome: Ongoing  Goal: Control of chronic pain  Description: Control of chronic pain  Outcome: Ongoing     Problem: Discharge Planning:  Goal: Participates in care planning  Description: Participates in care planning  Outcome: Ongoing  Goal: Discharged to appropriate level of care  Description: Discharged to appropriate level of care  Outcome: Ongoing     Problem:  Bowel Function - Altered:  Goal: Bowel elimination is within specified parameters  Description: Bowel elimination is within specified parameters  Outcome: Ongoing     Problem: Cardiac Output - Decreased:  Goal: Hemodynamic stability will improve  Description: Hemodynamic stability will improve  Outcome: Ongoing     Problem: Fluid Volume - Imbalance:  Goal: Absence of imbalanced fluid volume signs and symptoms  Description: Absence of imbalanced fluid volume signs and symptoms  Outcome: Ongoing  Goal: Will remain free of signs and symptoms of dehydration  Description: Will remain free of signs and symptoms of dehydration  Outcome: Ongoing     Problem: Serum Glucose Level - Abnormal:  Goal: Ability to maintain appropriate glucose levels will improve to within specified parameters  Description: Ability to maintain appropriate glucose levels will improve to within specified parameters  Outcome: Ongoing     Problem: Tissue Perfusion, Altered:  Goal: Circulatory function within specified parameters  Description: Circulatory function within specified parameters  Outcome: Ongoing     Problem: Tissue Perfusion - Cardiopulmonary, Altered:  Goal: Absence of angina  Description: Absence of angina  Outcome: Ongoing  Goal: Hemodynamic stability will improve  Description: Hemodynamic stability will improve  Outcome: Ongoing     Problem: Urinary Elimination:  Goal: Signs and symptoms of infection will decrease  Description: Signs and symptoms of infection will decrease  Outcome: Ongoing  Goal: Complications related to the disease process, condition or treatment will be avoided or minimized  Description: Complications related to the disease process, condition or treatment will be avoided or minimized  Outcome: Ongoing     Problem: Injury - Acid Base Imbalance:  Goal: Acid-base balance  Description: Acid-base balance  Outcome: Ongoing

## 2021-12-17 NOTE — PROGRESS NOTES
INPATIENT PROGRESS NOTE        IDENTIFYING DATA/REASON FOR CONSULTATION   PATIENT:  Cristiane Watkins  MRN:  [id number]  ADMIT DATE: 2021  TIME OF EVALUATION: 2021 12:03 PM  HOSPITAL STAY:   LOS: 5 days   CONSULTING PHYSICIAN: Gayle Cui MD   REASON FOR CONSULTATION: Severe pancreatitis    Subjective:    Patient seen in follow up for pancreatitis. Abdominal pain is improving. Tolerated low fat diet yesterday with no subsequent pain. MEDICATIONS   SCHEDULED:  insulin lispro, 15 Units, TID WC  insulin glargine, 35 Units, BID  furosemide, 40 mg, Once  pantoprazole, 40 mg, BID AC  docusate sodium, 100 mg, BID  fenofibrate, 160 mg, Daily  insulin lispro, 0-12 Units, TID WC  insulin lispro, 0-6 Units, Nightly  heparin (porcine), 5,000 Units, BID  meropenem, 500 mg, Q6H      FLUIDS/DRIPS:     IV infusion builder 75 mL/hr at 21 2334    dextrose       PRNs: morphine, 2 mg, Q4H PRN   Or  morphine, 4 mg, Q4H PRN  glucose, 15 g, PRN  dextrose, 12.5 g, PRN  glucagon (rDNA), 1 mg, PRN  dextrose, 100 mL/hr, PRN  dextrose, 12.5 g, PRN  magnesium sulfate, 1,000 mg, PRN  sodium phosphate IVPB, 10 mmol, PRN   Or  sodium phosphate IVPB, 15 mmol, PRN   Or  sodium phosphate IVPB, 20 mmol, PRN  ondansetron, 4 mg, Q6H PRN      ALLERGIES:  No Known Allergies      PHYSICAL EXAM   VITALS:  /89   Pulse 115   Temp 97.2 °F (36.2 °C)   Resp 16   Ht 6' 1\" (1.854 m)   Wt (!) 325 lb 6.4 oz (147.6 kg)   SpO2 90%   BMI 42.93 kg/m²   TEMPERATURE:  Current - Temp: 97.2 °F (36.2 °C); Max - Temp  Av.6 °F (37 °C)  Min: 97.2 °F (36.2 °C)  Max: 99.4 °F (37.4 °C)    Physical Exam:  General appearance: alert, cooperative, no distress, appears stated age  Eyes: Anicteric  Head: Normocephalic, without obvious abnormality  Lungs: clear to auscultation bilaterally, Normal Effort  Heart: regular rate and rhythm, normal S1 and S2, no murmurs or rubs  Abdomen: soft, non-distended, non-tender.  Bowel sounds normal. Extremities: atraumatic, no cyanosis or edema  Skin: warm and dry, no jaundice  Neuro: Grossly intact, A&OX3    LABS AND IMAGING   Laboratory   Recent Labs     12/15/21  0530 12/16/21  0510 12/17/21  0531   WBC 15.3* 18.0* 19.0*   HGB 9.9* 10.0* 9.8*   HCT 29.3* 30.1* 29.9*   MCV 89.7 91.4 91.1   * 163 198     Recent Labs     12/16/21  1040 12/16/21  2039 12/17/21  0531   * 134* 132*   K 4.7 4.1 3.9   CL 91* 93* 93*   CO2 19* 25 24   PHOS 3.1 2.1* 2.5   BUN 28* 26* 21*   CREATININE 2.1* 1.6* 1.6*     No results for input(s): AST, ALT, ALB, BILIDIR, BILITOT, ALKPHOS in the last 72 hours. Recent Labs     12/15/21  0530   LIPASE 466.0*     No results for input(s): PROTIME, INR in the last 72 hours. Imaging  CT ABDOMEN PELVIS WO CONTRAST Additional Contrast? Oral   Final Result   Persistent but decreased peripancreatic fluid and edema, in keeping with the   clinical diagnosis of pancreatitis      Increased body wall anasarca with increased pleural-parenchymal disease at   the lung bases, likely due to fluid overload      Fatty liver. Diverticulosis and normal caliber appendix      Subtle increased density is seen in the right femoral vein. It is uncertain   as whether not this is artifactual or due to a small amount of thrombus. Recommend correlation with lower extremity venous duplex ultrasound      RECOMMENDATIONS:   Unavailable         VL Extremity Venous Right   Final Result      US GALLBLADDER RUQ   Final Result   Pancreas not well visualized. This is likely due to the peripancreatic   inflammatory changes seen on recent CT. Hepatic steatosis. CT ABDOMEN PELVIS WO CONTRAST Additional Contrast? None   Final Result   1. Severe acute interstitial pancreatitis. No focal fluid collection. 2. Hepatomegaly with severe steatosis. 3. Possible gallbladder sludge. 4. Colonic diverticulosis without acute diverticulitis.    5. The distal esophagus is dilated and filled with agree with her consultation note, exam findings, assessment and plans  as written above. I have made appropriate modifications and edited her assessment and plan where needed to reflect my impression and plans for this patient. Uriel Keller is a 55 YO Male who presents with nausea, vomiting, and increase thrist. Patient was found to be in DKA with BG over 900.  Patient was found to have acute pancreatitis based on Lipase/CT results.   Acute pancreatitis- Concern related to hypertriglyceridemia. Triglyceride level was 396 and typically level needs to be >500 to cause pancreatitis but may have been higher prior to [address] normal GB. Normal LFT. He denies any significant alcohol use and denies any medication. Continue Fenofibrate now that eating. Diet advanced to low fat diet and tolerating. Coffee Ground Emesis: H/H stable. Continue PPI drip. Gram Positive Armaan Bacteremia: Continue Meropenem. Management per Primary team.   DKA Management per primary team.       Thank you for allowing me to participate in this patient's care. If there are any questions or concerns regarding this patient, or the plan we have set in place, please feel free to contact me at [phone].      Sonya Moore MD

## 2021-12-17 NOTE — CARE COORDINATION
[facility]  Diabetes Education   Progress Note       NAME:  [address] [place] [address] RECORD NUMBER:  [id number]  AGE: 54 y.o. GENDER: male  : 1970  TODAY'S DATE:  2021    Subjective   Reason for Diabetic Education Evaluation and Assessment: general diabetes support    Marlee Spurling reports \"not feeling well\". He is willing to review insulin administration. Visit Type: follow-up      Kaylah Boston is a 54 y.o. male referred by:     [] Physician  [] Nursing  [x] Chart Review   [] Other:     PAST MEDICAL HISTORY        Diagnosis Date    H/O degenerative disc disease        PAST SURGICAL HISTORY    No past surgical history on file. FAMILY HISTORY    No family history on file.     SOCIAL HISTORY    Social History     Tobacco Use    Smoking status: Never Smoker    Smokeless tobacco: Never Used   Substance Use Topics    Alcohol use: Yes     Comment: social    Drug use: Never       ALLERGIES    No Known Allergies    MEDICATIONS     insulin lispro  15 Units SubCUTAneous TID WC    insulin glargine  35 Units SubCUTAneous BID    pantoprazole  40 mg Oral BID AC    docusate sodium  100 mg Oral BID    fenofibrate  160 mg Oral Daily    insulin lispro  0-12 Units SubCUTAneous TID WC    insulin lispro  0-6 Units SubCUTAneous Nightly    heparin (porcine)  5,000 Units SubCUTAneous BID    meropenem  500 mg IntraVENous Q6H       Objective        Patient Active Problem List   Diagnosis Code    Back pain M54.9    DKA, type 2, not at goal [facility]) E11.10    Acute pancreatitis without infection or necrosis K85.90    Acute kidney injury ([facility] [address] N17.9        /68   Pulse 121   Temp 98.3 °F (36.8 °C) (Oral)   Resp 20   Ht 6' 1\" (1.854 m)   Wt (!) 325 lb 6.4 oz (147.6 kg)   SpO2 92%   BMI 42.93 kg/m²     HgBA1c:    Lab Results   Component Value Date    LABA1C 12.4 2021       Recent Labs     21  1221 21  1549 21  2133 21  0737   POCGLU 380* 425* 340* 297* BUN/Creatinine:    Lab Results   Component Value Date    BUN 21 12/17/2021    CREATININE 1.6 12/17/2021       Assessment        Diabetes Management and Education    Does the patient have a Primary Care Physician? Yes, [address]       Does the patient require new medication instruction? Yes  Reviewed insulin pen administration. Person responsible for administration of Insulin/Medication:       [x] Self     [] Caregiver       [] Spouse       [] Other Family Member   []  Other    Insulin Instruction:  insulin pen  Injection Site:   [x] location    [x] rotation     Level of patient/caregiver understanding able to:      [] Verbalized Understanding   [] Demonstrated Understanding       [] Teach Back       [x] Needs Reinforcement     []  Other:        Does the patient/caregiver monitor Blood Glucoses? No:   Reviewed glycemic control targets, testing frequency and when to call PCP. Level of patient/caregiver understanding able to:        [] Verbalized Understanding   [] Demonstrated Understanding       [] Teach Back       [x] Needs Reinforcement     []  Other:        Does the patient/caregiver follow a Meal Plan? No: He has not started eating yet. Recommend making water, unsweetened tea or coffee primary drinks. Level of patient/caregiver understanding able to:       [] Verbalized Understanding   [] Demonstrated Understanding       [] Teach Back       [x] Needs Reinforcement     []  Other:        Plan        Ongoing diabetes education and blood glucose monitoring. Recommend meds to beds.     Recommend [facility] referral.                                           Discharge Plan:  Discharge needs: insulin pens and 4mm pen needles and glucometer/strips/lancets       Teaching Time Diabetes Education:  20 minutes     Electronically signed by Tod Pham on 12/17/2021 at 10:28 AM

## 2021-12-17 NOTE — PROGRESS NOTES
Final Result   Persistent but decreased peripancreatic fluid and edema, in keeping with the   clinical diagnosis of pancreatitis      Increased body wall anasarca with increased pleural-parenchymal disease at   the lung bases, likely due to fluid overload      Fatty liver. Diverticulosis and normal caliber appendix      Subtle increased density is seen in the right femoral vein. It is uncertain   as whether not this is artifactual or due to a small amount of thrombus. Recommend correlation with lower extremity venous duplex ultrasound      RECOMMENDATIONS:   Unavailable         VL Extremity Venous Right   Final Result      US GALLBLADDER RUQ   Final Result   Pancreas not well visualized. This is likely due to the peripancreatic   inflammatory changes seen on recent CT. Hepatic steatosis. CT ABDOMEN PELVIS WO CONTRAST Additional Contrast? None   Final Result   1. Severe acute interstitial pancreatitis. No focal fluid collection. 2. Hepatomegaly with severe steatosis. 3. Possible gallbladder sludge. 4. Colonic diverticulosis without acute diverticulitis. 5. The distal esophagus is dilated and filled with fluid. RECOMMENDATIONS:   Unavailable         XR CHEST PORTABLE   Final Result   No radiographic evidence of acute pulmonary disease. XR CHEST PORTABLE    (Results Pending)           Assessment/Plan:    Active Hospital Problems    Diagnosis Date Noted    Acute kidney injury ([address] [N17.9]     Acute pancreatitis [K85.90]     DKA, type 2, not at goal [facility]) [E11.10] 12/12/2021       DKA resolved - suspect undiagnosed type 2 DM  -check Hgb A1c - 12.4  -NPO - to full liquid to low fat diet per GI  -DKA protocol completed  -insulin gtt - to SQ insulin  - check CARI and islet cell Abs   - increase lantus 35BID. - increase prand bolus 15 TID   - ISS coverage.     - ccd         Anion gap metabolic acidosis likely 2/2 DKA  -management as above       Severe interstitial pancreatitis  - diet as above  - s/p aggressive IVF   - IV merrem due to ongoing fevers - now resolving. -GI and general surgery consulted  -check RUQ U/S - pancreas not well visualized due to surrounding inflammatory changes. - pt reports 3 beers and hard liquor shots prior to onset of pancreatitis, but denies chronic alcohol abuse.        Septic Shock - resolved   -Levophed gtt  -Vanc, meropenem,   - no need for Flagyl   - culture with diphtheroids - likely contaminant.        CHAPIN - Cr improving slowly but pt with signs of worsening fluid overload. I did not stop his IVF but ordered a dose of lasix IV today. - check CXR. - avoid nephrotoxic medications  - nephrology following      Coffee ground emesis  -cont PPI  -GI following  -Hgb stable       Obesity - BMI 38.68  -nutritional counseling provided  -weight loss encouraged  -complicating medical management      Hypocalcemia - replete IV. Check iCa - trend - improved. DVT Prophylaxis: SCDs  Diet: ADULT DIET; Regular; 4 carb choices (60 gm/meal); Low Fat/Low Chol/High Fiber/OCTAVIANO  Code Status: Full Code    PT/OT Eval Status: defer    Dispo - continue care.        Reagan Flores MD

## 2021-12-18 LAB
ALBUMIN SERPL-MCNC: 1.9 G/DL (ref 3.4–5)
ALBUMIN SERPL-MCNC: 2.1 G/DL (ref 3.4–5)
ANION GAP SERPL CALCULATED.3IONS-SCNC: 14 MMOL/L (ref 3–16)
ANION GAP SERPL CALCULATED.3IONS-SCNC: 20 MMOL/L (ref 3–16)
BANDED NEUTROPHILS RELATIVE PERCENT: 3 % (ref 0–7)
BASOPHILS ABSOLUTE: 0 K/UL (ref 0–0.2)
BASOPHILS RELATIVE PERCENT: 0 %
BUN BLDV-MCNC: 20 MG/DL (ref 7–20)
BUN BLDV-MCNC: 22 MG/DL (ref 7–20)
CALCIUM SERPL-MCNC: 7.9 MG/DL (ref 8.3–10.6)
CALCIUM SERPL-MCNC: 8.1 MG/DL (ref 8.3–10.6)
CHLORIDE BLD-SCNC: 91 MMOL/L (ref 99–110)
CHLORIDE BLD-SCNC: 92 MMOL/L (ref 99–110)
CO2: 22 MMOL/L (ref 21–32)
CO2: 27 MMOL/L (ref 21–32)
CREAT SERPL-MCNC: 1.4 MG/DL (ref 0.9–1.3)
CREAT SERPL-MCNC: 1.5 MG/DL (ref 0.9–1.3)
EOSINOPHILS ABSOLUTE: 0.2 K/UL (ref 0–0.6)
EOSINOPHILS RELATIVE PERCENT: 1 %
GFR AFRICAN AMERICAN: >60
GFR AFRICAN AMERICAN: >60
GFR NON-AFRICAN AMERICAN: 50
GFR NON-AFRICAN AMERICAN: 54
GLUCOSE BLD-MCNC: 242 MG/DL (ref 70–99)
GLUCOSE BLD-MCNC: 255 MG/DL (ref 70–99)
GLUCOSE BLD-MCNC: 264 MG/DL (ref 70–99)
GLUCOSE BLD-MCNC: 286 MG/DL (ref 70–99)
GLUCOSE BLD-MCNC: 294 MG/DL (ref 70–99)
GLUCOSE BLD-MCNC: 300 MG/DL (ref 70–99)
GLUCOSE BLD-MCNC: 369 MG/DL (ref 70–99)
HCT VFR BLD CALC: 35.4 % (ref 40.5–52.5)
HEMATOLOGY PATH CONSULT: NO
HEMOGLOBIN: 11.3 G/DL (ref 13.5–17.5)
LACTIC ACID: 2.4 MMOL/L (ref 0.4–2)
LYMPHOCYTES ABSOLUTE: 0.7 K/UL (ref 1–5.1)
LYMPHOCYTES RELATIVE PERCENT: 3 %
MCH RBC QN AUTO: 29.2 PG (ref 26–34)
MCHC RBC AUTO-ENTMCNC: 31.9 G/DL (ref 31–36)
MCV RBC AUTO: 91.5 FL (ref 80–100)
METAMYELOCYTES RELATIVE PERCENT: 4 %
MONOCYTES ABSOLUTE: 3.8 K/UL (ref 0–1.3)
MONOCYTES RELATIVE PERCENT: 16 %
NEUTROPHILS ABSOLUTE: 18.8 K/UL (ref 1.7–7.7)
NEUTROPHILS RELATIVE PERCENT: 73 %
NUCLEATED RED BLOOD CELLS: 1 /100 WBC
PDW BLD-RTO: 13.7 % (ref 12.4–15.4)
PERFORMED ON: ABNORMAL
PHOSPHORUS: 2.4 MG/DL (ref 2.5–4.9)
PHOSPHORUS: 3.3 MG/DL (ref 2.5–4.9)
PLATELET # BLD: 252 K/UL (ref 135–450)
PMV BLD AUTO: 9.1 FL (ref 5–10.5)
POTASSIUM SERPL-SCNC: 3.9 MMOL/L (ref 3.5–5.1)
POTASSIUM SERPL-SCNC: 4.2 MMOL/L (ref 3.5–5.1)
RBC # BLD: 3.87 M/UL (ref 4.2–5.9)
RBC # BLD: NORMAL 10*6/UL
SODIUM BLD-SCNC: 133 MMOL/L (ref 136–145)
SODIUM BLD-SCNC: 133 MMOL/L (ref 136–145)
WBC # BLD: 23.5 K/UL (ref 4–11)

## 2021-12-18 PROCEDURE — 80069 RENAL FUNCTION PANEL: CPT

## 2021-12-18 PROCEDURE — 93005 ELECTROCARDIOGRAM TRACING: CPT | Performed by: INTERNAL MEDICINE

## 2021-12-18 PROCEDURE — 6360000002 HC RX W HCPCS: Performed by: STUDENT IN AN ORGANIZED HEALTH CARE EDUCATION/TRAINING PROGRAM

## 2021-12-18 PROCEDURE — 83605 ASSAY OF LACTIC ACID: CPT

## 2021-12-18 PROCEDURE — 2700000000 HC OXYGEN THERAPY PER DAY

## 2021-12-18 PROCEDURE — 6360000002 HC RX W HCPCS: Performed by: INTERNAL MEDICINE

## 2021-12-18 PROCEDURE — 6370000000 HC RX 637 (ALT 250 FOR IP): Performed by: SURGERY

## 2021-12-18 PROCEDURE — 36415 COLL VENOUS BLD VENIPUNCTURE: CPT

## 2021-12-18 PROCEDURE — 99232 SBSQ HOSP IP/OBS MODERATE 35: CPT | Performed by: SURGERY

## 2021-12-18 PROCEDURE — 6370000000 HC RX 637 (ALT 250 FOR IP): Performed by: INTERNAL MEDICINE

## 2021-12-18 PROCEDURE — 6370000000 HC RX 637 (ALT 250 FOR IP): Performed by: PHYSICIAN ASSISTANT

## 2021-12-18 PROCEDURE — 87040 BLOOD CULTURE FOR BACTERIA: CPT

## 2021-12-18 PROCEDURE — 2580000003 HC RX 258: Performed by: INTERNAL MEDICINE

## 2021-12-18 PROCEDURE — 85025 COMPLETE CBC W/AUTO DIFF WBC: CPT

## 2021-12-18 PROCEDURE — 94761 N-INVAS EAR/PLS OXIMETRY MLT: CPT

## 2021-12-18 PROCEDURE — 2500000003 HC RX 250 WO HCPCS: Performed by: INTERNAL MEDICINE

## 2021-12-18 PROCEDURE — 6360000002 HC RX W HCPCS: Performed by: SURGERY

## 2021-12-18 PROCEDURE — 2060000000 HC ICU INTERMEDIATE R&B

## 2021-12-18 RX ORDER — POLYETHYLENE GLYCOL 3350 17 G/17G
17 POWDER, FOR SOLUTION ORAL DAILY
Status: DISCONTINUED | OUTPATIENT
Start: 2021-12-18 | End: 2021-12-25

## 2021-12-18 RX ORDER — ONDANSETRON 2 MG/ML
4 INJECTION INTRAMUSCULAR; INTRAVENOUS EVERY 4 HOURS PRN
Status: DISCONTINUED | OUTPATIENT
Start: 2021-12-18 | End: 2021-12-31 | Stop reason: HOSPADM

## 2021-12-18 RX ORDER — SODIUM CHLORIDE 9 MG/ML
INJECTION, SOLUTION INTRAVENOUS CONTINUOUS
Status: DISCONTINUED | OUTPATIENT
Start: 2021-12-18 | End: 2021-12-22

## 2021-12-18 RX ADMIN — VANCOMYCIN HYDROCHLORIDE 1500 MG: 10 INJECTION, POWDER, LYOPHILIZED, FOR SOLUTION INTRAVENOUS at 11:07

## 2021-12-18 RX ADMIN — MEROPENEM 500 MG: 500 INJECTION, POWDER, FOR SOLUTION INTRAVENOUS at 09:45

## 2021-12-18 RX ADMIN — INSULIN GLARGINE 35 UNITS: 100 INJECTION, SOLUTION SUBCUTANEOUS at 09:19

## 2021-12-18 RX ADMIN — Medication: at 06:00

## 2021-12-18 RX ADMIN — MORPHINE SULFATE 2 MG: 2 INJECTION, SOLUTION INTRAMUSCULAR; INTRAVENOUS at 17:27

## 2021-12-18 RX ADMIN — ONDANSETRON 4 MG: 2 INJECTION INTRAMUSCULAR; INTRAVENOUS at 13:02

## 2021-12-18 RX ADMIN — INSULIN HUMAN 10 UNITS: 100 INJECTION, SOLUTION PARENTERAL at 11:01

## 2021-12-18 RX ADMIN — INSULIN HUMAN 5 UNITS: 100 INJECTION, SOLUTION PARENTERAL at 17:26

## 2021-12-18 RX ADMIN — FLUCONAZOLE 100 MG: 2 INJECTION, SOLUTION INTRAVENOUS at 09:47

## 2021-12-18 RX ADMIN — ONDANSETRON 4 MG: 2 INJECTION INTRAMUSCULAR; INTRAVENOUS at 09:00

## 2021-12-18 RX ADMIN — HEPARIN SODIUM 5000 UNITS: 5000 INJECTION INTRAVENOUS; SUBCUTANEOUS at 09:19

## 2021-12-18 RX ADMIN — MORPHINE SULFATE 4 MG: 4 INJECTION, SOLUTION INTRAMUSCULAR; INTRAVENOUS at 22:03

## 2021-12-18 RX ADMIN — FENOFIBRATE 160 MG: 160 TABLET ORAL at 09:19

## 2021-12-18 RX ADMIN — INSULIN HUMAN 6 UNITS: 100 INJECTION, SOLUTION PARENTERAL at 17:26

## 2021-12-18 RX ADMIN — DOCUSATE SODIUM 100 MG: 100 CAPSULE ORAL at 09:19

## 2021-12-18 RX ADMIN — INSULIN HUMAN 5 UNITS: 100 INJECTION, SOLUTION PARENTERAL at 13:12

## 2021-12-18 RX ADMIN — SODIUM CHLORIDE: 9 INJECTION, SOLUTION INTRAVENOUS at 12:44

## 2021-12-18 RX ADMIN — MEROPENEM 500 MG: 500 INJECTION, POWDER, FOR SOLUTION INTRAVENOUS at 16:19

## 2021-12-18 RX ADMIN — HEPARIN SODIUM 5000 UNITS: 5000 INJECTION INTRAVENOUS; SUBCUTANEOUS at 22:03

## 2021-12-18 RX ADMIN — MORPHINE SULFATE 2 MG: 2 INJECTION, SOLUTION INTRAMUSCULAR; INTRAVENOUS at 13:01

## 2021-12-18 RX ADMIN — INSULIN GLARGINE 35 UNITS: 100 INJECTION, SOLUTION SUBCUTANEOUS at 22:03

## 2021-12-18 RX ADMIN — INSULIN HUMAN 8 UNITS: 100 INJECTION, SOLUTION PARENTERAL at 13:09

## 2021-12-18 RX ADMIN — DOCUSATE SODIUM 100 MG: 100 CAPSULE ORAL at 22:01

## 2021-12-18 RX ADMIN — INSULIN HUMAN 6 UNITS: 100 INJECTION, SOLUTION PARENTERAL at 22:04

## 2021-12-18 RX ADMIN — PANTOPRAZOLE SODIUM 40 MG: 40 TABLET, DELAYED RELEASE ORAL at 05:31

## 2021-12-18 RX ADMIN — PANTOPRAZOLE SODIUM 40 MG: 40 TABLET, DELAYED RELEASE ORAL at 16:18

## 2021-12-18 RX ADMIN — MEROPENEM 500 MG: 500 INJECTION, POWDER, FOR SOLUTION INTRAVENOUS at 22:22

## 2021-12-18 RX ADMIN — ONDANSETRON 4 MG: 2 INJECTION INTRAMUSCULAR; INTRAVENOUS at 17:27

## 2021-12-18 RX ADMIN — ONDANSETRON 4 MG: 2 INJECTION INTRAMUSCULAR; INTRAVENOUS at 02:45

## 2021-12-18 ASSESSMENT — PAIN SCALES - GENERAL
PAINLEVEL_OUTOF10: 0
PAINLEVEL_OUTOF10: 6
PAINLEVEL_OUTOF10: 8
PAINLEVEL_OUTOF10: 8

## 2021-12-18 ASSESSMENT — PAIN - FUNCTIONAL ASSESSMENT
PAIN_FUNCTIONAL_ASSESSMENT: ACTIVITIES ARE NOT PREVENTED
PAIN_FUNCTIONAL_ASSESSMENT: PREVENTS OR INTERFERES SOME ACTIVE ACTIVITIES AND ADLS
PAIN_FUNCTIONAL_ASSESSMENT: ACTIVITIES ARE NOT PREVENTED

## 2021-12-18 ASSESSMENT — PAIN DESCRIPTION - ONSET
ONSET: ON-GOING

## 2021-12-18 ASSESSMENT — PAIN DESCRIPTION - PAIN TYPE
TYPE: ACUTE PAIN
TYPE: ACUTE PAIN;CHRONIC PAIN
TYPE: ACUTE PAIN

## 2021-12-18 ASSESSMENT — PAIN DESCRIPTION - PROGRESSION
CLINICAL_PROGRESSION: GRADUALLY WORSENING
CLINICAL_PROGRESSION: NOT CHANGED
CLINICAL_PROGRESSION: GRADUALLY WORSENING

## 2021-12-18 ASSESSMENT — PAIN DESCRIPTION - DESCRIPTORS
DESCRIPTORS: ACHING;DISCOMFORT
DESCRIPTORS: ACHING
DESCRIPTORS: ACHING

## 2021-12-18 ASSESSMENT — PAIN DESCRIPTION - LOCATION
LOCATION: ABDOMEN;BACK
LOCATION: BACK;ABDOMEN
LOCATION: BACK;ABDOMEN

## 2021-12-18 ASSESSMENT — PAIN DESCRIPTION - FREQUENCY
FREQUENCY: CONTINUOUS

## 2021-12-18 ASSESSMENT — PAIN DESCRIPTION - ORIENTATION: ORIENTATION: LOWER

## 2021-12-18 NOTE — CONSULTS
Clinical Pharmacy Note  Vancomycin Consult    Blanca Crisostomo is a 54 y.o. male ordered Vancomycin for intra abdominal infection; consult received from Dr. Everette Calles to manage therapy. Also receiving Meropenem. Patient Active Problem List   Diagnosis    Back pain    DKA, type 2, not at goal [facility])    Acute pancreatitis    Acute kidney injury ([facility] [address]       Allergies:  Patient has no known allergies. Temp max:  Temp (24hrs), Av.1 °F (36.7 °C), Min:97.2 °F (36.2 °C), Max:99.2 °F (37.3 °C)      Recent Labs     21  0510 21  0531 21  0535   WBC 18.0* 19.0* 23.5*       Recent Labs     21  0531 21  2111 21  0535   BUN 21* 22* 22*   CREATININE 1.6* 1.7* 1.5*         Intake/Output Summary (Last 24 hours) at 2021 0942  Last data filed at 2021 0540  Gross per 24 hour   Intake 4820 ml   Output 4615 ml   Net 205 ml       Culture Results:      Ht Readings from Last 1 Encounters:   21 6' 1\" (1.854 m)        Wt Readings from Last 1 Encounters:   21 (!) 324 lb 1.2 oz (147 kg)         Estimated Creatinine Clearance: 90 mL/min (A) (based on SCr of 1.5 mg/dL (H)). Assessment/Plan:  Vancomycin 1500 mg IV x 1 dose today. Aiming for trough around 15mg/L. Level ordered for 0600 . Thank you for the consult.    Magalis Boykin, [facility], [address] 2021 9:44 AM

## 2021-12-18 NOTE — PROGRESS NOTES
Hospitalist Progress Note      PCP: [address] [place] [address]    Date of Admission: 12/12/2021    Chief Complaint: increased thirst, urination, nausea, vomiting    Hospital Course: The patient is a 54 y.o. male with no significant past medical history who presents to [facility] with increased thirst, urination, nausea, and vomiting. Patient stated that over the past several days, he has had intractable nausea, vomiting, with increased thirst and urination. He denies fever, chills, chest pain, shortness of breath, constipation, diarrhea, and dysuria.     In the ED, labs were significant for a sodium of 123, chloride of 77, bicarb of 4, BUN/Cr of 35/3.2, glucose of 900, lactic acid of 8.5, WBC count of 20.1K with a pH of 6.831 and beta-hydroxyturate > 8. CXR showed no acute disease. Subjective:     Nauseated, unwilling to eat. BG finally starting to come down. No diarrhea. No emesis. Denies chest pain or pleurisy. Reports abdominal pain actually improved. Feels swelling is better after lasix yesterday.           Medications:  Reviewed    Infusion Medications    sodium chloride 75 mL/hr at 12/18/21 1244    dextrose       Scheduled Medications    insulin regular  0-12 Units IntraVENous 4x Daily AC & HS    meropenem  500 mg IntraVENous Q6H    fluconazole  100 mg IntraVENous Q24H    vancomycin (VANCOCIN) intermittent dosing (placeholder)   Other RX Placeholder    polyethylene glycol  17 g Oral Daily    insulin glargine  35 Units SubCUTAneous BID    pantoprazole  40 mg Oral BID AC    docusate sodium  100 mg Oral BID    fenofibrate  160 mg Oral Daily    heparin (porcine)  5,000 Units SubCUTAneous BID     PRN Meds: ondansetron, morphine **OR** morphine, glucose, dextrose, glucagon (rDNA), dextrose, dextrose, magnesium sulfate, sodium phosphate IVPB **OR** sodium phosphate IVPB **OR** sodium phosphate IVPB      Intake/Output Summary (Last 24 hours) at 12/18/2021 1300  Last data filed at 12/18/2021 1040  Gross per 24 hour   Intake 4820 ml   Output 5016 ml   Net -196 ml       Exam:    BP (!) 94/52   Pulse 121   Temp 98.2 °F (36.8 °C) (Oral)   Resp 20   Ht 6' 1\" (1.854 m)   Wt (!) 324 lb 1.2 oz (147 kg)   SpO2 (!) 89%   BMI 42.76 kg/m²     General appearance: No apparent distress, appears stated age and cooperative. HEENT: Pupils equal, round, and reactive to light. Conjunctivae/corneas clear. Neck: Supple, with full range of motion. No jugular venous distention. Trachea midline. Respiratory:  Normal respiratory effort. Clear to auscultation, bilaterally without Rales/Wheezes/Rhonchi. Cardiovascular: Regular rate and rhythm with normal S1/S2 without murmurs, rubs or gallops. Abdomen: Soft, non-tender, non-distended with normal bowel sounds. Musculoskeletal: No clubbing, cyanosis or edema bilaterally. Full range of motion without deformity. Skin: Skin color, texture, turgor normal.  No rashes or lesions. Neurologic:  Neurovascularly intact without any focal sensory/motor deficits. Cranial nerves: II-XII intact, grossly non-focal.  Psychiatric: Alert and oriented, thought content appropriate, normal insight  Capillary Refill: Brisk,< 3 seconds   Peripheral Pulses: +2 palpable, equal bilaterally       Labs:   Recent Labs     12/16/21  0510 12/17/21  0531 12/18/21  0535   WBC 18.0* 19.0* 23.5*   HGB 10.0* 9.8* 11.3*   HCT 30.1* 29.9* 35.4*    198 252     Recent Labs     12/17/21  0531 12/17/21  2111 12/18/21  0535   * 134* 133*   K 3.9 3.8 4.2   CL 93* 95* 91*   CO2 24 26 22   BUN 21* 22* 22*   CREATININE 1.6* 1.7* 1.5*   CALCIUM 7.2* 7.4* 7.9*   PHOS 2.5 2.4* 3.3     No results for input(s): AST, ALT, BILIDIR, BILITOT, ALKPHOS in the last 72 hours. No results for input(s): INR in the last 72 hours. No results for input(s): Chyrel Lions in the last 72 hours.     Urinalysis:      Lab Results   Component Value Date    NITRU Negative 12/12/2021    WBCUA 2 12/12/2021    RBCUA 0-2 12/12/2021    BLOODU LARGE 12/12/2021    SPECGRAV 1.023 12/12/2021    GLUCOSEU >=1000 12/12/2021       Radiology:  XR CHEST PORTABLE   Final Result   New retrocardiac left lower lobe opacification, possibly infiltrate or   atelectasis. CT ABDOMEN PELVIS WO CONTRAST Additional Contrast? Oral   Final Result   Persistent but decreased peripancreatic fluid and edema, in keeping with the   clinical diagnosis of pancreatitis      Increased body wall anasarca with increased pleural-parenchymal disease at   the lung bases, likely due to fluid overload      Fatty liver. Diverticulosis and normal caliber appendix      Subtle increased density is seen in the right femoral vein. It is uncertain   as whether not this is artifactual or due to a small amount of thrombus. Recommend correlation with lower extremity venous duplex ultrasound      RECOMMENDATIONS:   Unavailable         VL Extremity Venous Right   Final Result      US GALLBLADDER RUQ   Final Result   Pancreas not well visualized. This is likely due to the peripancreatic   inflammatory changes seen on recent CT. Hepatic steatosis. CT ABDOMEN PELVIS WO CONTRAST Additional Contrast? None   Final Result   1. Severe acute interstitial pancreatitis. No focal fluid collection. 2. Hepatomegaly with severe steatosis. 3. Possible gallbladder sludge. 4. Colonic diverticulosis without acute diverticulitis. 5. The distal esophagus is dilated and filled with fluid. RECOMMENDATIONS:   Unavailable         XR CHEST PORTABLE   Final Result   No radiographic evidence of acute pulmonary disease.                  Assessment/Plan:    Active Hospital Problems    Diagnosis Date Noted    Acute kidney injury ([address] [N17.9]     Acute pancreatitis [K85.90]     DKA, type 2, not at goal [facility]) [E11.10] 12/12/2021       DKA resolved - suspect undiagnosed type 2 DM  -check Hgb A1c - 12.4  -NPO - to full liquid to low fat diet per GI - pt not tolerating much PO  -DKA protocol completed  -insulin gtt - to SQ insulin  - check CARI and islet cell Abs   - lantus 35BID. - today I switched him to IV Humulin sliding scale and 5 units prand bolus if eating.          Anion gap metabolic acidosis likely 2/2 DKA  -management as above       Severe interstitial pancreatitis  - diet as above  - s/p aggressive IVF   - IV merrem due to ongoing fevers - now resolving. -GI and general surgery consulted  -check RUQ U/S - pancreas not well visualized due to surrounding inflammatory changes. - pt reports 3 beers and hard liquor shots prior to onset of pancreatitis, but denies chronic alcohol abuse. - repeat CT on 12/16 showed improvement of pancreatitis, but WBC continues to rise   - I resumed him on Vanc, merrem and diflucan and sent repeat blood Cx today (12/18).    Septic Shock - resolved   -Levophed gtt  - see abx as above.          CHAPIN - Cr improving slowly  He continues on IVF with bicarb. S/p single dose lasix on 12/17 with good effect. - avoid nephrotoxic medications  - nephrology following      Coffee ground emesis  -cont PPI  -GI following  -Hgb stable       Obesity - BMI 38.68  -nutritional counseling provided  -weight loss encouraged  -complicating medical management      Hypocalcemia - s/p replete IV. Improved. DVT Prophylaxis: SCDs  Diet: ADULT DIET; Regular; 4 carb choices (60 gm/meal); Low Fat/Low Chol/High Fiber/OCTAVIANO  Code Status: Full Code      Dispo - continue care.        Lynn Mascorro MD

## 2021-12-18 NOTE — PROGRESS NOTES
Progress Note    Data:  Patient nauseated and small immeasurable amount of emesis as well. Patient does not have appetite to eat. Action:  4mg Zofran Q6 given x2 overnight. Offered cool compresses, crackers and aime mist. Morning Protonix given. Will discuss with nurse to try and get Zofran down to q4 instead of q6. Response:  Zofran helps for a few hours and then patient is nauseated again. Patient refused any food. Is stable with nausea right now, but patient says he can feel it coming on.

## 2021-12-18 NOTE — PROGRESS NOTES
[facility] General Surgery                                   Daily Progress Note                                                         Pt Name: Kaylah Boston  Medical Record Number: [id number]  Date of Birth 1970   Today's Date: 12/18/2021  Chief Complaint   Patient presents with    Shortness of Breath     trouble breathing since today. Feeling unwell since yesterday. EMS blood glucose 548. Pt denies DM history. ASSESSMENT/PLAN  A/P: 55 yo male with DKA, severe acute pancreatitis, acute kidney injury, shock - resolved     -CT 48 hours agowith improving pancreatitis but WBC continues to increase slowly. Continue supportive care, abx. May need to repeat CT to eval for necrosis if continues to worsen. No other obvious source at this time  -low fat diet per GI. Dionisio Rodríguez is resting in bed, no acute distress. unchanged abdominal pain. Tolerated PO yesterday. Passing flatus. OBJECTIVE  VITALS:  height is 6' 1\" (1.854 m) and weight is 324 lb 1.2 oz (147 kg) (abnormal). His oral temperature is 98.2 °F (36.8 °C). His blood pressure is 94/52 (abnormal) and his pulse is 121. His respiration is 20 and oxygen saturation is 89% (abnormal). INTAKE/OUTPUT:      Intake/Output Summary (Last 24 hours) at 12/18/2021 1010  Last data filed at 12/18/2021 0540  Gross per 24 hour   Intake 4820 ml   Output 4615 ml   Net 205 ml     GENERAL: alert, cooperative, no distress  LUNGS: clear to ausculation, without wheezes, rales or rhonci  HEART: normal rate and regular rhythm  ABDOMEN: Soft,  non distended. Mild to moderate periumbilical pain with palpation. EXTREMITY: no cyanosis, clubbing or edema    I/O last 3 completed shifts:   In: 9627 [P.O.:540; I.V.:4280]  Out: 5415 [Urine:5415]      LABS  Recent Labs     12/15/21  2335 12/16/21  0510 12/18/21  0535   WBC  --    < > 23.5*   HGB  --    < > 11.3*   HCT  --    < > 35.4*   PLT  --    < > 252   *   < > 133*   K 4.6   < > 4.2   CL 97*   < > 91*   CO2 24   < > 22   BUN 19   < > 22*   CREATININE 1.9*   < > 1.5*   MG 1.80  --   --    PHOS 2.3*   < > 3.3   CALCIUM 6.3*   < > 7.9*    < > = values in this interval not displayed.        EDUCATION  Patient educated about Disease Process, Medications, Smoking Cessation, Oxygenation, Incentive Spirometry and Deep Breath and Cough, Diabetes, Hyperlipidemia, Smoking Cessation, Nutrition, Exercise and Hypertension    Electronically signed by Mariola Aviles MD on 12/18/2021 at 10:10 AM      [facility]   [phone] Office  [phone] Fax

## 2021-12-18 NOTE — PROGRESS NOTES
Nephrology Progress Note   [facility]      Chief Complaint: Nausea/vomiting/pancreatitis    History of Present Illness: Mr Geri Brar is a is a 54 y.o. male with no significant past medical history who presents to [facility] with increased thirst, urination, nausea, and vomiting. He was noted to have a BS level of 900 mg/dl. CT scan abdomen and pelvis revealed severe acute Pancreatitis. He was Hyperkalemic, with serum HCO level of 5 and creatinine of 3.4 mg/dl. Patient was started on insulin gtt, aggressively volume resuscitated - santiago was placed     Subjective:    Resting in bed; diet advanced to clears; feeling better; no shortness of breath    ROS: as above      Scheduled Meds:   insulin regular  0-12 Units IntraVENous 4x Daily AC & HS    meropenem  500 mg IntraVENous Q6H    fluconazole  100 mg IntraVENous Q24H    vancomycin  1,500 mg IntraVENous Once    vancomycin (VANCOCIN) intermittent dosing (placeholder)   Other RX Placeholder    polyethylene glycol  17 g Oral Daily    insulin glargine  35 Units SubCUTAneous BID    pantoprazole  40 mg Oral BID AC    docusate sodium  100 mg Oral BID    fenofibrate  160 mg Oral Daily    heparin (porcine)  5,000 Units SubCUTAneous BID        IV infusion builder 75 mL/hr at 21 0600    dextrose         PRN Meds:.ondansetron, morphine **OR** morphine, glucose, dextrose, glucagon (rDNA), dextrose, dextrose, magnesium sulfate, sodium phosphate IVPB **OR** sodium phosphate IVPB **OR** sodium phosphate IVPB    Physical Exam:    TEMPERATURE:  Current - Temp: 98.2 °F (36.8 °C);  Max - Temp  Av.1 °F (36.7 °C)  Min: 97.2 °F (36.2 °C)  Max: 99.2 °F (37.3 °C)  RESPIRATIONS RANGE: Resp  Av.1  Min: 16  Max: 22  PULSE RANGE: Pulse  Av.7  Min: 115  Max: 123  BLOOD PRESSURE RANGE:  Systolic (44VJV), RBV:899 , Min:94 , GEI:642   ; Diastolic (82NWG), VMD:55, Min:52, Max:89    24HR INTAKE/OUTPUT:      Intake/Output Summary (Last 24 hours) at 2021 [address] [employer] filed at 12/18/2021 1040  Gross per 24 hour   Intake 4820 ml   Output 5216 ml   Net -396 ml         Patient Vitals for the past 96 hrs (Last 3 readings):   Weight   12/18/21 0306 (!) 324 lb 1.2 oz (147 kg)   12/17/21 0334 (!) 325 lb 6.4 oz (147.6 kg)   12/16/21 0321 (!) 336 lb 6.8 oz (152.6 kg)       General: Alert, Awake, NAD, Obese  HEENT: Normocephalic, atraumatic, Nose and ears appear externally without deformity, MMM  Neck: No Thyromegaly, Trachea is midline, No Carotid bruit  Chest: clear to auscultation, no intercostal retractions  CVS: RRR, no murmur, no rub  Abdomen: distended, generalized tenderness, no bruit appreciated  Extremities: no edema, no cyanosis.   Skin: normal texture, normal skin turgor, no rash  Musculoskeletal: normal ROM, no joint swelling, no visible deformity  Neurological: CN intact, no focal motor neurological deficit  Psych: normal affect        LAB DATA:    CBC:   Lab Results   Component Value Date    WBC 23.5 12/18/2021    RBC 3.87 12/18/2021    HGB 11.3 12/18/2021    HCT 35.4 12/18/2021    MCV 91.5 12/18/2021    MCH 29.2 12/18/2021    MCHC 31.9 12/18/2021    RDW 13.7 12/18/2021     12/18/2021    MPV 9.1 12/18/2021     BMP:    Lab Results   Component Value Date     12/18/2021    K 4.2 12/18/2021    K 4.2 12/12/2021    CL 91 12/18/2021    CO2 22 12/18/2021    BUN 22 12/18/2021    CREATININE 1.5 12/18/2021    CALCIUM 7.9 12/18/2021    GFRAA >60 12/18/2021    LABGLOM 50 12/18/2021    GLUCOSE 286 12/18/2021     Ionized Calcium:  No results found for: IONCA  Magnesium:    Lab Results   Component Value Date    MG 1.80 12/15/2021     Phosphorus:    Lab Results   Component Value Date    PHOS 3.3 12/18/2021     U/A:    Lab Results   Component Value Date    COLORU YELLOW 12/12/2021    PHUR 5.0 12/12/2021    WBCUA 2 12/12/2021    RBCUA 0-2 12/12/2021    CLARITYU CLOUDY 12/12/2021    SPECGRAV 1.023 12/12/2021    LEUKOCYTESUR Negative 12/12/2021    UROBILINOGEN 0.2 12/12/2021    BILIRUBINUR MODERATE 12/12/2021    BLOODU LARGE 12/12/2021    GLUCOSEU >=1000 12/12/2021         IMPRESSION/RECOMMENDATIONS:      Active Problems:    DKA, type 2, not at goal [facility])    Acute pancreatitis    Acute kidney injury ([facility] [address]  Resolved Problems:    * No resolved hospital problems. *    1. CHAPIN - likely prerenal/ATN - occurring in the setting of DKA/pancreatitis  - Continue IVFuntil taking po well then would stop    - creatinine is trending down   - CT scan on admission did not reveal Hydronephrosis  - avoid exposure to Nephrotoxic agents    2. Critical life threatening acidosis   - Secondary to DKA and CHAPIN  - improved stop the Bicarb     3. Hyperkalemia - Resolved    4.  Pancreatitis - management per Medicine

## 2021-12-18 NOTE — PROGRESS NOTES
INPATIENT PROGRESS NOTE        IDENTIFYING DATA/REASON FOR CONSULTATION   PATIENT:  Kareen Villa  MRN:  [id number]  ADMIT DATE: 2021  TIME OF EVALUATION: 2021 9:23 AM  HOSPITAL STAY:   LOS: 6 days   CONSULTING PHYSICIAN: Halina Burr MD   REASON FOR CONSULTATION: Severe pancreatitis    Subjective:    Patient feeling well. He has some nausea with PO intake yesterday but pain continues to improve. MEDICATIONS   SCHEDULED:  insulin regular, 0-12 Units, 4x Daily AC & HS  meropenem, 500 mg, Q6H  fluconazole, 100 mg, Q24H  insulin glargine, 35 Units, BID  pantoprazole, 40 mg, BID AC  docusate sodium, 100 mg, BID  fenofibrate, 160 mg, Daily  heparin (porcine), 5,000 Units, BID      FLUIDS/DRIPS:     IV infusion builder 75 mL/hr at 21 0600    dextrose       PRNs: ondansetron, 4 mg, Q4H PRN  morphine, 2 mg, Q4H PRN   Or  morphine, 4 mg, Q4H PRN  glucose, 15 g, PRN  dextrose, 12.5 g, PRN  glucagon (rDNA), 1 mg, PRN  dextrose, 100 mL/hr, PRN  dextrose, 12.5 g, PRN  magnesium sulfate, 1,000 mg, PRN  sodium phosphate IVPB, 10 mmol, PRN   Or  sodium phosphate IVPB, 15 mmol, PRN   Or  sodium phosphate IVPB, 20 mmol, PRN      ALLERGIES:  No Known Allergies      PHYSICAL EXAM   VITALS:  BP (!) 94/52   Pulse 121   Temp 98.2 °F (36.8 °C) (Oral)   Resp 18   Ht 6' 1\" (1.854 m)   Wt (!) 324 lb 1.2 oz (147 kg)   SpO2 90%   BMI 42.76 kg/m²   TEMPERATURE:  Current - Temp: 98.2 °F (36.8 °C); Max - Temp  Av.1 °F (36.7 °C)  Min: 97.2 °F (36.2 °C)  Max: 99.2 °F (37.3 °C)    Physical Exam:  General appearance: alert, cooperative, no distress, appears stated age  Eyes: Anicteric  Head: Normocephalic, without obvious abnormality  Lungs: clear to auscultation bilaterally, Normal Effort  Heart: regular rate and rhythm, normal S1 and S2, no murmurs or rubs  Abdomen: soft, non-distended, non-tender.  Bowel sounds normal.   Extremities: atraumatic, no cyanosis or edema  Skin: warm and dry, no jaundice  Neuro: Grossly intact, A&OX3    LABS AND IMAGING   Laboratory   Recent Labs     12/16/21  0510 12/17/21  0531 12/18/21  0535   WBC 18.0* 19.0* 23.5*   HGB 10.0* 9.8* 11.3*   HCT 30.1* 29.9* 35.4*   MCV 91.4 91.1 91.5    198 252     Recent Labs     12/17/21  0531 12/17/21  2111 12/18/21  0535   * 134* 133*   K 3.9 3.8 4.2   CL 93* 95* 91*   CO2 24 26 22   PHOS 2.5 2.4* 3.3   BUN 21* 22* 22*   CREATININE 1.6* 1.7* 1.5*     No results for input(s): AST, ALT, ALB, BILIDIR, BILITOT, ALKPHOS in the last 72 hours. No results for input(s): LIPASE, AMYLASE in the last 72 hours. No results for input(s): PROTIME, INR in the last 72 hours. Imaging  XR CHEST PORTABLE   Final Result   New retrocardiac left lower lobe opacification, possibly infiltrate or   atelectasis. CT ABDOMEN PELVIS WO CONTRAST Additional Contrast? Oral   Final Result   Persistent but decreased peripancreatic fluid and edema, in keeping with the   clinical diagnosis of pancreatitis      Increased body wall anasarca with increased pleural-parenchymal disease at   the lung bases, likely due to fluid overload      Fatty liver. Diverticulosis and normal caliber appendix      Subtle increased density is seen in the right femoral vein. It is uncertain   as whether not this is artifactual or due to a small amount of thrombus. Recommend correlation with lower extremity venous duplex ultrasound      RECOMMENDATIONS:   Unavailable         VL Extremity Venous Right   Final Result      US GALLBLADDER RUQ   Final Result   Pancreas not well visualized. This is likely due to the peripancreatic   inflammatory changes seen on recent CT. Hepatic steatosis. CT ABDOMEN PELVIS WO CONTRAST Additional Contrast? None   Final Result   1. Severe acute interstitial pancreatitis. No focal fluid collection. 2. Hepatomegaly with severe steatosis. 3. Possible gallbladder sludge.    4. Colonic diverticulosis without acute -DKA management per Primary team     Thank you for allowing me to participate in this patient's care. If there are any questions or concerns regarding this patient, or the plan we have set in place, please feel free to contact me at [phone].      Lety Renae MD

## 2021-12-18 NOTE — PLAN OF CARE
Problem: Falls - Risk of:  Goal: Will remain free from falls  Description: Will remain free from falls  Outcome: Ongoing  Goal: Absence of physical injury  Description: Absence of physical injury  Outcome: Ongoing     Problem: Pain:  Goal: Pain level will decrease  Description: Pain level will decrease  Outcome: Ongoing  Goal: Control of acute pain  Description: Control of acute pain  Outcome: Ongoing  Goal: Control of chronic pain  Description: Control of chronic pain  Outcome: Ongoing     Problem: Discharge Planning:  Goal: Participates in care planning  Description: Participates in care planning  Outcome: Ongoing  Goal: Discharged to appropriate level of care  Description: Discharged to appropriate level of care  Outcome: Ongoing     Problem:  Bowel Function - Altered:  Goal: Bowel elimination is within specified parameters  Description: Bowel elimination is within specified parameters  Outcome: Ongoing     Problem: Cardiac Output - Decreased:  Goal: Hemodynamic stability will improve  Description: Hemodynamic stability will improve  Outcome: Ongoing     Problem: Fluid Volume - Imbalance:  Goal: Absence of imbalanced fluid volume signs and symptoms  Description: Absence of imbalanced fluid volume signs and symptoms  Outcome: Ongoing  Goal: Will remain free of signs and symptoms of dehydration  Description: Will remain free of signs and symptoms of dehydration  Outcome: Ongoing     Problem: Serum Glucose Level - Abnormal:  Goal: Ability to maintain appropriate glucose levels will improve to within specified parameters  Description: Ability to maintain appropriate glucose levels will improve to within specified parameters  Outcome: Ongoing  Goal: Ability to maintain appropriate glucose levels will improve  Description: Ability to maintain appropriate glucose levels will improve  Outcome: Ongoing     Problem: Tissue Perfusion, Altered:  Goal: Circulatory function within specified parameters  Description: Circulatory function within specified parameters  Outcome: Ongoing     Problem: Tissue Perfusion - Cardiopulmonary, Altered:  Goal: Absence of angina  Description: Absence of angina  Outcome: Ongoing  Goal: Hemodynamic stability will improve  Description: Hemodynamic stability will improve  Outcome: Ongoing     Problem: Urinary Elimination:  Goal: Signs and symptoms of infection will decrease  Description: Signs and symptoms of infection will decrease  Outcome: Ongoing  Goal: Complications related to the disease process, condition or treatment will be avoided or minimized  Description: Complications related to the disease process, condition or treatment will be avoided or minimized  Outcome: Ongoing     Problem: Injury - Acid Base Imbalance:  Goal: Acid-base balance  Description: Acid-base balance  Outcome: Ongoing     Problem: Tissue Perfusion - Renal, Altered:  Goal: Electrolytes within specified parameters  Description: Electrolytes within specified parameters  Outcome: Ongoing  Goal: Urine creatinine clearance will be within specified parameters  Description: Urine creatinine clearance will be within specified parameters  Outcome: Ongoing  Goal: Serum creatinine will be within specified parameters  Description: Serum creatinine will be within specified parameters  Outcome: Ongoing  Goal: Ability to achieve a balanced intake and output will improve  Description: Ability to achieve a balanced intake and output will improve  Outcome: Ongoing

## 2021-12-19 LAB
ALBUMIN SERPL-MCNC: 2.2 G/DL (ref 3.4–5)
ANION GAP SERPL CALCULATED.3IONS-SCNC: 15 MMOL/L (ref 3–16)
BANDED NEUTROPHILS RELATIVE PERCENT: 8 % (ref 0–7)
BASOPHILS ABSOLUTE: 0 K/UL (ref 0–0.2)
BASOPHILS RELATIVE PERCENT: 0 %
BUN BLDV-MCNC: 18 MG/DL (ref 7–20)
CALCIUM SERPL-MCNC: 8.1 MG/DL (ref 8.3–10.6)
CHLORIDE BLD-SCNC: 91 MMOL/L (ref 99–110)
CO2: 27 MMOL/L (ref 21–32)
CREAT SERPL-MCNC: 1.4 MG/DL (ref 0.9–1.3)
EKG ATRIAL RATE: 119 BPM
EKG DIAGNOSIS: NORMAL
EKG P AXIS: 43 DEGREES
EKG P-R INTERVAL: 120 MS
EKG Q-T INTERVAL: 336 MS
EKG QRS DURATION: 86 MS
EKG QTC CALCULATION (BAZETT): 472 MS
EKG R AXIS: -15 DEGREES
EKG T AXIS: 25 DEGREES
EKG VENTRICULAR RATE: 119 BPM
EOSINOPHILS ABSOLUTE: 0 K/UL (ref 0–0.6)
EOSINOPHILS RELATIVE PERCENT: 0 %
GFR AFRICAN AMERICAN: >60
GFR NON-AFRICAN AMERICAN: 54
GLUCOSE BLD-MCNC: 119 MG/DL (ref 70–99)
GLUCOSE BLD-MCNC: 130 MG/DL (ref 70–99)
GLUCOSE BLD-MCNC: 139 MG/DL (ref 70–99)
GLUCOSE BLD-MCNC: 162 MG/DL (ref 70–99)
GLUCOSE BLD-MCNC: 172 MG/DL (ref 70–99)
HCT VFR BLD CALC: 32.9 % (ref 40.5–52.5)
HEMOGLOBIN: 10.7 G/DL (ref 13.5–17.5)
LYMPHOCYTES ABSOLUTE: 1.9 K/UL (ref 1–5.1)
LYMPHOCYTES RELATIVE PERCENT: 8 %
MCH RBC QN AUTO: 29.8 PG (ref 26–34)
MCHC RBC AUTO-ENTMCNC: 32.5 G/DL (ref 31–36)
MCV RBC AUTO: 91.7 FL (ref 80–100)
MONOCYTES ABSOLUTE: 2.1 K/UL (ref 0–1.3)
MONOCYTES RELATIVE PERCENT: 9 %
NEUTROPHILS ABSOLUTE: 19.6 K/UL (ref 1.7–7.7)
NEUTROPHILS RELATIVE PERCENT: 75 %
PDW BLD-RTO: 13.6 % (ref 12.4–15.4)
PERFORMED ON: ABNORMAL
PHOSPHORUS: 2.6 MG/DL (ref 2.5–4.9)
PLATELET # BLD: 245 K/UL (ref 135–450)
PMV BLD AUTO: 9 FL (ref 5–10.5)
POTASSIUM SERPL-SCNC: 3.9 MMOL/L (ref 3.5–5.1)
RBC # BLD: 3.59 M/UL (ref 4.2–5.9)
RBC # BLD: NORMAL 10*6/UL
SODIUM BLD-SCNC: 133 MMOL/L (ref 136–145)
TOXIC GRANULATION: PRESENT
VANCOMYCIN RANDOM: 6 UG/ML
WBC # BLD: 23.6 K/UL (ref 4–11)

## 2021-12-19 PROCEDURE — 6360000002 HC RX W HCPCS: Performed by: INTERNAL MEDICINE

## 2021-12-19 PROCEDURE — 36569 INSJ PICC 5 YR+ W/O IMAGING: CPT

## 2021-12-19 PROCEDURE — C1751 CATH, INF, PER/CENT/MIDLINE: HCPCS

## 2021-12-19 PROCEDURE — 6360000002 HC RX W HCPCS: Performed by: SURGERY

## 2021-12-19 PROCEDURE — 6360000002 HC RX W HCPCS: Performed by: STUDENT IN AN ORGANIZED HEALTH CARE EDUCATION/TRAINING PROGRAM

## 2021-12-19 PROCEDURE — 2700000000 HC OXYGEN THERAPY PER DAY

## 2021-12-19 PROCEDURE — 2580000003 HC RX 258: Performed by: NURSE PRACTITIONER

## 2021-12-19 PROCEDURE — 93010 ELECTROCARDIOGRAM REPORT: CPT | Performed by: INTERNAL MEDICINE

## 2021-12-19 PROCEDURE — 99232 SBSQ HOSP IP/OBS MODERATE 35: CPT | Performed by: SURGERY

## 2021-12-19 PROCEDURE — 6370000000 HC RX 637 (ALT 250 FOR IP): Performed by: PHYSICIAN ASSISTANT

## 2021-12-19 PROCEDURE — 80202 ASSAY OF VANCOMYCIN: CPT

## 2021-12-19 PROCEDURE — 76937 US GUIDE VASCULAR ACCESS: CPT

## 2021-12-19 PROCEDURE — 6370000000 HC RX 637 (ALT 250 FOR IP): Performed by: SURGERY

## 2021-12-19 PROCEDURE — 2580000003 HC RX 258: Performed by: INTERNAL MEDICINE

## 2021-12-19 PROCEDURE — 85025 COMPLETE CBC W/AUTO DIFF WBC: CPT

## 2021-12-19 PROCEDURE — 2500000003 HC RX 250 WO HCPCS: Performed by: NURSE PRACTITIONER

## 2021-12-19 PROCEDURE — 6370000000 HC RX 637 (ALT 250 FOR IP): Performed by: INTERNAL MEDICINE

## 2021-12-19 PROCEDURE — 2060000000 HC ICU INTERMEDIATE R&B

## 2021-12-19 PROCEDURE — 80069 RENAL FUNCTION PANEL: CPT

## 2021-12-19 PROCEDURE — 36415 COLL VENOUS BLD VENIPUNCTURE: CPT

## 2021-12-19 RX ORDER — METOCLOPRAMIDE HYDROCHLORIDE 5 MG/ML
10 INJECTION INTRAMUSCULAR; INTRAVENOUS EVERY 6 HOURS
Status: DISCONTINUED | OUTPATIENT
Start: 2021-12-19 | End: 2021-12-26

## 2021-12-19 RX ORDER — SODIUM CHLORIDE 0.9 % (FLUSH) 0.9 %
5-40 SYRINGE (ML) INJECTION PRN
Status: DISCONTINUED | OUTPATIENT
Start: 2021-12-19 | End: 2021-12-31 | Stop reason: HOSPADM

## 2021-12-19 RX ORDER — SODIUM CHLORIDE 9 MG/ML
25 INJECTION, SOLUTION INTRAVENOUS PRN
Status: DISCONTINUED | OUTPATIENT
Start: 2021-12-19 | End: 2021-12-31 | Stop reason: HOSPADM

## 2021-12-19 RX ORDER — SODIUM CHLORIDE 0.9 % (FLUSH) 0.9 %
5-40 SYRINGE (ML) INJECTION EVERY 12 HOURS SCHEDULED
Status: DISCONTINUED | OUTPATIENT
Start: 2021-12-19 | End: 2021-12-31 | Stop reason: HOSPADM

## 2021-12-19 RX ORDER — LIDOCAINE HYDROCHLORIDE 10 MG/ML
5 INJECTION, SOLUTION EPIDURAL; INFILTRATION; INTRACAUDAL; PERINEURAL ONCE
Status: COMPLETED | OUTPATIENT
Start: 2021-12-19 | End: 2021-12-19

## 2021-12-19 RX ADMIN — INSULIN HUMAN 5 UNITS: 100 INJECTION, SOLUTION PARENTERAL at 12:39

## 2021-12-19 RX ADMIN — MEROPENEM 500 MG: 500 INJECTION, POWDER, FOR SOLUTION INTRAVENOUS at 10:21

## 2021-12-19 RX ADMIN — POLYETHYLENE GLYCOL 3350 17 G: 17 POWDER, FOR SOLUTION ORAL at 08:39

## 2021-12-19 RX ADMIN — HEPARIN SODIUM 5000 UNITS: 5000 INJECTION INTRAVENOUS; SUBCUTANEOUS at 08:40

## 2021-12-19 RX ADMIN — SODIUM CHLORIDE: 9 INJECTION, SOLUTION INTRAVENOUS at 04:05

## 2021-12-19 RX ADMIN — ONDANSETRON 4 MG: 2 INJECTION INTRAMUSCULAR; INTRAVENOUS at 23:23

## 2021-12-19 RX ADMIN — VANCOMYCIN HYDROCHLORIDE 1500 MG: 10 INJECTION, POWDER, LYOPHILIZED, FOR SOLUTION INTRAVENOUS at 08:36

## 2021-12-19 RX ADMIN — PANTOPRAZOLE SODIUM 40 MG: 40 TABLET, DELAYED RELEASE ORAL at 16:23

## 2021-12-19 RX ADMIN — ONDANSETRON 4 MG: 2 INJECTION INTRAMUSCULAR; INTRAVENOUS at 15:22

## 2021-12-19 RX ADMIN — DOCUSATE SODIUM 100 MG: 100 CAPSULE ORAL at 23:32

## 2021-12-19 RX ADMIN — PANTOPRAZOLE SODIUM 40 MG: 40 TABLET, DELAYED RELEASE ORAL at 06:36

## 2021-12-19 RX ADMIN — INSULIN HUMAN 5 UNITS: 100 INJECTION, SOLUTION PARENTERAL at 08:39

## 2021-12-19 RX ADMIN — ONDANSETRON 4 MG: 2 INJECTION INTRAMUSCULAR; INTRAVENOUS at 08:43

## 2021-12-19 RX ADMIN — Medication 10 ML: at 23:26

## 2021-12-19 RX ADMIN — LIDOCAINE HYDROCHLORIDE 5 ML: 10 INJECTION, SOLUTION EPIDURAL; INFILTRATION; INTRACAUDAL; PERINEURAL at 23:36

## 2021-12-19 RX ADMIN — METOCLOPRAMIDE HYDROCHLORIDE 10 MG: 5 INJECTION INTRAMUSCULAR; INTRAVENOUS at 23:25

## 2021-12-19 RX ADMIN — INSULIN GLARGINE 35 UNITS: 100 INJECTION, SOLUTION SUBCUTANEOUS at 08:39

## 2021-12-19 RX ADMIN — METOCLOPRAMIDE HYDROCHLORIDE 10 MG: 5 INJECTION INTRAMUSCULAR; INTRAVENOUS at 16:23

## 2021-12-19 RX ADMIN — HEPARIN SODIUM 5000 UNITS: 5000 INJECTION INTRAVENOUS; SUBCUTANEOUS at 22:05

## 2021-12-19 RX ADMIN — FLUCONAZOLE 100 MG: 2 INJECTION, SOLUTION INTRAVENOUS at 10:26

## 2021-12-19 RX ADMIN — METOCLOPRAMIDE HYDROCHLORIDE 10 MG: 5 INJECTION INTRAMUSCULAR; INTRAVENOUS at 10:26

## 2021-12-19 RX ADMIN — MEROPENEM 500 MG: 500 INJECTION, POWDER, FOR SOLUTION INTRAVENOUS at 23:31

## 2021-12-19 RX ADMIN — INSULIN GLARGINE 35 UNITS: 100 INJECTION, SOLUTION SUBCUTANEOUS at 22:02

## 2021-12-19 RX ADMIN — SODIUM CHLORIDE, PRESERVATIVE FREE 10 ML: 5 INJECTION INTRAVENOUS at 23:25

## 2021-12-19 RX ADMIN — MEROPENEM 500 MG: 500 INJECTION, POWDER, FOR SOLUTION INTRAVENOUS at 04:03

## 2021-12-19 RX ADMIN — FENOFIBRATE 160 MG: 160 TABLET ORAL at 08:39

## 2021-12-19 RX ADMIN — MEROPENEM 500 MG: 500 INJECTION, POWDER, FOR SOLUTION INTRAVENOUS at 16:24

## 2021-12-19 RX ADMIN — INSULIN HUMAN 2 UNITS: 100 INJECTION, SOLUTION PARENTERAL at 08:39

## 2021-12-19 RX ADMIN — MORPHINE SULFATE 2 MG: 2 INJECTION, SOLUTION INTRAMUSCULAR; INTRAVENOUS at 10:27

## 2021-12-19 RX ADMIN — DOCUSATE SODIUM 100 MG: 100 CAPSULE ORAL at 08:39

## 2021-12-19 ASSESSMENT — PAIN DESCRIPTION - PAIN TYPE: TYPE: ACUTE PAIN;CHRONIC PAIN

## 2021-12-19 ASSESSMENT — PAIN DESCRIPTION - PROGRESSION
CLINICAL_PROGRESSION: NOT CHANGED

## 2021-12-19 ASSESSMENT — PAIN DESCRIPTION - FREQUENCY: FREQUENCY: CONTINUOUS

## 2021-12-19 ASSESSMENT — PAIN DESCRIPTION - DESCRIPTORS: DESCRIPTORS: ACHING;DISCOMFORT

## 2021-12-19 ASSESSMENT — PAIN SCALES - GENERAL
PAINLEVEL_OUTOF10: 6
PAINLEVEL_OUTOF10: 0
PAINLEVEL_OUTOF10: 0

## 2021-12-19 ASSESSMENT — PAIN DESCRIPTION - ONSET: ONSET: ON-GOING

## 2021-12-19 ASSESSMENT — PAIN SCALES - WONG BAKER
WONGBAKER_NUMERICALRESPONSE: 0

## 2021-12-19 ASSESSMENT — PAIN - FUNCTIONAL ASSESSMENT: PAIN_FUNCTIONAL_ASSESSMENT: PREVENTS OR INTERFERES SOME ACTIVE ACTIVITIES AND ADLS

## 2021-12-19 ASSESSMENT — PAIN DESCRIPTION - LOCATION: LOCATION: ABDOMEN;BACK

## 2021-12-19 NOTE — PROGRESS NOTES
Nephrology Progress Note   [facility]. [facility]      Chief Complaint: Nausea/vomiting/pancreatitis    History of Present Illness: Mr Williams Wiley is a is a 54 y.o. male with no significant past medical history who presents to [facility] with increased thirst, urination, nausea, and vomiting. He was noted to have a BS level of 900 mg/dl. CT scan abdomen and pelvis revealed severe acute Pancreatitis. He was Hyperkalemic, with serum HCO level of 5 and creatinine of 3.4 mg/dl. Patient was started on insulin gtt,treated for DKA     Subjective:    Resting in bed; diet advanced to clears; not feeling great today ; no shortness of breath    ROS: as above      Scheduled Meds:   metoclopramide  10 mg IntraVENous Q6H    insulin regular  0-12 Units IntraVENous 4x Daily AC & HS    meropenem  500 mg IntraVENous Q6H    fluconazole  100 mg IntraVENous Q24H    vancomycin (VANCOCIN) intermittent dosing (placeholder)   Other RX Placeholder    polyethylene glycol  17 g Oral Daily    insulin regular  5 Units IntraVENous TID WC    insulin glargine  35 Units SubCUTAneous BID    pantoprazole  40 mg Oral BID AC    docusate sodium  100 mg Oral BID    fenofibrate  160 mg Oral Daily    heparin (porcine)  5,000 Units SubCUTAneous BID        sodium chloride 75 mL/hr at 21 0405    dextrose         PRN Meds:.ondansetron, morphine **OR** morphine, glucose, dextrose, glucagon (rDNA), dextrose, dextrose, magnesium sulfate, sodium phosphate IVPB **OR** sodium phosphate IVPB **OR** sodium phosphate IVPB    Physical Exam:    TEMPERATURE:  Current - Temp: 98.2 °F (36.8 °C);  Max - Temp  Av.3 °F (36.8 °C)  Min: 98 °F (36.7 °C)  Max: 99 °F (37.2 °C)  RESPIRATIONS RANGE: Resp  Av  Min: 16  Max: 22  PULSE RANGE: Pulse  Av.6  Min: 116  Max: 134  BLOOD PRESSURE RANGE:  Systolic (23QCP), NXW:635 , Min:98 , NBT:237   ; Diastolic (67TOU), VFX:27, Min:67, Max:90    24HR INTAKE/OUTPUT:      Intake/Output Summary (Last 24 hours) at 12/19/2021 1030  Last data filed at 12/19/2021 0313  Gross per 24 hour   Intake 360 ml   Output 801 ml   Net -441 ml         Patient Vitals for the past 96 hrs (Last 3 readings):   Weight   12/19/21 0312 (!) 324 lb 1.2 oz (147 kg)   12/18/21 0306 (!) 324 lb 1.2 oz (147 kg)   12/17/21 0334 (!) 325 lb 6.4 oz (147.6 kg)       General: Alert, Awake, NAD, Obese  HEENT: Normocephalic, atraumatic, Nose and ears appear externally without deformity, MMM  Neck: No Thyromegaly, Trachea is midline, No Carotid bruit  Chest: clear to auscultation, no intercostal retractions  CVS: RRR, no murmur, no rub  Abdomen: distended, generalized tenderness, no bruit appreciated  Extremities: no edema, no cyanosis.   Skin: normal texture, normal skin turgor, no rash  Musculoskeletal: normal ROM, no joint swelling, no visible deformity  Neurological: CN intact, no focal motor neurological deficit  Psych: normal affect        LAB DATA:    CBC:   Lab Results   Component Value Date    WBC 23.6 12/19/2021    RBC 3.59 12/19/2021    HGB 10.7 12/19/2021    HCT 32.9 12/19/2021    MCV 91.7 12/19/2021    MCH 29.8 12/19/2021    MCHC 32.5 12/19/2021    RDW 13.6 12/19/2021     12/19/2021    MPV 9.0 12/19/2021     BMP:    Lab Results   Component Value Date     12/19/2021    K 3.9 12/19/2021    K 4.2 12/12/2021    CL 91 12/19/2021    CO2 27 12/19/2021    BUN 18 12/19/2021    CREATININE 1.4 12/19/2021    CALCIUM 8.1 12/19/2021    GFRAA >60 12/19/2021    LABGLOM 54 12/19/2021    GLUCOSE 162 12/19/2021     Ionized Calcium:  No results found for: IONCA  Magnesium:    Lab Results   Component Value Date    MG 1.80 12/15/2021     Phosphorus:    Lab Results   Component Value Date    PHOS 2.6 12/19/2021     U/A:    Lab Results   Component Value Date    COLORU YELLOW 12/12/2021    PHUR 5.0 12/12/2021    WBCUA 2 12/12/2021    RBCUA 0-2 12/12/2021    CLARITYU CLOUDY 12/12/2021    SPECGRAV 1.023 12/12/2021    LEUKOCYTESUR Negative 12/12/2021    UROBILINOGEN 0.2 12/12/2021    BILIRUBINUR MODERATE 12/12/2021    BLOODU LARGE 12/12/2021    GLUCOSEU >=1000 12/12/2021         IMPRESSION/RECOMMENDATIONS:      Active Problems:    DKA, type 2, not at goal [facility])    Acute pancreatitis    Acute kidney injury ([facility] [address]  Resolved Problems:    * No resolved hospital problems. *    1. CHAPIN - likely prerenal/ATN - occurring in the setting of DKA/pancreatitis  - Continue IVFuntil taking po well then would stop    - creatinine is trending down   - CT scan on admission did not reveal Hydronephrosis  - avoid exposure to Nephrotoxic agents    2. Critical life threatening acidosis   - Secondary to DKA and CHAPIN  - improved stop the Bicarb IV     3. Hyperkalemia - Resolved    4.  Pancreatitis - management per Medicine

## 2021-12-19 NOTE — PROGRESS NOTES
Hospitalist Progress Note      PCP: [id number] [place] [address]    Date of Admission: 12/12/2021    Chief Complaint: increased thirst, urination, nausea, vomiting    Hospital Course: The patient is a 54 y.o. male with no significant past medical history who presents to [facility] with increased thirst, urination, nausea, and vomiting. Patient stated that over the past several days, he has had intractable nausea, vomiting, with increased thirst and urination. He denies fever, chills, chest pain, shortness of breath, constipation, diarrhea, and dysuria.     In the ED, labs were significant for a sodium of 123, chloride of 77, bicarb of 4, BUN/Cr of 35/3.2, glucose of 900, lactic acid of 8.5, WBC count of 20.1K with a pH of 6.831 and beta-hydroxyturate > 8. CXR showed no acute disease. Subjective:     Some better today. Remains on O2 4-6l/min. Pain improved. Tolerating some liquid PO.        Medications:  Reviewed    Infusion Medications    sodium chloride 75 mL/hr at 12/19/21 0405    dextrose       Scheduled Medications    metoclopramide  10 mg IntraVENous Q6H    insulin regular  0-12 Units IntraVENous 4x Daily AC & HS    meropenem  500 mg IntraVENous Q6H    fluconazole  100 mg IntraVENous Q24H    vancomycin (VANCOCIN) intermittent dosing (placeholder)   Other RX Placeholder    polyethylene glycol  17 g Oral Daily    insulin regular  5 Units IntraVENous TID WC    insulin glargine  35 Units SubCUTAneous BID    pantoprazole  40 mg Oral BID AC    docusate sodium  100 mg Oral BID    fenofibrate  160 mg Oral Daily    heparin (porcine)  5,000 Units SubCUTAneous BID     PRN Meds: ondansetron, morphine **OR** morphine, glucose, dextrose, glucagon (rDNA), dextrose, dextrose, magnesium sulfate, sodium phosphate IVPB **OR** sodium phosphate IVPB **OR** sodium phosphate IVPB      Intake/Output Summary (Last 24 hours) at 12/19/2021 1237  Last data filed at 12/19/2021 1021  Gross per 24 hour Intake 720 ml   Output 200 ml   Net 520 ml       Exam:    /78   Pulse 118   Temp 98.8 °F (37.1 °C) (Oral)   Resp 18   Ht 6' 1\" (1.854 m)   Wt (!) 324 lb 1.2 oz (147 kg)   SpO2 94%   BMI 42.76 kg/m²     General appearance: No apparent distress, appears stated age and cooperative. HEENT: Pupils equal, round, and reactive to light. Conjunctivae/corneas clear. Neck: Supple, with full range of motion. No jugular venous distention. Trachea midline. Respiratory:  Normal respiratory effort. Clear to auscultation, bilaterally without Rales/Wheezes/Rhonchi. Cardiovascular: Regular rate and rhythm with normal S1/S2 without murmurs, rubs or gallops. Abdomen: Soft, non-tender, non-distended with normal bowel sounds. Musculoskeletal: No clubbing, cyanosis or edema bilaterally. Full range of motion without deformity. Skin: Skin color, texture, turgor normal.  No rashes or lesions. Neurologic:  Neurovascularly intact without any focal sensory/motor deficits. Cranial nerves: II-XII intact, grossly non-focal.  Psychiatric: Alert and oriented, thought content appropriate, normal insight  Capillary Refill: Brisk,< 3 seconds   Peripheral Pulses: +2 palpable, equal bilaterally       Labs:   Recent Labs     12/17/21  0531 12/18/21  0535 12/19/21  0505   WBC 19.0* 23.5* 23.6*   HGB 9.8* 11.3* 10.7*   HCT 29.9* 35.4* 32.9*    252 245     Recent Labs     12/18/21  0535 12/18/21  2058 12/19/21  0505   * 133* 133*   K 4.2 3.9 3.9   CL 91* 92* 91*   CO2 22 27 27   BUN 22* 20 18   CREATININE 1.5* 1.4* 1.4*   CALCIUM 7.9* 8.1* 8.1*   PHOS 3.3 2.4* 2.6     No results for input(s): AST, ALT, BILIDIR, BILITOT, ALKPHOS in the last 72 hours. No results for input(s): INR in the last 72 hours. No results for input(s): Gaytan Sol in the last 72 hours.     Urinalysis:      Lab Results   Component Value Date    NITRU Negative 12/12/2021    WBCUA 2 12/12/2021    RBCUA 0-2 12/12/2021    BLOODU LARGE 12/12/2021 SPECGRAV 1.023 12/12/2021    GLUCOSEU >=1000 12/12/2021       Radiology:  XR CHEST PORTABLE   Final Result   New retrocardiac left lower lobe opacification, possibly infiltrate or   atelectasis. CT ABDOMEN PELVIS WO CONTRAST Additional Contrast? Oral   Final Result   Persistent but decreased peripancreatic fluid and edema, in keeping with the   clinical diagnosis of pancreatitis      Increased body wall anasarca with increased pleural-parenchymal disease at   the lung bases, likely due to fluid overload      Fatty liver. Diverticulosis and normal caliber appendix      Subtle increased density is seen in the right femoral vein. It is uncertain   as whether not this is artifactual or due to a small amount of thrombus. Recommend correlation with lower extremity venous duplex ultrasound      RECOMMENDATIONS:   Unavailable         VL Extremity Venous Right   Final Result      US GALLBLADDER RUQ   Final Result   Pancreas not well visualized. This is likely due to the peripancreatic   inflammatory changes seen on recent CT. Hepatic steatosis. CT ABDOMEN PELVIS WO CONTRAST Additional Contrast? None   Final Result   1. Severe acute interstitial pancreatitis. No focal fluid collection. 2. Hepatomegaly with severe steatosis. 3. Possible gallbladder sludge. 4. Colonic diverticulosis without acute diverticulitis. 5. The distal esophagus is dilated and filled with fluid. RECOMMENDATIONS:   Unavailable         XR CHEST PORTABLE   Final Result   No radiographic evidence of acute pulmonary disease.                  Assessment/Plan:    Active Hospital Problems    Diagnosis Date Noted    Acute kidney injury ([address] [N17.9]     Acute pancreatitis [K85.90]     DKA, type 2, not at goal [facility]) [E11.10] 12/12/2021       DKA resolved - suspect undiagnosed type 2 DM  -check Hgb A1c - 12.4  -NPO - to full liquid to low fat diet per GI - pt not tolerating much PO  -DKA protocol completed  -insulin gtt - to SQ insulin  - check CARI and islet cell Abs   - lantus 35BID. - on 12/18 I switched him to IV Humulin sliding scale and 5 units prand bolus if eating and BG appears to be responding well - will continue with IV insulin for today.        Anion gap metabolic acidosis likely 2/2 DKA  -management as above       Severe interstitial pancreatitis  - diet as above  - s/p aggressive IVF   - IV merrem due to ongoing fevers - fever now resolved. -GI and general surgery consulted  -check RUQ U/S - pancreas not well visualized due to surrounding inflammatory changes. - pt reports 3 beers and hard liquor shots prior to onset of pancreatitis, but denies chronic alcohol abuse. - repeat CT on 12/16 showed improvement of pancreatitis, but WBC continues to rise   - I resumed him on Vanc, merrem and diflucan and sent repeat blood Cx  On 12/18.        Septic Shock - resolved   Required levophed short term early in admission.   - see abx as above.          CHAPIN - Cr improving slowly  He continues on IVF with bicarb - switched to NS per nephrology team.    S/p single dose lasix on 12/17 with good effect. - avoid nephrotoxic medications  - nephrology following      Coffee ground emesis  -cont PPI  -GI following  -Hgb stable       Obesity - BMI 38.68  -nutritional counseling provided  -weight loss encouraged  -complicating medical management      Hypocalcemia - s/p IV replete. Improved. DVT Prophylaxis: SCDs  Diet: ADULT DIET; Regular; 4 carb choices (60 gm/meal); Low Fat/Low Chol/High Fiber/OCTAVIANO  Code Status: Full Code      Dispo - continue care.        Gigi Turner MD

## 2021-12-19 NOTE — PLAN OF CARE
Problem: Falls - Risk of:  Goal: Will remain free from falls  Description: Will remain free from falls  12/19/2021 1036 by Yin Landeros RN  Outcome: Ongoing  12/19/2021 0045 by Eduard Hull RN  Outcome: Met This Shift  Note: Fall risk assessment completed every shift. All precautions in place. Pt has call light within reach at all times. Room clear of clutter. Pt aware to call for assistance when getting up. Bed alarm on. No falls this shift. Goal: Absence of physical injury  Description: Absence of physical injury  12/19/2021 1036 by Yin Landeros RN  Outcome: Ongoing  12/19/2021 0045 by Eduard Hull RN  Outcome: Met This Shift     Problem: Pain:  Goal: Pain level will decrease  Description: Pain level will decrease  12/19/2021 1036 by Yin Landeros RN  Outcome: Ongoing  12/19/2021 0045 by Eduard Hull RN  Outcome: Ongoing  Note: Pain level and characteristics of pain assessed. Patient included in decisions related to pain management. Pain medications given as ordered after other non-medication management options have been attempted. Effectiveness of pain medications assessed and ineffective pain management reported to MD as needed. Goal: Control of acute pain  Description: Control of acute pain  Outcome: Ongoing  Goal: Control of chronic pain  Description: Control of chronic pain  Outcome: Ongoing     Problem: Discharge Planning:  Goal: Participates in care planning  Description: Participates in care planning  12/19/2021 1036 by Yin Landeros RN  Outcome: Ongoing  12/19/2021 0045 by Eduard Hull RN  Outcome: Ongoing  Goal: Discharged to appropriate level of care  Description: Discharged to appropriate level of care  12/19/2021 1036 by Yin Landeros RN  Outcome: Ongoing  12/19/2021 0045 by Eduard Hull RN  Outcome: Ongoing     Problem:  Bowel Function - Altered:  Goal: Bowel elimination is within specified parameters  Description: Bowel elimination is within specified parameters  12/19/2021 1036 by Carlos Anders RN  Outcome: Ongoing  12/19/2021 0045 by Manny Rivera RN  Outcome: Ongoing     Problem: Cardiac Output - Decreased:  Goal: Hemodynamic stability will improve  Description: Hemodynamic stability will improve  12/19/2021 1036 by Carlos Anders RN  Outcome: Ongoing  12/19/2021 0045 by Manny Rivera RN  Outcome: Ongoing     Problem: Fluid Volume - Imbalance:  Goal: Absence of imbalanced fluid volume signs and symptoms  Description: Absence of imbalanced fluid volume signs and symptoms  12/19/2021 1036 by Carlos Anders RN  Outcome: Ongoing  12/19/2021 0045 by Manny Rivera RN  Outcome: Met This Shift  Goal: Will remain free of signs and symptoms of dehydration  Description: Will remain free of signs and symptoms of dehydration  12/19/2021 1036 by Carlos Anders RN  Outcome: Ongoing  12/19/2021 0045 by Manny Rivera RN  Outcome: Ongoing     Problem: Serum Glucose Level - Abnormal:  Goal: Ability to maintain appropriate glucose levels will improve to within specified parameters  Description: Ability to maintain appropriate glucose levels will improve to within specified parameters  12/19/2021 1036 by Carlos Anders RN  Outcome: Ongoing  12/19/2021 0045 by Manny Rivera RN  Outcome: Ongoing  Goal: Ability to maintain appropriate glucose levels will improve  Description: Ability to maintain appropriate glucose levels will improve  12/19/2021 1036 by Carlos Anders RN  Outcome: Ongoing  12/19/2021 0045 by Manny Rivera RN  Outcome: Ongoing     Problem: Tissue Perfusion, Altered:  Goal: Circulatory function within specified parameters  Description: Circulatory function within specified parameters  12/19/2021 1036 by Carlos Anders RN  Outcome: Ongoing  12/19/2021 0045 by Manny Rivera RN  Outcome: Ongoing     Problem: Tissue Perfusion - Cardiopulmonary, Altered:  Goal: Absence of angina  Description: Absence of angina  12/19/2021 1036 by Payton Wilson Aparna Eden RN  Outcome: Ongoing  12/19/2021 0045 by Tabby Hu RN  Outcome: Met This Shift  Goal: Hemodynamic stability will improve  Description: Hemodynamic stability will improve  12/19/2021 1036 by Allyn Burnett RN  Outcome: Ongoing  12/19/2021 0045 by Tabby Hu RN  Outcome: Ongoing     Problem: Urinary Elimination:  Goal: Signs and symptoms of infection will decrease  Description: Signs and symptoms of infection will decrease  12/19/2021 1036 by Allyn Burnett RN  Outcome: Ongoing  12/19/2021 0045 by Tabby Hu RN  Outcome: Ongoing  Goal: Complications related to the disease process, condition or treatment will be avoided or minimized  Description: Complications related to the disease process, condition or treatment will be avoided or minimized  12/19/2021 1036 by Allyn Burnett RN  Outcome: Ongoing  12/19/2021 0045 by Tabby Hu RN  Outcome: Ongoing     Problem: Injury - Acid Base Imbalance:  Goal: Acid-base balance  Description: Acid-base balance  12/19/2021 1036 by Allyn Burnett RN  Outcome: Ongoing  12/19/2021 0045 by Tabby Hu RN  Outcome: Met This Shift     Problem: Tissue Perfusion - Renal, Altered:  Goal: Electrolytes within specified parameters  Description: Electrolytes within specified parameters  12/19/2021 1036 by Allyn Burnett RN  Outcome: Ongoing  12/19/2021 0045 by Tabby Hu RN  Outcome: Ongoing  Goal: Urine creatinine clearance will be within specified parameters  Description: Urine creatinine clearance will be within specified parameters  12/19/2021 1036 by Allyn Burnett RN  Outcome: Ongoing  12/19/2021 0045 by Tabby Hu RN  Outcome: Ongoing  Goal: Serum creatinine will be within specified parameters  Description: Serum creatinine will be within specified parameters  12/19/2021 1036 by Allyn Burnett RN  Outcome: Ongoing  12/19/2021 0045 by Tabby Hu RN  Outcome: Ongoing  Goal: Ability to achieve a balanced intake and output will improve  Description: Ability to achieve a balanced intake and output will improve  12/19/2021 1036 by Kiko Rodríguez RN  Outcome: Ongoing  12/19/2021 0045 by Cindy Hammond RN  Outcome: Ongoing

## 2021-12-19 NOTE — PLAN OF CARE
Problem: Falls - Risk of:  Goal: Will remain free from falls  12/19/2021 0045 by Suzi Andrade RN  Outcome: Met This Shift  Note: Fall risk assessment completed every shift. All precautions in place. Pt has call light within reach at all times. Room clear of clutter. Pt aware to call for assistance when getting up. Bed alarm on. No falls this shift. Problem: Falls - Risk of:  Goal: Absence of physical injury  12/19/2021 0045 by Suzi Andrade RN  Outcome: Met This Shift     Problem: Fluid Volume - Imbalance:  Goal: Absence of imbalanced fluid volume signs and symptoms  12/19/2021 0045 by Suzi Andrade RN  Outcome: Met This Shift     Problem: Injury - Acid Base Imbalance:  Goal: Acid-base balance  12/19/2021 0045 by Suzi Andrade RN  Outcome: Met This Shift     Problem: Pain:  Goal: Pain level will decrease  12/19/2021 0045 by Suzi Andrade RN  Outcome: Ongoing  Note: Pain level and characteristics of pain assessed. Patient included in decisions related to pain management. Pain medications given as ordered after other non-medication management options have been attempted. Effectiveness of pain medications assessed and ineffective pain management reported to MD as needed.       Problem: Cardiac Output - Decreased:  Goal: Hemodynamic stability will improve  12/19/2021 0045 by Suzi Andrade RN  Outcome: Ongoing     Problem: Serum Glucose Level - Abnormal:  Goal: Ability to maintain appropriate glucose levels will improve to within specified parameters  12/19/2021 0045 by Suzi Andrade RN  Outcome: Ongoing     Problem: Tissue Perfusion, Altered:  Goal: Circulatory function within specified parameters  12/19/2021 0045 by Suzi Andrade RN  Outcome: Ongoing     Problem: Urinary Elimination:  Goal: Complications related to the disease process, condition or treatment will be avoided or minimized  12/19/2021 0045 by Suzi Andrade RN  Outcome: Ongoing     Problem: Tissue Perfusion - Renal, Altered:  Goal: Serum creatinine will be within specified parameters  12/19/2021 0045 by Isra Bernard RN  Outcome: Ongoing

## 2021-12-19 NOTE — PROGRESS NOTES
[facility] General Surgery                                   Daily Progress Note                                                         Pt Name: Mauricio Ormond  Medical Record Number: [id number]  Date of Birth 1970   Today's Date: 12/19/2021  Chief Complaint   Patient presents with    Shortness of Breath     trouble breathing since today. Feeling unwell since yesterday. EMS blood glucose 548. Pt denies DM history. ASSESSMENT/PLAN  A/P: 55 yo male with DKA, severe acute pancreatitis, acute kidney injury, shock - resolved     -WBC stabilized today and hopefully at caryn. Continue supportive care, abx. May need to repeat CT to eval for necrosis if continues to worsen. No other obvious source at this time  -low fat diet per GI.    -continue miralax          SUBJECTIVE  Can Carter is resting in bed, no acute distress. Tolerated PO yesterday. Passing flatus. OBJECTIVE  VITALS:  height is 6' 1\" (1.854 m) and weight is 324 lb 1.2 oz (147 kg) (abnormal). His oral temperature is 98.2 °F (36.8 °C). His blood pressure is 115/73 and his pulse is 116. His respiration is 16 and oxygen saturation is 90%. INTAKE/OUTPUT:      Intake/Output Summary (Last 24 hours) at 12/19/2021 1050  Last data filed at 12/19/2021 1021  Gross per 24 hour   Intake 720 ml   Output 200 ml   Net 520 ml     GENERAL: alert, cooperative, no distress  LUNGS: clear to ausculation, without wheezes, rales or rhonci  HEART: normal rate and regular rhythm  ABDOMEN: Soft,  non distended. Mild to moderate periumbilical pain with palpation. EXTREMITY: no cyanosis, clubbing or edema    I/O last 3 completed shifts:   In: 360 [P.O.:360]  Out: 801 [Urine:800; Emesis/NG output:1]      LABS  Recent Labs     12/19/21  0505   WBC 23.6*   HGB 10.7*   HCT 32.9*      *   K 3.9   CL 91*   CO2 27   BUN 18   CREATININE 1.4*   PHOS 2.6   CALCIUM 8.1*       EDUCATION  Patient educated about Disease Process, Medications, Smoking Cessation, Oxygenation, Incentive Spirometry and Deep Breath and Cough, Diabetes, Hyperlipidemia, Smoking Cessation, Nutrition, Exercise and Hypertension    Electronically signed by Yue Adams MD on 12/19/2021 at [address] [facility]   [phone] Office  [phone] Fax

## 2021-12-19 NOTE — PROGRESS NOTES
INPATIENT PROGRESS NOTE        IDENTIFYING DATA/REASON FOR CONSULTATION   PATIENT:  Angela Stubbs  MRN:  [id number]  ADMIT DATE: 2021  TIME OF EVALUATION: 2021 9:53 AM  HOSPITAL STAY:   LOS: 7 days   CONSULTING PHYSICIAN: Andreia Hartley MD   REASON FOR CONSULTATION: Severe pancreatitis    Subjective:    He reports persistent nausea. Limited PO intake due to nausea. No severe pain. MEDICATIONS   SCHEDULED:  vancomycin, 1,500 mg, Once  insulin regular, 0-12 Units, 4x Daily AC & HS  meropenem, 500 mg, Q6H  fluconazole, 100 mg, Q24H  vancomycin (VANCOCIN) intermittent dosing (placeholder), , RX Placeholder  polyethylene glycol, 17 g, Daily  insulin regular, 5 Units, TID WC  insulin glargine, 35 Units, BID  pantoprazole, 40 mg, BID AC  docusate sodium, 100 mg, BID  fenofibrate, 160 mg, Daily  heparin (porcine), 5,000 Units, BID      FLUIDS/DRIPS:     sodium chloride 75 mL/hr at 21 0405    dextrose       PRNs: ondansetron, 4 mg, Q4H PRN  morphine, 2 mg, Q4H PRN   Or  morphine, 4 mg, Q4H PRN  glucose, 15 g, PRN  dextrose, 12.5 g, PRN  glucagon (rDNA), 1 mg, PRN  dextrose, 100 mL/hr, PRN  dextrose, 12.5 g, PRN  magnesium sulfate, 1,000 mg, PRN  sodium phosphate IVPB, 10 mmol, PRN   Or  sodium phosphate IVPB, 15 mmol, PRN   Or  sodium phosphate IVPB, 20 mmol, PRN      ALLERGIES:  No Known Allergies      PHYSICAL EXAM   VITALS:  /73   Pulse 116   Temp 98.2 °F (36.8 °C) (Oral)   Resp 16   Ht 6' 1\" (1.854 m)   Wt (!) 324 lb 1.2 oz (147 kg)   SpO2 90%   BMI 42.76 kg/m²   TEMPERATURE:  Current - Temp: 98.2 °F (36.8 °C);  Max - Temp  Av.3 °F (36.8 °C)  Min: 98 °F (36.7 °C)  Max: 99 °F (37.2 °C)    Physical Exam:  General appearance: alert, cooperative, no distress, appears stated age  Eyes: Anicteric  Head: Normocephalic, without obvious abnormality  Lungs: clear to auscultation bilaterally, Normal Effort  Heart: regular rate and rhythm, normal S1 and S2, no murmurs or rubs  Abdomen: soft, non-distended, non-tender. Bowel sounds normal.   Extremities: atraumatic, no cyanosis or edema  Skin: warm and dry, no jaundice  Neuro: Grossly intact, A&OX3    LABS AND IMAGING   Laboratory   Recent Labs     12/17/21  0531 12/18/21  0535 12/19/21  0505   WBC 19.0* 23.5* 23.6*   HGB 9.8* 11.3* 10.7*   HCT 29.9* 35.4* 32.9*   MCV 91.1 91.5 91.7    252 245     Recent Labs     12/17/21  2111 12/18/21  0535 12/18/21  2058   * 133* 133*   K 3.8 4.2 3.9   CL 95* 91* 92*   CO2 26 22 27   PHOS 2.4* 3.3 2.4*   BUN 22* 22* 20   CREATININE 1.7* 1.5* 1.4*     No results for input(s): AST, ALT, ALB, BILIDIR, BILITOT, ALKPHOS in the last 72 hours. No results for input(s): LIPASE, AMYLASE in the last 72 hours. No results for input(s): PROTIME, INR in the last 72 hours. Imaging  XR CHEST PORTABLE   Final Result   New retrocardiac left lower lobe opacification, possibly infiltrate or   atelectasis. CT ABDOMEN PELVIS WO CONTRAST Additional Contrast? Oral   Final Result   Persistent but decreased peripancreatic fluid and edema, in keeping with the   clinical diagnosis of pancreatitis      Increased body wall anasarca with increased pleural-parenchymal disease at   the lung bases, likely due to fluid overload      Fatty liver. Diverticulosis and normal caliber appendix      Subtle increased density is seen in the right femoral vein. It is uncertain   as whether not this is artifactual or due to a small amount of thrombus. Recommend correlation with lower extremity venous duplex ultrasound      RECOMMENDATIONS:   Unavailable         VL Extremity Venous Right   Final Result      US GALLBLADDER RUQ   Final Result   Pancreas not well visualized. This is likely due to the peripancreatic   inflammatory changes seen on recent CT. Hepatic steatosis. CT ABDOMEN PELVIS WO CONTRAST Additional Contrast? None   Final Result   1. Severe acute interstitial pancreatitis.   No focal fluid collection. 2. Hepatomegaly with severe steatosis. 3. Possible gallbladder sludge. 4. Colonic diverticulosis without acute diverticulitis. 5. The distal esophagus is dilated and filled with fluid. RECOMMENDATIONS:   Unavailable         XR CHEST PORTABLE   Final Result   No radiographic evidence of acute pulmonary disease. Endoscopy      ASSESSMENT AND RECOMMENDATIONS   Roosevelt Garcia is a 53 YO Male who presents with nausea, vomiting, and increase thrist. Patient was found to be in DKA with BG over 900.  Patient was found to have acute pancreatitis based on Lipase/CT results. Acute pancreatitis- Concern related to hypertriglyceridemia. Triglyceride level was 396 and typically level needs to be >500 to cause pancreatitis but may have been higher prior to [address] normal GB. Normal LFT. He denies any significant chronic alcohol use and denies any medications. 1. Acute pancreatitis  -lipase >3000. CT with severe acute interstitial pancreatitis.  No focal fluid collection. Etiology unclear, was found with TG levels of 361. Usually this would not be significant enough to cause this degree of pancreatitis but wonder if it was higher prior to starting him on insulin. Ca normal.  CT with gallbladder sludge and lfts mildly elevated however US shows normal gallbladder and bile ducts, no stones  2. Coffee ground emesis  -1 episode. No melena.  hgb 13.3-10.9->9.9, now stable at 10.0. Monitor for now. defer EGD until more stable unless worsening bleeding. 3. DKA: off insulin gtt  4. CHAPIN  5. Shock: weaned off pressors. On Vanc and merrem. RECOMMENDATIONS:    -Continue low fat diet. Will add Reglan in case any component of Gastroparesis contributing. Continue Fenofibrate. We discussed if poor PO intake persist will need to place NG tube. If persists would also consider repeat imaging to exclude any gastric outlet obstruction secondary to pancreatitis. -WBC elevated/stable. No fevers past 24 hours. Repeat infectious work-up sent by primary team.   We may need to repeat CT to evaluate for necrosis if no other sources noted. He did have positive blood cultures but suspected to be contaminant. It is too early for necrosis to become infected (typically occurs after 10 days of onset of symptoms). Need nutrition to maintain gut integrity and prevent translocation of bacteria.   -Continue PPI PO BID for coffee ground emesis. H/H stable. -DKA management per Primary team     Thank you for allowing me to participate in this patient's care. If there are any questions or concerns regarding this patient, or the plan we have set in place, please feel free to contact me at [phone].      Judge Dwight MD

## 2021-12-20 ENCOUNTER — APPOINTMENT (OUTPATIENT)
Dept: CT IMAGING | Age: 51
DRG: 871 | End: 2021-12-20
Payer: COMMERCIAL

## 2021-12-20 LAB
ALBUMIN SERPL-MCNC: 2.2 G/DL (ref 3.4–5)
ALBUMIN SERPL-MCNC: 2.4 G/DL (ref 3.4–5)
ALP BLD-CCNC: 119 U/L (ref 40–129)
ALT SERPL-CCNC: 16 U/L (ref 10–40)
ANION GAP SERPL CALCULATED.3IONS-SCNC: 11 MMOL/L (ref 3–16)
AST SERPL-CCNC: 30 U/L (ref 15–37)
BANDED NEUTROPHILS RELATIVE PERCENT: 6 % (ref 0–7)
BASOPHILS ABSOLUTE: 0 K/UL (ref 0–0.2)
BASOPHILS RELATIVE PERCENT: 0 %
BILIRUB SERPL-MCNC: 0.5 MG/DL (ref 0–1)
BILIRUBIN DIRECT: 0.3 MG/DL (ref 0–0.3)
BILIRUBIN, INDIRECT: 0.2 MG/DL (ref 0–1)
BUN BLDV-MCNC: 19 MG/DL (ref 7–20)
CALCIUM SERPL-MCNC: 7.9 MG/DL (ref 8.3–10.6)
CHLORIDE BLD-SCNC: 96 MMOL/L (ref 99–110)
CO2: 30 MMOL/L (ref 21–32)
CREAT SERPL-MCNC: 1.5 MG/DL (ref 0.9–1.3)
EOSINOPHILS ABSOLUTE: 0 K/UL (ref 0–0.6)
EOSINOPHILS RELATIVE PERCENT: 0 %
GFR AFRICAN AMERICAN: >60
GFR NON-AFRICAN AMERICAN: 50
GLUCOSE BLD-MCNC: 124 MG/DL (ref 70–99)
GLUCOSE BLD-MCNC: 126 MG/DL (ref 70–99)
GLUCOSE BLD-MCNC: 145 MG/DL (ref 70–99)
GLUCOSE BLD-MCNC: 153 MG/DL (ref 70–99)
GLUCOSE BLD-MCNC: 165 MG/DL (ref 70–99)
HCT VFR BLD CALC: 33.3 % (ref 40.5–52.5)
HEMOGLOBIN: 10.8 G/DL (ref 13.5–17.5)
LIPASE: 117 U/L (ref 13–60)
LV EF: 68 %
LVEF MODALITY: NORMAL
LYMPHOCYTES ABSOLUTE: 0.8 K/UL (ref 1–5.1)
LYMPHOCYTES RELATIVE PERCENT: 3 %
MCH RBC QN AUTO: 29.7 PG (ref 26–34)
MCHC RBC AUTO-ENTMCNC: 32.4 G/DL (ref 31–36)
MCV RBC AUTO: 91.6 FL (ref 80–100)
MONOCYTES ABSOLUTE: 2.3 K/UL (ref 0–1.3)
MONOCYTES RELATIVE PERCENT: 9 %
NEUTROPHILS ABSOLUTE: 23 K/UL (ref 1.7–7.7)
NEUTROPHILS RELATIVE PERCENT: 82 %
PDW BLD-RTO: 13.9 % (ref 12.4–15.4)
PERFORMED ON: ABNORMAL
PHOSPHORUS: 2.7 MG/DL (ref 2.5–4.9)
PLATELET # BLD: 266 K/UL (ref 135–450)
PLATELET SLIDE REVIEW: ADEQUATE
PMV BLD AUTO: 8.9 FL (ref 5–10.5)
POTASSIUM SERPL-SCNC: 3.6 MMOL/L (ref 3.5–5.1)
RBC # BLD: 3.63 M/UL (ref 4.2–5.9)
RBC # BLD: NORMAL 10*6/UL
SLIDE REVIEW: ABNORMAL
SMUDGE CELLS: PRESENT
SODIUM BLD-SCNC: 137 MMOL/L (ref 136–145)
TOTAL PROTEIN: 5.6 G/DL (ref 6.4–8.2)
WBC # BLD: 26.1 K/UL (ref 4–11)

## 2021-12-20 PROCEDURE — 2060000000 HC ICU INTERMEDIATE R&B

## 2021-12-20 PROCEDURE — 85025 COMPLETE CBC W/AUTO DIFF WBC: CPT

## 2021-12-20 PROCEDURE — 97116 GAIT TRAINING THERAPY: CPT

## 2021-12-20 PROCEDURE — 80076 HEPATIC FUNCTION PANEL: CPT

## 2021-12-20 PROCEDURE — 6360000002 HC RX W HCPCS: Performed by: SURGERY

## 2021-12-20 PROCEDURE — 6360000002 HC RX W HCPCS: Performed by: INTERNAL MEDICINE

## 2021-12-20 PROCEDURE — 97110 THERAPEUTIC EXERCISES: CPT

## 2021-12-20 PROCEDURE — 6370000000 HC RX 637 (ALT 250 FOR IP): Performed by: SURGERY

## 2021-12-20 PROCEDURE — 2580000003 HC RX 258: Performed by: NURSE PRACTITIONER

## 2021-12-20 PROCEDURE — 6370000000 HC RX 637 (ALT 250 FOR IP): Performed by: PHYSICIAN ASSISTANT

## 2021-12-20 PROCEDURE — 93306 TTE W/DOPPLER COMPLETE: CPT

## 2021-12-20 PROCEDURE — 6370000000 HC RX 637 (ALT 250 FOR IP): Performed by: INTERNAL MEDICINE

## 2021-12-20 PROCEDURE — 80202 ASSAY OF VANCOMYCIN: CPT

## 2021-12-20 PROCEDURE — 2580000003 HC RX 258: Performed by: INTERNAL MEDICINE

## 2021-12-20 PROCEDURE — 80069 RENAL FUNCTION PANEL: CPT

## 2021-12-20 PROCEDURE — 74176 CT ABD & PELVIS W/O CONTRAST: CPT

## 2021-12-20 PROCEDURE — 83690 ASSAY OF LIPASE: CPT

## 2021-12-20 PROCEDURE — 36415 COLL VENOUS BLD VENIPUNCTURE: CPT

## 2021-12-20 RX ADMIN — SODIUM CHLORIDE, PRESERVATIVE FREE 10 ML: 5 INJECTION INTRAVENOUS at 21:41

## 2021-12-20 RX ADMIN — POLYETHYLENE GLYCOL 3350 17 G: 17 POWDER, FOR SOLUTION ORAL at 09:44

## 2021-12-20 RX ADMIN — NALOXEGOL OXALATE 25 MG: 25 TABLET, FILM COATED ORAL at 15:25

## 2021-12-20 RX ADMIN — ONDANSETRON 4 MG: 2 INJECTION INTRAMUSCULAR; INTRAVENOUS at 09:44

## 2021-12-20 RX ADMIN — INSULIN HUMAN 2 UNITS: 100 INJECTION, SOLUTION PARENTERAL at 09:44

## 2021-12-20 RX ADMIN — DOCUSATE SODIUM 100 MG: 100 CAPSULE ORAL at 09:44

## 2021-12-20 RX ADMIN — HEPARIN SODIUM 5000 UNITS: 5000 INJECTION INTRAVENOUS; SUBCUTANEOUS at 09:44

## 2021-12-20 RX ADMIN — SODIUM CHLORIDE: 9 INJECTION, SOLUTION INTRAVENOUS at 21:41

## 2021-12-20 RX ADMIN — FENOFIBRATE 160 MG: 160 TABLET ORAL at 09:44

## 2021-12-20 RX ADMIN — SODIUM CHLORIDE, PRESERVATIVE FREE 10 ML: 5 INJECTION INTRAVENOUS at 09:44

## 2021-12-20 RX ADMIN — INSULIN HUMAN 2 UNITS: 100 INJECTION, SOLUTION PARENTERAL at 12:35

## 2021-12-20 RX ADMIN — METOCLOPRAMIDE HYDROCHLORIDE 10 MG: 5 INJECTION INTRAMUSCULAR; INTRAVENOUS at 18:12

## 2021-12-20 RX ADMIN — FLUCONAZOLE 100 MG: 2 INJECTION, SOLUTION INTRAVENOUS at 10:03

## 2021-12-20 RX ADMIN — Medication 1500 MG: at 03:04

## 2021-12-20 RX ADMIN — INSULIN GLARGINE 35 UNITS: 100 INJECTION, SOLUTION SUBCUTANEOUS at 21:42

## 2021-12-20 RX ADMIN — HEPARIN SODIUM 5000 UNITS: 5000 INJECTION INTRAVENOUS; SUBCUTANEOUS at 21:42

## 2021-12-20 RX ADMIN — METOCLOPRAMIDE HYDROCHLORIDE 10 MG: 5 INJECTION INTRAMUSCULAR; INTRAVENOUS at 12:34

## 2021-12-20 RX ADMIN — INSULIN HUMAN 5 UNITS: 100 INJECTION, SOLUTION PARENTERAL at 09:47

## 2021-12-20 RX ADMIN — DOCUSATE SODIUM 100 MG: 100 CAPSULE ORAL at 21:41

## 2021-12-20 RX ADMIN — INSULIN HUMAN 5 UNITS: 100 INJECTION, SOLUTION PARENTERAL at 18:12

## 2021-12-20 RX ADMIN — INSULIN GLARGINE 35 UNITS: 100 INJECTION, SOLUTION SUBCUTANEOUS at 09:44

## 2021-12-20 RX ADMIN — MEROPENEM 500 MG: 500 INJECTION, POWDER, FOR SOLUTION INTRAVENOUS at 12:34

## 2021-12-20 RX ADMIN — SODIUM CHLORIDE: 9 INJECTION, SOLUTION INTRAVENOUS at 06:54

## 2021-12-20 RX ADMIN — MEROPENEM 500 MG: 500 INJECTION, POWDER, FOR SOLUTION INTRAVENOUS at 18:12

## 2021-12-20 RX ADMIN — METOCLOPRAMIDE HYDROCHLORIDE 10 MG: 5 INJECTION INTRAMUSCULAR; INTRAVENOUS at 05:47

## 2021-12-20 RX ADMIN — MEROPENEM 500 MG: 500 INJECTION, POWDER, FOR SOLUTION INTRAVENOUS at 05:50

## 2021-12-20 RX ADMIN — PANTOPRAZOLE SODIUM 40 MG: 40 TABLET, DELAYED RELEASE ORAL at 15:25

## 2021-12-20 RX ADMIN — INSULIN HUMAN 5 UNITS: 100 INJECTION, SOLUTION PARENTERAL at 12:36

## 2021-12-20 RX ADMIN — ONDANSETRON 4 MG: 2 INJECTION INTRAMUSCULAR; INTRAVENOUS at 03:26

## 2021-12-20 ASSESSMENT — PAIN SCALES - GENERAL
PAINLEVEL_OUTOF10: 0

## 2021-12-20 ASSESSMENT — PAIN SCALES - WONG BAKER
WONGBAKER_NUMERICALRESPONSE: 0

## 2021-12-20 NOTE — PROGRESS NOTES
Nephrology Progress Note   [facility]      Chief Complaint: Nausea/vomiting/pancreatitis    History of Present Illness: Mr Matteo Fagan is a is a 54 y.o. male with no significant past medical history who presents to [facility] with increased thirst, urination, nausea, and vomiting. He was noted to have a BS level of 900 mg/dl. CT scan abdomen and pelvis revealed severe acute Pancreatitis. He was Hyperkalemic, with serum HCO level of 5 and creatinine of 3.4 mg/dl. Patient was started on insulin gtt,treated for DKA     Subjective:    Resting in bed;     ROS: No CP/SOB. Still has  Nausea     No family present      Scheduled Meds:   metoclopramide  10 mg IntraVENous Q6H    vancomycin  1,500 mg IntraVENous Q18H    sodium chloride flush  5-40 mL IntraVENous 2 times per day    insulin regular  0-12 Units IntraVENous 4x Daily AC & HS    meropenem  500 mg IntraVENous Q6H    fluconazole  100 mg IntraVENous Q24H    vancomycin (VANCOCIN) intermittent dosing (placeholder)   Other RX Placeholder    polyethylene glycol  17 g Oral Daily    insulin regular  5 Units IntraVENous TID WC    insulin glargine  35 Units SubCUTAneous BID    pantoprazole  40 mg Oral BID AC    docusate sodium  100 mg Oral BID    fenofibrate  160 mg Oral Daily    heparin (porcine)  5,000 Units SubCUTAneous BID        sodium chloride      sodium chloride 75 mL/hr at 21 0654    dextrose         PRN Meds:.sodium chloride flush, sodium chloride, ondansetron, morphine **OR** morphine, glucose, dextrose, glucagon (rDNA), dextrose, dextrose, magnesium sulfate, sodium phosphate IVPB **OR** sodium phosphate IVPB **OR** sodium phosphate IVPB    Physical Exam:    TEMPERATURE:  Current - Temp: 98.5 °F (36.9 °C);  Max - Temp  Av.6 °F (37 °C)  Min: 98.1 °F (36.7 °C)  Max: 98.7 °F (37.1 °C)  RESPIRATIONS RANGE: Resp  Av  Min: 16  Max: 20  PULSE RANGE: Pulse  Av.7  Min: 110  Max: 122  BLOOD PRESSURE RANGE:  Systolic (50OCM), Av , Min:101 , VXN:406   ; Diastolic (80YHQ), OMM:14, Min:68, Max:80    24HR INTAKE/OUTPUT:      Intake/Output Summary (Last 24 hours) at 2021 1247  Last data filed at 2021 2310  Gross per 24 hour   Intake 1100.23 ml   Output 2600 ml   Net -1499.77 ml         Patient Vitals for the past 96 hrs (Last 3 readings):   Weight   21 0407 (!) 322 lb 5 oz (146.2 kg)   21 0312 (!) 324 lb 1.2 oz (147 kg)   21 0306 (!) 324 lb 1.2 oz (147 kg)       General: in bed   HEENT: Normocephalic, atraumatic  Neck: No Thyromegaly, Trachea is midline  Chest: clear to auscultation  CVS: RRR, no murmur, no rub  Abdomen: distended, generalized tenderness   Extremities: no edema, no cyanosis.   Skin: normal texture, normal skin turgor, no rash  Neurological: not done         LAB DATA:    CBC:   Lab Results   Component Value Date    WBC 26.1 2021    RBC 3.63 2021    HGB 10.8 2021    HCT 33.3 2021    MCV 91.6 2021    MCH 29.7 2021    MCHC 32.4 2021    RDW 13.9 2021     2021    MPV 8.9 2021     BMP:    Lab Results   Component Value Date     2021    K 3.6 2021    K 4.2 2021    CL 96 2021    CO2 30 2021    BUN 19 2021    CREATININE 1.5 2021    CALCIUM 7.9 2021    GFRAA >60 2021    LABGLOM 50 2021    GLUCOSE 145 2021     Ionized Calcium:  No results found for: IONCA  Magnesium:    Lab Results   Component Value Date    MG 1.80 12/15/2021     Phosphorus:    Lab Results   Component Value Date    PHOS 2.7 2021     U/A:    Lab Results   Component Value Date    COLORU YELLOW 2021    PHUR 5.0 2021    WBCUA 2 2021    RBCUA 0-2 2021    CLARITYU CLOUDY 2021    SPECGRAV 1.023 2021    LEUKOCYTESUR Negative 2021    UROBILINOGEN 0.2 2021    BILIRUBINUR MODERATE 2021    BLOODU LARGE 2021    GLUCOSEU >=1000 2021 IMPRESSION/RECOMMENDATIONS:      Active Problems:    DKA, type 2, not at goal [facility])    Acute pancreatitis    Acute kidney injury ([facility] [address]  Resolved Problems:    * No resolved hospital problems. *    1. CHAPIN - likely prerenal/ATN - occurring in the setting of DKA/pancreatitis  - Continue IVFuntil taking po well then would stop    - creatinine stable  , on IVF . - CT scan on admission did not reveal Hydronephrosis  - avoid exposure to Nephrotoxic agents    2. Critical life threatening acidosis   - Secondary to DKA and CHAPIN  - Resolved . 3. Hyperkalemia - Resolved    4.  Pancreatitis - management per Medicine        Marino Merchant MD,FACP

## 2021-12-20 NOTE — PROGRESS NOTES
Physical Therapy  Facility/Department: [address] PROGRESSIVE CARE  Daily Treatment Note  NAME: Uriel Keller  : 1970  MRN: [id number]    Date of Service: 2021    Discharge Recommendations:  Continue to assess pending progress,2-3 sessions per week,3-5 sessions per week (home services if pt's family in the e building can assist with homemaking, may need to consider SNF if they cannot asssit)     Uriel Keller scored a 21/24 on the AM-PAC short mobility form. Current research shows that an AM-PAC score of 18 or greater is typically associated with a discharge to the patient's home setting. Based on the patient's AM-PAC score and their current functional mobility deficits, it is recommended that the patient have 2-3 sessions per week of Physical Therapy at d/c to increase the patient's independence. At this time, this patient demonstrates the endurance and safety to discharge home with home services and a follow up treatment frequency of 2-3x/wk. However, if family cannot assist with homemaking and pt has to be completely I in his apartment alone, may still nee to consider  SNF. Please see assessment section for further patient specific details. If patient discharges prior to next session this note will serve as a discharge summary. Please see below for the latest assessment towards goals. Assessment   Body structures, Functions, Activity limitations: Decreased functional mobility ; Decreased strength;Decreased safe awareness;Decreased endurance  Assessment: 55 y/o male admit 2021 with Acute Pancreatitis,CHAPIN, DKA and Shock. PMH insign otherwise. PTA Pt living alone in apt setting; independent daily care and functional mobility. Today () pt more alert and oriented, performed bed mobility with S and transfers with CGA. Pt was able to walk 40' with walker CGA with no LOB. Fatigued following.   Pt is progressing and at this time and may be able to discharge to home setting with home Score  -[facility] Inpatient Mobility Raw Score : 21 (12/20/21 1053)  AM-PAC Inpatient T-Scale Score : 50.25 (12/20/21 1053)  Mobility Inpatient CMS 0-100% Score: 28.97 (12/20/21 1053)  Mobility Inpatient CMS G-Code Modifier : CJ (12/20/21 1053)     Goals  Short term goals  Time Frame for Short term goals: Upon d/c acute care setting. (pt progressing but goals ongoing 12-16)  Short term goal 1: Bed Mob Supervision. MET 12-20  Short term goal 2: Transfers with/without assist device SBA. MET 12-20  Short term goal 3: Amb with/without assist device 50' SBA. Short term goal 4: Pt participating in approp Strength Exs. Patient Goals   Patient goals : Return home. Plan    Plan  Times per week: 3-5x week while in acute care setting. Current Treatment Recommendations: Strengthening,Functional Mobility Training,Transfer Training,Gait Training,Safety Education & Training,Patient/Caregiver Education & Training  Safety Devices  Type of devices: All fall risk precautions in place,Call light within reach,Bed alarm in place,Gait belt,Left in bed     Therapy Time   Individual Concurrent Group Co-treatment   Time In 1030         Time Out 1054         Minutes 24         Timed Code Treatment Minutes: 24 Minutes   charges = 15 min gt 9 min therex  If pt is discharged before subsequent therapy sessions, this note will serve as the discharge summary.   Andrea Arenas, [address]

## 2021-12-20 NOTE — PROGRESS NOTES
evidenced by intake 0-25%      Nutrition Interventions:   Food and/or Nutrient Delivery:  Continue Current Diet,Start Oral Nutrition Supplement  Nutrition Education/Counseling:  No recommendation at this time   Coordination of Nutrition Care:  Continue to monitor while inpatient    Goals:  Consume greater than 50% of meals and supplements       Nutrition Monitoring and Evaluation:   Behavioral-Environmental Outcomes:  None Identified   Food/Nutrient Intake Outcomes:  Food and Nutrient Intake,Supplement Intake,Diet Advancement/Tolerance  Physical Signs/Symptoms Outcomes:  Biochemical Data,GI Status,Nutrition Focused Physical Findings,Skin,Weight     Discharge Planning:     Too soon to determine     Electronically signed by Aristeo Goodrich RD, LD on 12/20/21 at 12:55 PM EST    Contact: [phone]

## 2021-12-20 NOTE — PLAN OF CARE
Problem: Nutrition  Goal: Optimal nutrition therapy  Outcome: Ongoing     Nutrition Problem #1: Inadequate oral intake  Intervention: Food and/or Nutrient Delivery: Continue Current Diet,Start Oral Nutrition Supplement  Nutritional Goals: Consume greater than 50% of meals and supplements

## 2021-12-20 NOTE — PROGRESS NOTES
Patient not able to keep anything down. Having nausea & emesis of green liquid. IV fluids infusing as ordered. Sinus tachycardia on heart monitor.

## 2021-12-20 NOTE — PROGRESS NOTES
Pt lost IV access. Multiple unsuccessful IV attempts. Secure message sent to Gunjan Cochran NP. Okay to place PICC or midline depending on approval from nephrology. Paged On call nephrologist Dr Mesha Ardon for approval. Awaiting call back.       Electronically signed by Yin Landeros RN on 12/19/2021 at 8:03 PM

## 2021-12-20 NOTE — PROGRESS NOTES
INPATIENT PROGRESS NOTE        IDENTIFYING DATA/REASON FOR CONSULTATION   PATIENT:  Magnus Nance  MRN:  [id number]  ADMIT DATE: 2021  TIME OF EVALUATION: 2021 9:12 AM  HOSPITAL STAY:   LOS: 8 days   CONSULTING PHYSICIAN: Sherine Watkins MD   REASON FOR CONSULTATION: Severe pancreatitis    Subjective:    He reports persistent nausea. Limited PO intake due to nausea. No severe pain. Afebrile over the weekend. He is passing gas. Denies SOB but has had intermittent mild productive cough. MEDICATIONS   SCHEDULED:  metoclopramide, 10 mg, Q6H  vancomycin, 1,500 mg, Q18H  sodium chloride flush, 5-40 mL, 2 times per day  insulin regular, 0-12 Units, 4x Daily AC & HS  meropenem, 500 mg, Q6H  fluconazole, 100 mg, Q24H  vancomycin (VANCOCIN) intermittent dosing (placeholder), , RX Placeholder  polyethylene glycol, 17 g, Daily  insulin regular, 5 Units, TID WC  insulin glargine, 35 Units, BID  pantoprazole, 40 mg, BID AC  docusate sodium, 100 mg, BID  fenofibrate, 160 mg, Daily  heparin (porcine), 5,000 Units, BID      FLUIDS/DRIPS:     sodium chloride      sodium chloride 75 mL/hr at 21 0654    dextrose       PRNs: sodium chloride flush, 5-40 mL, PRN  sodium chloride, 25 mL, PRN  ondansetron, 4 mg, Q4H PRN  morphine, 2 mg, Q4H PRN   Or  morphine, 4 mg, Q4H PRN  glucose, 15 g, PRN  dextrose, 12.5 g, PRN  glucagon (rDNA), 1 mg, PRN  dextrose, 100 mL/hr, PRN  dextrose, 12.5 g, PRN  magnesium sulfate, 1,000 mg, PRN  sodium phosphate IVPB, 10 mmol, PRN   Or  sodium phosphate IVPB, 15 mmol, PRN   Or  sodium phosphate IVPB, 20 mmol, PRN      ALLERGIES:  No Known Allergies      PHYSICAL EXAM   VITALS:  /70   Pulse 113   Temp 98.7 °F (37.1 °C) (Oral)   Resp 17   Ht 6' 1\" (1.854 m)   Wt (!) 322 lb 5 oz (146.2 kg)   SpO2 93%   BMI 42.52 kg/m²   TEMPERATURE:  Current - Temp: 98.7 °F (37.1 °C);  Max - Temp  Av.7 °F (37.1 °C)  Min: 98.6 °F (37 °C)  Max: 98.8 °F (37.1 °C)    Physical Exam:  General appearance: alert, cooperative, no distress, appears stated age  Eyes: Anicteric  Head: Normocephalic, without obvious abnormality  Lungs: clear to auscultation bilaterally, Normal Effort  Heart: regular rate and rhythm, normal S1 and S2, no murmurs or rubs  Abdomen: soft, non-distended, non-tender. Bowel sounds normal.   Extremities: atraumatic, no cyanosis or edema  Skin: warm and dry, no jaundice  Neuro: Grossly intact, A&OX3    LABS AND IMAGING   Laboratory   Recent Labs     12/18/21  0535 12/19/21  0505 12/20/21  0405   WBC 23.5* 23.6* 26.1*   HGB 11.3* 10.7* 10.8*   HCT 35.4* 32.9* 33.3*   MCV 91.5 91.7 91.6    245 266     Recent Labs     12/18/21 2058 12/19/21  0505 12/20/21  0405   * 133* 137   K 3.9 3.9 3.6   CL 92* 91* 96*   CO2 27 27 30   PHOS 2.4* 2.6 2.7   BUN 20 18 19   CREATININE 1.4* 1.4* 1.5*     No results for input(s): AST, ALT, ALB, BILIDIR, BILITOT, ALKPHOS in the last 72 hours. No results for input(s): LIPASE, AMYLASE in the last 72 hours. No results for input(s): PROTIME, INR in the last 72 hours. Imaging  XR CHEST PORTABLE   Final Result   New retrocardiac left lower lobe opacification, possibly infiltrate or   atelectasis. CT ABDOMEN PELVIS WO CONTRAST Additional Contrast? Oral   Final Result   Persistent but decreased peripancreatic fluid and edema, in keeping with the   clinical diagnosis of pancreatitis      Increased body wall anasarca with increased pleural-parenchymal disease at   the lung bases, likely due to fluid overload      Fatty liver. Diverticulosis and normal caliber appendix      Subtle increased density is seen in the right femoral vein. It is uncertain   as whether not this is artifactual or due to a small amount of thrombus.    Recommend correlation with lower extremity venous duplex ultrasound      RECOMMENDATIONS:   Unavailable         VL Extremity Venous Right   Final Result      US GALLBLADDER RUQ   Final Result Pancreas not well visualized. This is likely due to the peripancreatic   inflammatory changes seen on recent CT. Hepatic steatosis. CT ABDOMEN PELVIS WO CONTRAST Additional Contrast? None   Final Result   1. Severe acute interstitial pancreatitis. No focal fluid collection. 2. Hepatomegaly with severe steatosis. 3. Possible gallbladder sludge. 4. Colonic diverticulosis without acute diverticulitis. 5. The distal esophagus is dilated and filled with fluid. RECOMMENDATIONS:   Unavailable         XR CHEST PORTABLE   Final Result   No radiographic evidence of acute pulmonary disease. CT CHEST ABDOMEN PELVIS WO CONTRAST    (Results Pending)       Endoscopy      ASSESSMENT AND RECOMMENDATIONS   Angela Stubbs is a 53 YO Male who presents with nausea, vomiting, and increase thrist. Patient was found to be in DKA with BG over 900.  Patient was found to have acute pancreatitis based on Lipase/CT results. Acute pancreatitis- Concern related to hypertriglyceridemia. Triglyceride level was 396 and typically level needs to be >500 to cause pancreatitis but may have been higher [facility] [address] normal GB. Normal LFT. He denies any significant chronic alcohol use and denies any medications. 1. Acute pancreatitis  -lipase >3000. CT with severe acute interstitial pancreatitis.  No focal fluid collection. Etiology unclear, was found with TG levels of 361. Usually this would not be significant enough to cause this degree of pancreatitis but wonder if it was higher prior to starting him on insulin. Ca normal.  CT with gallbladder sludge and lfts mildly elevated however US shows normal gallbladder and bile ducts, no stones  2. Coffee ground emesis  -1 episode. No melena.  hgb 13.3-10.9->9.9, now stable at 10.0. Monitor for now. defer EGD until more stable unless worsening bleeding. 3. DKA: off insulin gtt  4. CHAPIN  5. Shock: weaned off pressors. On Vanc and merrem. RECOMMENDATIONS:    -Continue low fat diet.   -Continue Reglan in case any component of Gastroparesis contributing.   -Continue Fenofibrate.   -Ordered bedside spirometry for breathing  -We discussed if poor PO intake persist will need to place NG tube. -WBC elevated. No fevers past 48 hours.   -We may need to repeat CT to evaluate for necrosis if no other sources noted. He has been afebrile and benign physical exam. Due to elevated renal function, could not do contrast study so ability to evaluate for necrosis would be limited. Will hold off for now. He is on IV Meropenem, Vancomycin and Fluconazole which would provide appropriate coverage if infection was present. Please await for Dr. Harpreet Swan further input and recommendations for imaging. He did have positive blood cultures but suspected to be contaminant. It is too early for necrosis to become infected (typically occurs after 10 days of onset of symptoms). Need nutrition to maintain gut integrity and prevent translocation of bacteria.   -Continue PPI PO BID for coffee ground emesis. H/H stable. Thank you for allowing me to participate in this patient's care. If there are any questions or concerns regarding this patient, or the plan we have set in place, please feel free to contact me at [phone]. Valerie Mac PA-C     Attending physician addendum:      I have personally seen and examined the patient, reviewed the patient's medical record and pertinent labs and clinical imaging. I have personally staffed the case with Wendie Jin PA-C. I agree with her consultation note, exam findings, assessment and plans  as written above. I have made appropriate modifications and edited her assessment and plan where needed to reflect my impression and plans for this patient. The patient denies any abdominal pain. He did have some emesis reported. He had lost IV access but now has a PICC line in place.   He is nausea and vomiting has improved since he has received his antiemetics. He denies any hematemesis. He has not had a bowel movement. He had no documented fevers over the past 24 hours. BP (!) 143/80   Pulse 112   Temp 98.5 °F (36.9 °C) (Oral)   Resp 20   Ht 6' 1\" (1.854 m)   Wt (!) 322 lb 5 oz (146.2 kg)   SpO2 92%   BMI 42.52 kg/m²      Gen- Obese, AAM, NAD  CV- tachy  Lungs- rhonchi in bilateral bases  Abd- Soft, obese, mild distension, Decreased BS  Ext- edema    Labs and CT reviewed    CT with   1. Findings remain consistent with acute pancreatitis.  Evaluation is limited   due to the absence of contrast.   2. Findings concerning for diffuse small bowel ileus. 3. Significant atelectasis at the lung bases bilaterally. Impression:  1) Severe acute pancreatitis- suspect secondary to hypertriglyceridemia and poorly controlled DM. Repeat CT without obvious infected fluid collections or necrosis (limited by lack of IV contrast)    2) Ileus- Secondary to #1  3) Nausea and vomiting- secondary to #2  4) SIRS      Plan:   Continue supportive care with Reglan 10mg IV q 6  Avoid opioids  Dose Movantik   Continue IV Meropenem, fluconazole, and Vanco  Will monitor PO intake as ileus resolves. If no better, we may need to consider NG with trophic tube feedings. Thank you for allowing me to participate in this patient's care. If there are any questions or concerns regarding this patient, or the plan we have set in place, please feel free to contact me at [phone].      Tracee Reed, DO

## 2021-12-20 NOTE — PROCEDURES
PICC PLACEMENT:  Preprocedure & timeout done w primary RN Lacie Woods; +PICC Order & Consent verified; +Nephrology clearance for PICC placement obtained from Dr. Mesha Ardon; no difficulty accessing R BRACHIAL vein; picc tip is verified using mPowa 3cg Tip Confirmation System with positive pwave and no negative deflection visualized at 1cm out, confirming optimal tip positioning; waveform attached below; reported same and ok to use PICC to primary RN; pt tolerated procedure well; pt is instructed to remain in bed at rest in order to promote hemostasis to the insertion site, and he agrees to do so; pt is left in a position of comfort w siderails up x2, call light in reach and bed is locked in lowest position; Order for OK to use PICC is placed in EMR    NURSES:  *please replace all existing IV tubing with new IV tubing prior to using the PICC for current IV infusions. *please remove any PIVs from RIGHT arm. *please refrain from obtaining BPs in the RIGHT arm. All of the above may be sources of infection or damage to the PICC line/site.     NANCY Medina RN, BSN, [facility].

## 2021-12-21 LAB
ALBUMIN SERPL-MCNC: 2.2 G/DL (ref 3.4–5)
ALP BLD-CCNC: 125 U/L (ref 40–129)
ALT SERPL-CCNC: 17 U/L (ref 10–40)
ANION GAP SERPL CALCULATED.3IONS-SCNC: 11 MMOL/L (ref 3–16)
AST SERPL-CCNC: 35 U/L (ref 15–37)
BASOPHILS ABSOLUTE: 0 K/UL (ref 0–0.2)
BASOPHILS RELATIVE PERCENT: 0.1 %
BILIRUB SERPL-MCNC: 0.5 MG/DL (ref 0–1)
BILIRUBIN DIRECT: 0.3 MG/DL (ref 0–0.3)
BILIRUBIN, INDIRECT: 0.2 MG/DL (ref 0–1)
BUN BLDV-MCNC: 15 MG/DL (ref 7–20)
CALCIUM SERPL-MCNC: 7.6 MG/DL (ref 8.3–10.6)
CHLORIDE BLD-SCNC: 98 MMOL/L (ref 99–110)
CO2: 28 MMOL/L (ref 21–32)
CREAT SERPL-MCNC: 1.6 MG/DL (ref 0.9–1.3)
EOSINOPHILS ABSOLUTE: 0.1 K/UL (ref 0–0.6)
EOSINOPHILS RELATIVE PERCENT: 0.2 %
GFR AFRICAN AMERICAN: 56
GFR NON-AFRICAN AMERICAN: 46
GLUCOSE BLD-MCNC: 145 MG/DL (ref 70–99)
GLUCOSE BLD-MCNC: 171 MG/DL (ref 70–99)
GLUCOSE BLD-MCNC: 175 MG/DL (ref 70–99)
GLUCOSE BLD-MCNC: 176 MG/DL (ref 70–99)
GLUCOSE BLD-MCNC: 194 MG/DL (ref 70–99)
HCT VFR BLD CALC: 29 % (ref 40.5–52.5)
HEMOGLOBIN: 9.2 G/DL (ref 13.5–17.5)
LIPASE: 272 U/L (ref 13–60)
LYMPHOCYTES ABSOLUTE: 2.3 K/UL (ref 1–5.1)
LYMPHOCYTES RELATIVE PERCENT: 9.5 %
MAGNESIUM: 2 MG/DL (ref 1.8–2.4)
MCH RBC QN AUTO: 29.2 PG (ref 26–34)
MCHC RBC AUTO-ENTMCNC: 31.7 G/DL (ref 31–36)
MCV RBC AUTO: 92.3 FL (ref 80–100)
MONOCYTES ABSOLUTE: 2.2 K/UL (ref 0–1.3)
MONOCYTES RELATIVE PERCENT: 8.9 %
NEUTROPHILS ABSOLUTE: 19.6 K/UL (ref 1.7–7.7)
NEUTROPHILS RELATIVE PERCENT: 81.3 %
PDW BLD-RTO: 13.8 % (ref 12.4–15.4)
PERFORMED ON: ABNORMAL
PHOSPHORUS: 3 MG/DL (ref 2.5–4.9)
PLATELET # BLD: 225 K/UL (ref 135–450)
PMV BLD AUTO: 8.7 FL (ref 5–10.5)
POTASSIUM SERPL-SCNC: 3.8 MMOL/L (ref 3.5–5.1)
PRO-BNP: 882 PG/ML (ref 0–124)
RBC # BLD: 3.14 M/UL (ref 4.2–5.9)
SODIUM BLD-SCNC: 137 MMOL/L (ref 136–145)
TOTAL PROTEIN: 5.5 G/DL (ref 6.4–8.2)
TRIGL SERPL-MCNC: 148 MG/DL (ref 0–150)
VANCOMYCIN TROUGH: 7.5 UG/ML (ref 10–20)
VANCOMYCIN TROUGH: 8.5 UG/ML (ref 10–20)
WBC # BLD: 24.1 K/UL (ref 4–11)

## 2021-12-21 PROCEDURE — 6370000000 HC RX 637 (ALT 250 FOR IP): Performed by: INTERNAL MEDICINE

## 2021-12-21 PROCEDURE — 6360000002 HC RX W HCPCS: Performed by: INTERNAL MEDICINE

## 2021-12-21 PROCEDURE — 2580000003 HC RX 258: Performed by: INTERNAL MEDICINE

## 2021-12-21 PROCEDURE — 97530 THERAPEUTIC ACTIVITIES: CPT

## 2021-12-21 PROCEDURE — 85025 COMPLETE CBC W/AUTO DIFF WBC: CPT

## 2021-12-21 PROCEDURE — 6370000000 HC RX 637 (ALT 250 FOR IP): Performed by: PHYSICIAN ASSISTANT

## 2021-12-21 PROCEDURE — 83880 ASSAY OF NATRIURETIC PEPTIDE: CPT

## 2021-12-21 PROCEDURE — 97535 SELF CARE MNGMENT TRAINING: CPT

## 2021-12-21 PROCEDURE — APPSS45 APP SPLIT SHARED TIME 31-45 MINUTES: Performed by: NURSE PRACTITIONER

## 2021-12-21 PROCEDURE — 80202 ASSAY OF VANCOMYCIN: CPT

## 2021-12-21 PROCEDURE — 83735 ASSAY OF MAGNESIUM: CPT

## 2021-12-21 PROCEDURE — 2580000003 HC RX 258: Performed by: NURSE PRACTITIONER

## 2021-12-21 PROCEDURE — APPNB45 APP NON BILLABLE 31-45 MINUTES: Performed by: NURSE PRACTITIONER

## 2021-12-21 PROCEDURE — 6370000000 HC RX 637 (ALT 250 FOR IP): Performed by: SURGERY

## 2021-12-21 PROCEDURE — 97116 GAIT TRAINING THERAPY: CPT

## 2021-12-21 PROCEDURE — 84478 ASSAY OF TRIGLYCERIDES: CPT

## 2021-12-21 PROCEDURE — 80076 HEPATIC FUNCTION PANEL: CPT

## 2021-12-21 PROCEDURE — 86341 ISLET CELL ANTIBODY: CPT

## 2021-12-21 PROCEDURE — 80069 RENAL FUNCTION PANEL: CPT

## 2021-12-21 PROCEDURE — 83690 ASSAY OF LIPASE: CPT

## 2021-12-21 PROCEDURE — 2060000000 HC ICU INTERMEDIATE R&B

## 2021-12-21 RX ADMIN — INSULIN HUMAN 2 UNITS: 100 INJECTION, SOLUTION PARENTERAL at 12:11

## 2021-12-21 RX ADMIN — POLYETHYLENE GLYCOL 3350 17 G: 17 POWDER, FOR SOLUTION ORAL at 09:01

## 2021-12-21 RX ADMIN — INSULIN HUMAN 2 UNITS: 100 INJECTION, SOLUTION PARENTERAL at 17:18

## 2021-12-21 RX ADMIN — DOCUSATE SODIUM 100 MG: 100 CAPSULE ORAL at 09:01

## 2021-12-21 RX ADMIN — MEROPENEM 500 MG: 500 INJECTION, POWDER, FOR SOLUTION INTRAVENOUS at 17:14

## 2021-12-21 RX ADMIN — INSULIN HUMAN 2 UNITS: 100 INJECTION, SOLUTION PARENTERAL at 22:08

## 2021-12-21 RX ADMIN — PANTOPRAZOLE SODIUM 40 MG: 40 TABLET, DELAYED RELEASE ORAL at 16:14

## 2021-12-21 RX ADMIN — METOCLOPRAMIDE HYDROCHLORIDE 10 MG: 5 INJECTION INTRAMUSCULAR; INTRAVENOUS at 12:09

## 2021-12-21 RX ADMIN — DOCUSATE SODIUM 100 MG: 100 CAPSULE ORAL at 22:13

## 2021-12-21 RX ADMIN — METOCLOPRAMIDE HYDROCHLORIDE 10 MG: 5 INJECTION INTRAMUSCULAR; INTRAVENOUS at 06:36

## 2021-12-21 RX ADMIN — METOCLOPRAMIDE HYDROCHLORIDE 10 MG: 5 INJECTION INTRAMUSCULAR; INTRAVENOUS at 17:15

## 2021-12-21 RX ADMIN — INSULIN GLARGINE 35 UNITS: 100 INJECTION, SOLUTION SUBCUTANEOUS at 22:05

## 2021-12-21 RX ADMIN — MEROPENEM 500 MG: 500 INJECTION, POWDER, FOR SOLUTION INTRAVENOUS at 23:52

## 2021-12-21 RX ADMIN — PANTOPRAZOLE SODIUM 40 MG: 40 TABLET, DELAYED RELEASE ORAL at 09:01

## 2021-12-21 RX ADMIN — SODIUM CHLORIDE, PRESERVATIVE FREE 10 ML: 5 INJECTION INTRAVENOUS at 22:07

## 2021-12-21 RX ADMIN — INSULIN GLARGINE 35 UNITS: 100 INJECTION, SOLUTION SUBCUTANEOUS at 09:02

## 2021-12-21 RX ADMIN — HEPARIN SODIUM 5000 UNITS: 5000 INJECTION INTRAVENOUS; SUBCUTANEOUS at 09:01

## 2021-12-21 RX ADMIN — Medication 1500 MG: at 04:50

## 2021-12-21 RX ADMIN — METOCLOPRAMIDE HYDROCHLORIDE 10 MG: 5 INJECTION INTRAMUSCULAR; INTRAVENOUS at 23:48

## 2021-12-21 RX ADMIN — SODIUM CHLORIDE: 9 INJECTION, SOLUTION INTRAVENOUS at 14:32

## 2021-12-21 RX ADMIN — MEROPENEM 500 MG: 500 INJECTION, POWDER, FOR SOLUTION INTRAVENOUS at 06:42

## 2021-12-21 RX ADMIN — MEROPENEM 500 MG: 500 INJECTION, POWDER, FOR SOLUTION INTRAVENOUS at 12:10

## 2021-12-21 RX ADMIN — VANCOMYCIN HYDROCHLORIDE 1000 MG: 1 INJECTION, POWDER, LYOPHILIZED, FOR SOLUTION INTRAVENOUS at 22:15

## 2021-12-21 RX ADMIN — INSULIN HUMAN 5 UNITS: 100 INJECTION, SOLUTION PARENTERAL at 17:19

## 2021-12-21 RX ADMIN — INSULIN HUMAN 5 UNITS: 100 INJECTION, SOLUTION PARENTERAL at 09:52

## 2021-12-21 RX ADMIN — INSULIN HUMAN 2 UNITS: 100 INJECTION, SOLUTION PARENTERAL at 09:52

## 2021-12-21 RX ADMIN — NALOXEGOL OXALATE 25 MG: 25 TABLET, FILM COATED ORAL at 09:01

## 2021-12-21 RX ADMIN — METOCLOPRAMIDE HYDROCHLORIDE 10 MG: 5 INJECTION INTRAMUSCULAR; INTRAVENOUS at 00:48

## 2021-12-21 RX ADMIN — HEPARIN SODIUM 5000 UNITS: 5000 INJECTION INTRAVENOUS; SUBCUTANEOUS at 22:13

## 2021-12-21 RX ADMIN — FENOFIBRATE 160 MG: 160 TABLET ORAL at 09:01

## 2021-12-21 RX ADMIN — INSULIN HUMAN 5 UNITS: 100 INJECTION, SOLUTION PARENTERAL at 12:11

## 2021-12-21 RX ADMIN — MEROPENEM 500 MG: 500 INJECTION, POWDER, FOR SOLUTION INTRAVENOUS at 00:49

## 2021-12-21 RX ADMIN — FLUCONAZOLE 100 MG: 2 INJECTION, SOLUTION INTRAVENOUS at 10:02

## 2021-12-21 ASSESSMENT — PAIN SCALES - GENERAL
PAINLEVEL_OUTOF10: 0
PAINLEVEL_OUTOF10: 0

## 2021-12-21 NOTE — CARE COORDINATION
[facility]  Diabetes Education   Progress Note       NAME:  [address] [place] [address] RECORD NUMBER:  [id number]  AGE: 54 y.o. GENDER: male  : 1970  TODAY'S DATE:  2021    Subjective   Reason for Diabetic Education Evaluation and Assessment: general diabetes support, new insulin     Saul Gr reports feeling better. He has less nausea but dislikes the hospital food. Visit Type: evaluation      Magnus Nance is a 54 y.o. male referred by:     [x] Physician  [] Nursing  [] Chart Review   [] Other:     PAST MEDICAL HISTORY        Diagnosis Date    H/O degenerative disc disease        PAST SURGICAL HISTORY    No past surgical history on file. FAMILY HISTORY    No family history on file.     SOCIAL HISTORY    Social History     Tobacco Use    Smoking status: Never Smoker    Smokeless tobacco: Never Used   Substance Use Topics    Alcohol use: Yes     Comment: social    Drug use: Never       ALLERGIES    No Known Allergies    MEDICATIONS     naloxegol  25 mg Oral QAM    metoclopramide  10 mg IntraVENous Q6H    vancomycin  1,500 mg IntraVENous Q18H    sodium chloride flush  5-40 mL IntraVENous 2 times per day    insulin regular  0-12 Units IntraVENous 4x Daily AC & HS    meropenem  500 mg IntraVENous Q6H    fluconazole  100 mg IntraVENous Q24H    vancomycin (VANCOCIN) intermittent dosing (placeholder)   Other RX Placeholder    polyethylene glycol  17 g Oral Daily    insulin regular  5 Units IntraVENous TID WC    insulin glargine  35 Units SubCUTAneous BID    pantoprazole  40 mg Oral BID AC    docusate sodium  100 mg Oral BID    fenofibrate  160 mg Oral Daily    heparin (porcine)  5,000 Units SubCUTAneous BID       Objective        Patient Active Problem List   Diagnosis Code    Back pain M54.9    DKA, type 2, not at goal [facility]) E11.10    Acute pancreatitis K85.90    Acute kidney injury ([facility] [address] N17.9        /67   Pulse 108   Temp 98.9 °F (37.2 °C) (Oral)   Resp Understanding   [] Demonstrated Understanding       [] Teach Back       [x] Needs Reinforcement     []  Other:                    Plan        Ongoing diabetes education and blood glucose monitoring. Recommend [facility].       The following educational and support materials were provided:  · My contact information  · The Diabetes Education Program:  Planning Healthy Meals   · Academy of Nutrition and Dietetics handout - Carbohydrate Counting for People with Diabetes                                         Discharge Plan:  Discharge needs: insulin pens and 4mm pen needles and glucometer/strips/lancets       Teaching Time Diabetes Education:  20 minutes     Electronically signed by Xenia Vigil on 12/21/2021 at 10:53 AM

## 2021-12-21 NOTE — PROGRESS NOTES
Hospitalist Progress Note      PCP: [id number] [place] [address]    Date of Admission: 12/12/2021    Chief Complaint: increased thirst, urination, nausea, vomiting    Hospital Course: The patient is a 54 y.o. male with no significant past medical history who presents to [facility] with increased thirst, urination, nausea, and vomiting. Patient stated that over the past several days, he has had intractable nausea, vomiting, with increased thirst and urination. He denies fever, chills, chest pain, shortness of breath, constipation, diarrhea, and dysuria.     In the ED, labs were significant for a sodium of 123, chloride of 77, bicarb of 4, BUN/Cr of 35/3.2, glucose of 900, lactic acid of 8.5, WBC count of 20.1K with a pH of 6.831 and beta-hydroxyturate > 8. CXR showed no acute disease. Subjective:     Small liquid BM. Sleeping at the time of my evaluation. Has been nauseated and very poor PO intake. BG better with IV insulin regimen.          Medications:  Reviewed    Infusion Medications    sodium chloride      sodium chloride 75 mL/hr at 12/21/21 0442    dextrose       Scheduled Medications    naloxegol  25 mg Oral QAM    metoclopramide  10 mg IntraVENous Q6H    vancomycin  1,500 mg IntraVENous Q18H    sodium chloride flush  5-40 mL IntraVENous 2 times per day    insulin regular  0-12 Units IntraVENous 4x Daily AC & HS    meropenem  500 mg IntraVENous Q6H    fluconazole  100 mg IntraVENous Q24H    vancomycin (VANCOCIN) intermittent dosing (placeholder)   Other RX Placeholder    polyethylene glycol  17 g Oral Daily    insulin regular  5 Units IntraVENous TID WC    insulin glargine  35 Units SubCUTAneous BID    pantoprazole  40 mg Oral BID AC    docusate sodium  100 mg Oral BID    fenofibrate  160 mg Oral Daily    heparin (porcine)  5,000 Units SubCUTAneous BID     PRN Meds: sodium chloride flush, sodium chloride, ondansetron, morphine **OR** morphine, glucose, dextrose, glucagon (rDNA), dextrose, dextrose, magnesium sulfate, sodium phosphate IVPB **OR** sodium phosphate IVPB **OR** sodium phosphate IVPB      Intake/Output Summary (Last 24 hours) at 12/21/2021 1243  Last data filed at 12/21/2021 3200  Gross per 24 hour   Intake 2249.86 ml   Output 825 ml   Net 1424.86 ml       Exam:    /62   Pulse 106   Temp 99.5 °F (37.5 °C) (Oral)   Resp 18   Ht 6' 1\" (1.854 m)   Wt (!) 328 lb 0.7 oz (148.8 kg)   SpO2 95%   BMI 43.28 kg/m²     General appearance: No apparent distress, appears stated age and cooperative. HEENT: Pupils equal, round, and reactive to light. Conjunctivae/corneas clear. Neck: Supple, with full range of motion. No jugular venous distention. Trachea midline. Respiratory:  Normal respiratory effort. Clear to auscultation, bilaterally without Rales/Wheezes/Rhonchi. Cardiovascular: Regular rate and rhythm with normal S1/S2 without murmurs, rubs or gallops. Abdomen: Soft, non-tender, non-distended with normal bowel sounds. Musculoskeletal: No clubbing, cyanosis or edema bilaterally. Full range of motion without deformity. Skin: Skin color, texture, turgor normal.  No rashes or lesions. Neurologic:  Neurovascularly intact without any focal sensory/motor deficits.  Cranial nerves: II-XII intact, grossly non-focal.  Psychiatric: Alert and oriented, thought content appropriate, normal insight  Capillary Refill: Brisk,< 3 seconds   Peripheral Pulses: +2 palpable, equal bilaterally       Labs:   Recent Labs     12/19/21  0505 12/20/21  0405 12/21/21  0639   WBC 23.6* 26.1* 24.1*   HGB 10.7* 10.8* 9.2*   HCT 32.9* 33.3* 29.0*    266 225     Recent Labs     12/19/21  0505 12/20/21  0405 12/21/21  0639   * 137 137   K 3.9 3.6 3.8   CL 91* 96* 98*   CO2 27 30 28   BUN 18 19 15   CREATININE 1.4* 1.5* 1.6*   CALCIUM 8.1* 7.9* 7.6*   PHOS 2.6 2.7 3.0     Recent Labs     12/20/21  0940 12/21/21  0639   AST 30 35   ALT 16 17   BILIDIR 0.3 0.3   BILITOT 0.5 0. 5   ALKPHOS 119 125     No results for input(s): INR in the last 72 hours. No results for input(s): Aman Hustoners in the last 72 hours. Urinalysis:      Lab Results   Component Value Date    NITRU Negative 12/12/2021    WBCUA 2 12/12/2021    RBCUA 0-2 12/12/2021    BLOODU LARGE 12/12/2021    SPECGRAV 1.023 12/12/2021    GLUCOSEU >=1000 12/12/2021       Radiology:  CT CHEST ABDOMEN PELVIS WO CONTRAST   Final Result   1. Findings remain consistent with acute pancreatitis. Evaluation is limited   due to the absence of contrast.   2. Findings concerning for diffuse small bowel ileus. 3. Significant atelectasis at the lung bases bilaterally. XR CHEST PORTABLE   Final Result   New retrocardiac left lower lobe opacification, possibly infiltrate or   atelectasis. CT ABDOMEN PELVIS WO CONTRAST Additional Contrast? Oral   Final Result   Persistent but decreased peripancreatic fluid and edema, in keeping with the   clinical diagnosis of pancreatitis      Increased body wall anasarca with increased pleural-parenchymal disease at   the lung bases, likely due to fluid overload      Fatty liver. Diverticulosis and normal caliber appendix      Subtle increased density is seen in the right femoral vein. It is uncertain   as whether not this is artifactual or due to a small amount of thrombus. Recommend correlation with lower extremity venous duplex ultrasound      RECOMMENDATIONS:   Unavailable         VL Extremity Venous Right   Final Result      US GALLBLADDER RUQ   Final Result   Pancreas not well visualized. This is likely due to the peripancreatic   inflammatory changes seen on recent CT. Hepatic steatosis. CT ABDOMEN PELVIS WO CONTRAST Additional Contrast? None   Final Result   1. Severe acute interstitial pancreatitis. No focal fluid collection. 2. Hepatomegaly with severe steatosis. 3. Possible gallbladder sludge.    4. Colonic diverticulosis without acute 38.68  -nutritional counseling provided  -weight loss encouraged  -complicating medical management      Hypocalcemia - s/p IV replete. Improved. Constipation. CT 12/20 concerning for diffuse ileus. Started on reglan per GI team.   Will give Movantik daily for 3 days - started 12/20. DVT Prophylaxis: Heparin   Diet: ADULT DIET; Regular; 4 carb choices (60 gm/meal); Low Fat/Low Chol/High Fiber/OCTAVIANO  ADULT ORAL NUTRITION SUPPLEMENT; Breakfast, Lunch, Dinner; Diabetic Oral Supplement  Code Status: Full Code      Dispo - continue care.        Sherine Watkins MD

## 2021-12-21 NOTE — PROGRESS NOTES
Clinical Pharmacy Note  Vancomycin Consult    Uriel Keller is a 54 y.o. male ordered Vancomycin for intra abdominal infection; consult received from Dr. Paloma Owusu to manage therapy. Also receiving Meropenem. Patient Active Problem List   Diagnosis    Back pain    DKA, type 2, not at goal [facility])    Acute pancreatitis    Acute kidney injury ([facility] [address]       Allergies:  Patient has no known allergies. Temp max:  Temp (24hrs), Av.6 °F (37 °C), Min:97.8 °F (36.6 °C), Max:99.2 °F (37.3 °C)      Recent Labs     21  0535 21  0505 21  0405   WBC 23.5* 23.6* 26.1*       Recent Labs     21  2058 21  0505 21  0405   BUN    CREATININE 1.4* 1.4* 1.5*         Intake/Output Summary (Last 24 hours) at 2021 0402  Last data filed at 2021 1957  Gross per 24 hour   Intake 680.23 ml   Output 875 ml   Net -194.77 ml       Culture Results:      Ht Readings from Last 1 Encounters:   21 6' 1\" (1.854 m)        Wt Readings from Last 1 Encounters:   21 (!) 322 lb 5 oz (146.2 kg)         Estimated Creatinine Clearance: 90 mL/min (A) (based on SCr of 1.5 mg/dL (H)). Assessment/Plan:  Day#3   Level 8.5 ug/mL   Scr 1.5  Trough collected at , was not resulted until 300. Continue Vancomycin 1500 mg iv q18 h. Re timed dose based on lab issues. Aiming for trough around 15mg/L. Estimated  mg/L.hr.     Thank you for the consult.    Erik Mills, [facility], [address] 2021 4:02 AM

## 2021-12-21 NOTE — PROGRESS NOTES
Nephrology Progress Note   [facility]. [facility]      Chief Complaint: Nausea/vomiting/pancreatitis    History of Present Illness: Mr Zo Carlisle is a is a 54 y.o. male with no significant past medical history who presents to [facility] with increased thirst, urination, nausea, and vomiting. He was noted to have a BS level of 900 mg/dl. CT scan abdomen and pelvis revealed severe acute Pancreatitis. He was Hyperkalemic, with serum HCO level of 5 and creatinine of 3.4 mg/dl. Patient was started on insulin gtt,treated for DKA     Subjective:    Resting in bed;   Labs reviewed . ROS: No CP/SOB , poor appetite  . No family present      Scheduled Meds:   naloxegol  25 mg Oral QAM    metoclopramide  10 mg IntraVENous Q6H    vancomycin  1,500 mg IntraVENous Q18H    sodium chloride flush  5-40 mL IntraVENous 2 times per day    insulin regular  0-12 Units IntraVENous 4x Daily AC & HS    meropenem  500 mg IntraVENous Q6H    fluconazole  100 mg IntraVENous Q24H    vancomycin (VANCOCIN) intermittent dosing (placeholder)   Other RX Placeholder    polyethylene glycol  17 g Oral Daily    insulin regular  5 Units IntraVENous TID WC    insulin glargine  35 Units SubCUTAneous BID    pantoprazole  40 mg Oral BID AC    docusate sodium  100 mg Oral BID    fenofibrate  160 mg Oral Daily    heparin (porcine)  5,000 Units SubCUTAneous BID        sodium chloride      sodium chloride 75 mL/hr at 21 0442    dextrose         PRN Meds:.sodium chloride flush, sodium chloride, ondansetron, morphine **OR** morphine, glucose, dextrose, glucagon (rDNA), dextrose, dextrose, magnesium sulfate, sodium phosphate IVPB **OR** sodium phosphate IVPB **OR** sodium phosphate IVPB    Physical Exam:    TEMPERATURE:  Current - Temp: 98.9 °F (37.2 °C);  Max - Temp  Av.6 °F (37 °C)  Min: 97.8 °F (36.6 °C)  Max: 99.2 °F (37.3 °C)  RESPIRATIONS RANGE: Resp  Av.6  Min: 16  Max: 20  PULSE RANGE: Pulse  Av.5  Min: 108  Max: 117  BLOOD PRESSURE RANGE:  Systolic (76RCD), VOJ:722 , Min:101 , OOL:618   ; Diastolic (33QNR), SBY:68, Min:61, Max:80    24HR INTAKE/OUTPUT:      Intake/Output Summary (Last 24 hours) at 12/21/2021 1039  Last data filed at 12/21/2021 6155  Gross per 24 hour   Intake 2249.86 ml   Output 825 ml   Net 1424.86 ml         Patient Vitals for the past 96 hrs (Last 3 readings):   Weight   12/21/21 0428 (!) 328 lb 0.7 oz (148.8 kg)   12/20/21 0600 (!) 328 lb 0.7 oz (148.8 kg)   12/20/21 0407 (!) 322 lb 5 oz (146.2 kg)       General: in bed   HEENT: Normocephalic, atraumatic  Neck: No Thyromegaly, Trachea is midline  Chest: clear to auscultation  CVS: RRR, no murmur, no rub  Abdomen: distended, generalized tenderness   Extremities: no edema, no cyanosis.   Skin: normal texture, normal skin turgor, no rash  Neurological: not done         LAB DATA:    CBC:   Lab Results   Component Value Date    WBC 24.1 12/21/2021    RBC 3.14 12/21/2021    HGB 9.2 12/21/2021    HCT 29.0 12/21/2021    MCV 92.3 12/21/2021    MCH 29.2 12/21/2021    MCHC 31.7 12/21/2021    RDW 13.8 12/21/2021     12/21/2021    MPV 8.7 12/21/2021     BMP:    Lab Results   Component Value Date     12/21/2021    K 3.8 12/21/2021    K 4.2 12/12/2021    CL 98 12/21/2021    CO2 28 12/21/2021    BUN 15 12/21/2021    CREATININE 1.6 12/21/2021    CALCIUM 7.6 12/21/2021    GFRAA 56 12/21/2021    LABGLOM 46 12/21/2021    GLUCOSE 175 12/21/2021     Ionized Calcium:  No results found for: IONCA  Magnesium:    Lab Results   Component Value Date    MG 2.00 12/21/2021     Phosphorus:    Lab Results   Component Value Date    PHOS 3.0 12/21/2021     U/A:    Lab Results   Component Value Date    COLORU YELLOW 12/12/2021    PHUR 5.0 12/12/2021    WBCUA 2 12/12/2021    RBCUA 0-2 12/12/2021    CLARITYU CLOUDY 12/12/2021    SPECGRAV 1.023 12/12/2021    LEUKOCYTESUR Negative 12/12/2021    UROBILINOGEN 0.2 12/12/2021    BILIRUBINUR MODERATE 12/12/2021    BLOODU LARGE 12/12/2021    GLUCOSEU >=1000 12/12/2021         IMPRESSION/RECOMMENDATIONS:      Active Problems:    DKA, type 2, not at goal [facility])    Acute pancreatitis    Acute kidney injury ([facility] [address]  Resolved Problems:    * No resolved hospital problems. *    1. CHAPIN - likely prerenal/ATN - occurring in the setting of DKA/pancreatitis. - creatinine stable  , on IVF . - CT scan on admission did not reveal Hydronephrosis  - avoid exposure to Nephrotoxic agents    2. Critical life threatening acidosis   - Secondary to DKA and CHAPIN  - Resolved . 3. Hyperkalemia - Resolved    4.  Pancreatitis - management per Medicine        Kate Aranda MD,FACP

## 2021-12-21 NOTE — PROGRESS NOTES
Physical Therapy  Facility/Department: [facility] 5W PROGRESSIVE CARE  Daily Treatment Note  NAME: Gabriel Alberts  : 1970  MRN: [id number]    Date of Service: 2021    Discharge Recommendations:  Continue to assess pending progress,2-3 sessions per week,3-5 sessions per week (home services if pt's family in the e building can assist with homemaking, may need to consider SNF if they cannot asssit)   PT Equipment Recommendations  Equipment Needed: Yes  Other: bariatric RW    Assessment   Assessment: Pt continues to show improvement this date. Pt performs bed mobility with S and transfers with SBA. Pt ambulates 40 ft in room with standard walker and SBA with no LOB or signs of fatigue other than slight drop in O2 saturation to 87% that quickly recovers to 93% upon seated rest break. Pt would be safe to return home with home therapy if his family in the same building can offer increased assistance with homemaking. If pt family cannot provide asist, consider 3-5x placement. Pt would continue to benefit from skilled PT in order to return to prior level of independence  Treatment Diagnosis: decreased functional mobility  Prognosis: Good  Decision Making: Medium Complexity  History: 55 y/o male admit 2021 with Acute Pancreatitis,CHAPIN, DKA and Shock. PMH insign otherwise. PT Education: Goals;PT Role;Plan of Care  Patient Education: discussed discharge recommendations, pt verbalized understanding  Barriers to Learning: none  REQUIRES PT FOLLOW UP: Yes  Activity Tolerance  Activity Tolerance: Patient Tolerated treatment well;Patient limited by endurance  Activity Tolerance: Low motivation to participate in therapy. Slight drop in O2 post ambulation on 6 L O2 (see ambulation notes)     Patient Diagnosis(es): The primary encounter diagnosis was Diabetic ketoacidosis without coma associated with diabetes mellitus due to underlying condition ([facility] [address]  Diagnoses of Metabolic acidosis, Hypotension, unspecified hypotension type, Hyperglycemia, Lymphocytosis, and DKA, type 2, not at goal [facility]) were also pertinent to this visit. has a past medical history of H/O degenerative disc disease. has no past surgical history on file. Restrictions  Restrictions/Precautions  Restrictions/Precautions: Fall Risk  Position Activity Restriction  Other position/activity restrictions: O2 6L via NC. Subjective   General  Chart Reviewed: Yes  Additional Pertinent Hx: 55 y/o male admit 12/12/2021 with Acute Pancreatitis,CHAPIN, DKA and Shock. PMH insign otherwise. Response To Previous Treatment: Patient with no complaints from previous session. Family / Caregiver Present: No  Referring Practitioner: Christal Marquez NP. Subjective  Subjective: Pt reports no pain at this time. Pt agreeable to participate in therapy with encouragement  General Comment  Comments: Pt supine in bed upon arrival.  Pain Screening  Patient Currently in Pain: Denies  Vital Signs  Patient Currently in Pain: Denies       Orientation  Orientation  Overall Orientation Status: Within Functional Limits  Cognition   Cognition  Overall Cognitive Status: WFL  Objective   Bed mobility  Supine to Sit: Supervision  Sit to Supine: Supervision  Comment: HOB elevated  Transfers  Sit to Stand: Stand by assistance  Stand to sit: Stand by assistance  Ambulation  Ambulation?: Yes  Ambulation 1  Surface: level tile  Device: Standard Walker  Assistance: Stand by assistance  Quality of Gait: even pace and pattern, would benefit from a bariatric wh walker but none available at this time  Gait Deviations: Decreased step length;Decreased step height  Distance: 40' in room  Comments: Pt ambulated 40 ft in room with SBA and standard walker. When asked pt if he could ambulate further he said he could easily walk farther but does not want to at this time, no signs of fatigue noted.  O2 drops to 88% post ambulation and quickly recovers to 93% within 30 sec of seated rest break.  Stairs/Curb  Stairs?: No     Balance  Posture: Good  Sitting - Static: Good  Sitting - Dynamic: Good  Standing - Static: Good (SBA with SW)  Standing - Dynamic: Good (SBA with SW)                           G-Code     OutComes Score                                                     AM-PAC Score  AM-PAC Inpatient Mobility Raw Score : 20 (12/21/21 1509)  AM-PAC Inpatient T-Scale Score : 47.67 (12/21/21 1509)  Mobility Inpatient CMS 0-100% Score: 35.83 (12/21/21 1509)  Mobility Inpatient CMS G-Code Modifier : CJ (12/21/21 1509)          Goals  Short term goals  Time Frame for Short term goals: Upon d/c acute care setting. (pt progressing but goals ongoing 12-16)  Short term goal 1: Bed Mob Supervision. MET 12-20  Short term goal 2: Transfers with/without assist device SBA. MET 12-20  Short term goal 3: Amb with/without assist device 50' SBA. Short term goal 4: Pt participating in approp Strength Exs. Patient Goals   Patient goals : Return home. NO GOALS MET THIS DATE   Plan    Plan  Times per week: 3-5x week while in acute care setting. Current Treatment Recommendations: Strengthening,Functional Mobility Training,Transfer Training,Gait Training,Safety Education & Training,Patient/Caregiver Education & Training  Safety Devices  Type of devices:  All fall risk precautions in place,Call light within reach,Bed alarm in place,Gait belt,Left in bed,Patient at risk for falls,Nurse notified  Restraints  Initially in place: No     Therapy Time   Individual Concurrent Group Co-treatment   Time In 1332         Time Out 1404         Minutes 32         Timed Code Treatment Minutes: 170 [facility], [address] [place], [place], Critical access hospital 18

## 2021-12-21 NOTE — PROGRESS NOTES
Occupational Therapy  Facility/Department: [facility] 5W PROGRESSIVE CARE  Daily Treatment Note  NAME: Emre Uribe  : 1970  MRN: [id number]    Date of Service: 2021    Discharge Recommendations:  Patient would benefit from continued therapy after discharge,3-5 sessions per week OR 2-3 sessions per week (dependent on level of family assist able to be provided)  OT Equipment Recommendations  Other: defer to DC facility    Assessment   Performance deficits / Impairments: Decreased functional mobility ; Decreased strength;Decreased endurance;Decreased ADL status; Decreased safe awareness;Decreased high-level IADLs;Decreased balance  Assessment: 55 y/o male admitted 2021 with DKA, severe acute pancreatitis, acute kidney injury, and shock. PTA pt reports lives at home alone and was independent with ADLs and functional mobility. TODAY- pt required SUP for bed mobility and SBA/CGA for fxl mobility/transfers with RW. Pt required min A for hygiene after BM. Pt expressed concerns for managing toileting and LB dressing tasks at home but reports brother and father may be able to assist as needed. Pt continues to be limited by decreased activity tolerance, balance and strength and would benefit from continued skilled OT services. If family at home is able to provided, pt may be safe to return home with home OT however if family is unable to provide assistance, pt would benefit from continued skilled OT at 3-5x.  Will continue to monitor progress during hospitalization and follow in order to maximize safety and independence  OT Education: OT Role;Transfer Training;Plan of Care;ADL Adaptive Strategies  Patient Education: d/c planning- education for SNF vs home with home OT  REQUIRES OT FOLLOW UP: Yes  Activity Tolerance  Activity Tolerance: Patient Tolerated treatment well;Patient limited by fatigue  Safety Devices  Type of devices: Left in bed;Call light within reach;Gait belt;Nurse notified         Patient Diagnosis(es): The primary encounter diagnosis was Diabetic ketoacidosis without coma associated with diabetes mellitus due to underlying condition ([facility] [address] Diagnoses of Metabolic acidosis, Hypotension, unspecified hypotension type, Hyperglycemia, Lymphocytosis, and DKA, type 2, not at goal [facility]) were also pertinent to this visit. has a past medical history of H/O degenerative disc disease. has no past surgical history on file. Restrictions  Restrictions/Precautions  Restrictions/Precautions: Fall Risk  Position Activity Restriction  Other position/activity restrictions: O2 6L via NC. Subjective   General  Chart Reviewed: Yes,Orders,Progress Notes  Patient assessed for rehabilitation services?: Yes  Additional Pertinent Hx: 55 y/o male admitted 12/12/2021 with DKA, severe acute pancreatitis, acute kidney injury, and shock. Response to previous treatment: Patient with no complaints from previous session  Family / Caregiver Present: No  Referring Practitioner: MICKI Pedroza CNP  Diagnosis: DKA, severe acute pancreatitis  Subjective  Subjective: Pt seen bedside and agreeable to therapy.  Pt denies pain  General Comment  Comments: Per RN ok for therapy      Orientation  Orientation  Overall Orientation Status: Within Functional Limits  Objective    ADL  Grooming: Stand by assistance (pt washed/dried hands in stance at sink)  Toileting: Minimal assistance (no clothes to manage- required hygiene after BM)  Additional Comments: Discussed d/c locations: SNF vs home; pt reports he believes he will need assistance with toileting and LB dressing but states dad and brother are home 24/7 if needing assistance        Balance  Sitting Balance: Supervision  Standing Balance: Stand by assistance  Standing Balance  Time: ~1 min with fxl mobility >< bathroom  Functional Mobility  Functional - Mobility Device: Rolling Walker  Activity: To/from bathroom  Assist Level: Stand by assistance  Functional Mobility Comments: bed >< bathroom with RW SBA ; no LOB  Toilet Transfers  Toilet - Technique: Ambulating (RW)  Equipment Used: Standard toilet  Toilet Transfer: Stand by assistance;Contact guard assistance  Toilet Transfers Comments: Use of L grab bar  Bed mobility  Supine to Sit: Supervision  Sit to Supine: Supervision  Transfers  Sit to stand: Stand by assistance;Contact guard assistance  Stand to sit: Stand by assistance;Contact guard assistance  Transfer Comments: to/from RW        Cognition  Overall Cognitive Status: [facility]  Times per week: 3-5  Current Treatment Recommendations: Strengthening,Endurance Training,Balance Training,Gait Training,Functional Mobility Training,Self-Care / ADL,ROM    AM-PAC Score        AM-[facility] Inpatient Daily Activity Raw Score: 16 (12/21/21 1532)  AM-PAC Inpatient ADL T-Scale Score : 35.96 (12/21/21 1532)  ADL Inpatient CMS 0-100% Score: 53.32 (12/21/21 1532)  ADL Inpatient CMS G-Code Modifier : CK (12/21/21 1532)    Goals  Short term goals  Time Frame for Short term goals: Prior to DC: goals ongoing  Short term goal 1: Pt will complete ADL transfers/mobility with supervision  Short term goal 2: Pt will complete toileting with supervision  Short term goal 3: Pt will complete LB dressing with supervision  Short term goal 4: Pt will tolerate standing > 5 min for functional task with supervision  Short term goal 5: Pt will complete UE exercises in all planes to inc strength/endurance  Patient Goals   Patient goals : to return home       Therapy Time   Individual Concurrent Group Co-treatment   Time In 1509         Time Out 1547         Minutes 38         Timed Code Treatment Minutes: 614[address] Abdias Neri OT   Electronically signed by Valentino Mallow, OTR/L  License # [id number]

## 2021-12-21 NOTE — PLAN OF CARE
Problem: Falls - Risk of:  Goal: Will remain free from falls  Description: Will remain free from falls  Outcome: Ongoing  Goal: Absence of physical injury  Description: Absence of physical injury  Outcome: Ongoing     Problem: Pain:  Goal: Pain level will decrease  Description: Pain level will decrease  Outcome: Ongoing  Goal: Control of acute pain  Description: Control of acute pain  Outcome: Ongoing  Goal: Control of chronic pain  Description: Control of chronic pain  Outcome: Ongoing     Problem: Discharge Planning:  Goal: Participates in care planning  Description: Participates in care planning  Outcome: Ongoing  Goal: Discharged to appropriate level of care  Description: Discharged to appropriate level of care  Outcome: Ongoing     Problem:  Bowel Function - Altered:  Goal: Bowel elimination is within specified parameters  Description: Bowel elimination is within specified parameters  Outcome: Ongoing     Problem: Cardiac Output - Decreased:  Goal: Hemodynamic stability will improve  Description: Hemodynamic stability will improve  Outcome: Ongoing     Problem: Fluid Volume - Imbalance:  Goal: Absence of imbalanced fluid volume signs and symptoms  Description: Absence of imbalanced fluid volume signs and symptoms  Outcome: Ongoing  Goal: Will remain free of signs and symptoms of dehydration  Description: Will remain free of signs and symptoms of dehydration  Outcome: Ongoing     Problem: Serum Glucose Level - Abnormal:  Goal: Ability to maintain appropriate glucose levels will improve to within specified parameters  Description: Ability to maintain appropriate glucose levels will improve to within specified parameters  Outcome: Ongoing  Goal: Ability to maintain appropriate glucose levels will improve  Description: Ability to maintain appropriate glucose levels will improve  Outcome: Ongoing     Problem: Tissue Perfusion, Altered:  Goal: Circulatory function within specified parameters  Description: Circulatory function within specified parameters  Outcome: Ongoing     Problem: Tissue Perfusion - Cardiopulmonary, Altered:  Goal: Absence of angina  Description: Absence of angina  Outcome: Ongoing  Goal: Hemodynamic stability will improve  Description: Hemodynamic stability will improve  Outcome: Ongoing     Problem: Urinary Elimination:  Goal: Signs and symptoms of infection will decrease  Description: Signs and symptoms of infection will decrease  Outcome: Ongoing  Goal: Complications related to the disease process, condition or treatment will be avoided or minimized  Description: Complications related to the disease process, condition or treatment will be avoided or minimized  Outcome: Ongoing     Problem: Injury - Acid Base Imbalance:  Goal: Acid-base balance  Description: Acid-base balance  Outcome: Ongoing     Problem: Tissue Perfusion - Renal, Altered:  Goal: Electrolytes within specified parameters  Description: Electrolytes within specified parameters  Outcome: Ongoing  Goal: Urine creatinine clearance will be within specified parameters  Description: Urine creatinine clearance will be within specified parameters  Outcome: Ongoing  Goal: Serum creatinine will be within specified parameters  Description: Serum creatinine will be within specified parameters  Outcome: Ongoing  Goal: Ability to achieve a balanced intake and output will improve  Description: Ability to achieve a balanced intake and output will improve  Outcome: Ongoing     Problem: Nutrition  Goal: Optimal nutrition therapy  12/21/2021 0232 by Nash Reyes RN  Outcome: Ongoing  12/20/2021 1258 by Lady Edwards RD, LD  Outcome: Ongoing

## 2021-12-21 NOTE — PROGRESS NOTES
[facility] General Surgery                                   Daily Progress Note                                                         Pt Name: Angela Stubbs  Medical Record Number: [id number]  Date of Birth 1970   Today's Date: 12/21/2021  Chief Complaint   Patient presents with    Shortness of Breath     trouble breathing since today. Feeling unwell since yesterday. EMS blood glucose 548. Pt denies DM history. ASSESSMENT/PLAN  A/P: 55 yo male with DKA, severe acute pancreatitis, acute kidney injury, shock - resolved     -WBC down a little. Repeat CT 12/20 w/o IV contrast c/w ileus. Limited evaluation of pancreas. Significant atelectasis BLL  -low fat diet per GI.    -continue miralax          SUBJECTIVE  Rice Camps is resting in bed, no acute distress. Nausea improved, no emesis last 24H per pt. OBJECTIVE  VITALS:  height is 6' 1\" (1.854 m) and weight is 328 lb 0.7 oz (148.8 kg) (abnormal). His oral temperature is 98.9 °F (37.2 °C). His blood pressure is 112/67 and his pulse is 108. His respiration is 16 and oxygen saturation is 91%. INTAKE/OUTPUT:      Intake/Output Summary (Last 24 hours) at 12/21/2021 1059  Last data filed at 12/21/2021 7830  Gross per 24 hour   Intake 2249.86 ml   Output 825 ml   Net 1424.86 ml     GENERAL: alert, cooperative, no distress  LUNGS: clear to ausculation, without wheezes, rales or rhonci  HEART: normal rate and regular rhythm  ABDOMEN: Soft,  non distended. Mild to moderate periumbilical pain with palpation. EXTREMITY: no cyanosis, clubbing or edema    I/O last 3 completed shifts:   In: 2581.6 [I.V.:1952; IV Piggyback:629.6]  Out: 525 [Urine:525]      LABS  Recent Labs     12/21/21  0639   WBC 24.1*   HGB 9.2*   HCT 29.0*         K 3.8   CL 98*   CO2 28   BUN 15   CREATININE 1.6*   MG 2.00   PHOS 3.0   CALCIUM 7.6*   AST 35   ALT 17   BILITOT 0.5   BILIDIR 0.3       EDUCATION  Patient educated about Disease Process, Medications, Smoking Cessation, Oxygenation, Incentive Spirometry and Deep Breath and Cough, Diabetes, Hyperlipidemia, Smoking Cessation, Nutrition, Exercise and Hypertension    Electronically signed by MICKI Rainey CNP on 12/21/2021 at [address] [place] [address] and Vascular Surgery   [phone] Office  [phone] Fax

## 2021-12-21 NOTE — PROGRESS NOTES
INPATIENT PROGRESS NOTE        IDENTIFYING DATA/REASON FOR CONSULTATION   PATIENT:  Marcos Rosenthal  MRN:  [id number]  ADMIT DATE: 2021  TIME OF EVALUATION: 2021 8:28 AM  HOSPITAL STAY:   LOS: 9 days   CONSULTING PHYSICIAN: Campbell Weiner MD   REASON FOR CONSULTATION: pancreatitis    Subjective:    Patient seen in follow up. He denies abd pain. His nausea is improving. He oral intake remains poor which he related to disliking the hospital food. He had a bowel movement yesterday, stool was semi liquid, semi formed. He is passing flatus    MEDICATIONS   SCHEDULED:  naloxegol, 25 mg, QAM  metoclopramide, 10 mg, Q6H  vancomycin, 1,500 mg, Q18H  sodium chloride flush, 5-40 mL, 2 times per day  insulin regular, 0-12 Units, 4x Daily AC & HS  meropenem, 500 mg, Q6H  fluconazole, 100 mg, Q24H  vancomycin (VANCOCIN) intermittent dosing (placeholder), , RX Placeholder  polyethylene glycol, 17 g, Daily  insulin regular, 5 Units, TID WC  insulin glargine, 35 Units, BID  pantoprazole, 40 mg, BID AC  docusate sodium, 100 mg, BID  fenofibrate, 160 mg, Daily  heparin (porcine), 5,000 Units, BID      FLUIDS/DRIPS:     sodium chloride      sodium chloride 75 mL/hr at 21 0442    dextrose       PRNs: sodium chloride flush, 5-40 mL, PRN  sodium chloride, 25 mL, PRN  ondansetron, 4 mg, Q4H PRN  morphine, 2 mg, Q4H PRN   Or  morphine, 4 mg, Q4H PRN  glucose, 15 g, PRN  dextrose, 12.5 g, PRN  glucagon (rDNA), 1 mg, PRN  dextrose, 100 mL/hr, PRN  dextrose, 12.5 g, PRN  magnesium sulfate, 1,000 mg, PRN  sodium phosphate IVPB, 10 mmol, PRN   Or  sodium phosphate IVPB, 15 mmol, PRN   Or  sodium phosphate IVPB, 20 mmol, PRN      ALLERGIES:  No Known Allergies      PHYSICAL EXAM   VITALS:  /74   Pulse 108   Temp 98.3 °F (36.8 °C) (Oral)   Resp 18   Ht 6' 1\" (1.854 m)   Wt (!) 328 lb 0.7 oz (148.8 kg)   SpO2 93%   BMI 43.28 kg/m²   TEMPERATURE:  Current - Temp: 98.3 °F (36.8 °C);  Max - Temp  Av.5 °F (36.9 °C)  Min: 97.8 °F (36.6 °C)  Max: 99.2 °F (37.3 °C)    Physical Exam:  General appearance: alert, cooperative, no distress, appears stated age  Eyes: Anicteric  Head: Normocephalic, without obvious abnormality  Lungs: clear to auscultation bilaterally, Normal Effort  Heart: regular rate and rhythm, normal S1 and S2, no murmurs or rubs  Abdomen: distended, non-tender. Extremities: atraumatic, no cyanosis or edema  Skin: warm and dry, no jaundice  Neuro: Grossly intact, A&OX3    LABS AND IMAGING   Laboratory   Recent Labs     12/19/21  0505 12/20/21  0405 12/21/21  0639   WBC 23.6* 26.1* 24.1*   HGB 10.7* 10.8* 9.2*   HCT 32.9* 33.3* 29.0*   MCV 91.7 91.6 92.3    266 225     Recent Labs     12/19/21  0505 12/20/21  0405 12/21/21  0639   * 137 137   K 3.9 3.6 3.8   CL 91* 96* 98*   CO2 27 30 28   PHOS 2.6 2.7 3.0   BUN 18 19 15   CREATININE 1.4* 1.5* 1.6*     Recent Labs     12/20/21  0940 12/21/21  0639   AST 30 35   ALT 16 17   BILIDIR 0.3 0.3   BILITOT 0.5 0.5   ALKPHOS 119 125     Recent Labs     12/20/21  0940 12/21/21  0639   LIPASE 117.0* 272.0*     No results for input(s): PROTIME, INR in the last 72 hours. Imaging  CT CHEST ABDOMEN PELVIS WO CONTRAST   Final Result   1. Findings remain consistent with acute pancreatitis. Evaluation is limited   due to the absence of contrast.   2. Findings concerning for diffuse small bowel ileus. 3. Significant atelectasis at the lung bases bilaterally. XR CHEST PORTABLE   Final Result   New retrocardiac left lower lobe opacification, possibly infiltrate or   atelectasis. CT ABDOMEN PELVIS WO CONTRAST Additional Contrast? Oral   Final Result   Persistent but decreased peripancreatic fluid and edema, in keeping with the   clinical diagnosis of pancreatitis      Increased body wall anasarca with increased pleural-parenchymal disease at   the lung bases, likely due to fluid overload      Fatty liver.       Diverticulosis and normal caliber appendix      Subtle increased density is seen in the right femoral vein. It is uncertain   as whether not this is artifactual or due to a small amount of thrombus. Recommend correlation with lower extremity venous duplex ultrasound      RECOMMENDATIONS:   Unavailable         VL Extremity Venous Right   Final Result      US GALLBLADDER RUQ   Final Result   Pancreas not well visualized. This is likely due to the peripancreatic   inflammatory changes seen on recent CT. Hepatic steatosis. CT ABDOMEN PELVIS WO CONTRAST Additional Contrast? None   Final Result   1. Severe acute interstitial pancreatitis. No focal fluid collection. 2. Hepatomegaly with severe steatosis. 3. Possible gallbladder sludge. 4. Colonic diverticulosis without acute diverticulitis. 5. The distal esophagus is dilated and filled with fluid. RECOMMENDATIONS:   Unavailable         XR CHEST PORTABLE   Final Result   No radiographic evidence of acute pulmonary disease. Endoscopy      ASSESSMENT AND RECOMMENDATIONS   Artie Deleon is a 54 y.o. male who presented on 12/12/2021 with shortness of breath, nausea, vomiting, increased thirst and urination. Found with DKA, CHAPIN, hypotension/shock, leukocytosis, and acute pancreatitis. GI consulted regarding acute pancreatitis       1. Acute pancreatitis  -suspect 2/2 hypertriglyceridemia and poorly controlled DM. No hx of heavy ETOH use. RUQ US showed normal gallbladder and bile ducts, no stones. Ca normal  -Slowly clinically improving. Still with nausea which is likely in part due to ileus  -white count better today, 26.1->24.1. No fevers,  Repeat CT 12/20 without obvious infected fluid collections or necrosis (limited by lack of IV contrast)      2. Ileus 2/2 #1  -on reglan and movantik. Appears to be improving. Had BM yesterday    3.  Hypertriglyceridemia  -on fenofibrate       RECOMMENDATIONS:    Continue Reglan 10 mg q6hr  Continue movantik 25 mg daily while on narcotics  Continue fenofibrate   Continue IV Meropenem, fluconazole, and Vanco  Continue low fat diet, monitor tolerance    If you have any questions or need any further information, please feel free to contact us [phone]. Thank you for allowing us to participate in the care of Francisco Palmer. The note was completed using Dragon voice recognition transcription. Every effort was made to ensure accuracy; however, inadvertent transcription errors may be present despite my best efforts to edit errors. Idalia TODD    Attending physician addendum:      I have personally seen and examined the patient, reviewed the patient's medical record and pertinent labs and clinical imaging. I have personally staffed the case with PEREZ Duque. I agree with her consultation note, exam findings, assessment and plans  as written above. I have made appropriate modifications and edited her assessment and plan where needed to reflect my impression and plans for this patient. Patient remains afebrile. No further vomiting reported. He had a liquid BM and reports flatus. Denies any severe abdominal pain. /62   Pulse 106   Temp 99.5 °F (37.5 °C) (Oral)   Resp 18   Ht 6' 1\" (1.854 m)   Wt (!) 328 lb 0.7 oz (148.8 kg)   SpO2 95%   BMI 43.28 kg/m²      Gen- NAD, obese  CV- Tachy  Lungs- decreased BS in bases  Abd- distended, slight decrease in BS  Ext- trace edema    Labs and imaging were reviewed. Impression:  Severe interstitial pancreatitis- slowly resolving  Ileus- slowly resolving  Leukocytosis- improving. suspect leukemoid reaction. CT without obvious signs of infected peripancreatic fluid collection or necrotizing pancreatitis  RYAN- stable. Obesity  Hx of DKA- resolved  Sepsis- resolved  Hx of coffee ground emesis-   Hypertriglyceridemia- resolved.        Plan:  Continue Reglan and Movantik for now  Monitor return of bowel function  Will need to re-attempt to initiate PO intake. If he fails trials at PO, may need an NG tube placed for feeding and nutrition  Optimize glycemic control  Still on Vanco/Meropenem/Fluconazole. If leukocytosis improves, could start to de-escalate antibiotics and antifungals. Continue Fenofibrate for hx of hypertriglyceridemia  PPI bid for hx of CGE. Monitor H and H daily    Thank you for allowing me to participate in this patient's care. If there are any questions or concerns regarding this patient, or the plan we have set in place, please feel free to contact me at [phone].      James Whaley, DO

## 2021-12-21 NOTE — PLAN OF CARE
Problem: Falls - Risk of:  Goal: Will remain free from falls  Description: Will remain free from falls  12/21/2021 0909 by Sujata Owusu RN  Outcome: Ongoing     Problem: Falls - Risk of:  Goal: Absence of physical injury  Description: Absence of physical injury  12/21/2021 0909 by Sujata Owusu RN  Outcome: Ongoing     Problem: Pain:  Description: Pain management should include both nonpharmacologic and pharmacologic interventions. Goal: Pain level will decrease  Description: Pain level will decrease  12/21/2021 0909 by Sujata Owusu RN  Outcome: Ongoing     Problem: Pain:  Description: Pain management should include both nonpharmacologic and pharmacologic interventions. Goal: Control of acute pain  Description: Control of acute pain  12/21/2021 0909 by Sujata Owusu RN  Outcome: Ongoing     Problem: Pain:  Description: Pain management should include both nonpharmacologic and pharmacologic interventions.   Goal: Control of chronic pain  Description: Control of chronic pain  12/21/2021 0909 by Sujata Owusu RN  Outcome: Ongoing     Problem: Discharge Planning:  Goal: Participates in care planning  Description: Participates in care planning  12/21/2021 0909 by Sujata Owusu RN  Outcome: Ongoing

## 2021-12-21 NOTE — PROGRESS NOTES
Pt has had no complaints of nausea or any vomiting. He was able to eat part of a sandwich and some jello without incidence.

## 2021-12-22 LAB
ALBUMIN SERPL-MCNC: 2.2 G/DL (ref 3.4–5)
ANION GAP SERPL CALCULATED.3IONS-SCNC: 10 MMOL/L (ref 3–16)
BASOPHILS ABSOLUTE: 0 K/UL (ref 0–0.2)
BASOPHILS RELATIVE PERCENT: 0 %
BLOOD CULTURE, ROUTINE: NORMAL
BUN BLDV-MCNC: 12 MG/DL (ref 7–20)
CALCIUM SERPL-MCNC: 7.8 MG/DL (ref 8.3–10.6)
CHLORIDE BLD-SCNC: 97 MMOL/L (ref 99–110)
CO2: 28 MMOL/L (ref 21–32)
CREAT SERPL-MCNC: 1.5 MG/DL (ref 0.9–1.3)
EOSINOPHILS ABSOLUTE: 0.2 K/UL (ref 0–0.6)
EOSINOPHILS RELATIVE PERCENT: 1 %
GFR AFRICAN AMERICAN: >60
GFR NON-AFRICAN AMERICAN: 50
GLUCOSE BLD-MCNC: 105 MG/DL (ref 70–99)
GLUCOSE BLD-MCNC: 117 MG/DL (ref 70–99)
GLUCOSE BLD-MCNC: 159 MG/DL (ref 70–99)
GLUCOSE BLD-MCNC: 201 MG/DL (ref 70–99)
GLUCOSE BLD-MCNC: 205 MG/DL (ref 70–99)
HCT VFR BLD CALC: 26.5 % (ref 40.5–52.5)
HEMOGLOBIN: 8.7 G/DL (ref 13.5–17.5)
LYMPHOCYTES ABSOLUTE: 1.8 K/UL (ref 1–5.1)
LYMPHOCYTES RELATIVE PERCENT: 8 %
MCH RBC QN AUTO: 29.8 PG (ref 26–34)
MCHC RBC AUTO-ENTMCNC: 32.7 G/DL (ref 31–36)
MCV RBC AUTO: 91.2 FL (ref 80–100)
METAMYELOCYTES RELATIVE PERCENT: 4 %
MONOCYTES ABSOLUTE: 1.5 K/UL (ref 0–1.3)
MONOCYTES RELATIVE PERCENT: 7 %
MYELOCYTE PERCENT: 1 %
NEUTROPHILS ABSOLUTE: 18.5 K/UL (ref 1.7–7.7)
NEUTROPHILS RELATIVE PERCENT: 79 %
PDW BLD-RTO: 13.6 % (ref 12.4–15.4)
PERFORMED ON: ABNORMAL
PHOSPHORUS: 3 MG/DL (ref 2.5–4.9)
PLATELET # BLD: 230 K/UL (ref 135–450)
PMV BLD AUTO: 7.9 FL (ref 5–10.5)
POLYCHROMASIA: ABNORMAL
POTASSIUM SERPL-SCNC: 3.3 MMOL/L (ref 3.5–5.1)
RBC # BLD: 2.9 M/UL (ref 4.2–5.9)
SODIUM BLD-SCNC: 135 MMOL/L (ref 136–145)
WBC # BLD: 22 K/UL (ref 4–11)

## 2021-12-22 PROCEDURE — 85025 COMPLETE CBC W/AUTO DIFF WBC: CPT

## 2021-12-22 PROCEDURE — 97110 THERAPEUTIC EXERCISES: CPT | Performed by: PHYSICAL THERAPIST

## 2021-12-22 PROCEDURE — 6360000002 HC RX W HCPCS: Performed by: INTERNAL MEDICINE

## 2021-12-22 PROCEDURE — 6370000000 HC RX 637 (ALT 250 FOR IP): Performed by: INTERNAL MEDICINE

## 2021-12-22 PROCEDURE — 2060000000 HC ICU INTERMEDIATE R&B

## 2021-12-22 PROCEDURE — 97530 THERAPEUTIC ACTIVITIES: CPT | Performed by: PHYSICAL THERAPIST

## 2021-12-22 PROCEDURE — 2700000000 HC OXYGEN THERAPY PER DAY

## 2021-12-22 PROCEDURE — 2580000003 HC RX 258: Performed by: INTERNAL MEDICINE

## 2021-12-22 PROCEDURE — 94761 N-INVAS EAR/PLS OXIMETRY MLT: CPT

## 2021-12-22 PROCEDURE — APPNB45 APP NON BILLABLE 31-45 MINUTES: Performed by: NURSE PRACTITIONER

## 2021-12-22 PROCEDURE — 2580000003 HC RX 258: Performed by: NURSE PRACTITIONER

## 2021-12-22 PROCEDURE — 6370000000 HC RX 637 (ALT 250 FOR IP): Performed by: PHYSICIAN ASSISTANT

## 2021-12-22 PROCEDURE — 80069 RENAL FUNCTION PANEL: CPT

## 2021-12-22 PROCEDURE — 97116 GAIT TRAINING THERAPY: CPT | Performed by: PHYSICAL THERAPIST

## 2021-12-22 PROCEDURE — 6370000000 HC RX 637 (ALT 250 FOR IP): Performed by: SURGERY

## 2021-12-22 PROCEDURE — APPSS45 APP SPLIT SHARED TIME 31-45 MINUTES: Performed by: NURSE PRACTITIONER

## 2021-12-22 RX ORDER — POTASSIUM CHLORIDE 20 MEQ/1
20 TABLET, EXTENDED RELEASE ORAL ONCE
Status: COMPLETED | OUTPATIENT
Start: 2021-12-22 | End: 2021-12-22

## 2021-12-22 RX ORDER — FUROSEMIDE 10 MG/ML
40 INJECTION INTRAMUSCULAR; INTRAVENOUS DAILY
Status: DISCONTINUED | OUTPATIENT
Start: 2021-12-22 | End: 2021-12-24

## 2021-12-22 RX ADMIN — INSULIN HUMAN 5 UNITS: 100 INJECTION, SOLUTION PARENTERAL at 08:08

## 2021-12-22 RX ADMIN — DOCUSATE SODIUM 100 MG: 100 CAPSULE ORAL at 20:56

## 2021-12-22 RX ADMIN — INSULIN LISPRO 5 UNITS: 100 INJECTION, SOLUTION INTRAVENOUS; SUBCUTANEOUS at 16:57

## 2021-12-22 RX ADMIN — INSULIN GLARGINE 35 UNITS: 100 INJECTION, SOLUTION SUBCUTANEOUS at 20:59

## 2021-12-22 RX ADMIN — PANTOPRAZOLE SODIUM 40 MG: 40 TABLET, DELAYED RELEASE ORAL at 16:02

## 2021-12-22 RX ADMIN — BISACODYL 10 MG: 5 TABLET, COATED ORAL at 11:00

## 2021-12-22 RX ADMIN — METOCLOPRAMIDE HYDROCHLORIDE 10 MG: 5 INJECTION INTRAMUSCULAR; INTRAVENOUS at 16:58

## 2021-12-22 RX ADMIN — VANCOMYCIN HYDROCHLORIDE 1000 MG: 1 INJECTION, POWDER, LYOPHILIZED, FOR SOLUTION INTRAVENOUS at 09:03

## 2021-12-22 RX ADMIN — FENOFIBRATE 160 MG: 160 TABLET ORAL at 08:06

## 2021-12-22 RX ADMIN — SODIUM CHLORIDE, PRESERVATIVE FREE 10 ML: 5 INJECTION INTRAVENOUS at 21:00

## 2021-12-22 RX ADMIN — METOCLOPRAMIDE HYDROCHLORIDE 10 MG: 5 INJECTION INTRAMUSCULAR; INTRAVENOUS at 06:12

## 2021-12-22 RX ADMIN — INSULIN GLARGINE 35 UNITS: 100 INJECTION, SOLUTION SUBCUTANEOUS at 08:06

## 2021-12-22 RX ADMIN — VANCOMYCIN HYDROCHLORIDE 1000 MG: 1 INJECTION, POWDER, LYOPHILIZED, FOR SOLUTION INTRAVENOUS at 21:04

## 2021-12-22 RX ADMIN — INSULIN LISPRO 5 UNITS: 100 INJECTION, SOLUTION INTRAVENOUS; SUBCUTANEOUS at 12:35

## 2021-12-22 RX ADMIN — SODIUM CHLORIDE: 9 INJECTION, SOLUTION INTRAVENOUS at 06:07

## 2021-12-22 RX ADMIN — NALOXEGOL OXALATE 25 MG: 25 TABLET, FILM COATED ORAL at 08:06

## 2021-12-22 RX ADMIN — HEPARIN SODIUM 5000 UNITS: 5000 INJECTION INTRAVENOUS; SUBCUTANEOUS at 08:06

## 2021-12-22 RX ADMIN — INSULIN LISPRO 1 UNITS: 100 INJECTION, SOLUTION INTRAVENOUS; SUBCUTANEOUS at 20:59

## 2021-12-22 RX ADMIN — FUROSEMIDE 40 MG: 10 INJECTION, SOLUTION INTRAMUSCULAR; INTRAVENOUS at 11:00

## 2021-12-22 RX ADMIN — INSULIN LISPRO 4 UNITS: 100 INJECTION, SOLUTION INTRAVENOUS; SUBCUTANEOUS at 12:35

## 2021-12-22 RX ADMIN — FLUCONAZOLE 100 MG: 2 INJECTION, SOLUTION INTRAVENOUS at 10:06

## 2021-12-22 RX ADMIN — MEROPENEM 500 MG: 500 INJECTION, POWDER, FOR SOLUTION INTRAVENOUS at 11:08

## 2021-12-22 RX ADMIN — HEPARIN SODIUM 5000 UNITS: 5000 INJECTION INTRAVENOUS; SUBCUTANEOUS at 20:58

## 2021-12-22 RX ADMIN — SODIUM CHLORIDE, PRESERVATIVE FREE 10 ML: 5 INJECTION INTRAVENOUS at 08:07

## 2021-12-22 RX ADMIN — INSULIN LISPRO 4 UNITS: 100 INJECTION, SOLUTION INTRAVENOUS; SUBCUTANEOUS at 16:58

## 2021-12-22 RX ADMIN — POTASSIUM CHLORIDE 20 MEQ: 1500 TABLET, EXTENDED RELEASE ORAL at 10:04

## 2021-12-22 RX ADMIN — MEROPENEM 500 MG: 500 INJECTION, POWDER, FOR SOLUTION INTRAVENOUS at 06:24

## 2021-12-22 RX ADMIN — METOCLOPRAMIDE HYDROCHLORIDE 10 MG: 5 INJECTION INTRAMUSCULAR; INTRAVENOUS at 11:00

## 2021-12-22 RX ADMIN — DOCUSATE SODIUM 100 MG: 100 CAPSULE ORAL at 08:06

## 2021-12-22 RX ADMIN — PANTOPRAZOLE SODIUM 40 MG: 40 TABLET, DELAYED RELEASE ORAL at 06:12

## 2021-12-22 RX ADMIN — MEROPENEM 500 MG: 500 INJECTION, POWDER, FOR SOLUTION INTRAVENOUS at 16:59

## 2021-12-22 ASSESSMENT — PAIN SCALES - WONG BAKER
WONGBAKER_NUMERICALRESPONSE: 0
WONGBAKER_NUMERICALRESPONSE: 0

## 2021-12-22 ASSESSMENT — PAIN SCALES - GENERAL
PAINLEVEL_OUTOF10: 0

## 2021-12-22 NOTE — PROGRESS NOTES
[facility] General Surgery                                   Daily Progress Note                                                         Pt Name: Kareen Villa  Medical Record Number: [id number]  Date of Birth 1970   Today's Date: 12/22/2021  Chief Complaint   Patient presents with    Shortness of Breath     trouble breathing since today. Feeling unwell since yesterday. EMS blood glucose 548. Pt denies DM history. ASSESSMENT/PLAN  A/P: 55 yo male with DKA, severe acute pancreatitis, acute kidney injury, shock - resolved     -Slowly improving ileus - +BM  -low fat diet per GI.    -continue miralax  -WBC trending down . Continue abx  -no acute surgical needs at present. Will follow peripherally but please call with questions          Olga Alvarado is resting in bed, no acute distress. Nausea improved, no emesis last 24H per pt. OBJECTIVE  VITALS:  height is 6' 1\" (1.854 m) and weight is 324 lb 11.8 oz (147.3 kg) (abnormal). His oral temperature is 97.9 °F (36.6 °C). His blood pressure is 98/67 and his pulse is 107. His respiration is 20 and oxygen saturation is 93%. INTAKE/OUTPUT:      Intake/Output Summary (Last 24 hours) at 12/22/2021 1321  Last data filed at 12/22/2021 1156  Gross per 24 hour   Intake 3714.09 ml   Output 3300 ml   Net 414.09 ml     GENERAL: alert, cooperative, no distress  LUNGS: clear to ausculation, without wheezes, rales or rhonci  HEART: normal rate and regular rhythm  ABDOMEN: Soft,  non distended. Mild to moderate periumbilical pain with palpation. EXTREMITY: no cyanosis, clubbing or edema    I/O last 3 completed shifts:   In: 3474.1 [P.O.:960; I.V.:1514.1; IV Piggyback:1000]  Out: 1925 [LVACE:6735]      LABS  Recent Labs     12/21/21  0639 12/21/21  0639 12/22/21  0625   WBC 24.1*   < > 22.0*   HGB 9.2*   < > 8.7*   HCT 29.0*   < > 26.5*      < > 230      < > 135*   K 3.8   < > 3.3*   CL 98* < > 97*   CO2 28   < > 28   BUN 15   < > 12   CREATININE 1.6*   < > 1.5*   MG 2.00  --   --    PHOS 3.0   < > 3.0   CALCIUM 7.6*   < > 7.8*   AST 35  --   --    ALT 17  --   --    BILITOT 0.5  --   --    BILIDIR 0.3  --   --     < > = values in this interval not displayed.        EDUCATION  Patient educated about Disease Process, Medications, Smoking Cessation, Oxygenation, Incentive Spirometry and Deep Breath and Cough, Diabetes, Hyperlipidemia, Smoking Cessation, Nutrition, Exercise and Hypertension    Electronically signed by Tiffanie Nuñez MD on 12/22/2021 at 1:21 PM      [address] and Vascular Surgery   [phone] Office  [phone] Fax

## 2021-12-22 NOTE — PROGRESS NOTES
INPATIENT PROGRESS NOTE        IDENTIFYING DATA/REASON FOR CONSULTATION   PATIENT:  Azeb Cuevas  MRN:  [id number]  ADMIT DATE: 2021  TIME OF EVALUATION: 2021 8:53 AM  HOSPITAL STAY:   LOS: 10 days   CONSULTING PHYSICIAN: Jazlyn Tom MD   REASON FOR CONSULTATION: pancreatitis    Subjective:    Patient seen in follow up. He reports his abdomen remains distended. He denies abd pain. He is tolerating food    MEDICATIONS   SCHEDULED:  insulin lispro, 0-12 Units, TID WC  insulin lispro, 0-6 Units, Nightly  insulin lispro, 5 Units, TID WC  vancomycin, 1,000 mg, Q12H  naloxegol, 25 mg, QAM  metoclopramide, 10 mg, Q6H  sodium chloride flush, 5-40 mL, 2 times per day  meropenem, 500 mg, Q6H  fluconazole, 100 mg, Q24H  vancomycin (VANCOCIN) intermittent dosing (placeholder), , RX Placeholder  polyethylene glycol, 17 g, Daily  insulin glargine, 35 Units, BID  pantoprazole, 40 mg, BID AC  docusate sodium, 100 mg, BID  fenofibrate, 160 mg, Daily  heparin (porcine), 5,000 Units, BID      FLUIDS/DRIPS:     sodium chloride      sodium chloride Stopped (21 0624)    dextrose       PRNs: sodium chloride flush, 5-40 mL, PRN  sodium chloride, 25 mL, PRN  ondansetron, 4 mg, Q4H PRN  morphine, 2 mg, Q4H PRN   Or  morphine, 4 mg, Q4H PRN  glucose, 15 g, PRN  dextrose, 12.5 g, PRN  glucagon (rDNA), 1 mg, PRN  dextrose, 100 mL/hr, PRN  dextrose, 12.5 g, PRN  magnesium sulfate, 1,000 mg, PRN  sodium phosphate IVPB, 10 mmol, PRN   Or  sodium phosphate IVPB, 15 mmol, PRN   Or  sodium phosphate IVPB, 20 mmol, PRN      ALLERGIES:  No Known Allergies      PHYSICAL EXAM   VITALS:  /63   Pulse 108   Temp 99 °F (37.2 °C) (Oral)   Resp 20   Ht 6' 1\" (1.854 m)   Wt (!) 324 lb 11.8 oz (147.3 kg)   SpO2 92%   BMI 42.84 kg/m²   TEMPERATURE:  Current - Temp: 99 °F (37.2 °C);  Max - Temp  Av °F (37.2 °C)  Min: 98.2 °F (36.8 °C)  Max: 99.6 °F (37.6 °C)    Physical Exam:  General appearance: alert, cooperative, no distress, appears stated age  Eyes: Anicteric  Head: Normocephalic, without obvious abnormality  Lungs: clear to auscultation bilaterally, Normal Effort  Heart: regular rate and rhythm, normal S1 and S2, no murmurs or rubs  Abdomen: distended, non-tender. Extremities: atraumatic, no cyanosis or edema  Skin: warm and dry, no jaundice  Neuro: Grossly intact, A&OX3    LABS AND IMAGING   Laboratory   Recent Labs     12/20/21  0405 12/21/21  0639 12/22/21  0625   WBC 26.1* 24.1* 22.0*   HGB 10.8* 9.2* 8.7*   HCT 33.3* 29.0* 26.5*   MCV 91.6 92.3 91.2    225 230     Recent Labs     12/20/21  0405 12/21/21  0639 12/22/21  0625    137 135*   K 3.6 3.8 3.3*   CL 96* 98* 97*   CO2 30 28 28   PHOS 2.7 3.0 3.0   BUN 19 15 12   CREATININE 1.5* 1.6* 1.5*     Recent Labs     12/20/21  0940 12/21/21  0639   AST 30 35   ALT 16 17   BILIDIR 0.3 0.3   BILITOT 0.5 0.5   ALKPHOS 119 125     Recent Labs     12/20/21  0940 12/21/21  0639   LIPASE 117.0* 272.0*     No results for input(s): PROTIME, INR in the last 72 hours. Imaging  CT CHEST ABDOMEN PELVIS WO CONTRAST   Final Result   1. Findings remain consistent with acute pancreatitis. Evaluation is limited   due to the absence of contrast.   2. Findings concerning for diffuse small bowel ileus. 3. Significant atelectasis at the lung bases bilaterally. XR CHEST PORTABLE   Final Result   New retrocardiac left lower lobe opacification, possibly infiltrate or   atelectasis. CT ABDOMEN PELVIS WO CONTRAST Additional Contrast? Oral   Final Result   Persistent but decreased peripancreatic fluid and edema, in keeping with the   clinical diagnosis of pancreatitis      Increased body wall anasarca with increased pleural-parenchymal disease at   the lung bases, likely due to fluid overload      Fatty liver. Diverticulosis and normal caliber appendix      Subtle increased density is seen in the right femoral vein.   It is uncertain   as whether not this is artifactual or due to a small amount of thrombus. Recommend correlation with lower extremity venous duplex ultrasound      RECOMMENDATIONS:   Unavailable         VL Extremity Venous Right   Final Result      US GALLBLADDER RUQ   Final Result   Pancreas not well visualized. This is likely due to the peripancreatic   inflammatory changes seen on recent CT. Hepatic steatosis. CT ABDOMEN PELVIS WO CONTRAST Additional Contrast? None   Final Result   1. Severe acute interstitial pancreatitis. No focal fluid collection. 2. Hepatomegaly with severe steatosis. 3. Possible gallbladder sludge. 4. Colonic diverticulosis without acute diverticulitis. 5. The distal esophagus is dilated and filled with fluid. RECOMMENDATIONS:   Unavailable         XR CHEST PORTABLE   Final Result   No radiographic evidence of acute pulmonary disease. Endoscopy      ASSESSMENT AND RECOMMENDATIONS   Kurt Keating is a 54 y.o. male who presented on 12/12/2021 with shortness of breath, nausea, vomiting, increased thirst and urination. Found with DKA, CHAPIN, hypotension/shock, leukocytosis, and acute pancreatitis. GI consulted regarding acute pancreatitis       1. Acute pancreatitis  -suspect 2/2 hypertriglyceridemia and poorly controlled DM. No hx of heavy ETOH use. RUQ US showed normal gallbladder and bile ducts, no stones. Ca normal  -Slowly clinically improving. Still with nausea which is likely in part due to ileus  -white count better today, 26.1->24.1. No fevers,  Repeat CT 12/20 without obvious infected fluid collections or necrosis (limited by lack of IV contrast)      2. Ileus 2/2 #1  -on reglan and movantik. Appears to be improving. Had BM yesterday    3.  Hypertriglyceridemia  -on fenofibrate       RECOMMENDATIONS:    Continue Reglan 10 mg q6hr  Continue movantik 25 mg daily  Continue fenofibrate   Continue IV Meropenem, fluconazole, and Vanco  Continue low fat diet, de-escalate antibiotics and antifungals. Continue Fenofibrate for hx of hypertriglyceridemia  PPI bid for hx of CGE. Monitor H and H daily    Thank you for allowing me to participate in this patient's care. If there are any questions or concerns regarding this patient, or the plan we have set in place, please feel free to contact me at [phone].      Nish Gutiérrez, DO

## 2021-12-22 NOTE — PROGRESS NOTES
Clinical Pharmacy Note  Vancomycin Consult    Anthony Lopez is a 54 y.o. male ordered Vancomycin for intra abdominal infection; consult received from Dr. Garrison Mohan to manage therapy. Also receiving Meropenem. Patient Active Problem List   Diagnosis    Back pain    DKA, type 2, not at goal [facility])    Acute pancreatitis    Acute kidney injury ([facility] [address]       Allergies:  Patient has no known allergies. Temp max:  Temp (24hrs), Av.9 °F (37.2 °C), Min:97.8 °F (36.6 °C), Max:99.6 °F (37.6 °C)      Recent Labs     21  0505 21  0405 21  0639   WBC 23.6* 26.1* 24.1*       Recent Labs     21  0505 21  0405 21  0639   BUN 18 19 15   CREATININE 1.4* 1.5* 1.6*         Intake/Output Summary (Last 24 hours) at 2021 2131  Last data filed at 2021 1626  Gross per 24 hour   Intake 3916.5 ml   Output 1200 ml   Net 2716.5 ml       Culture Results:      Ht Readings from Last 1 Encounters:   21 6' 1\" (1.854 m)        Wt Readings from Last 1 Encounters:   21 (!) 328 lb 0.7 oz (148.8 kg)         Estimated Creatinine Clearance: 85 mL/min (A) (based on SCr of 1.6 mg/dL (H)). Assessment/Plan:  Day#3     Trough level = 7.5 ug/mL     Scr 1.6    Unclear whether or not trough is low due to long time between doses or inadequate dosing. Will increase to vancomycin 1000 mg IV every 12 hours. Aiming for trough around 15mg/L. Will check a trough at 0900 on 21. Thank you for the consult.      Kaden Monk, [facility]  2021 10:10 PM

## 2021-12-22 NOTE — PROGRESS NOTES
Physical Therapy  Facility/Department: [facility] PROGRESSIVE CARE  Daily Treatment Note  NAME: Scarlett Garces  : 1970  MRN: [id number]    Date of Service: 2021    Discharge Recommendations:  Continue to assess pending progress,2-3 sessions per week,3-5 sessions per week (home services if pt's family in the e building can assist with homemaking, may need to consider SNF if they cannot asssit)   PT Equipment Recommendations  Equipment Needed: Yes  Mobility Devices: Ada Miranda: Rolling  Other: bariatric RW     Scarlett Garces scored a 20/24 on the AM-PAC short mobility form. Current research shows that an AM-PAC score of 18 or greater is typically associated with a discharge to the patient's home setting. Based on the patient's AM-PAC score and their current functional mobility deficits, it is recommended that the patient have 2-3 sessions per week of Physical Therapy at d/c to increase the patient's independence. At this time, this patient demonstrates the endurance and safety to discharge home with home therapy services and a follow up treatment frequency of 2-3x/wk. Please see assessment section for further patient specific details. If patient discharges prior to next session this note will serve as a discharge summary. Please see below for the latest assessment towards goals. Assessment   Body structures, Functions, Activity limitations: Decreased functional mobility ; Decreased strength;Decreased safe awareness;Decreased endurance  Assessment: Pt continues to show improvement this date. Pt performs bed mobility with S and transfers with SBA. Pt ambulates up to 40 ft in room with standard bariatric walker (prefer  bariatric walker but not available) and SBA with no LOB or signs of fatigue other than slight drop in O2 saturation to 88% that quickly recovers to low 90s upon seated rest break with 6L O2 per NC.   Instructed to sit up in chair and perform exercises regularly to continue to build his strength to ensure that he is physically strong enough to return home once he is medically ready. Pt would be safe to return home with home therapy if his family in the same building can offer increased assistance with homemaking. If pt family cannot provide asist, could consider 3-5x placement. Pt would continue to benefit from skilled PT in order to return to prior level of independence. Treatment Diagnosis: decreased functional mobility  Prognosis: Good  Decision Making: Medium Complexity  History: 53 y/o male admit 12/12/2021 with Acute Pancreatitis,CHAPIN, DKA and Shock. PMH insign otherwise. PT Education: Goals;PT Role;Plan of Care  Patient Education: discussed discharge recommendations, pt verbalized understanding  Barriers to Learning: none  REQUIRES PT FOLLOW UP: Yes  Activity Tolerance  Activity Tolerance: Patient Tolerated treatment well;Patient limited by endurance  Activity Tolerance: improved willingness to participate with PT     Patient Diagnosis(es): The primary encounter diagnosis was Diabetic ketoacidosis without coma associated with diabetes mellitus due to underlying condition ([facility] [address] Diagnoses of Metabolic acidosis, Hypotension, unspecified hypotension type, Hyperglycemia, Lymphocytosis, and DKA, type 2, not at goal [facility]) were also pertinent to this visit. has a past medical history of H/O degenerative disc disease. has no past surgical history on file. Restrictions  Restrictions/Precautions  Restrictions/Precautions: Fall Risk  Position Activity Restriction  Other position/activity restrictions: O2 6L via NC. Subjective   General  Chart Reviewed: Yes  Additional Pertinent Hx: 53 y/o male admit 12/12/2021 with Acute Pancreatitis,CHAPIN, DKA and Shock. PMH insign otherwise. Response To Previous Treatment: Patient with no complaints from previous session. Family / Caregiver Present: No  Referring Practitioner: Adan Trinh NP.   Subjective  Subjective: Pt reports no pain at this time while supine in bed, but did complain R hip pain with weightbearing and walking. Pt agreeable to participate in therapy. General Comment  Comments: Pt supine in bed upon arrival.          Orientation  Orientation  Overall Orientation Status: Within Functional Limits  Cognition   Cognition  Overall Cognitive Status: WFL  Objective   Bed mobility  Supine to Sit: Supervision  Comment: remained up in recliner  Transfers  Sit to Stand: Stand by assistance  Stand to sit: Stand by assistance  Comment: Cues for safe hand placement with use of Walker, darrius with turning and backing - may do better with wheeled walker. Sit to stand from toilet with SBA, used grab bar and momentum. Ambulation  Ambulation?: Yes  Ambulation 1  Surface: level tile  Device: Standard Walker  Assistance: Stand by assistance  Quality of Gait: even pace and pattern, would benefit from a bariatric wh walker but none available at this time  Gait Deviations: Decreased step length;Decreased step height  Distance: 10' bed to toilet and 5' toilet to sink with no device but unsteady, grabbing for surrounding objects and complaining of R hip discomfort, 25' and 40' with standard barriatric walker (prefer one with wheels but not available)  Comments: Pt ambulated in room with SBA and standard walker. O2 drops to 88-90% post ambulation and quickly recovers to lower 90s after seated rest break. Assist to manage IV pole and O2 at 6 L during functional mobility. Stairs/Curb  Stairs?: No        Exercises  Hip Flexion: 20, BLEs  Knee Long Arc Quad: 20, BLEs  Ankle Pumps: 20, BLEs  Comments: instructed to perform above ex in sitting every hour as tolerated         Other Activities: Other (see comment)  Comment: To toilet for urination, used urinal while seated on toilet, passed gas but no BM. Fecal mearing with pericare - assist for more thoroughness. Stood to wash hands at sink with SBA/supervision for standing balance.                 AM-PAC Score  AM-PAC Inpatient Mobility Raw Score : 20 (12/22/21 1053)  AM-PAC Inpatient T-Scale Score : 47.67 (12/22/21 1053)  Mobility Inpatient CMS 0-100% Score: 35.83 (12/22/21 1053)  Mobility Inpatient CMS G-Code Modifier : CJ (12/22/21 1053)          Goals  Short term goals  Time Frame for Short term goals: Upon d/c acute care setting. (pt progressing but goals ongoing 12-16)  Short term goal 1: Bed Mob Supervision. MET 12-20  Short term goal 2: Transfers with/without assist device SBA. MET 12-20  Short term goal 3: Amb with/without assist device 50' SBA. Short term goal 4: Pt participating in approp Strength Exs. Patient Goals   Patient goals : Return home. Plan    Plan  Times per week: 3-5x week while in acute care setting. Current Treatment Recommendations: Strengthening,Functional Mobility Training,Transfer Training,Gait Training,Safety Education & Training,Patient/Caregiver Education & Training  Safety Devices  Type of devices:  All fall risk precautions in place,Call light within reach,Chair alarm in place,Gait belt,Left in chair,Nurse notified  Restraints  Initially in place: No     Therapy Time   Individual Concurrent Group Co-treatment   Time In 1005         Time Out 1045         Minutes 40         Timed Code Treatment Minutes: Americo Mendoza [id number], [address]

## 2021-12-22 NOTE — CARE COORDINATION
CM discussed discharge options with patient. Lists provided for [place] [id number] services and skilled nursing facilities. Patient agreed to review the lists and provide CM with at least three [address] [facility] if he chooses to discharge to a SNF. The Plan for Transition of Care is related to the following treatment goals: TBD    The Patient and/or patient representative was provided with a choice of provider and agrees   with the discharge plan. [x] Yes [] No    Freedom of choice list was provided with basic dialogue that supports the patient's individualized plan of care/goals, treatment preferences and shares the quality data associated with the providers.  [x] Yes [] No    Electronically signed by Debra Powers RN on 12/22/2021 at 3:30 PM

## 2021-12-22 NOTE — PLAN OF CARE
Problem: Falls - Risk of:  Goal: Will remain free from falls  Description: Will remain free from falls  12/22/2021 0748 by Maxi Fairbanks RN  Outcome: Ongoing     Problem: Falls - Risk of:  Goal: Absence of physical injury  Description: Absence of physical injury  12/22/2021 0748 by Maxi Fairbanks RN  Outcome: Ongoing     Problem: Pain:  Description: Pain management should include both nonpharmacologic and pharmacologic interventions. Goal: Pain level will decrease  Description: Pain level will decrease  12/22/2021 0748 by Maxi Fairbanks RN  Outcome: Ongoing     Problem: Pain:  Description: Pain management should include both nonpharmacologic and pharmacologic interventions. Goal: Control of acute pain  Description: Control of acute pain  12/22/2021 0748 by Maxi Fairbanks RN  Outcome: Ongoing     Problem: Pain:  Description: Pain management should include both nonpharmacologic and pharmacologic interventions.   Goal: Control of chronic pain  Description: Control of chronic pain  12/22/2021 0748 by Maxi Fairbanks RN  Outcome: Ongoing     Problem: Discharge Planning:  Goal: Participates in care planning  Description: Participates in care planning  12/22/2021 0748 by Maxi Fairbanks RN  Outcome: Ongoing

## 2021-12-22 NOTE — PLAN OF CARE
Problem: Falls - Risk of:  Goal: Will remain free from falls  Description: Will remain free from falls  Outcome: Ongoing  Goal: Absence of physical injury  Description: Absence of physical injury  Outcome: Ongoing     Problem: Pain:  Goal: Pain level will decrease  Description: Pain level will decrease  Outcome: Ongoing  Goal: Control of acute pain  Description: Control of acute pain  Outcome: Ongoing  Goal: Control of chronic pain  Description: Control of chronic pain  Outcome: Ongoing     Problem: Discharge Planning:  Goal: Participates in care planning  Description: Participates in care planning  Outcome: Ongoing  Goal: Discharged to appropriate level of care  Description: Discharged to appropriate level of care  Outcome: Ongoing     Problem:  Bowel Function - Altered:  Goal: Bowel elimination is within specified parameters  Description: Bowel elimination is within specified parameters  Outcome: Ongoing     Problem: Cardiac Output - Decreased:  Goal: Hemodynamic stability will improve  Description: Hemodynamic stability will improve  Outcome: Ongoing     Problem: Fluid Volume - Imbalance:  Goal: Absence of imbalanced fluid volume signs and symptoms  Description: Absence of imbalanced fluid volume signs and symptoms  Outcome: Ongoing  Goal: Will remain free of signs and symptoms of dehydration  Description: Will remain free of signs and symptoms of dehydration  Outcome: Ongoing     Problem: Serum Glucose Level - Abnormal:  Goal: Ability to maintain appropriate glucose levels will improve to within specified parameters  Description: Ability to maintain appropriate glucose levels will improve to within specified parameters  Outcome: Ongoing  Goal: Ability to maintain appropriate glucose levels will improve  Description: Ability to maintain appropriate glucose levels will improve  Outcome: Ongoing     Problem: Tissue Perfusion, Altered:  Goal: Circulatory function within specified parameters  Description: Circulatory function within specified parameters  Outcome: Ongoing     Problem: Tissue Perfusion - Cardiopulmonary, Altered:  Goal: Absence of angina  Description: Absence of angina  Outcome: Ongoing  Goal: Hemodynamic stability will improve  Description: Hemodynamic stability will improve  Outcome: Ongoing     Problem: Urinary Elimination:  Goal: Signs and symptoms of infection will decrease  Description: Signs and symptoms of infection will decrease  Outcome: Ongoing  Goal: Complications related to the disease process, condition or treatment will be avoided or minimized  Description: Complications related to the disease process, condition or treatment will be avoided or minimized  Outcome: Ongoing     Problem: Injury - Acid Base Imbalance:  Goal: Acid-base balance  Description: Acid-base balance  Outcome: Ongoing     Problem: Tissue Perfusion - Renal, Altered:  Goal: Electrolytes within specified parameters  Description: Electrolytes within specified parameters  Outcome: Ongoing  Goal: Urine creatinine clearance will be within specified parameters  Description: Urine creatinine clearance will be within specified parameters  Outcome: Ongoing  Goal: Serum creatinine will be within specified parameters  Description: Serum creatinine will be within specified parameters  Outcome: Ongoing  Goal: Ability to achieve a balanced intake and output will improve  Description: Ability to achieve a balanced intake and output will improve  Outcome: Ongoing     Problem: Nutrition  Goal: Optimal nutrition therapy  Outcome: Ongoing

## 2021-12-22 NOTE — PROGRESS NOTES
Hospitalist Progress Note      PCP: [phone] [place] [address]    Date of Admission: 12/12/2021    Chief Complaint: increased thirst, urination, nausea, vomiting    Hospital Course: The patient is a 54 y.o. male with no significant past medical history who presents to [facility] with increased thirst, urination, nausea, and vomiting. Patient stated that over the past several days, he has had intractable nausea, vomiting, with increased thirst and urination. He denies fever, chills, chest pain, shortness of breath, constipation, diarrhea, and dysuria.     In the ED, labs were significant for a sodium of 123, chloride of 77, bicarb of 4, BUN/Cr of 35/3.2, glucose of 900, lactic acid of 8.5, WBC count of 20.1K with a pH of 6.831 and beta-hydroxyturate > 8. CXR showed no acute disease. Subjective:     Small liquid BM 2 days back - none since.   + Flatus. Ongoing abd distention. Reports abd pain much improved. Worsening edema and anasarca again. Out of bed to chair. Denies nausea today.        Medications:  Reviewed    Infusion Medications    sodium chloride      dextrose       Scheduled Medications    insulin lispro  0-12 Units SubCUTAneous TID WC    insulin lispro  0-6 Units SubCUTAneous Nightly    insulin lispro  5 Units SubCUTAneous TID WC    furosemide  40 mg IntraVENous Daily    bisacodyl  10 mg Oral Daily    vancomycin  1,000 mg IntraVENous Q12H    naloxegol  25 mg Oral QAM    metoclopramide  10 mg IntraVENous Q6H    sodium chloride flush  5-40 mL IntraVENous 2 times per day    meropenem  500 mg IntraVENous Q6H    fluconazole  100 mg IntraVENous Q24H    vancomycin (VANCOCIN) intermittent dosing (placeholder)   Other RX Placeholder    polyethylene glycol  17 g Oral Daily    insulin glargine  35 Units SubCUTAneous BID    pantoprazole  40 mg Oral BID AC    docusate sodium  100 mg Oral BID    fenofibrate  160 mg Oral Daily    heparin (porcine)  5,000 Units SubCUTAneous BID     PRN Meds: sodium chloride flush, sodium chloride, ondansetron, morphine **OR** morphine, glucose, dextrose, glucagon (rDNA), dextrose, dextrose, magnesium sulfate, sodium phosphate IVPB **OR** sodium phosphate IVPB **OR** sodium phosphate IVPB      Intake/Output Summary (Last 24 hours) at 12/22/2021 1237  Last data filed at 12/22/2021 1156  Gross per 24 hour   Intake 3714.09 ml   Output 3300 ml   Net 414.09 ml       Exam:    BP 98/67   Pulse 107   Temp 97.9 °F (36.6 °C) (Oral)   Resp 20   Ht 6' 1\" (1.854 m)   Wt (!) 324 lb 11.8 oz (147.3 kg)   SpO2 93%   BMI 42.84 kg/m²     General appearance: No apparent distress, appears stated age and cooperative. HEENT: Pupils equal, round, and reactive to light. Conjunctivae/corneas clear. Neck: Supple, with full range of motion. No jugular venous distention. Trachea midline. Respiratory:  Normal respiratory effort. Clear to auscultation, bilaterally without Rales/Wheezes/Rhonchi. Cardiovascular: Regular rate and rhythm with normal S1/S2 without murmurs, rubs or gallops. Abdomen: Soft, non-tender, non-distended with normal bowel sounds. Musculoskeletal: No clubbing, cyanosis or edema bilaterally. Full range of motion without deformity. Skin: Skin color, texture, turgor normal.  No rashes or lesions. Neurologic:  Neurovascularly intact without any focal sensory/motor deficits.  Cranial nerves: II-XII intact, grossly non-focal.  Psychiatric: Alert and oriented, thought content appropriate, normal insight  Capillary Refill: Brisk,< 3 seconds   Peripheral Pulses: +2 palpable, equal bilaterally       Labs:   Recent Labs     12/20/21  0405 12/21/21  0639 12/22/21  0625   WBC 26.1* 24.1* 22.0*   HGB 10.8* 9.2* 8.7*   HCT 33.3* 29.0* 26.5*    225 230     Recent Labs     12/20/21  0405 12/21/21  0639 12/22/21  0625    137 135*   K 3.6 3.8 3.3*   CL 96* 98* 97*   CO2 30 28 28   BUN 19 15 12   CREATININE 1.5* 1.6* 1.5*   CALCIUM 7.9* 7.6* 7.8*   PHOS 2.7 3.0 3.0     Recent Labs     12/20/21  0940 12/21/21  0639   AST 30 35   ALT 16 17   BILIDIR 0.3 0.3   BILITOT 0.5 0.5   ALKPHOS 119 125     No results for input(s): INR in the last 72 hours. No results for input(s): Ana Divine in the last 72 hours. Urinalysis:      Lab Results   Component Value Date    NITRU Negative 12/12/2021    WBCUA 2 12/12/2021    RBCUA 0-2 12/12/2021    BLOODU LARGE 12/12/2021    SPECGRAV 1.023 12/12/2021    GLUCOSEU >=1000 12/12/2021       Radiology:  CT CHEST ABDOMEN PELVIS WO CONTRAST   Final Result   1. Findings remain consistent with acute pancreatitis. Evaluation is limited   due to the absence of contrast.   2. Findings concerning for diffuse small bowel ileus. 3. Significant atelectasis at the lung bases bilaterally. XR CHEST PORTABLE   Final Result   New retrocardiac left lower lobe opacification, possibly infiltrate or   atelectasis. CT ABDOMEN PELVIS WO CONTRAST Additional Contrast? Oral   Final Result   Persistent but decreased peripancreatic fluid and edema, in keeping with the   clinical diagnosis of pancreatitis      Increased body wall anasarca with increased pleural-parenchymal disease at   the lung bases, likely due to fluid overload      Fatty liver. Diverticulosis and normal caliber appendix      Subtle increased density is seen in the right femoral vein. It is uncertain   as whether not this is artifactual or due to a small amount of thrombus. Recommend correlation with lower extremity venous duplex ultrasound      RECOMMENDATIONS:   Unavailable         VL Extremity Venous Right   Final Result      US GALLBLADDER RUQ   Final Result   Pancreas not well visualized. This is likely due to the peripancreatic   inflammatory changes seen on recent CT. Hepatic steatosis. CT ABDOMEN PELVIS WO CONTRAST Additional Contrast? None   Final Result   1. Severe acute interstitial pancreatitis. No focal fluid collection. 2. Hepatomegaly with severe steatosis. 3. Possible gallbladder sludge. 4. Colonic diverticulosis without acute diverticulitis. 5. The distal esophagus is dilated and filled with fluid. RECOMMENDATIONS:   Unavailable         XR CHEST PORTABLE   Final Result   No radiographic evidence of acute pulmonary disease. Assessment/Plan:    Active Hospital Problems    Diagnosis Date Noted    Acute kidney injury ([facility] [address] [N17.9]     Acute pancreatitis [K85.90]     DKA, type 2, not at goal [facility]) [E11.10] 12/12/2021       DKA resolved - suspect undiagnosed type 2 DM  -check Hgb A1c - 12.4  -NPO - to full liquid to low fat diet per GI - pt not tolerating much PO  -DKA protocol completed  -insulin gtt - to SQ insulin  - check CARI and islet cell Abs   - lantus 35BID. - on 12/18 I switched him to IV Humulin sliding scale and 5 units prand bolus if eating - BG appears to be responding well    - will try and switch back to SQ insulin today.        Anion gap metabolic acidosis likely 2/2 DKA  -management as above       Severe interstitial pancreatitis  - diet as above  - s/p aggressive IVF   - IV merrem due to ongoing fevers - fever now resolved. -GI and general surgery consulted  -check RUQ U/S - pancreas not well visualized due to surrounding inflammatory changes. - pt reports 3 beers and hard liquor shots prior to onset of pancreatitis, but denies chronic alcohol abuse. - repeat CT on 12/16 showed improvement of pancreatitis, but WBC continued to rise   - I resumed him on Vanc, merrem and diflucan and sent repeat blood Cx  On 12/18.    - repeat CT chest abd pelvis (no contrast) 12/20 reviewed   - WBC starting to improve slowly.        Septic Shock - resolved   Required levophed short term early in admission.   - see abx as above.          CHAPIN - Cr improving slowly  s/p IVF with bicarb - switched to NS per nephrology team.    S/p single dose lasix on 12/17 with good effect.    - avoid nephrotoxic medications  - nephrology following  - again showing signs of fluid retention - stop IVF. - lasix IV today. Coffee ground emesis  -cont PPI  -GI following  -Hgb slow downtrend.        Obesity - BMI 38.68  -nutritional counseling provided  -weight loss encouraged  -complicating medical management      Hypocalcemia - s/p IV replete. Improved. Constipation. CT 12/20 concerning for diffuse ileus. Started on reglan per GI team.   Will give Movantik daily for 3 days - started 12/20. Add dulcolax. Pt refused miralax - makes him nauseated. DVT Prophylaxis: Heparin   Diet: ADULT DIET; Regular; 4 carb choices (60 gm/meal); Low Fat/Low Chol/High Fiber/OCTAVIANO  ADULT ORAL NUTRITION SUPPLEMENT; Breakfast, Lunch, Dinner; Diabetic Oral Supplement  Code Status: Full Code      Dispo - continue care.        Halina Burr MD

## 2021-12-22 NOTE — PROGRESS NOTES
Nephrology Progress Note   [facility]. [facility]      Chief Complaint: Nausea/vomiting/pancreatitis    History of Present Illness: Mr Kelvin Cintron is a is a 54 y.o. male with no significant past medical history who presents to [facility] with increased thirst, urination, nausea, and vomiting. He was noted to have a BS level of 900 mg/dl. CT scan abdomen and pelvis revealed severe acute Pancreatitis. He was Hyperkalemic, with serum HCO level of 5 and creatinine of 3.4 mg/dl. Patient was started on insulin gtt,treated for DKA     Subjective:    Resting in bed;   Labs reviewed . ROS: No CP/SOB , poor appetite  . weakness better . No nausea or vomiting . No family present      Scheduled Meds:   insulin lispro  0-12 Units SubCUTAneous TID WC    insulin lispro  0-6 Units SubCUTAneous Nightly    insulin lispro  5 Units SubCUTAneous TID WC    furosemide  40 mg IntraVENous Daily    bisacodyl  10 mg Oral Daily    vancomycin  1,000 mg IntraVENous Q12H    naloxegol  25 mg Oral QAM    metoclopramide  10 mg IntraVENous Q6H    sodium chloride flush  5-40 mL IntraVENous 2 times per day    meropenem  500 mg IntraVENous Q6H    fluconazole  100 mg IntraVENous Q24H    vancomycin (VANCOCIN) intermittent dosing (placeholder)   Other RX Placeholder    polyethylene glycol  17 g Oral Daily    insulin glargine  35 Units SubCUTAneous BID    pantoprazole  40 mg Oral BID AC    docusate sodium  100 mg Oral BID    fenofibrate  160 mg Oral Daily    heparin (porcine)  5,000 Units SubCUTAneous BID        sodium chloride      dextrose         PRN Meds:.sodium chloride flush, sodium chloride, ondansetron, morphine **OR** morphine, glucose, dextrose, glucagon (rDNA), dextrose, dextrose, magnesium sulfate, sodium phosphate IVPB **OR** sodium phosphate IVPB **OR** sodium phosphate IVPB    Physical Exam:    TEMPERATURE:  Current - Temp: 97.9 °F (36.6 °C);  Max - Temp  Av.9 °F (37.2 °C)  Min: 97.9 °F (36.6 °C)  Max: 99.6 °F (37.6 °C)  RESPIRATIONS RANGE: Resp  Av.6  Min: 16  Max: 20  PULSE RANGE: Pulse  Av  Min: 106  Max: 114  BLOOD PRESSURE RANGE:  Systolic (69ZYH), JNK:744 , Min:98 , GMD:104   ; Diastolic (98TCF), OVZ:44, Min:62, Max:82    24HR INTAKE/OUTPUT:      Intake/Output Summary (Last 24 hours) at 2021 1122  Last data filed at 2021 1012  Gross per 24 hour   Intake 3954.09 ml   Output 2450 ml   Net 1504.09 ml         Patient Vitals for the past 96 hrs (Last 3 readings):   Weight   21 0348 (!) 324 lb 11.8 oz (147.3 kg)   21 0428 (!) 328 lb 0.7 oz (148.8 kg)   21 0600 (!) 328 lb 0.7 oz (148.8 kg)       General: in bed   HEENT: Normocephalic, atraumatic  Neck: No Thyromegaly, Trachea is midline  Chest: clear to auscultation  CVS: RRR, no murmur, no rub  Abdomen: distended, generalized tenderness   Extremities: no edema, no cyanosis.   Skin: normal texture, normal skin turgor, no rash  Neurological: not done         LAB DATA:    CBC:   Lab Results   Component Value Date    WBC 22.0 2021    RBC 2.90 2021    HGB 8.7 2021    HCT 26.5 2021    MCV 91.2 2021    MCH 29.8 2021    MCHC 32.7 2021    RDW 13.6 2021     2021    MPV 7.9 2021     BMP:    Lab Results   Component Value Date     2021    K 3.3 2021    K 4.2 2021    CL 97 2021    CO2 28 2021    BUN 12 2021    CREATININE 1.5 2021    CALCIUM 7.8 2021    GFRAA >60 2021    LABGLOM 50 2021    GLUCOSE 105 2021     Ionized Calcium:  No results found for: IONCA  Magnesium:    Lab Results   Component Value Date    MG 2.00 2021     Phosphorus:    Lab Results   Component Value Date    PHOS 3.0 2021     U/A:    Lab Results   Component Value Date    COLORU YELLOW 2021    PHUR 5.0 2021    WBCUA 2 2021    RBCUA 0-2 2021    CLARITYU CLOUDY 2021    SPECGRAV 1.023 2021 LEUKOCYTESUR Negative 12/12/2021    UROBILINOGEN 0.2 12/12/2021    BILIRUBINUR MODERATE 12/12/2021    BLOODU LARGE 12/12/2021    GLUCOSEU >=1000 12/12/2021         IMPRESSION/RECOMMENDATIONS:      Active Problems:    DKA, type 2, not at goal [facility])    Acute pancreatitis    Acute kidney injury ([address]  Resolved Problems:    * No resolved hospital problems. *    1. CHAPIN - likely prerenal/ATN - occurring in the setting of DKA/pancreatitis. - creatinine stable  , dc  IVF . - CT scan on admission did not reveal Hydronephrosis  - avoid exposure to Nephrotoxic agents    2. Critical life threatening acidosis   - Secondary to DKA and CHAPIN  - Resolved . 3. Hypokalemia will supp.      4. Pancreatitis - management per Medicine        Myriam Woods MD,FACP

## 2021-12-23 LAB
ALBUMIN SERPL-MCNC: 2.1 G/DL (ref 3.4–5)
ANION GAP SERPL CALCULATED.3IONS-SCNC: 10 MMOL/L (ref 3–16)
BASOPHILS ABSOLUTE: 0 K/UL (ref 0–0.2)
BASOPHILS RELATIVE PERCENT: 0.2 %
BUN BLDV-MCNC: 10 MG/DL (ref 7–20)
CALCIUM SERPL-MCNC: 7.8 MG/DL (ref 8.3–10.6)
CHLORIDE BLD-SCNC: 100 MMOL/L (ref 99–110)
CO2: 28 MMOL/L (ref 21–32)
CREAT SERPL-MCNC: 1.5 MG/DL (ref 0.9–1.3)
EOSINOPHILS ABSOLUTE: 0.1 K/UL (ref 0–0.6)
EOSINOPHILS RELATIVE PERCENT: 0.6 %
GFR AFRICAN AMERICAN: >60
GFR NON-AFRICAN AMERICAN: 50
GLUCOSE BLD-MCNC: 127 MG/DL (ref 70–99)
GLUCOSE BLD-MCNC: 144 MG/DL (ref 70–99)
GLUCOSE BLD-MCNC: 169 MG/DL (ref 70–99)
GLUCOSE BLD-MCNC: 194 MG/DL (ref 70–99)
GLUCOSE BLD-MCNC: 205 MG/DL (ref 70–99)
HCT VFR BLD CALC: 25.4 % (ref 40.5–52.5)
HEMATOLOGY PATH CONSULT: NO
HEMOGLOBIN: 8.4 G/DL (ref 13.5–17.5)
LYMPHOCYTES ABSOLUTE: 2.5 K/UL (ref 1–5.1)
LYMPHOCYTES RELATIVE PERCENT: 14.9 %
MAGNESIUM: 2 MG/DL (ref 1.8–2.4)
MCH RBC QN AUTO: 30 PG (ref 26–34)
MCHC RBC AUTO-ENTMCNC: 33 G/DL (ref 31–36)
MCV RBC AUTO: 91 FL (ref 80–100)
MONOCYTES ABSOLUTE: 1.8 K/UL (ref 0–1.3)
MONOCYTES RELATIVE PERCENT: 10.6 %
NEUTROPHILS ABSOLUTE: 12.2 K/UL (ref 1.7–7.7)
NEUTROPHILS RELATIVE PERCENT: 73.7 %
PDW BLD-RTO: 13.5 % (ref 12.4–15.4)
PERFORMED ON: ABNORMAL
PHOSPHORUS: 2.8 MG/DL (ref 2.5–4.9)
PLATELET # BLD: 230 K/UL (ref 135–450)
PMV BLD AUTO: 7.8 FL (ref 5–10.5)
POTASSIUM SERPL-SCNC: 3.4 MMOL/L (ref 3.5–5.1)
RBC # BLD: 2.79 M/UL (ref 4.2–5.9)
SODIUM BLD-SCNC: 138 MMOL/L (ref 136–145)
WBC # BLD: 16.6 K/UL (ref 4–11)

## 2021-12-23 PROCEDURE — 85025 COMPLETE CBC W/AUTO DIFF WBC: CPT

## 2021-12-23 PROCEDURE — 97530 THERAPEUTIC ACTIVITIES: CPT

## 2021-12-23 PROCEDURE — 97535 SELF CARE MNGMENT TRAINING: CPT

## 2021-12-23 PROCEDURE — 6370000000 HC RX 637 (ALT 250 FOR IP): Performed by: SURGERY

## 2021-12-23 PROCEDURE — 2580000003 HC RX 258: Performed by: NURSE PRACTITIONER

## 2021-12-23 PROCEDURE — 6360000002 HC RX W HCPCS: Performed by: INTERNAL MEDICINE

## 2021-12-23 PROCEDURE — 97116 GAIT TRAINING THERAPY: CPT | Performed by: PHYSICAL THERAPIST

## 2021-12-23 PROCEDURE — 94761 N-INVAS EAR/PLS OXIMETRY MLT: CPT

## 2021-12-23 PROCEDURE — 6370000000 HC RX 637 (ALT 250 FOR IP): Performed by: PHYSICIAN ASSISTANT

## 2021-12-23 PROCEDURE — 2700000000 HC OXYGEN THERAPY PER DAY

## 2021-12-23 PROCEDURE — 83735 ASSAY OF MAGNESIUM: CPT

## 2021-12-23 PROCEDURE — 2060000000 HC ICU INTERMEDIATE R&B

## 2021-12-23 PROCEDURE — 6370000000 HC RX 637 (ALT 250 FOR IP): Performed by: INTERNAL MEDICINE

## 2021-12-23 PROCEDURE — 97530 THERAPEUTIC ACTIVITIES: CPT | Performed by: PHYSICAL THERAPIST

## 2021-12-23 PROCEDURE — 2580000003 HC RX 258: Performed by: INTERNAL MEDICINE

## 2021-12-23 PROCEDURE — 6360000002 HC RX W HCPCS: Performed by: SURGERY

## 2021-12-23 PROCEDURE — 80069 RENAL FUNCTION PANEL: CPT

## 2021-12-23 PROCEDURE — APPNB30 APP NON BILLABLE TIME 0-30 MINS: Performed by: NURSE PRACTITIONER

## 2021-12-23 RX ORDER — POTASSIUM CHLORIDE 20 MEQ/1
40 TABLET, EXTENDED RELEASE ORAL DAILY
Status: DISCONTINUED | OUTPATIENT
Start: 2021-12-23 | End: 2021-12-29

## 2021-12-23 RX ADMIN — METOCLOPRAMIDE HYDROCHLORIDE 10 MG: 5 INJECTION INTRAMUSCULAR; INTRAVENOUS at 00:22

## 2021-12-23 RX ADMIN — INSULIN GLARGINE 35 UNITS: 100 INJECTION, SOLUTION SUBCUTANEOUS at 20:41

## 2021-12-23 RX ADMIN — POTASSIUM CHLORIDE 40 MEQ: 20 TABLET, EXTENDED RELEASE ORAL at 16:51

## 2021-12-23 RX ADMIN — INSULIN LISPRO 7 UNITS: 100 INJECTION, SOLUTION INTRAVENOUS; SUBCUTANEOUS at 08:50

## 2021-12-23 RX ADMIN — HEPARIN SODIUM 5000 UNITS: 5000 INJECTION INTRAVENOUS; SUBCUTANEOUS at 08:49

## 2021-12-23 RX ADMIN — INSULIN LISPRO 7 UNITS: 100 INJECTION, SOLUTION INTRAVENOUS; SUBCUTANEOUS at 13:02

## 2021-12-23 RX ADMIN — INSULIN GLARGINE 35 UNITS: 100 INJECTION, SOLUTION SUBCUTANEOUS at 08:50

## 2021-12-23 RX ADMIN — INSULIN LISPRO 4 UNITS: 100 INJECTION, SOLUTION INTRAVENOUS; SUBCUTANEOUS at 08:50

## 2021-12-23 RX ADMIN — FUROSEMIDE 40 MG: 10 INJECTION, SOLUTION INTRAMUSCULAR; INTRAVENOUS at 08:49

## 2021-12-23 RX ADMIN — MEROPENEM 500 MG: 500 INJECTION, POWDER, FOR SOLUTION INTRAVENOUS at 06:20

## 2021-12-23 RX ADMIN — FENOFIBRATE 160 MG: 160 TABLET ORAL at 08:49

## 2021-12-23 RX ADMIN — DOCUSATE SODIUM 100 MG: 100 CAPSULE ORAL at 20:33

## 2021-12-23 RX ADMIN — METOCLOPRAMIDE HYDROCHLORIDE 10 MG: 5 INJECTION INTRAMUSCULAR; INTRAVENOUS at 06:19

## 2021-12-23 RX ADMIN — ONDANSETRON 4 MG: 2 INJECTION INTRAMUSCULAR; INTRAVENOUS at 18:14

## 2021-12-23 RX ADMIN — METOCLOPRAMIDE HYDROCHLORIDE 10 MG: 5 INJECTION INTRAMUSCULAR; INTRAVENOUS at 18:14

## 2021-12-23 RX ADMIN — BISACODYL 10 MG: 5 TABLET, COATED ORAL at 08:49

## 2021-12-23 RX ADMIN — MEROPENEM 500 MG: 500 INJECTION, POWDER, FOR SOLUTION INTRAVENOUS at 01:29

## 2021-12-23 RX ADMIN — PANTOPRAZOLE SODIUM 40 MG: 40 TABLET, DELAYED RELEASE ORAL at 06:19

## 2021-12-23 RX ADMIN — METOCLOPRAMIDE HYDROCHLORIDE 10 MG: 5 INJECTION INTRAMUSCULAR; INTRAVENOUS at 12:57

## 2021-12-23 RX ADMIN — INSULIN LISPRO 2 UNITS: 100 INJECTION, SOLUTION INTRAVENOUS; SUBCUTANEOUS at 13:01

## 2021-12-23 RX ADMIN — INSULIN LISPRO 1 UNITS: 100 INJECTION, SOLUTION INTRAVENOUS; SUBCUTANEOUS at 20:41

## 2021-12-23 RX ADMIN — SODIUM CHLORIDE, PRESERVATIVE FREE 10 ML: 5 INJECTION INTRAVENOUS at 20:33

## 2021-12-23 RX ADMIN — PANTOPRAZOLE SODIUM 40 MG: 40 TABLET, DELAYED RELEASE ORAL at 16:51

## 2021-12-23 RX ADMIN — FLUCONAZOLE 100 MG: 2 INJECTION, SOLUTION INTRAVENOUS at 10:05

## 2021-12-23 RX ADMIN — HEPARIN SODIUM 5000 UNITS: 5000 INJECTION INTRAVENOUS; SUBCUTANEOUS at 20:33

## 2021-12-23 RX ADMIN — MEROPENEM 500 MG: 500 INJECTION, POWDER, FOR SOLUTION INTRAVENOUS at 18:30

## 2021-12-23 RX ADMIN — NALOXEGOL OXALATE 25 MG: 25 TABLET, FILM COATED ORAL at 11:00

## 2021-12-23 RX ADMIN — DOCUSATE SODIUM 100 MG: 100 CAPSULE ORAL at 08:49

## 2021-12-23 RX ADMIN — MEROPENEM 500 MG: 500 INJECTION, POWDER, FOR SOLUTION INTRAVENOUS at 12:59

## 2021-12-23 ASSESSMENT — PAIN SCALES - GENERAL
PAINLEVEL_OUTOF10: 0

## 2021-12-23 ASSESSMENT — PAIN SCALES - WONG BAKER
WONGBAKER_NUMERICALRESPONSE: 0
WONGBAKER_NUMERICALRESPONSE: 0

## 2021-12-23 NOTE — PROGRESS NOTES
INPATIENT PROGRESS NOTE        IDENTIFYING DATA/REASON FOR CONSULTATION   PATIENT:  Anthony Lopez  MRN:  [id number]  ADMIT DATE: 2021  TIME OF EVALUATION: 2021 9:38 AM  HOSPITAL STAY:   LOS: 11 days   CONSULTING PHYSICIAN: Mani Mitchell MD   REASON FOR CONSULTATION: pancreatitis    Subjective:    Patient seen in follow up. He denies abdominal pain, nausea, vomiting. He is tolerating food. He is still not had a bowel movement in a few days. MEDICATIONS   SCHEDULED:  insulin lispro, 7 Units, TID WC  potassium bicarb-citric acid, 40 mEq, Daily  insulin lispro, 0-12 Units, TID WC  insulin lispro, 0-6 Units, Nightly  furosemide, 40 mg, Daily  bisacodyl, 10 mg, Daily  naloxegol, 25 mg, QAM  metoclopramide, 10 mg, Q6H  sodium chloride flush, 5-40 mL, 2 times per day  meropenem, 500 mg, Q6H  fluconazole, 100 mg, Q24H  polyethylene glycol, 17 g, Daily  insulin glargine, 35 Units, BID  pantoprazole, 40 mg, BID AC  docusate sodium, 100 mg, BID  fenofibrate, 160 mg, Daily  heparin (porcine), 5,000 Units, BID      FLUIDS/DRIPS:     sodium chloride      dextrose       PRNs: sodium chloride flush, 5-40 mL, PRN  sodium chloride, 25 mL, PRN  ondansetron, 4 mg, Q4H PRN  glucose, 15 g, PRN  dextrose, 12.5 g, PRN  glucagon (rDNA), 1 mg, PRN  dextrose, 100 mL/hr, PRN  dextrose, 12.5 g, PRN      ALLERGIES:  No Known Allergies      PHYSICAL EXAM   VITALS:  /77   Pulse 93   Temp 99.1 °F (37.3 °C) (Oral)   Resp 18   Ht 6' 1\" (1.854 m)   Wt (!) 321 lb 6.9 oz (145.8 kg)   SpO2 96%   BMI 42.41 kg/m²   TEMPERATURE:  Current - Temp: 99.1 °F (37.3 °C);  Max - Temp  Av.1 °F (37.3 °C)  Min: 97.9 °F (36.6 °C)  Max: 99.9 °F (37.7 °C)    Physical Exam:  General appearance: alert, cooperative, no distress, appears stated age  Eyes: Anicteric  Head: Normocephalic, without obvious abnormality  Lungs: clear to auscultation bilaterally, Normal Effort  Heart: regular rate and rhythm, normal S1 and S2, no Final Result      US GALLBLADDER RUQ   Final Result   Pancreas not well visualized. This is likely due to the peripancreatic   inflammatory changes seen on recent CT. Hepatic steatosis. CT ABDOMEN PELVIS WO CONTRAST Additional Contrast? None   Final Result   1. Severe acute interstitial pancreatitis. No focal fluid collection. 2. Hepatomegaly with severe steatosis. 3. Possible gallbladder sludge. 4. Colonic diverticulosis without acute diverticulitis. 5. The distal esophagus is dilated and filled with fluid. RECOMMENDATIONS:   Unavailable         XR CHEST PORTABLE   Final Result   No radiographic evidence of acute pulmonary disease. Endoscopy      ASSESSMENT AND RECOMMENDATIONS   Mauricio Ormond is a 54 y.o. male who presented on 12/12/2021 with shortness of breath, nausea, vomiting, increased thirst and urination. Found with DKA, CHAPIN, hypotension/shock, leukocytosis, and acute pancreatitis. GI consulted regarding acute pancreatitis       1. Acute pancreatitis  -suspect 2/2 hypertriglyceridemia and poorly controlled DM. No hx of heavy ETOH use. RUQ US showed normal gallbladder and bile ducts, no stones. Ca normal  -Clinically improving. Tolerating food without nausea, vomiting or pain. - Repeat CT 12/20 without obvious infected fluid collections or necrosis (limited by lack of IV contrast)   -no fevers and white count continue to improve. Can likely de-escalate antibiotics    2. Ileus 2/2 #1  -on reglan and movantik. Abdomen less distended but no bowel movements in a few days    3. Hypertriglyceridemia  -on fenofibrate     4. Fluid retention.    -On lasix 40 mg daily        RECOMMENDATIONS:    White count continues to improve, no fevers. Can likely de-escalate antibiotics.   Vanc d/c'd  Continue Reglan 10 mg q6hr  Continue movantik 25 mg daily and dulcolax  Continue fenofibrate   Continue IV Meropenem, fluconazole, and Vanco  Continue low fat diet, monitor tolerance    If you have any questions or need any further information, please feel free to contact us [phone]. Thank you for allowing us to participate in the care of Blanca Crisostomo. The note was completed using Dragon voice recognition transcription. Every effort was made to ensure accuracy; however, inadvertent transcription errors may be present despite my best efforts to edit errors. Wes TODD    Attending physician addendum:      I have personally seen and examined the patient, reviewed the patient's medical record and pertinent labs and clinical imaging. I have personally staffed the case with PEREZ Anders. I agree with her consultation note, exam findings, assessment and plans  as written above. I have made appropriate modifications and edited her assessment and plan where needed to reflect my impression and plans for this patient. Patient tolerating diet without vomiting. He reports no abdominal pain or nausea. No BM in 24 hours. Some flatus reported. /76   Pulse 102   Temp 97.5 °F (36.4 °C)   Resp 16   Ht 6' 1\" (1.854 m)   Wt (!) 321 lb 6.9 oz (145.8 kg)   SpO2 95%   BMI 42.41 kg/m²      Gen- NAD, obese  CV- Tachy  Lungs- decreased BS in bases  Abd- slightly less distended, slight decrease in BS  Ext- + edema     Labs and imaging were reviewed.      Impression:    Severe interstitial pancreatitis- resolving. WBC improving. No signs or source of infection. Ileus- slow to resolve. Improving . Leukocytosis- improving.  suspect leukemoid reaction. CT without obvious signs of infected peripancreatic fluid collection or necrotizing pancreatitis  RYAN- stable. Volume overload- on Lasix daily  Obesity  Hx of DKA- resolved  Sepsis- resolved  Hx of coffee ground emesis- resolved  Hypertriglyceridemia- resolved.         Plan:  Continue Lasix daily for now.  Nephrology managing fluid management  Continue Reglan and Movantik for now  On dulcolax and colace for constipation  Continue low fat diet as tolerated. Optimize glycemic control  Would consider stopping Vanco/Meropenem/Fluconazole if OK with Dr. Valerie Perales is no signs of infected peripancreatic fluid collection or overt necrosis noted. Continue Fenofibrate for hx of hypertriglyceridemia  PPI bid for hx of coffee ground emesis.  Monitor H and H daily    Thank you for allowing me to participate in this patient's care. If there are any questions or concerns regarding this patient, or the plan we have set in place, please feel free to contact me at [phone].      Arti Bray, DO

## 2021-12-23 NOTE — PROGRESS NOTES
Nephrology Progress Note   [facility]. [facility]      Chief Complaint: Nausea/vomiting/pancreatitis    History of Present Illness: Mr Katja Horton is a is a 54 y.o. male with no significant past medical history who presents to [facility] with increased thirst, urination, nausea, and vomiting. He was noted to have a BS level of 900 mg/dl. CT scan abdomen and pelvis revealed severe acute Pancreatitis. He was Hyperkalemic, with serum HCO level of 5 and creatinine of 3.4 mg/dl. Patient was started on insulin gtt,treated for DKA     Subjective:    Resting in bed;   Labs reviewed . ROS: No CP , improved WATT/ SOB   . weakness better . No nausea or vomiting . No family present      Scheduled Meds:   insulin lispro  7 Units SubCUTAneous TID WC    potassium bicarb-citric acid  40 mEq Oral Daily    insulin lispro  0-12 Units SubCUTAneous TID WC    insulin lispro  0-6 Units SubCUTAneous Nightly    furosemide  40 mg IntraVENous Daily    bisacodyl  10 mg Oral Daily    naloxegol  25 mg Oral QAM    metoclopramide  10 mg IntraVENous Q6H    sodium chloride flush  5-40 mL IntraVENous 2 times per day    meropenem  500 mg IntraVENous Q6H    fluconazole  100 mg IntraVENous Q24H    polyethylene glycol  17 g Oral Daily    insulin glargine  35 Units SubCUTAneous BID    pantoprazole  40 mg Oral BID AC    docusate sodium  100 mg Oral BID    fenofibrate  160 mg Oral Daily    heparin (porcine)  5,000 Units SubCUTAneous BID        sodium chloride      dextrose         PRN Meds:.sodium chloride flush, sodium chloride, ondansetron, glucose, dextrose, glucagon (rDNA), dextrose, dextrose    Physical Exam:    TEMPERATURE:  Current - Temp: 97.5 °F (36.4 °C);  Max - Temp  Av °F (37.2 °C)  Min: 97.5 °F (36.4 °C)  Max: 99.9 °F (37.7 °C)  RESPIRATIONS RANGE: Resp  Av.7  Min: 16  Max: 18  PULSE RANGE: Pulse  Av.5  Min: 93  Max: 106  BLOOD PRESSURE RANGE:  Systolic (55MVW), RFQ:949 , Min:102 , EZB:707   ; Diastolic (24hrs), Av, Min:60, Max:77    24HR INTAKE/OUTPUT:      Intake/Output Summary (Last 24 hours) at 2021 1119  Last data filed at 2021 1108  Gross per 24 hour   Intake 1210 ml   Output 5725 ml   Net -4515 ml         Patient Vitals for the past 96 hrs (Last 3 readings):   Weight   21 0410 (!) 321 lb 6.9 oz (145.8 kg)   21 0348 (!) 324 lb 11.8 oz (147.3 kg)   21 0428 (!) 328 lb 0.7 oz (148.8 kg)       General: in bed   HEENT: Normocephalic, atraumatic  Neck: No Thyromegaly, Trachea is midline  Chest: clear to auscultation  CVS: RRR, no murmur, no rub  Abdomen: distended, generalized tenderness   Extremities: no edema, no cyanosis.   Skin: normal texture, normal skin turgor, no rash  Neurological: not done         LAB DATA:    CBC:   Lab Results   Component Value Date    WBC 16.6 2021    RBC 2.79 2021    HGB 8.4 2021    HCT 25.4 2021    MCV 91.0 2021    MCH 30.0 2021    MCHC 33.0 2021    RDW 13.5 2021     2021    MPV 7.8 2021     BMP:    Lab Results   Component Value Date     2021    K 3.4 2021    K 4.2 2021     2021    CO2 28 2021    BUN 10 2021    CREATININE 1.5 2021    CALCIUM 7.8 2021    GFRAA >60 2021    LABGLOM 50 2021    GLUCOSE 144 2021     Ionized Calcium:  No results found for: IONCA  Magnesium:    Lab Results   Component Value Date    MG 2.00 2021     Phosphorus:    Lab Results   Component Value Date    PHOS 2.8 2021     U/A:    Lab Results   Component Value Date    COLORU YELLOW 2021    PHUR 5.0 2021    WBCUA 2 2021    RBCUA 0-2 2021    CLARITYU CLOUDY 2021    SPECGRAV 1.023 2021    LEUKOCYTESUR Negative 2021    UROBILINOGEN 0.2 2021    BILIRUBINUR MODERATE 2021    BLOODU LARGE 2021    GLUCOSEU >=1000 2021         IMPRESSION/RECOMMENDATIONS:      Active Problems:    DKA, type 2, not at goal [facility])    Acute pancreatitis    Acute kidney injury ([facility] [address]  Resolved Problems:    * No resolved hospital problems. *    1. CHAPIN - likely prerenal/ATN - occurring in the setting of DKA/pancreatitis. - creatinine stable  , Off   IVF . - CT scan on admission did not reveal Hydronephrosis  - avoid exposure to Nephrotoxic agents    2. Edema on lasix per admitting team.   Monitor renal function / electrolytes . 3. Hypokalemia will supp.      4. Pancreatitis - management per Medicine        Cynthia Peraza MD,FACP

## 2021-12-23 NOTE — PLAN OF CARE
Problem: Bowel Function - Altered:  Goal: Bowel elimination is within specified parameters       12/23/21 0800   Gastrointestinal   Abdominal (WDL) X   Abdomen Inspection Distended; Soft   Last BM (including prior to admit) 12/20/21   Tenderness No guarding;Nontender; Soft   RUQ Bowel Sounds Active   LUQ Bowel Sounds Active   RLQ Bowel Sounds Active   LLQ Bowel Sounds Active   GI Symptoms Constipation   Emesis Appearance Clear   Current Interventions Laxative   Passing Flatus Y

## 2021-12-23 NOTE — PROGRESS NOTES
Physical Therapy  Facility/Department: [address] PROGRESSIVE CARE  Daily Treatment Note  NAME: Ophelia Chau  : 1970  MRN: [id number]    Date of Service: 2021    Discharge Recommendations:  Continue to assess pending progress,2-3 sessions per week,3-5 sessions per week (home services if pt's family in the e building can assist with homemaking, may need to consider SNF if they cannot asssit)   PT Equipment Recommendations  Equipment Needed: Yes  Mobility Devices: Tiffanie Trevino: Rolling  Other: bariatric RW  Ophelia Chau scored a 20/24 on the AM-PAC short mobility form. Current research shows that an AM-PAC score of 18 or greater is typically associated with a discharge to the patient's home setting. Based on the patient's AM-PAC score and their current functional mobility deficits, it is recommended that the patient have 2-3 sessions per week of Physical Therapy at d/c to increase the patient's independence.  At this time, this patient demonstrates the endurance and safety to discharge home with home therapy services and a follow up treatment frequency of 2-3x/wk. Please see assessment section for further patient specific details.     If patient discharges prior to next session this note will serve as a discharge summary. Please see below for the latest assessment towards goals. Assessment   Body structures, Functions, Activity limitations: Decreased functional mobility ; Decreased strength;Decreased safe awareness;Decreased endurance  Assessment: Pt continues to show improvement, requiring less O2 per NC. Pt performs bed mobility with supervision and transfers with supervision/SBA. Pt ambulates up to 40 ft in room with standard bariatric walker (prefer  bariatric walker but not available) and SBA/supervision with no LOB or signs of fatigue other than slight drop in O2 saturation to 85% that quickly recovers to low 90s upon seated rest break with 3L O2 per NC.   Pt would be safe to return home with home therapy if his family in the same building can offer increased assistance with homemaking. If pt family cannot provide asist, could consider 3-5x placement. Pt would continue to benefit from skilled PT in order to return to prior level of independence. Treatment Diagnosis: decreased functional mobility  Prognosis: Good  Decision Making: Medium Complexity  History: 53 y/o male admit 12/12/2021 with Acute Pancreatitis,CHAPIN, DKA and Shock. PMH insign otherwise. PT Education: Goals;PT Role;Plan of Care  Patient Education: discussed discharge recommendations, pt verbalized understanding  Barriers to Learning: none  REQUIRES PT FOLLOW UP: Yes  Activity Tolerance  Activity Tolerance: Patient Tolerated treatment well;Patient limited by endurance  Activity Tolerance: improved willingness to participate with PT with some encouragement, able to tolerate similar level of activity on lower amounts of O2 remaining in mid to high 80s     Patient Diagnosis(es): The primary encounter diagnosis was Diabetic ketoacidosis without coma associated with diabetes mellitus due to underlying condition ([facility] [address] Diagnoses of Metabolic acidosis, Hypotension, unspecified hypotension type, Hyperglycemia, Lymphocytosis, and DKA, type 2, not at goal [facility]) were also pertinent to this visit. has a past medical history of H/O degenerative disc disease. has no past surgical history on file. Restrictions  Restrictions/Precautions  Restrictions/Precautions: Fall Risk  Position Activity Restriction  Other position/activity restrictions: O2 3L via NC  Subjective   General  Chart Reviewed: Yes  Additional Pertinent Hx: 53 y/o male admit 12/12/2021 with Acute Pancreatitis,CHAPIN, DKA and Shock. PMH insign otherwise. Response To Previous Treatment: Patient reporting fatigue but able to participate. Family / Caregiver Present: No  Referring Practitioner: Fani Romero NP.   Subjective  Subjective: Pt reports no pain at this time while supine in bed, but continues to complain of R hip pain with weightbearing and walking - reports hip is \"swollen\". Pt agreeable to participate in therapy. Continues to complain that he is urinating frequency and needs urinal placed nearby. General Comment  Comments: Supine in bed, agreeable to get up to recliner to eat a sandwich since he did not eat his unappetizing lunch earlier. Orientation  Orientation  Overall Orientation Status: Within Functional Limits  Cognition   Cognition  Overall Cognitive Status: WFL  Objective   Bed mobility  Supine to Sit: Supervision  Transfers  Sit to Stand: Stand by assistance;Supervision  Stand to sit: Stand by assistance;Supervision  Comment: improving with hand placement and walker safety for turning and backing up, would still do better with wheeled walker compared to standard  Ambulation  Ambulation?: Yes  Ambulation 1  Surface: level tile  Device: Standard Walker  Assistance: Stand by assistance  Quality of Gait: even pace and pattern, would benefit from a bariatric wh walker but none available at this time  Gait Deviations: Decreased step length;Decreased step height  Distance: 30' bed to recliner with standard barriatric walker (prefer one with wheels but not available)  Comments: Pt ambulated in room with SBA and standard walker. O2 drops to 85% post ambulation and recovered to 90% after seated rest break. Assist to manage IV pole and O2 at 3 L during functional mobility. Stairs/Curb  Stairs?: No                  Other Activities: Other (see comment)  Comment: Set up with sandwich and drinks once in recliner.                        AM-PAC Score  AM-PAC Inpatient Mobility Raw Score : 20 (12/23/21 1532)  AM-PAC Inpatient T-Scale Score : 47.67 (12/23/21 1532)  Mobility Inpatient CMS 0-100% Score: 35.83 (12/23/21 1532)  Mobility Inpatient CMS G-Code Modifier : CJ (12/23/21 1532)          Goals  Short term goals  Time Frame for Short term goals: Upon d/c acute care setting. (pt progressing but goals ongoing 12-16)  Short term goal 1: Bed Mob Supervision. MET 12-20  Short term goal 2: Transfers with/without assist device SBA. MET 12-20  Short term goal 3: Amb with/without assist device 50' SBA. Short term goal 4: Pt participating in approp Strength Exs. Patient Goals   Patient goals : Return home. Plan    Plan  Times per week: 3-5x week while in acute care setting. Current Treatment Recommendations: Strengthening,Functional Mobility Training,Transfer Training,Gait Training,Safety Education & Training,Patient/Caregiver Education & Training  Safety Devices  Type of devices:  All fall risk precautions in place,Call light within reach,Chair alarm in place,Gait belt,Left in chair,Nurse notified  Restraints  Initially in place: No     Therapy Time   Individual Concurrent Group Co-treatment   Time In 9547         Time Out 1520         Minutes 25         Timed Code Treatment Minutes: Severino Payne4, PT #[id number]

## 2021-12-23 NOTE — PROGRESS NOTES
Physical Therapy    NAME: Rafi Elizabeth  : 1970  MRN: [id number]     Date of Service: 2021    1140 - Attempted PT session, but patient was still fatigued from session with OT. Agreeable to attempt PT later this afternoon as schedule permits.      Electronically signed by Annia Salazar, PT #[id number]  on 2021 at 12:10 PM

## 2021-12-23 NOTE — PROGRESS NOTES
Occupational Therapy  Facility/Department: [facility] 5W PROGRESSIVE CARE  Daily Treatment Note  Should patient be discharged prior to another treatment session, this note shall serve as the discharge summary. NAME: Kareen Villa  : 1970  MRN: [id number]    Date of Service: 2021    Discharge Recommendations:  Patient would benefit from continued therapy after discharge,3-5 sessions per week,2-3 sessions per week  OT Equipment Recommendations  Equipment Needed: Yes  Mobility Devices: Priya : Rolling    Assessment   Performance deficits / Impairments: Decreased functional mobility ; Decreased strength;Decreased endurance;Decreased ADL status; Decreased safe awareness;Decreased high-level IADLs;Decreased balance  Assessment: Pt completed transfers with SBA with std. walker, good hand placements but OT managed O2 tube. Pt completed LB dressing without difficulty. Pt's O2 at 95% on 4L O2, SOB with activity. Endurance improving. Pt will be safe for d/c home with family assist when medically stable with HHOT, 2-3x week. Prognosis: Good  Decision Making: Medium Complexity  OT Education: OT Role;Transfer Training;Plan of Care;ADL Adaptive Strategies; Energy Conservation  Barriers to Learning: none  REQUIRES OT FOLLOW UP: Yes  Activity Tolerance  Activity Tolerance: Patient Tolerated treatment well;Patient limited by fatigue  Activity Tolerance: Fatigues with activity; and spO2 95% on 4L with activity  Safety Devices  Safety Devices in place: Yes  Type of devices: Call light within reach;Gait belt;Nurse notified; Left in chair;Chair alarm in place         Patient Diagnosis(es): The primary encounter diagnosis was Diabetic ketoacidosis without coma associated with diabetes mellitus due to underlying condition ([facility] [address] Diagnoses of Metabolic acidosis, Hypotension, unspecified hypotension type, Hyperglycemia, Lymphocytosis, and DKA, type 2, not at goal [facility]) were also pertinent to this visit. has a past medical history of H/O degenerative disc disease. has no past surgical history on file. Restrictions  Restrictions/Precautions  Restrictions/Precautions: Fall Risk  Position Activity Restriction  Other position/activity restrictions: O2 4L via NC. Subjective   General  Chart Reviewed: Yes,Orders,Progress Notes  Patient assessed for rehabilitation services?: Yes  Additional Pertinent Hx: 53 y/o male admitted 12/12/2021 with DKA, severe acute pancreatitis, acute kidney injury, and shock. Response to previous treatment: Patient with no complaints from previous session  Family / Caregiver Present: No  Referring Practitioner: MICKI Rosenberg CNP  Diagnosis: DKA, severe acute pancreatitis  Subjective  Subjective: Pt seen bedside and agreeable to therapy. Pt denies pain  General Comment  Comments: Per RN ok for therapy      Orientation  Orientation  Overall Orientation Status: Within Functional Limits  Orientation Level: Oriented to person;Oriented to time;Oriented to situation  Objective    ADL  Feeding: Independent  Grooming: Stand by assistance  LE Dressing: Stand by assistance (donned pants without assist.)        Balance  Sitting Balance: Supervision  Standing Balance: Stand by assistance  Functional Mobility  Functional - Mobility Device: Rolling Walker  Activity: Other  Assist Level: Stand by assistance  Functional Mobility Comments: amb with SBA with RW, good hand placement, OT managed O2 tube and educated on how to avoid trips with line if d/c home with O2.   Pt verbalized understanding     Transfers  Sit to stand: Stand by assistance  Stand to sit: Stand by assistance                                                                 Plan   Plan  Times per week: 3-5  Times per day: Daily  Current Treatment Recommendations: Strengthening,Endurance Training,Balance Training,Gait Training,Functional Mobility Training,Self-Care / ADL,ROM,Patient/Caregiver Education & Training,Safety Education & Training,Equipment Evaluation, Education, & procurement       OutComes Score       Azeb Cuevas scored a 18/24 on the [facility] ADL Inpatient form. Current research shows that an AM-PAC score of 18 or greater is typically associated with a discharge to the patient's home setting. Based on the patient's AM-PAC score, and their current ADL deficits, it is recommended that the patient have 2-3 sessions per week of Occupational Therapy at d/c to increase the patient's independence. At this time, this patient demonstrates the endurance and safety to discharge home with home services and a follow up treatment frequency of 2-3x/wk. Please see assessment section for further patient specific details. If patient discharges prior to next session this note will serve as a discharge summary. Please see below for the latest assessment towards goals.                                                AM-PAC Score        [facility] Inpatient Daily Activity Raw Score: 18 (12/23/21 1123)  AM-PAC Inpatient ADL T-Scale Score : 38.66 (12/23/21 1123)  ADL Inpatient CMS 0-100% Score: 46.65 (12/23/21 1123)  ADL Inpatient CMS G-Code Modifier : CK (12/23/21 1123)    Goals  Short term goals  Time Frame for Short term goals: Prior to DC: goals ongoing  Short term goal 1: Pt will complete ADL transfers/mobility with supervision  Short term goal 2: Pt will complete toileting with supervision  Short term goal 3: Pt will complete LB dressing with supervision  Short term goal 4: Pt will tolerate standing > 5 min for functional task with supervision  Short term goal 5: Pt will complete UE exercises in all planes to inc strength/endurance  Patient Goals   Patient goals : to return home       Therapy Time   Individual Concurrent Group Co-treatment   Time In 1030         Time Out 1100         Minutes 30         Timed Code Treatment Minutes: Cristina 91, OT

## 2021-12-23 NOTE — PROGRESS NOTES
Hospitalist Progress Note      PCP: [id number] [place] [address]    Date of Admission: 12/12/2021    Chief Complaint: increased thirst, urination, nausea, vomiting    Hospital Course: The patient is a 54 y.o. male with no significant past medical history who presents to [facility] with increased thirst, urination, nausea, and vomiting. Patient stated that over the past several days, he has had intractable nausea, vomiting, with increased thirst and urination. He denies fever, chills, chest pain, shortness of breath, constipation, diarrhea, and dysuria.     In the ED, labs were significant for a sodium of 123, chloride of 77, bicarb of 4, BUN/Cr of 35/3.2, glucose of 900, lactic acid of 8.5, WBC count of 20.1K with a pH of 6.831 and beta-hydroxyturate > 8. CXR showed no acute disease. Subjective:     Abd pain resolved. Excellent diuresis - 4.2L - will continue lasix today. Still without BM, though abd distention better - anticipate this will improve as we mobilize more fluid. No nausea or repeat emesis.        Medications:  Reviewed    Infusion Medications    sodium chloride      dextrose       Scheduled Medications    insulin lispro  7 Units SubCUTAneous TID WC    potassium bicarb-citric acid  40 mEq Oral Daily    insulin lispro  0-12 Units SubCUTAneous TID WC    insulin lispro  0-6 Units SubCUTAneous Nightly    furosemide  40 mg IntraVENous Daily    bisacodyl  10 mg Oral Daily    naloxegol  25 mg Oral QAM    metoclopramide  10 mg IntraVENous Q6H    sodium chloride flush  5-40 mL IntraVENous 2 times per day    meropenem  500 mg IntraVENous Q6H    fluconazole  100 mg IntraVENous Q24H    polyethylene glycol  17 g Oral Daily    insulin glargine  35 Units SubCUTAneous BID    pantoprazole  40 mg Oral BID AC    docusate sodium  100 mg Oral BID    fenofibrate  160 mg Oral Daily    heparin (porcine)  5,000 Units SubCUTAneous BID     PRN Meds: sodium chloride flush, sodium chloride, ondansetron, glucose, dextrose, glucagon (rDNA), dextrose, dextrose      Intake/Output Summary (Last 24 hours) at 12/23/2021 1245  Last data filed at 12/23/2021 1108  Gross per 24 hour   Intake 1210 ml   Output 4875 ml   Net -3665 ml       Exam:    /76   Pulse 102   Temp 97.5 °F (36.4 °C)   Resp 16   Ht 6' 1\" (1.854 m)   Wt (!) 321 lb 6.9 oz (145.8 kg)   SpO2 95%   BMI 42.41 kg/m²     General appearance: No apparent distress, appears stated age and cooperative. HEENT: Pupils equal, round, and reactive to light. Conjunctivae/corneas clear. Neck: Supple, with full range of motion. No jugular venous distention. Trachea midline. Respiratory:  Normal respiratory effort. Clear to auscultation, bilaterally without Rales/Wheezes/Rhonchi. Cardiovascular: Regular rate and rhythm with normal S1/S2 without murmurs, rubs or gallops. Abdomen: Soft, non-tender, non-distended with normal bowel sounds. Musculoskeletal: No clubbing, cyanosis or edema bilaterally. Full range of motion without deformity. Skin: Skin color, texture, turgor normal.  No rashes or lesions. Neurologic:  Neurovascularly intact without any focal sensory/motor deficits. Cranial nerves: II-XII intact, grossly non-focal.  Psychiatric: Alert and oriented, thought content appropriate, normal insight  Capillary Refill: Brisk,< 3 seconds   Peripheral Pulses: +2 palpable, equal bilaterally       Labs:   Recent Labs     12/21/21  0639 12/22/21  0625 12/23/21  0445   WBC 24.1* 22.0* 16.6*   HGB 9.2* 8.7* 8.4*   HCT 29.0* 26.5* 25.4*    230 230     Recent Labs     12/21/21  0639 12/22/21  0625 12/23/21  0445    135* 138   K 3.8 3.3* 3.4*   CL 98* 97* 100   CO2 28 28 28   BUN 15 12 10   CREATININE 1.6* 1.5* 1.5*   CALCIUM 7.6* 7.8* 7.8*   PHOS 3.0 3.0 2.8     Recent Labs     12/21/21  0639   AST 35   ALT 17   BILIDIR 0.3   BILITOT 0.5   ALKPHOS 125     No results for input(s): INR in the last 72 hours.   No results for input(s): CKTOTAL, TROPONINI in the last 72 hours. Urinalysis:      Lab Results   Component Value Date    NITRU Negative 12/12/2021    WBCUA 2 12/12/2021    RBCUA 0-2 12/12/2021    BLOODU LARGE 12/12/2021    SPECGRAV 1.023 12/12/2021    GLUCOSEU >=1000 12/12/2021       Radiology:  CT CHEST ABDOMEN PELVIS WO CONTRAST   Final Result   1. Findings remain consistent with acute pancreatitis. Evaluation is limited   due to the absence of contrast.   2. Findings concerning for diffuse small bowel ileus. 3. Significant atelectasis at the lung bases bilaterally. XR CHEST PORTABLE   Final Result   New retrocardiac left lower lobe opacification, possibly infiltrate or   atelectasis. CT ABDOMEN PELVIS WO CONTRAST Additional Contrast? Oral   Final Result   Persistent but decreased peripancreatic fluid and edema, in keeping with the   clinical diagnosis of pancreatitis      Increased body wall anasarca with increased pleural-parenchymal disease at   the lung bases, likely due to fluid overload      Fatty liver. Diverticulosis and normal caliber appendix      Subtle increased density is seen in the right femoral vein. It is uncertain   as whether not this is artifactual or due to a small amount of thrombus. Recommend correlation with lower extremity venous duplex ultrasound      RECOMMENDATIONS:   Unavailable         VL Extremity Venous Right   Final Result      US GALLBLADDER RUQ   Final Result   Pancreas not well visualized. This is likely due to the peripancreatic   inflammatory changes seen on recent CT. Hepatic steatosis. CT ABDOMEN PELVIS WO CONTRAST Additional Contrast? None   Final Result   1. Severe acute interstitial pancreatitis. No focal fluid collection. 2. Hepatomegaly with severe steatosis. 3. Possible gallbladder sludge. 4. Colonic diverticulosis without acute diverticulitis. 5. The distal esophagus is dilated and filled with fluid.       RECOMMENDATIONS:   Unavailable         XR CHEST PORTABLE   Final Result   No radiographic evidence of acute pulmonary disease. Assessment/Plan:    Active Hospital Problems    Diagnosis Date Noted    Acute kidney injury ([facility] [address] [N17.9]     Acute pancreatitis [K85.90]     DKA, type 2, not at goal [facility]) [E11.10] 12/12/2021       DKA resolved - suspect undiagnosed type 2 DM  -check Hgb A1c - 12.4  -NPO - to full liquid to low fat diet per GI - pt not tolerating much PO  -DKA protocol completed  -insulin gtt - to SQ insulin  - check CARI and islet cell Abs   - lantus 35BID. - on 12/18 I switched him to IV Humulin sliding scale and 5 units prand bolus if eating - BG appears to be responding well    - switched back to SQ insulin 12/22.        Anion gap metabolic acidosis likely 2/2 DKA  -management as above       Severe interstitial pancreatitis  - diet as above  - s/p aggressive IVF   - IV merrem due to ongoing fevers - fever now resolved. -GI and general surgery consulted  -check RUQ U/S - pancreas not well visualized due to surrounding inflammatory changes. - pt reports 3 beers and hard liquor shots prior to onset of pancreatitis, but denies chronic alcohol abuse. - repeat CT on 12/16 showed improvement of pancreatitis, but WBC continued to rise   - I resumed him on Vanc, merrem and diflucan and sent repeat blood Cx  On 12/18 - plan for 10 day course. - repeat CT chest abd pelvis (no contrast) 12/20 reviewed   - WBC now steadily improving.          Septic Shock - resolved   Required levophed short term early in admission.   - see abx as above.          CHAPIN - Cr improving slowly  s/p IVF with bicarb - switched to NS per nephrology team.    S/p single dose lasix on 12/17 with good effect.    - avoid nephrotoxic medications  - nephrology following  - again showing signs of fluid retention - stopped IVF 12/22.    - lasix IV daily - will continue      Coffee ground emesis  -cont PPI  -GI following  -Hgb slow downtrend.        Obesity - BMI 38.68  -nutritional counseling provided  -weight loss encouraged  -complicating medical management      Hypocalcemia - s/p IV replete. Improved. Constipation. CT 12/20 concerning for diffuse ileus. Started on reglan per GI team.   Movantik daily - started 12/20. Stop opiate pain control - pain resolved. Added dulcolax 10 daily    Pt refused miralax - makes him nauseated. DVT Prophylaxis: Heparin   Diet: ADULT DIET; Regular; 4 carb choices (60 gm/meal); Low Fat/Low Chol/High Fiber/OCTAVIANO  ADULT ORAL NUTRITION SUPPLEMENT; Breakfast, Lunch, Dinner; Diabetic Oral Supplement  Code Status: Full Code      Dispo - continue care. Pending return of bowel function and mobilizing fluid with IV lasix. Overall continues to improve. Needs to ambulate more.        Jessica Carpenter MD

## 2021-12-23 NOTE — CARE COORDINATION
[facility]  Diabetes Education   Progress Note       NAME:  [address] [place] [address] RECORD NUMBER:  [id number]  AGE: 54 y.o. GENDER: male  : 1970  TODAY'S DATE:  2021    Subjective   Reason for Diabetic Education Evaluation and Assessment: general diabetes support, new insulin     Armaanatis Aase is retuning back to bed after sitting up in the chair for lunch. He dislikes the hospital food. Visit Type: follow-up      Jessica Mcgrath is a 54 y.o. male referred by:     [x] Physician  [] Nursing  [] Chart Review   [] Other:     PAST MEDICAL HISTORY        Diagnosis Date    H/O degenerative disc disease        PAST SURGICAL HISTORY    No past surgical history on file. FAMILY HISTORY    No family history on file.     SOCIAL HISTORY    Social History     Tobacco Use    Smoking status: Never Smoker    Smokeless tobacco: Never Used   Substance Use Topics    Alcohol use: Yes     Comment: social    Drug use: Never       ALLERGIES    No Known Allergies    MEDICATIONS     insulin lispro  7 Units SubCUTAneous TID WC    potassium bicarb-citric acid  40 mEq Oral Daily    insulin lispro  0-12 Units SubCUTAneous TID WC    insulin lispro  0-6 Units SubCUTAneous Nightly    furosemide  40 mg IntraVENous Daily    bisacodyl  10 mg Oral Daily    naloxegol  25 mg Oral QAM    metoclopramide  10 mg IntraVENous Q6H    sodium chloride flush  5-40 mL IntraVENous 2 times per day    meropenem  500 mg IntraVENous Q6H    fluconazole  100 mg IntraVENous Q24H    polyethylene glycol  17 g Oral Daily    insulin glargine  35 Units SubCUTAneous BID    pantoprazole  40 mg Oral BID AC    docusate sodium  100 mg Oral BID    fenofibrate  160 mg Oral Daily    heparin (porcine)  5,000 Units SubCUTAneous BID       Objective        Patient Active Problem List   Diagnosis Code    Back pain M54.9    DKA, type 2, not at goal [facility]) E11.10    Acute pancreatitis K85.90    Acute kidney injury ([facility] [address] N17.9 /76   Pulse 102   Temp 97.5 °F (36.4 °C) (Oral)   Resp 16   Ht 6' 1\" (1.854 m)   Wt (!) 321 lb 6.9 oz (145.8 kg)   SpO2 95%   BMI 42.41 kg/m²     HgBA1c:    Lab Results   Component Value Date    LABA1C 12.4 12/13/2021       Recent Labs     12/22/21  1640 12/22/21  2030 12/23/21  0804 12/23/21  1100   POCGLU 205* 159* 205* 194*       BUN/Creatinine:    Lab Results   Component Value Date    BUN 10 12/23/2021    CREATININE 1.5 12/23/2021       Assessment        Diabetes Management and Education    Does the patient have a Primary Care Physician? Yes, [address]       Does the patient require new medication instruction? Yes, anticipate insulin at discharge  Reviewed pen administration steps. Person responsible for administration of Insulin/Medication:        [x] Self     [] Caregiver       [] Spouse       [] Other Family Member   []  Other    Insulin Instruction:  insulin pen  Injection Site:   [x] location    [x] rotation     Level of patient/caregiver understanding able to:      [] Verbalized Understanding   [x] Demonstrated Understanding with coaching        [] Teach Back       [x] Needs Reinforcement     []  Other:        Does the patient/caregiver monitor Blood Glucoses? No:   Reviewed glycemic control targets, testing frequency and when to call PCP. Level of patient/caregiver understanding able to:        [] Verbalized Understanding   [] Demonstrated Understanding       [] Teach Back       [x] Needs Reinforcement     []  Other:        Does the patient/caregiver follow a Meal Plan? Yes   Reviewed importance of eating three meals per day. Recommend making water, unsweetened tea or coffee primary drinks. .      Level of patient/caregiver understanding able to:      [] Verbalized Understanding   [] Demonstrated Understanding       [] Teach Back       [x] Needs Reinforcement     []  Other:         Plan        Ongoing diabetes education and blood glucose monitoring.       Recommend meds to beds. Recommend [facility] and/or Home care support.                                          Discharge Plan:  Discharge needs: insulin pens and 4mm pen needles and glucometer/strips/lancets       Teaching Time Diabetes Education:  20 minutes     Electronically signed by Noel Kay on 12/23/2021 at 1:20 PM

## 2021-12-23 NOTE — CARE COORDINATION
CM continues to follow for discharge needs. Chart reviewed. Met with Mr. Matteo Fagan at the bedside to discuss his home situation. He lives in his own apartment but he has a brother and father who live in the same building who are supportive and helpful. Prior to admission, he was very independent. Therapy is now recommending [address] services. A list of [address] companies was provided for pt to choose provider. He is open to PT/OT, a nurse, a home health aide, and a medical social worker. Mr. Matteo Fagan has a history of schizophrenia, bipolar disorder, and major depression. He has been hospitalized several times in the past. He has one suicide attempt in the remote past as well. He denies SI/HI/AVH at this time. He has a  at [facility] but is not currently taking any psychiatric medications. He stated he knows when to call for help. Will assist with arranging [address] at [place]. He is currently on O2 3L/NC which is a new requirement. Will arrange home O2 if needed. Family will transport home.      Electronically signed by HOMER Macdonald RN-[facility]  Case Management  [phone]

## 2021-12-24 LAB
ALBUMIN SERPL-MCNC: 2.1 G/DL (ref 3.4–5)
ALP BLD-CCNC: 98 U/L (ref 40–129)
ALT SERPL-CCNC: 18 U/L (ref 10–40)
ANION GAP SERPL CALCULATED.3IONS-SCNC: 10 MMOL/L (ref 3–16)
AST SERPL-CCNC: 37 U/L (ref 15–37)
BANDED NEUTROPHILS RELATIVE PERCENT: 5 % (ref 0–7)
BASOPHILS ABSOLUTE: 0 K/UL (ref 0–0.2)
BASOPHILS RELATIVE PERCENT: 0 %
BILIRUB SERPL-MCNC: 0.4 MG/DL (ref 0–1)
BILIRUBIN DIRECT: <0.2 MG/DL (ref 0–0.3)
BILIRUBIN, INDIRECT: ABNORMAL MG/DL (ref 0–1)
BUN BLDV-MCNC: 10 MG/DL (ref 7–20)
CALCIUM SERPL-MCNC: 8.1 MG/DL (ref 8.3–10.6)
CHLORIDE BLD-SCNC: 99 MMOL/L (ref 99–110)
CO2: 29 MMOL/L (ref 21–32)
CREAT SERPL-MCNC: 1.6 MG/DL (ref 0.9–1.3)
EOSINOPHILS ABSOLUTE: 0.1 K/UL (ref 0–0.6)
EOSINOPHILS RELATIVE PERCENT: 1 %
GFR AFRICAN AMERICAN: 56
GFR NON-AFRICAN AMERICAN: 46
GLUCOSE BLD-MCNC: 120 MG/DL (ref 70–99)
GLUCOSE BLD-MCNC: 123 MG/DL (ref 70–99)
GLUCOSE BLD-MCNC: 163 MG/DL (ref 70–99)
GLUCOSE BLD-MCNC: 180 MG/DL (ref 70–99)
GLUCOSE BLD-MCNC: 185 MG/DL (ref 70–99)
GLUCOSE BLD-MCNC: 213 MG/DL (ref 70–99)
HCT VFR BLD CALC: 25.8 % (ref 40.5–52.5)
HEMATOLOGY PATH CONSULT: NO
HEMOGLOBIN: 8.6 G/DL (ref 13.5–17.5)
ISLET CELL ANTIBODY: NORMAL
LYMPHOCYTES ABSOLUTE: 1.7 K/UL (ref 1–5.1)
LYMPHOCYTES RELATIVE PERCENT: 12 %
MCH RBC QN AUTO: 30.4 PG (ref 26–34)
MCHC RBC AUTO-ENTMCNC: 33.3 G/DL (ref 31–36)
MCV RBC AUTO: 91.2 FL (ref 80–100)
METAMYELOCYTES RELATIVE PERCENT: 2 %
MONOCYTES ABSOLUTE: 0.6 K/UL (ref 0–1.3)
MONOCYTES RELATIVE PERCENT: 4 %
NEUTROPHILS ABSOLUTE: 11.5 K/UL (ref 1.7–7.7)
NEUTROPHILS RELATIVE PERCENT: 76 %
PDW BLD-RTO: 13.7 % (ref 12.4–15.4)
PERFORMED ON: ABNORMAL
PHOSPHORUS: 2.9 MG/DL (ref 2.5–4.9)
PLATELET # BLD: 266 K/UL (ref 135–450)
PMV BLD AUTO: 7.7 FL (ref 5–10.5)
POTASSIUM SERPL-SCNC: 3.8 MMOL/L (ref 3.5–5.1)
RBC # BLD: 2.83 M/UL (ref 4.2–5.9)
RBC # BLD: NORMAL 10*6/UL
SODIUM BLD-SCNC: 138 MMOL/L (ref 136–145)
TOTAL PROTEIN: 5.6 G/DL (ref 6.4–8.2)
WBC # BLD: 13.8 K/UL (ref 4–11)

## 2021-12-24 PROCEDURE — 94761 N-INVAS EAR/PLS OXIMETRY MLT: CPT

## 2021-12-24 PROCEDURE — 97116 GAIT TRAINING THERAPY: CPT | Performed by: PHYSICAL THERAPIST

## 2021-12-24 PROCEDURE — 6370000000 HC RX 637 (ALT 250 FOR IP): Performed by: SURGERY

## 2021-12-24 PROCEDURE — 2580000003 HC RX 258: Performed by: INTERNAL MEDICINE

## 2021-12-24 PROCEDURE — 6360000002 HC RX W HCPCS: Performed by: INTERNAL MEDICINE

## 2021-12-24 PROCEDURE — 97530 THERAPEUTIC ACTIVITIES: CPT

## 2021-12-24 PROCEDURE — 6370000000 HC RX 637 (ALT 250 FOR IP): Performed by: INTERNAL MEDICINE

## 2021-12-24 PROCEDURE — 85025 COMPLETE CBC W/AUTO DIFF WBC: CPT

## 2021-12-24 PROCEDURE — 97530 THERAPEUTIC ACTIVITIES: CPT | Performed by: PHYSICAL THERAPIST

## 2021-12-24 PROCEDURE — 80069 RENAL FUNCTION PANEL: CPT

## 2021-12-24 PROCEDURE — 80076 HEPATIC FUNCTION PANEL: CPT

## 2021-12-24 PROCEDURE — 97110 THERAPEUTIC EXERCISES: CPT

## 2021-12-24 PROCEDURE — 6370000000 HC RX 637 (ALT 250 FOR IP): Performed by: PHYSICIAN ASSISTANT

## 2021-12-24 PROCEDURE — 2580000003 HC RX 258: Performed by: NURSE PRACTITIONER

## 2021-12-24 PROCEDURE — 6360000002 HC RX W HCPCS: Performed by: SURGERY

## 2021-12-24 PROCEDURE — 2700000000 HC OXYGEN THERAPY PER DAY

## 2021-12-24 PROCEDURE — 2060000000 HC ICU INTERMEDIATE R&B

## 2021-12-24 RX ORDER — ACETAMINOPHEN 325 MG/1
650 TABLET ORAL EVERY 4 HOURS PRN
Status: DISCONTINUED | OUTPATIENT
Start: 2021-12-24 | End: 2021-12-31 | Stop reason: HOSPADM

## 2021-12-24 RX ORDER — FUROSEMIDE 10 MG/ML
40 INJECTION INTRAMUSCULAR; INTRAVENOUS 2 TIMES DAILY
Status: DISCONTINUED | OUTPATIENT
Start: 2021-12-24 | End: 2021-12-25

## 2021-12-24 RX ADMIN — PANTOPRAZOLE SODIUM 40 MG: 40 TABLET, DELAYED RELEASE ORAL at 16:15

## 2021-12-24 RX ADMIN — MEROPENEM 500 MG: 500 INJECTION, POWDER, FOR SOLUTION INTRAVENOUS at 13:13

## 2021-12-24 RX ADMIN — FENOFIBRATE 160 MG: 160 TABLET ORAL at 08:57

## 2021-12-24 RX ADMIN — POTASSIUM CHLORIDE 40 MEQ: 20 TABLET, EXTENDED RELEASE ORAL at 08:57

## 2021-12-24 RX ADMIN — METOCLOPRAMIDE HYDROCHLORIDE 10 MG: 5 INJECTION INTRAMUSCULAR; INTRAVENOUS at 00:07

## 2021-12-24 RX ADMIN — HEPARIN SODIUM 5000 UNITS: 5000 INJECTION INTRAVENOUS; SUBCUTANEOUS at 08:57

## 2021-12-24 RX ADMIN — INSULIN LISPRO 1 UNITS: 100 INJECTION, SOLUTION INTRAVENOUS; SUBCUTANEOUS at 21:18

## 2021-12-24 RX ADMIN — INSULIN GLARGINE 35 UNITS: 100 INJECTION, SOLUTION SUBCUTANEOUS at 08:53

## 2021-12-24 RX ADMIN — METOCLOPRAMIDE HYDROCHLORIDE 10 MG: 5 INJECTION INTRAMUSCULAR; INTRAVENOUS at 06:05

## 2021-12-24 RX ADMIN — MEROPENEM 500 MG: 500 INJECTION, POWDER, FOR SOLUTION INTRAVENOUS at 18:15

## 2021-12-24 RX ADMIN — METOCLOPRAMIDE HYDROCHLORIDE 10 MG: 5 INJECTION INTRAMUSCULAR; INTRAVENOUS at 12:45

## 2021-12-24 RX ADMIN — NALOXEGOL OXALATE 25 MG: 25 TABLET, FILM COATED ORAL at 10:29

## 2021-12-24 RX ADMIN — INSULIN LISPRO 7 UNITS: 100 INJECTION, SOLUTION INTRAVENOUS; SUBCUTANEOUS at 08:54

## 2021-12-24 RX ADMIN — SODIUM CHLORIDE, PRESERVATIVE FREE 10 ML: 5 INJECTION INTRAVENOUS at 08:57

## 2021-12-24 RX ADMIN — INSULIN GLARGINE 35 UNITS: 100 INJECTION, SOLUTION SUBCUTANEOUS at 21:18

## 2021-12-24 RX ADMIN — ONDANSETRON 4 MG: 2 INJECTION INTRAMUSCULAR; INTRAVENOUS at 18:13

## 2021-12-24 RX ADMIN — MEROPENEM 500 MG: 500 INJECTION, POWDER, FOR SOLUTION INTRAVENOUS at 06:10

## 2021-12-24 RX ADMIN — DOCUSATE SODIUM 100 MG: 100 CAPSULE ORAL at 08:57

## 2021-12-24 RX ADMIN — INSULIN LISPRO 7 UNITS: 100 INJECTION, SOLUTION INTRAVENOUS; SUBCUTANEOUS at 12:45

## 2021-12-24 RX ADMIN — INSULIN LISPRO 4 UNITS: 100 INJECTION, SOLUTION INTRAVENOUS; SUBCUTANEOUS at 18:02

## 2021-12-24 RX ADMIN — MEROPENEM 500 MG: 500 INJECTION, POWDER, FOR SOLUTION INTRAVENOUS at 00:08

## 2021-12-24 RX ADMIN — SODIUM CHLORIDE, PRESERVATIVE FREE 10 ML: 5 INJECTION INTRAVENOUS at 21:13

## 2021-12-24 RX ADMIN — METOCLOPRAMIDE HYDROCHLORIDE 10 MG: 5 INJECTION INTRAMUSCULAR; INTRAVENOUS at 18:04

## 2021-12-24 RX ADMIN — INSULIN LISPRO 2 UNITS: 100 INJECTION, SOLUTION INTRAVENOUS; SUBCUTANEOUS at 08:54

## 2021-12-24 RX ADMIN — DOCUSATE SODIUM 100 MG: 100 CAPSULE ORAL at 21:12

## 2021-12-24 RX ADMIN — INSULIN LISPRO 7 UNITS: 100 INJECTION, SOLUTION INTRAVENOUS; SUBCUTANEOUS at 18:02

## 2021-12-24 RX ADMIN — HEPARIN SODIUM 5000 UNITS: 5000 INJECTION INTRAVENOUS; SUBCUTANEOUS at 21:12

## 2021-12-24 RX ADMIN — INSULIN LISPRO 4 UNITS: 100 INJECTION, SOLUTION INTRAVENOUS; SUBCUTANEOUS at 12:45

## 2021-12-24 RX ADMIN — PANTOPRAZOLE SODIUM 40 MG: 40 TABLET, DELAYED RELEASE ORAL at 06:01

## 2021-12-24 RX ADMIN — FUROSEMIDE 40 MG: 10 INJECTION, SOLUTION INTRAMUSCULAR; INTRAVENOUS at 16:15

## 2021-12-24 RX ADMIN — FUROSEMIDE 40 MG: 10 INJECTION, SOLUTION INTRAMUSCULAR; INTRAVENOUS at 08:57

## 2021-12-24 RX ADMIN — BISACODYL 10 MG: 5 TABLET, COATED ORAL at 08:57

## 2021-12-24 RX ADMIN — FLUCONAZOLE 100 MG: 2 INJECTION, SOLUTION INTRAVENOUS at 11:12

## 2021-12-24 ASSESSMENT — PAIN DESCRIPTION - PAIN TYPE: TYPE: ACUTE PAIN

## 2021-12-24 ASSESSMENT — PAIN SCALES - GENERAL
PAINLEVEL_OUTOF10: 0
PAINLEVEL_OUTOF10: 3
PAINLEVEL_OUTOF10: 0
PAINLEVEL_OUTOF10: 0

## 2021-12-24 ASSESSMENT — PAIN DESCRIPTION - LOCATION: LOCATION: ABDOMEN

## 2021-12-24 ASSESSMENT — PAIN SCALES - WONG BAKER
WONGBAKER_NUMERICALRESPONSE: 0

## 2021-12-24 ASSESSMENT — PAIN DESCRIPTION - ONSET: ONSET: ON-GOING

## 2021-12-24 ASSESSMENT — PAIN DESCRIPTION - PROGRESSION: CLINICAL_PROGRESSION: GRADUALLY WORSENING

## 2021-12-24 ASSESSMENT — PAIN DESCRIPTION - ORIENTATION: ORIENTATION: LEFT;UPPER

## 2021-12-24 ASSESSMENT — PAIN - FUNCTIONAL ASSESSMENT: PAIN_FUNCTIONAL_ASSESSMENT: PREVENTS OR INTERFERES SOME ACTIVE ACTIVITIES AND ADLS

## 2021-12-24 ASSESSMENT — PAIN DESCRIPTION - FREQUENCY: FREQUENCY: INTERMITTENT

## 2021-12-24 ASSESSMENT — PAIN DESCRIPTION - DESCRIPTORS: DESCRIPTORS: DISCOMFORT

## 2021-12-24 NOTE — PROGRESS NOTES
Nephrology Progress Note   [facility]      Chief Complaint: Nausea/vomiting/pancreatitis    History of Present Illness: Mr Moshe Landon is a is a 54 y.o. male with no significant past medical history who presents to [facility] with increased thirst, urination, nausea, and vomiting. He was noted to have a BS level of 900 mg/dl. CT scan abdomen and pelvis revealed severe acute Pancreatitis. He was Hyperkalemic, with serum HCO level of 5 and creatinine of 3.4 mg/dl. Patient was started on insulin gtt,treated for DKA     Subjective:    Resting in bed;   Reports generalized weakness   Urine output great      Scheduled Meds:   insulin lispro  7 Units SubCUTAneous TID WC    potassium chloride  40 mEq Oral Daily    insulin lispro  0-12 Units SubCUTAneous TID WC    insulin lispro  0-6 Units SubCUTAneous Nightly    furosemide  40 mg IntraVENous Daily    bisacodyl  10 mg Oral Daily    naloxegol  25 mg Oral QAM    metoclopramide  10 mg IntraVENous Q6H    sodium chloride flush  5-40 mL IntraVENous 2 times per day    meropenem  500 mg IntraVENous Q6H    fluconazole  100 mg IntraVENous Q24H    polyethylene glycol  17 g Oral Daily    insulin glargine  35 Units SubCUTAneous BID    pantoprazole  40 mg Oral BID AC    docusate sodium  100 mg Oral BID    fenofibrate  160 mg Oral Daily    heparin (porcine)  5,000 Units SubCUTAneous BID        sodium chloride      dextrose         PRN Meds:.sodium chloride flush, sodium chloride, ondansetron, glucose, dextrose, glucagon (rDNA), dextrose, dextrose    Physical Exam:    TEMPERATURE:  Current - Temp: 98.4 °F (36.9 °C);  Max - Temp  Av.7 °F (37.1 °C)  Min: 97.5 °F (36.4 °C)  Max: 99.9 °F (37.7 °C)  RESPIRATIONS RANGE: Resp  Av.7  Min: 16  Max: 18  PULSE RANGE: Pulse  Av.8  Min: 93  Max: 108  BLOOD PRESSURE RANGE:  Systolic (42XWB), CJQ:488 , Min:96 , ATJ:044   ; Diastolic (65OIA), PFS:34, Min:59, Max:83    24HR INTAKE/OUTPUT:      Intake/Output Summary (Last 24 hours) at 12/24/2021 0758  Last data filed at 12/24/2021 4300  Gross per 24 hour   Intake 2134.6 ml   Output 5660 ml   Net -3525.4 ml         Patient Vitals for the past 96 hrs (Last 3 readings):   Weight   12/24/21 0506 (!) 317 lb 14.5 oz (144.2 kg)   12/23/21 0410 (!) 321 lb 6.9 oz (145.8 kg)   12/22/21 0348 (!) 324 lb 11.8 oz (147.3 kg)       General: in bed   HEENT: Normocephalic, atraumatic  Neck: No Thyromegaly, Trachea is midline  Chest: diminished at bases, faint crackles   CVS: RRR, no murmur, no rub  Abdomen: distended, generalized tenderness   Extremities: 1+  edema, no cyanosis.   Skin: normal texture, normal skin turgor, no rash  Neurological: not done         LAB DATA:    CBC:   Lab Results   Component Value Date    WBC 13.8 12/24/2021    RBC 2.83 12/24/2021    HGB 8.6 12/24/2021    HCT 25.8 12/24/2021    MCV 91.2 12/24/2021    MCH 30.4 12/24/2021    MCHC 33.3 12/24/2021    RDW 13.7 12/24/2021     12/24/2021    MPV 7.7 12/24/2021     BMP:    Lab Results   Component Value Date     12/24/2021    K 3.8 12/24/2021    K 4.2 12/12/2021    CL 99 12/24/2021    CO2 29 12/24/2021    BUN 10 12/24/2021    CREATININE 1.6 12/24/2021    CALCIUM 8.1 12/24/2021    GFRAA 56 12/24/2021    LABGLOM 46 12/24/2021    GLUCOSE 120 12/24/2021     Ionized Calcium:  No results found for: IONCA  Magnesium:    Lab Results   Component Value Date    MG 2.00 12/23/2021     Phosphorus:    Lab Results   Component Value Date    PHOS 2.9 12/24/2021     U/A:    Lab Results   Component Value Date    COLORU YELLOW 12/12/2021    PHUR 5.0 12/12/2021    WBCUA 2 12/12/2021    RBCUA 0-2 12/12/2021    CLARITYU CLOUDY 12/12/2021    SPECGRAV 1.023 12/12/2021    LEUKOCYTESUR Negative 12/12/2021    UROBILINOGEN 0.2 12/12/2021    BILIRUBINUR MODERATE 12/12/2021    BLOODU LARGE 12/12/2021    GLUCOSEU >=1000 12/12/2021         IMPRESSION/RECOMMENDATIONS:      Active Problems:    DKA, type 2, not at goal [facility])    Acute pancreatitis

## 2021-12-24 NOTE — PROGRESS NOTES
Spoke with patient's sister who addressed concerns over patient's history of schizophrenia, bipolar disorder, and major depression being addressed while the patient is in the hospital. Notified charge nurse who notified attending MD.

## 2021-12-24 NOTE — PROGRESS NOTES
Hospitalist Progress Note      PCP: [id number] [place] [address]    Date of Admission: 12/12/2021    Chief Complaint: increased thirst, urination, nausea, vomiting    Hospital Course: The patient is a 54 y.o. male with no significant past medical history who presents to [facility] with increased thirst, urination, nausea, and vomiting. Patient stated that over the past several days, he has had intractable nausea, vomiting, with increased thirst and urination. He denies fever, chills, chest pain, shortness of breath, constipation, diarrhea, and dysuria.     In the ED, labs were significant for a sodium of 123, chloride of 77, bicarb of 4, BUN/Cr of 35/3.2, glucose of 900, lactic acid of 8.5, WBC count of 20.1K with a pH of 6.831 and beta-hydroxyturate > 8. CXR showed no acute disease. Subjective:     Continues better. Urine output over  5L - still with lots of excess fluid - lasix to IV BID today. + flatus, but still no BM. Overall feels much better. I discussed his psych Hx with him - he follows with [address] [place] [address],[address] and usually gets monthly Invega injection. He denies any hallucinations, no SI or HI, mood has been stable, albeit with flat effect to the most part. He does want to talk to a psychiatrist - I think it will be a good opportunity to see if his Radha Vann could potentially be switched to something alternate with less BG effect - it was very difficult to get his BG under control early this admission.        Medications:  Reviewed    Infusion Medications    sodium chloride      dextrose       Scheduled Medications    furosemide  40 mg IntraVENous BID    insulin lispro  7 Units SubCUTAneous TID WC    potassium chloride  40 mEq Oral Daily    insulin lispro  0-12 Units SubCUTAneous TID WC    insulin lispro  0-6 Units SubCUTAneous Nightly    bisacodyl  10 mg Oral Daily    naloxegol  25 mg Oral QAM    metoclopramide  10 mg IntraVENous Q6H    sodium chloride flush  5-40 mL IntraVENous 2 times per day    meropenem  500 mg IntraVENous Q6H    fluconazole  100 mg IntraVENous Q24H    polyethylene glycol  17 g Oral Daily    insulin glargine  35 Units SubCUTAneous BID    pantoprazole  40 mg Oral BID AC    docusate sodium  100 mg Oral BID    fenofibrate  160 mg Oral Daily    heparin (porcine)  5,000 Units SubCUTAneous BID     PRN Meds: sodium chloride flush, sodium chloride, ondansetron, glucose, dextrose, glucagon (rDNA), dextrose, dextrose      Intake/Output Summary (Last 24 hours) at 12/24/2021 1246  Last data filed at 12/24/2021 1032  Gross per 24 hour   Intake 2094.6 ml   Output 4785 ml   Net -2690.4 ml       Exam:    /76   Pulse 99   Temp 97.8 °F (36.6 °C) (Oral)   Resp 18   Ht 6' 1\" (1.854 m)   Wt (!) 317 lb 14.5 oz (144.2 kg)   SpO2 94%   BMI 41.94 kg/m²     General appearance: No apparent distress, appears stated age and cooperative. HEENT: Pupils equal, round, and reactive to light. Conjunctivae/corneas clear. Neck: Supple, with full range of motion. No jugular venous distention. Trachea midline. Respiratory:  Normal respiratory effort. Clear to auscultation, bilaterally without Rales/Wheezes/Rhonchi. Cardiovascular: Regular rate and rhythm with normal S1/S2 without murmurs, rubs or gallops. Abdomen: Soft, non-tender, non-distended with normal bowel sounds. Musculoskeletal: No clubbing, cyanosis or edema bilaterally. Full range of motion without deformity. Skin: Skin color, texture, turgor normal.  No rashes or lesions. Neurologic:  Neurovascularly intact without any focal sensory/motor deficits.  Cranial nerves: II-XII intact, grossly non-focal.  Psychiatric: Alert and oriented, thought content appropriate, normal insight  Capillary Refill: Brisk,< 3 seconds   Peripheral Pulses: +2 palpable, equal bilaterally       Labs:   Recent Labs     12/22/21  0625 12/23/21  0445 12/24/21  0608   WBC 22.0* 16.6* 13.8*   HGB 8.7* 8.4* 8.6*   HCT 26.5* 25.4* 25.8*  230 266     Recent Labs     12/22/21  0625 12/23/21  0445 12/24/21  0608   * 138 138   K 3.3* 3.4* 3.8   CL 97* 100 99   CO2 28 28 29   BUN 12 10 10   CREATININE 1.5* 1.5* 1.6*   CALCIUM 7.8* 7.8* 8.1*   PHOS 3.0 2.8 2.9     Recent Labs     12/24/21  0608   AST 37   ALT 18   BILIDIR <0.2   BILITOT 0.4   ALKPHOS 98     No results for input(s): INR in the last 72 hours. No results for input(s): Chyrel Lions in the last 72 hours. Urinalysis:      Lab Results   Component Value Date    NITRU Negative 12/12/2021    WBCUA 2 12/12/2021    RBCUA 0-2 12/12/2021    BLOODU LARGE 12/12/2021    SPECGRAV 1.023 12/12/2021    GLUCOSEU >=1000 12/12/2021       Radiology:  CT CHEST ABDOMEN PELVIS WO CONTRAST   Final Result   1. Findings remain consistent with acute pancreatitis. Evaluation is limited   due to the absence of contrast.   2. Findings concerning for diffuse small bowel ileus. 3. Significant atelectasis at the lung bases bilaterally. XR CHEST PORTABLE   Final Result   New retrocardiac left lower lobe opacification, possibly infiltrate or   atelectasis. CT ABDOMEN PELVIS WO CONTRAST Additional Contrast? Oral   Final Result   Persistent but decreased peripancreatic fluid and edema, in keeping with the   clinical diagnosis of pancreatitis      Increased body wall anasarca with increased pleural-parenchymal disease at   the lung bases, likely due to fluid overload      Fatty liver. Diverticulosis and normal caliber appendix      Subtle increased density is seen in the right femoral vein. It is uncertain   as whether not this is artifactual or due to a small amount of thrombus. Recommend correlation with lower extremity venous duplex ultrasound      RECOMMENDATIONS:   Unavailable         VL Extremity Venous Right   Final Result      US GALLBLADDER RUQ   Final Result   Pancreas not well visualized.   This is likely due to the peripancreatic   inflammatory changes seen on recent CT.      Hepatic steatosis. CT ABDOMEN PELVIS WO CONTRAST Additional Contrast? None   Final Result   1. Severe acute interstitial pancreatitis. No focal fluid collection. 2. Hepatomegaly with severe steatosis. 3. Possible gallbladder sludge. 4. Colonic diverticulosis without acute diverticulitis. 5. The distal esophagus is dilated and filled with fluid. RECOMMENDATIONS:   Unavailable         XR CHEST PORTABLE   Final Result   No radiographic evidence of acute pulmonary disease. Assessment/Plan:    Active Hospital Problems    Diagnosis Date Noted    Acute kidney injury ([facility] [address] [N17.9]     Acute pancreatitis [K85.90]     DKA, type 2, not at goal [facility]) [E11.10] 12/12/2021       DKA resolved - suspect undiagnosed type 2 DM  -check Hgb A1c - 12.4  -NPO - to full liquid to low fat diet per GI - pt not tolerating much PO  -DKA protocol completed  -insulin gtt - to SQ insulin  - check CARI and islet cell Abs   - lantus 35BID. - on 12/18 I switched him to IV Humulin sliding scale and 5 units prand bolus if eating - BG appears to be responding well    - switched back to SQ insulin 12/22.        Anion gap metabolic acidosis likely 2/2 DKA  -management as above       Severe interstitial pancreatitis  - diet as above  - s/p aggressive IVF   - IV merrem due to ongoing fevers - fever now resolved. -GI and general surgery consulted  -check RUQ U/S - pancreas not well visualized due to surrounding inflammatory changes. - pt reports 3 beers and hard liquor shots prior to onset of pancreatitis, but denies chronic alcohol abuse. - repeat CT on 12/16 showed improvement of pancreatitis, but WBC continued to rise   - I resumed him on Vanc, merrem and diflucan and sent repeat blood Cx  On 12/18 - plan for 10 day course.     - repeat CT chest abd pelvis (no contrast) 12/20 reviewed   - WBC now steadily improving.          Septic Shock - resolved   Required levophed short term early in admission.   - see abx as above.          CHAPIN - Cr improving slowly  s/p IVF with bicarb - switched to NS per nephrology team.    S/p single dose lasix on 12/17 with good effect. - avoid nephrotoxic medications  - nephrology following  - again showing signs of fluid retention - stopped IVF 12/22.    - lasix IV daily - will continue - increase to BID on 12/24      Coffee ground emesis  -cont PPI  -GI following  -Hgb slow downtrend.        Obesity - BMI 38.68  -nutritional counseling provided  -weight loss encouraged  -complicating medical management      Hypocalcemia - s/p IV replete. Improved. Constipation. CT 12/20 concerning for diffuse ileus. Started on reglan per GI team.   Movantik daily - started 12/20. Stop opiate pain control - pain resolved. Added dulcolax 10 daily    Pt refused miralax - makes him nauseated. DVT Prophylaxis: Heparin   Diet: ADULT DIET; Regular; 5 carb choices (75 gm/meal)  Code Status: Full Code      Dispo - continue care. Pending return of bowel function and mobilizing fluid with IV lasix. Overall continues to improve. Needs to ambulate more. Psychiatry consult placed today - please see above.          Mani Mitchell MD

## 2021-12-24 NOTE — PROGRESS NOTES
Physical Therapy  Facility/Department: [facility] 5W PROGRESSIVE CARE  Daily Treatment Note  NAME: Mikki Durand  : 1970  MRN: [id number]    Date of Service: 2021    Discharge Recommendations:  Home with assist PRN,2-3 sessions per week   PT Equipment Recommendations  Equipment Needed: Yes  Zoniajacoby Borges: Rolling  Other: bariatric RW  Mikki Durand scored a 21/24 on the AM-PAC short mobility form. Current research shows that an AM-PAC score of 18 or greater is typically associated with a discharge to the patient's home setting. Based on the patient's AM-PAC score and their current functional mobility deficits, it is recommended that the patient have 2-3 sessions per week of Physical Therapy at d/c to increase the patient's independence. At this time, this patient demonstrates the endurance and safety to discharge home with home PTand a follow up treatment frequency of 2-3x/wk. Please see assessment section for further patient specific details. If patient discharges prior to next session this note will serve as a discharge summary. Please see below for the latest assessment towards goals. Assessment   Body structures, Functions, Activity limitations: Decreased functional mobility ; Decreased strength;Decreased safe awareness;Decreased endurance  Assessment: pt making progress in therapy; able to use bariatric RW safely in room however limited by endurance and still needing O2 line; discussed management of O2 line for at home if he needs to remain on O2; pt reports family can assist with household tasks and he is open to home therapy; pt will likely need PT and OT as he needed assist for his socks; recommend home with PRN assist and home PT/OT  Treatment Diagnosis: decreased functional mobility  Decision Making: Medium Complexity  History: 53 y/o male admit 2021 with Acute Pancreatitis,CHAPIN, DKA and Shock. PMH insign otherwise.   PT Education: General Safety  Patient Education: discussed O2 management at home if he needs to return home with O2  Barriers to Learning: none  REQUIRES PT FOLLOW UP: Yes  Activity Tolerance  Activity Tolerance: Patient limited by endurance; Patient limited by fatigue  Activity Tolerance: pt reports not wanting to get up this early; returned to bed at end of session     Patient Diagnosis(es): The primary encounter diagnosis was Diabetic ketoacidosis without coma associated with diabetes mellitus due to underlying condition ([facility] [address] Diagnoses of Metabolic acidosis, Hypotension, unspecified hypotension type, Hyperglycemia, Lymphocytosis, and DKA, type 2, not at goal [facility]) were also pertinent to this visit. has a past medical history of H/O degenerative disc disease. has no past surgical history on file. Social/Functional History  Lives With: Alone  Type of Home: Apartment  Home Layout: Two level,Laundry in basement (1 story with basement.)  Home Access: Stairs to enter with rails  Entrance Stairs - Number of Steps: Primarily enters garage then has 12 PATRICE to main level with bed/bathroom; or could go in front door with 1 PATRICE. Bathroom Shower/Tub: Tub/Shower unit  Bathroom Toilet:  (Comfort height toilet)  Bathroom Accessibility: Accessible  Home Equipment:  (No DME.)  Receives Help From: Home health ([facility] 5 days/week for 2-3 hours for house chores)  ADL Assistance: Independent  Homemaking Assistance: Independent  Ambulation Assistance: Independent (Without assist device pta.)  Transfer Assistance: Independent  Active : Yes  Occupation: On disability  Additional Comments: [de-identified] y/o father and brother stay in the same building. Restrictions  Restrictions/Precautions  Restrictions/Precautions: Fall Risk  Position Activity Restriction  Other position/activity restrictions: O2 3L via NC  Subjective   General  Chart Reviewed: Yes  Additional Pertinent Hx: 55 y/o male admit 12/12/2021 with Acute Pancreatitis,CHAPIN, DKA and Shock. PMH insign otherwise.   Response To Previous Treatment: Patient reporting fatigue but able to participate. Family / Caregiver Present: No  Referring Practitioner: Cherilynn Schilder NP. Subjective  Subjective: pt agreeable to getting up with therapy however declined staying up wanted to return to bed; Dr Correia Fallen in during session to see pt          Orientation  Orientation  Overall Orientation Status: Within Functional Limits  Cognition   Cognition  Overall Cognitive Status: WFL  Cognition Comment: decreased insight into need to get up and fully participate in therapy to get stronger  Objective   Bed mobility  Supine to Sit: Modified independent  Sit to Supine: Modified independent  Transfers  Sit to Stand: Stand by assistance;Supervision  Stand to sit: Stand by assistance;Supervision  Ambulation  Ambulation?: Yes  Ambulation 1  Surface: level tile  Device: Rolling Walker (bariatric walker)  Assistance: Stand by assistance  Quality of Gait: pt able to manage bariatric walker well; no LOB; therapist managed O2 line  Distance: 50' in room with a few turns  Comments: no c/o SOB during session     Balance  Sitting - Static: Good  Sitting - Dynamic: Good  Standing - Static: Good (with RW)  Standing - Dynamic: Good (with RW)            Comment: obtained bariatric RW for in room to use with nursing staff to practice for home; positioned in chair for comfort with all needs in reach              G-Code     OutComes Score                                                     AM-PAC Score  AM-PAC Inpatient Mobility Raw Score : 21 (12/24/21 0756)  AM-PAC Inpatient T-Scale Score : 50.25 (12/24/21 0756)  Mobility Inpatient CMS 0-100% Score: 28.97 (12/24/21 0756)  Mobility Inpatient CMS G-Code Modifier : Kori Walker (12/24/21 0756)          Goals  Short term goals  Time Frame for Short term goals: Upon d/c acute care setting. (pt progressing but goals ongoing 12-16)  Short term goal 1: Bed Mob Supervision. MET 12-20  Short term goal 2: Transfers with/without assist device SBA.  MET 12-20  Short term goal 3: Amb with/without assist device 50' SBA. - met however not yet managing O2 line  Short term goal 4: Pt participating in approp Strength Exs. Patient Goals   Patient goals : Return home. Plan    Plan  Times per week: 3-5x week while in acute care setting. Current Treatment Recommendations: Strengthening,Functional Mobility Training,Transfer Training,Gait Training,Safety Education & Training,Patient/Caregiver Education & Training  Safety Devices  Type of devices:  All fall risk precautions in place,Call light within reach,Chair alarm in place,Gait belt,Left in chair,Nurse notified  Restraints  Initially in place: No     Therapy Time   Individual Concurrent Group Co-treatment   Time In 0720         Time Out 0755         Minutes 35                 SYED HERNÁNDEZ PT    Electronically signed by SYED HERNÁNDEZ PT on 12/24/2021 at 7:56 AM

## 2021-12-24 NOTE — PROGRESS NOTES
Gastroenterology Progress Note    Lynda Pearl is a 54 y.o. male patient. Hospitalization Day:12    SUBJECTIVE:  Patient with some emesis yesterday. + flatus. No BM in several days. Feels less distended. No fevers reported. ROS:  Gastrointestinal ROS: positive for - change in bowel habits and nausea/vomiting    Physical    VITALS:  /73   Pulse 111   Temp 97.5 °F (36.4 °C) (Oral)   Resp 16   Ht 6' 1\" (1.854 m)   Wt (!) 317 lb 14.5 oz (144.2 kg)   SpO2 91%   BMI 41.94 kg/m²   TEMPERATURE:  Current - Temp: 97.5 °F (36.4 °C); Max - Temp  Av.5 °F (36.9 °C)  Min: 97.5 °F (36.4 °C)  Max: 99.9 °F (37.7 °C)    General:  Alert and oriented,  No apparent distress, obese, flat affect. Skin- without jaundice  Eyes: anicteric sclera  Cardiac: tachy, Nl s1s2, without murmurs  Lungs CTA Bilaterally, normal effort  Abdomen soft, moderate distension without T/G/R, no HSM, Bowel sounds normal  Ext: without edema  Neuro: no asterixis     Data    Data Review:    Recent Labs     21  0625 21  0445 21  0608   WBC 22.0* 16.6* 13.8*   HGB 8.7* 8.4* 8.6*   HCT 26.5* 25.4* 25.8*   MCV 91.2 91.0 91.2    230 266     Recent Labs     21  0625 21  0445 21  0608   * 138 138   K 3.3* 3.4* 3.8   CL 97* 100 99   CO2 28 28 29   PHOS 3.0 2.8 2.9   BUN 12 10 10   CREATININE 1.5* 1.5* 1.6*     Recent Labs     21  0608   AST 37   ALT 18   BILIDIR <0.2   BILITOT 0.4   ALKPHOS 98     No results for input(s): LIPASE, AMYLASE in the last 72 hours. No results for input(s): PROTIME, INR in the last 72 hours. Radiology Review:    CT CHEST ABDOMEN PELVIS WO CONTRAST   Final Result   1. Findings remain consistent with acute pancreatitis. Evaluation is limited   due to the absence of contrast.   2. Findings concerning for diffuse small bowel ileus. 3. Significant atelectasis at the lung bases bilaterally.          XR CHEST PORTABLE   Final Result   New retrocardiac left lower lobe opacification, possibly infiltrate or   atelectasis. CT ABDOMEN PELVIS WO CONTRAST Additional Contrast? Oral   Final Result   Persistent but decreased peripancreatic fluid and edema, in keeping with the   clinical diagnosis of pancreatitis      Increased body wall anasarca with increased pleural-parenchymal disease at   the lung bases, likely due to fluid overload      Fatty liver. Diverticulosis and normal caliber appendix      Subtle increased density is seen in the right femoral vein. It is uncertain   as whether not this is artifactual or due to a small amount of thrombus. Recommend correlation with lower extremity venous duplex ultrasound      RECOMMENDATIONS:   Unavailable         VL Extremity Venous Right   Final Result      US GALLBLADDER RUQ   Final Result   Pancreas not well visualized. This is likely due to the peripancreatic   inflammatory changes seen on recent CT. Hepatic steatosis. CT ABDOMEN PELVIS WO CONTRAST Additional Contrast? None   Final Result   1. Severe acute interstitial pancreatitis. No focal fluid collection. 2. Hepatomegaly with severe steatosis. 3. Possible gallbladder sludge. 4. Colonic diverticulosis without acute diverticulitis. 5. The distal esophagus is dilated and filled with fluid. RECOMMENDATIONS:   Unavailable         XR CHEST PORTABLE   Final Result   No radiographic evidence of acute pulmonary disease. ASSESSMENT:  1)Severe interstitial pancreatitis- slowly resolving. WBC improving. No signs or source of infection. 2) Protracted adynamic ileus- slow to resolve. Improving . 3) Leukocytosis- improving.  suspect leukemoid reaction. CT without obvious signs of infected peripancreatic fluid collection or necrotizing pancreatitis  4) RYAN- stable.  Cr 1.6  5) Volume overload- on Lasix daily  6) Obesity  7) Hx of DKA- resolved  8) Sepsis- resolved  9) Hx of coffee ground emesis- resolved  10 )Hypertriglyceridemia- resolved.         Plan:  Continue Lasix IV bid for now. Nephrology managing fluid management  Continue Reglan 10mg IV q 6 hours and Movantik daily for now  On dulcolax 10mg daily  and colace 100mg bid for constipation  Continue low fat diet as tolerated. Recheck lipase in am  Optimize glycemic control  Will defer duration of antibiotics Meropenem/Fluconazole to Dr. Valerie Perales is no signs of infected peripancreatic fluid collection or overt necrosis noted on CT.  WBC improved. Day 6 of therapy  Continue Fenofibrate for hx of hypertriglyceridemia  PPI bid for hx of coffee ground emesis.  Monitor H and H daily      Thank you for allowing me to participate in the care of your patient. Please feel free to contact me with any concerns.   [address], DO

## 2021-12-25 LAB
ALBUMIN SERPL-MCNC: 2.4 G/DL (ref 3.4–5)
ANION GAP SERPL CALCULATED.3IONS-SCNC: 7 MMOL/L (ref 3–16)
BANDED NEUTROPHILS RELATIVE PERCENT: 7 % (ref 0–7)
BASOPHILS ABSOLUTE: 0.1 K/UL (ref 0–0.2)
BASOPHILS RELATIVE PERCENT: 1 %
BUN BLDV-MCNC: 10 MG/DL (ref 7–20)
CALCIUM SERPL-MCNC: 8.3 MG/DL (ref 8.3–10.6)
CHLORIDE BLD-SCNC: 97 MMOL/L (ref 99–110)
CO2: 33 MMOL/L (ref 21–32)
CREAT SERPL-MCNC: 1.7 MG/DL (ref 0.9–1.3)
EOSINOPHILS ABSOLUTE: 0.2 K/UL (ref 0–0.6)
EOSINOPHILS RELATIVE PERCENT: 2 %
GFR AFRICAN AMERICAN: 52
GFR NON-AFRICAN AMERICAN: 43
GLUCOSE BLD-MCNC: 100 MG/DL (ref 70–99)
GLUCOSE BLD-MCNC: 161 MG/DL (ref 70–99)
GLUCOSE BLD-MCNC: 226 MG/DL (ref 70–99)
GLUCOSE BLD-MCNC: 261 MG/DL (ref 70–99)
GLUCOSE BLD-MCNC: 94 MG/DL (ref 70–99)
HCT VFR BLD CALC: 26.1 % (ref 40.5–52.5)
HEMOGLOBIN: 8.6 G/DL (ref 13.5–17.5)
LIPASE: 230 U/L (ref 13–60)
LYMPHOCYTES ABSOLUTE: 1.6 K/UL (ref 1–5.1)
LYMPHOCYTES RELATIVE PERCENT: 16 %
MAGNESIUM: 1.6 MG/DL (ref 1.8–2.4)
MCH RBC QN AUTO: 30.2 PG (ref 26–34)
MCHC RBC AUTO-ENTMCNC: 33.1 G/DL (ref 31–36)
MCV RBC AUTO: 91.3 FL (ref 80–100)
METAMYELOCYTES RELATIVE PERCENT: 3 %
MONOCYTES ABSOLUTE: 0.9 K/UL (ref 0–1.3)
MONOCYTES RELATIVE PERCENT: 9 %
NEUTROPHILS ABSOLUTE: 7.2 K/UL (ref 1.7–7.7)
NEUTROPHILS RELATIVE PERCENT: 62 %
PDW BLD-RTO: 13.3 % (ref 12.4–15.4)
PERFORMED ON: ABNORMAL
PHOSPHORUS: 3.8 MG/DL (ref 2.5–4.9)
PLATELET # BLD: 265 K/UL (ref 135–450)
PLATELET SLIDE REVIEW: ADEQUATE
PMV BLD AUTO: 8 FL (ref 5–10.5)
POTASSIUM SERPL-SCNC: 3.9 MMOL/L (ref 3.5–5.1)
RBC # BLD: 2.86 M/UL (ref 4.2–5.9)
RBC # BLD: NORMAL 10*6/UL
SLIDE REVIEW: ABNORMAL
SODIUM BLD-SCNC: 137 MMOL/L (ref 136–145)
WBC # BLD: 10 K/UL (ref 4–11)

## 2021-12-25 PROCEDURE — 36592 COLLECT BLOOD FROM PICC: CPT

## 2021-12-25 PROCEDURE — 2700000000 HC OXYGEN THERAPY PER DAY

## 2021-12-25 PROCEDURE — 83735 ASSAY OF MAGNESIUM: CPT

## 2021-12-25 PROCEDURE — 6360000002 HC RX W HCPCS: Performed by: INTERNAL MEDICINE

## 2021-12-25 PROCEDURE — 6370000000 HC RX 637 (ALT 250 FOR IP): Performed by: INTERNAL MEDICINE

## 2021-12-25 PROCEDURE — 6370000000 HC RX 637 (ALT 250 FOR IP): Performed by: PHYSICIAN ASSISTANT

## 2021-12-25 PROCEDURE — 85025 COMPLETE CBC W/AUTO DIFF WBC: CPT

## 2021-12-25 PROCEDURE — 6370000000 HC RX 637 (ALT 250 FOR IP): Performed by: SURGERY

## 2021-12-25 PROCEDURE — 2580000003 HC RX 258: Performed by: NURSE PRACTITIONER

## 2021-12-25 PROCEDURE — 94761 N-INVAS EAR/PLS OXIMETRY MLT: CPT

## 2021-12-25 PROCEDURE — 80069 RENAL FUNCTION PANEL: CPT

## 2021-12-25 PROCEDURE — 2060000000 HC ICU INTERMEDIATE R&B

## 2021-12-25 PROCEDURE — 99222 1ST HOSP IP/OBS MODERATE 55: CPT | Performed by: PSYCHIATRY & NEUROLOGY

## 2021-12-25 PROCEDURE — 83690 ASSAY OF LIPASE: CPT

## 2021-12-25 PROCEDURE — 2580000003 HC RX 258: Performed by: INTERNAL MEDICINE

## 2021-12-25 RX ORDER — FUROSEMIDE 10 MG/ML
40 INJECTION INTRAMUSCULAR; INTRAVENOUS DAILY
Status: DISCONTINUED | OUTPATIENT
Start: 2021-12-26 | End: 2021-12-26

## 2021-12-25 RX ORDER — MAGNESIUM SULFATE IN WATER 40 MG/ML
4000 INJECTION, SOLUTION INTRAVENOUS ONCE
Status: COMPLETED | OUTPATIENT
Start: 2021-12-25 | End: 2021-12-25

## 2021-12-25 RX ADMIN — METOCLOPRAMIDE HYDROCHLORIDE 10 MG: 5 INJECTION INTRAMUSCULAR; INTRAVENOUS at 17:08

## 2021-12-25 RX ADMIN — BISACODYL 10 MG: 5 TABLET, COATED ORAL at 08:49

## 2021-12-25 RX ADMIN — INSULIN LISPRO 7 UNITS: 100 INJECTION, SOLUTION INTRAVENOUS; SUBCUTANEOUS at 17:10

## 2021-12-25 RX ADMIN — INSULIN LISPRO 7 UNITS: 100 INJECTION, SOLUTION INTRAVENOUS; SUBCUTANEOUS at 08:59

## 2021-12-25 RX ADMIN — MEROPENEM 500 MG: 500 INJECTION, POWDER, FOR SOLUTION INTRAVENOUS at 17:10

## 2021-12-25 RX ADMIN — METOCLOPRAMIDE HYDROCHLORIDE 10 MG: 5 INJECTION INTRAMUSCULAR; INTRAVENOUS at 00:11

## 2021-12-25 RX ADMIN — PANTOPRAZOLE SODIUM 40 MG: 40 TABLET, DELAYED RELEASE ORAL at 05:53

## 2021-12-25 RX ADMIN — INSULIN LISPRO 7 UNITS: 100 INJECTION, SOLUTION INTRAVENOUS; SUBCUTANEOUS at 12:43

## 2021-12-25 RX ADMIN — FENOFIBRATE 160 MG: 160 TABLET ORAL at 08:49

## 2021-12-25 RX ADMIN — INSULIN GLARGINE 35 UNITS: 100 INJECTION, SOLUTION SUBCUTANEOUS at 08:59

## 2021-12-25 RX ADMIN — INSULIN LISPRO 2 UNITS: 100 INJECTION, SOLUTION INTRAVENOUS; SUBCUTANEOUS at 12:44

## 2021-12-25 RX ADMIN — DOCUSATE SODIUM 100 MG: 100 CAPSULE ORAL at 21:07

## 2021-12-25 RX ADMIN — NALOXEGOL OXALATE 25 MG: 25 TABLET, FILM COATED ORAL at 08:58

## 2021-12-25 RX ADMIN — METOCLOPRAMIDE HYDROCHLORIDE 10 MG: 5 INJECTION INTRAMUSCULAR; INTRAVENOUS at 05:53

## 2021-12-25 RX ADMIN — MEROPENEM 500 MG: 500 INJECTION, POWDER, FOR SOLUTION INTRAVENOUS at 12:42

## 2021-12-25 RX ADMIN — HEPARIN SODIUM 5000 UNITS: 5000 INJECTION INTRAVENOUS; SUBCUTANEOUS at 21:07

## 2021-12-25 RX ADMIN — HEPARIN SODIUM 5000 UNITS: 5000 INJECTION INTRAVENOUS; SUBCUTANEOUS at 08:50

## 2021-12-25 RX ADMIN — MEROPENEM 500 MG: 500 INJECTION, POWDER, FOR SOLUTION INTRAVENOUS at 05:54

## 2021-12-25 RX ADMIN — POTASSIUM CHLORIDE 40 MEQ: 20 TABLET, EXTENDED RELEASE ORAL at 08:49

## 2021-12-25 RX ADMIN — FLUCONAZOLE 100 MG: 2 INJECTION, SOLUTION INTRAVENOUS at 10:12

## 2021-12-25 RX ADMIN — SODIUM CHLORIDE 25 ML: 9 INJECTION, SOLUTION INTRAVENOUS at 12:40

## 2021-12-25 RX ADMIN — PANTOPRAZOLE SODIUM 40 MG: 40 TABLET, DELAYED RELEASE ORAL at 16:06

## 2021-12-25 RX ADMIN — DOCUSATE SODIUM 100 MG: 100 CAPSULE ORAL at 08:49

## 2021-12-25 RX ADMIN — MAGNESIUM SULFATE HEPTAHYDRATE 4000 MG: 40 INJECTION, SOLUTION INTRAVENOUS at 10:47

## 2021-12-25 RX ADMIN — MEROPENEM 500 MG: 500 INJECTION, POWDER, FOR SOLUTION INTRAVENOUS at 00:11

## 2021-12-25 RX ADMIN — INSULIN LISPRO 4 UNITS: 100 INJECTION, SOLUTION INTRAVENOUS; SUBCUTANEOUS at 08:59

## 2021-12-25 RX ADMIN — INSULIN LISPRO 6 UNITS: 100 INJECTION, SOLUTION INTRAVENOUS; SUBCUTANEOUS at 17:10

## 2021-12-25 RX ADMIN — INSULIN GLARGINE 35 UNITS: 100 INJECTION, SOLUTION SUBCUTANEOUS at 21:07

## 2021-12-25 RX ADMIN — METOCLOPRAMIDE HYDROCHLORIDE 10 MG: 5 INJECTION INTRAMUSCULAR; INTRAVENOUS at 12:40

## 2021-12-25 RX ADMIN — FUROSEMIDE 40 MG: 10 INJECTION, SOLUTION INTRAMUSCULAR; INTRAVENOUS at 08:50

## 2021-12-25 RX ADMIN — SODIUM CHLORIDE, PRESERVATIVE FREE 10 ML: 5 INJECTION INTRAVENOUS at 21:06

## 2021-12-25 ASSESSMENT — PAIN SCALES - GENERAL
PAINLEVEL_OUTOF10: 0

## 2021-12-25 NOTE — PLAN OF CARE
Problem: Falls - Risk of:  Goal: Will remain free from falls  Description: Will remain free from falls  Outcome: Ongoing     Problem: Cardiac Output - Decreased:  Goal: Hemodynamic stability will improve  Description: Hemodynamic stability will improve  Outcome: Ongoing     Problem: Serum Glucose Level - Abnormal:  Goal: Ability to maintain appropriate glucose levels will improve to within specified parameters  Description: Ability to maintain appropriate glucose levels will improve to within specified parameters  Outcome: Ongoing     Problem: Coping:  Goal: Ability to adjust to condition or change in health will improve  Description: Ability to adjust to condition or change in health will improve  Outcome: Ongoing     Problem: Fluid Volume:  Goal: Ability to maintain a balanced intake and output will improve  Description: Ability to maintain a balanced intake and output will improve  Outcome: Ongoing

## 2021-12-25 NOTE — PROGRESS NOTES
Nephrology Progress Note   [facility]. [facility]      Chief Complaint: Nausea/vomiting/pancreatitis    History of Present Illness: Mr Daija Galvin is a is a 54 y.o. male with no significant past medical history who presents to [facility] with increased thirst, urination, nausea, and vomiting. He was noted to have a BS level of 900 mg/dl. CT scan abdomen and pelvis revealed severe acute Pancreatitis. He was Hyperkalemic, with serum HCO level of 5 and creatinine of 3.4 mg/dl. Patient was started on insulin gtt,treated for DKA     Subjective:    Resting in bed;   Reports edema and breathing better      Scheduled Meds:   magnesium sulfate  4,000 mg IntraVENous Once    insulin lispro  7 Units SubCUTAneous TID WC    potassium chloride  40 mEq Oral Daily    insulin lispro  0-12 Units SubCUTAneous TID WC    insulin lispro  0-6 Units SubCUTAneous Nightly    bisacodyl  10 mg Oral Daily    naloxegol  25 mg Oral QAM    metoclopramide  10 mg IntraVENous Q6H    sodium chloride flush  5-40 mL IntraVENous 2 times per day    meropenem  500 mg IntraVENous Q6H    fluconazole  100 mg IntraVENous Q24H    insulin glargine  35 Units SubCUTAneous BID    pantoprazole  40 mg Oral BID AC    docusate sodium  100 mg Oral BID    fenofibrate  160 mg Oral Daily    heparin (porcine)  5,000 Units SubCUTAneous BID        sodium chloride      dextrose         PRN Meds:.acetaminophen, sodium chloride flush, sodium chloride, ondansetron, glucose, dextrose, glucagon (rDNA), dextrose, dextrose    Physical Exam:    TEMPERATURE:  Current - Temp: 98.3 °F (36.8 °C);  Max - Temp  Av.9 °F (37.2 °C)  Min: 97.5 °F (36.4 °C)  Max: 100.4 °F (38 °C)  RESPIRATIONS RANGE: Resp  Av.7  Min: 15  Max: 18  PULSE RANGE: Pulse  Av.5  Min: 93  Max: 111  BLOOD PRESSURE RANGE:  Systolic (42ZZM), IAD:681 , Min:97 , DRL:836   ; Diastolic (58TDM), SP, Min:58, Max:83    24HR INTAKE/OUTPUT:      Intake/Output Summary (Last 24 hours) at 2021 [address] filed at 12/25/2021 0906  Gross per 24 hour   Intake 1600 ml   Output 4800 ml   Net -3200 ml         Patient Vitals for the past 96 hrs (Last 3 readings):   Weight   12/25/21 0504 (!) 311 lb 4.6 oz (141.2 kg)   12/24/21 0506 (!) 317 lb 14.5 oz (144.2 kg)   12/23/21 0410 (!) 321 lb 6.9 oz (145.8 kg)       General: in bed   HEENT: Normocephalic, atraumatic  Neck: No Thyromegaly, Trachea is midline  Chest: diminished at bases, faint crackles   CVS: RRR, no murmur, no rub  Abdomen: distended, generalized tenderness   Extremities: 1+  edema, no cyanosis.   Skin: normal texture, normal skin turgor, no rash  Neurological: not done         LAB DATA:    CBC:   Lab Results   Component Value Date    WBC 10.0 12/25/2021    RBC 2.86 12/25/2021    HGB 8.6 12/25/2021    HCT 26.1 12/25/2021    MCV 91.3 12/25/2021    MCH 30.2 12/25/2021    MCHC 33.1 12/25/2021    RDW 13.3 12/25/2021     12/25/2021    MPV 8.0 12/25/2021     BMP:    Lab Results   Component Value Date     12/25/2021    K 3.9 12/25/2021    K 4.2 12/12/2021    CL 97 12/25/2021    CO2 33 12/25/2021    BUN 10 12/25/2021    CREATININE 1.7 12/25/2021    CALCIUM 8.3 12/25/2021    GFRAA 52 12/25/2021    LABGLOM 43 12/25/2021    GLUCOSE 94 12/25/2021     Ionized Calcium:  No results found for: IONCA  Magnesium:    Lab Results   Component Value Date    MG 1.60 12/25/2021     Phosphorus:    Lab Results   Component Value Date    PHOS 3.8 12/25/2021     U/A:    Lab Results   Component Value Date    COLORU YELLOW 12/12/2021    PHUR 5.0 12/12/2021    WBCUA 2 12/12/2021    RBCUA 0-2 12/12/2021    CLARITYU CLOUDY 12/12/2021    SPECGRAV 1.023 12/12/2021    LEUKOCYTESUR Negative 12/12/2021    UROBILINOGEN 0.2 12/12/2021    BILIRUBINUR MODERATE 12/12/2021    BLOODU LARGE 12/12/2021    GLUCOSEU >=1000 12/12/2021         IMPRESSION/RECOMMENDATIONS:      Active Problems:    DKA, type 2, not at goal [facility])    Acute pancreatitis    Acute kidney injury ([facility] [address] Schizophrenia ([facility] [address]  Resolved Problems:    * No resolved hospital problems. *    1. CHAPIN - likely prerenal/ATN - occurring in the setting of DKA/pancreatitis. - creatinine up a bit  -  Still with signs of volume excess but can likely be changed to PO diuretics in am     2. Edema    - weight trending down nicely  - continue lasix     3. Hypokalemia   - scheduled supplement while on lasix     4.  Pancreatitis - management per GI      Robb Stephenson

## 2021-12-25 NOTE — PLAN OF CARE
Fall risk assessment completed every shift. All precautions in place. Pt has call light within reach at all times. Room clear of clutter. Pt aware to call for assistance when getting up. Pain/discomfort being managed with PRN analgesics per MD orders. Pt able to express presence and absence of pain and rate pain appropriately using numerical scale. Intake/Output Summary (Last 24 hours) at 12/25/2021 0739  Last data filed at 12/25/2021 0604  Gross per 24 hour   Intake 1600 ml   Output 4500 ml   Net -2900 ml     Vitals:    12/25/21 0708   BP: 98/75   Pulse: 107   Resp: 15   Temp: 98.3 °F (36.8 °C)   SpO2: 92%     Patient assessed for fall risk; fall precautions initiated. Patient and family instructed about safety devices. Environment kept free of clutter and adequate lighting provided. Bed locked and in lowest position. Call light within reach. Will continue to monitor.

## 2021-12-25 NOTE — PROGRESS NOTES
Hospitalist Progress Note      PCP: [id number] [place] [address]    Date of Admission: 12/12/2021    Chief Complaint: increased thirst, urination, nausea, vomiting    Hospital Course: The patient is a 54 y.o. male with no significant past medical history who presents to [facility] with increased thirst, urination, nausea, and vomiting. Patient stated that over the past several days, he has had intractable nausea, vomiting, with increased thirst and urination. He denies fever, chills, chest pain, shortness of breath, constipation, diarrhea, and dysuria.     In the ED, labs were significant for a sodium of 123, chloride of 77, bicarb of 4, BUN/Cr of 35/3.2, glucose of 900, lactic acid of 8.5, WBC count of 20.1K with a pH of 6.831 and beta-hydroxyturate > 8. CXR showed no acute disease. Subjective:       Edema improving with lasix. WBC back to normal today. Tolerating diet. + small BM.              Medications:  Reviewed    Infusion Medications    sodium chloride 25 mL (12/25/21 1240)    dextrose       Scheduled Medications    magnesium sulfate  4,000 mg IntraVENous Once    [START ON 12/26/2021] furosemide  40 mg IntraVENous Daily    insulin lispro  7 Units SubCUTAneous TID WC    potassium chloride  40 mEq Oral Daily    insulin lispro  0-12 Units SubCUTAneous TID WC    insulin lispro  0-6 Units SubCUTAneous Nightly    bisacodyl  10 mg Oral Daily    naloxegol  25 mg Oral QAM    metoclopramide  10 mg IntraVENous Q6H    sodium chloride flush  5-40 mL IntraVENous 2 times per day    meropenem  500 mg IntraVENous Q6H    fluconazole  100 mg IntraVENous Q24H    insulin glargine  35 Units SubCUTAneous BID    pantoprazole  40 mg Oral BID AC    docusate sodium  100 mg Oral BID    fenofibrate  160 mg Oral Daily    heparin (porcine)  5,000 Units SubCUTAneous BID     PRN Meds: acetaminophen, sodium chloride flush, sodium chloride, ondansetron, glucose, dextrose, glucagon (rDNA), dextrose, dextrose      Intake/Output Summary (Last 24 hours) at 12/25/2021 1347  Last data filed at 12/25/2021 1243  Gross per 24 hour   Intake 1950 ml   Output 5725 ml   Net -3775 ml       Exam:    BP 98/75   Pulse 107   Temp 98.3 °F (36.8 °C) (Oral)   Resp 15   Ht 6' 1\" (1.854 m)   Wt (!) 311 lb 4.6 oz (141.2 kg)   SpO2 92%   BMI 41.07 kg/m²     General appearance: No apparent distress, appears stated age and cooperative. HEENT: Pupils equal, round, and reactive to light. Conjunctivae/corneas clear. Neck: Supple, with full range of motion. No jugular venous distention. Trachea midline. Respiratory:  Normal respiratory effort. Clear to auscultation, bilaterally without Rales/Wheezes/Rhonchi. Cardiovascular: Regular rate and rhythm with normal S1/S2 without murmurs, rubs or gallops. Abdomen: Soft, non-tender, non-distended with normal bowel sounds. Musculoskeletal: No clubbing, cyanosis or edema bilaterally. Full range of motion without deformity. Skin: Skin color, texture, turgor normal.  No rashes or lesions. Neurologic:  Neurovascularly intact without any focal sensory/motor deficits. Cranial nerves: II-XII intact, grossly non-focal.  Psychiatric: Alert and oriented, thought content appropriate, normal insight  Capillary Refill: Brisk,< 3 seconds   Peripheral Pulses: +2 palpable, equal bilaterally       Labs:   Recent Labs     12/23/21  0445 12/24/21  0608 12/25/21  0545   WBC 16.6* 13.8* 10.0   HGB 8.4* 8.6* 8.6*   HCT 25.4* 25.8* 26.1*    266 265     Recent Labs     12/23/21  0445 12/24/21  0608 12/25/21  0545    138 137   K 3.4* 3.8 3.9    99 97*   CO2 28 29 33*   BUN 10 10 10   CREATININE 1.5* 1.6* 1.7*   CALCIUM 7.8* 8.1* 8.3   PHOS 2.8 2.9 3.8     Recent Labs     12/24/21  0608   AST 37   ALT 18   BILIDIR <0.2   BILITOT 0.4   ALKPHOS 98     No results for input(s): INR in the last 72 hours. No results for input(s): Aman Roland in the last 72 hours.     Urinalysis:      Lab Results   Component Value Date    NITRU Negative 12/12/2021    WBCUA 2 12/12/2021    RBCUA 0-2 12/12/2021    BLOODU LARGE 12/12/2021    SPECGRAV 1.023 12/12/2021    GLUCOSEU >=1000 12/12/2021       Radiology:  CT CHEST ABDOMEN PELVIS WO CONTRAST   Final Result   1. Findings remain consistent with acute pancreatitis. Evaluation is limited   due to the absence of contrast.   2. Findings concerning for diffuse small bowel ileus. 3. Significant atelectasis at the lung bases bilaterally. XR CHEST PORTABLE   Final Result   New retrocardiac left lower lobe opacification, possibly infiltrate or   atelectasis. CT ABDOMEN PELVIS WO CONTRAST Additional Contrast? Oral   Final Result   Persistent but decreased peripancreatic fluid and edema, in keeping with the   clinical diagnosis of pancreatitis      Increased body wall anasarca with increased pleural-parenchymal disease at   the lung bases, likely due to fluid overload      Fatty liver. Diverticulosis and normal caliber appendix      Subtle increased density is seen in the right femoral vein. It is uncertain   as whether not this is artifactual or due to a small amount of thrombus. Recommend correlation with lower extremity venous duplex ultrasound      RECOMMENDATIONS:   Unavailable         VL Extremity Venous Right   Final Result      US GALLBLADDER RUQ   Final Result   Pancreas not well visualized. This is likely due to the peripancreatic   inflammatory changes seen on recent CT. Hepatic steatosis. CT ABDOMEN PELVIS WO CONTRAST Additional Contrast? None   Final Result   1. Severe acute interstitial pancreatitis. No focal fluid collection. 2. Hepatomegaly with severe steatosis. 3. Possible gallbladder sludge. 4. Colonic diverticulosis without acute diverticulitis. 5. The distal esophagus is dilated and filled with fluid.       RECOMMENDATIONS:   Unavailable         XR CHEST PORTABLE   Final Result   No radiographic evidence of acute pulmonary disease. Assessment/Plan:    Active Hospital Problems    Diagnosis Date Noted    Schizophrenia ([facility] [address] [F20.9]     Acute kidney injury ([facility] [address] [N17.9]     Acute pancreatitis [K85.90]     DKA, type 2, not at goal [facility]) [E11.10] 12/12/2021       DKA resolved - suspect undiagnosed type 2 DM  -check Hgb A1c - 12.4  -NPO - to full liquid to low fat diet per GI  -DKA protocol completed  -insulin gtt - to SQ insulin  - check CARI and islet cell Abs   - lantus 35BID. - on 12/18 I switched him to IV Humulin sliding scale and 5 units prand bolus if eating - BG responded very well    - switched back to SQ insulin 12/22.          Severe interstitial pancreatitis  - s/p aggressive IVF   - IV merrem due to ongoing fevers - fever now resolved. -GI and general surgery consulted  -check RUQ U/S - pancreas not well visualized due to surrounding inflammatory changes. - pt reports 3 beers and hard liquor shots prior to onset of pancreatitis, but denies chronic alcohol abuse. - repeat CT on 12/16 showed improvement of pancreatitis, but WBC continued to rise   - I resumed him on Vanc, merrem and diflucan and sent repeat blood Cx  On 12/18 - plan for 10 day course - 3 days remaining.    - repeat CT chest abd pelvis (no contrast) 12/20 reviewed   - WBC now steadily improving and actually returned to normal.           Septic Shock - resolved   Required levophed short term early in admission.   - see abx as above.          CHAPIN - Cr improving slowly  s/p IVF with bicarb - switched to NS per nephrology team.    S/p single dose lasix on 12/17 with good effect.    - avoid nephrotoxic medications  - nephrology following  - again showing signs of fluid retention - stopped IVF 12/22.    - lasix IV daily - will continue - increase to BID on 12/24      Coffee ground emesis  -cont PPI  -GI following  -Hgb slow downtrend.        Obesity - BMI 38.68  -nutritional counseling provided  -weight loss encouraged  -complicating medical management      Hypocalcemia - s/p IV replete. Improved. Constipation. CT 12/20 concerning for diffuse ileus. Started on reglan per GI team.   Movantik daily - started 12/20. Stop opiate pain control - pain resolved. Added dulcolax 10 daily    Pt refused miralax - makes him nauseated. DVT Prophylaxis: Heparin   Diet: ADULT DIET; Regular; 5 carb choices (75 gm/meal)  Code Status: Full Code      Dispo - continue care. Lasix decreased to daily today. PLan discharge in the next 24/48hrs - weaning O2 and mobilizing edema. Seen by psychiatry - they do not recommend changing Invega - so will need close BG monitoring on discharge.        Gisele Gee MD

## 2021-12-25 NOTE — CONSULTS
PSYCHIATRY CONSULT, INITIAL EVALUATION    Referring Provider:  Hayder Franco MD    CC/Reason for Consult: Bipolar d/o on monthly invega, please assess for alternative due to severe hyperglycemia      ASSESSMENT:   53 yo M with schizophrenia. There is concern about his invega causing severe hyperglycemia. Certainly this medication can increase risk of DM type II. However, this patient has been on this medication for 6 yrs and did not develop DM type II during the entirety of this period of time. We would expect the impact of the medication to present sooner and less abruptly than in this case. It may have contributed to weight gain, and certainly this does increase his risk of insulin resistance. However, the medication has been an important stabilizing medication for him, and he has pretty significant psych history of multiple hospitalization and suicide attempts. The only long acting injection alternative with lower metabolic risk would be aripiprazole, however this medication he has already been on previously. There is too high a risk to change him off the invega and I have low suspicion that is playing a causative role in his hyperglycemia on admission. 1. Schizophrenia    RECOMMENDATIONS:   1. Would continue this patient in invega sustenna injections. He is last was early this month, and he will be due sometime in the next 1.5-2 weeks. He was instructed to follow up with his provider at [facility] for his next injection. Dispo: does not require inpatient psych admission  Safety: not an imminent safety risk at this time    Thank you for this consult, please call the psychiatry consult line for further questions. I will sign off at this time    ____________________________________________________________________________    HPI:   context: Pt is a 53 yo M with hx of schizophrenia, who was admitted with DKA and had a new diagnosis of DM type II with a HgA1c of 12.4.  He has very difficult to control blood glucose initially and there was concern his antipsychotic was playing a role. Consult placed for possible psychotropic medication playing a role since patient is on invega sustenna injections. associated symptoms:   Pt reports a hx of schizophrenia. Symptoms in the past have included hearing voices, depressed mood, suicidal ideation and suicide attempts (atleast 3). At this time he denies AH. He does feel a little depressed but denies he is suicidal.     modifying factors: he has been on invega injections for 6 yrs. He reports he was screened for diabetes less than 1 yr ago it was normal. He has had some weight gain on the medication since he started it. He has found the invega to be very helpful to stabilize his condition. Timing: acute   duration: years  severity: severe in the past, currently mild    ROS:   Gen: +fatigue  HEENT: no vision or hearing prob  CV: no cp  Resp: no dyspnea  : no dysuria  MSK: no muscle or joint pain  GI:  No n/v  Skin: no rashes  Neuro: no tremors  Endo: no weight changes    Past Psychiatric History:    Hosp: reports several past hospitalizations but can't tell me details on when. Diagnoses: schizophrenia   Med trials: aripiprazole, invega, doesn't recall others. Can't remember what issues he has with aripiprazole   Outpt: [facility]   NSSI: denies   Suicide Attempts: atleast 3 past attempts    Substance Use History:   Nicotine: denies   Alcohol: denies. He used to drink heavily in the past   Illicits: denies    Past Medical History:   Past Medical History:   Diagnosis Date    H/O degenerative disc disease      No past surgical history on file. Social/Developmental History:   Lives near his father and brother. He has a  through [place] History:   No family history on file. Psychiatric: denies    Allergies:  No Known Allergies    Home Medications:   No current facility-administered medications on file prior to encounter. Current Outpatient Medications on File Prior to Encounter   Medication Sig Dispense Refill    paliperidone palmitate ER (INVEGA SUSTENNA) 156 MG/ML MARY KAY IM injection Inject 156 mg into the muscle every 30 days      acetaminophen (TYLENOL) 325 MG tablet Take 650 mg by mouth every 6 hours as needed for Pain         Medications:  Scheduled Meds:   furosemide  40 mg IntraVENous BID    insulin lispro  7 Units SubCUTAneous TID WC    potassium chloride  40 mEq Oral Daily    insulin lispro  0-12 Units SubCUTAneous TID WC    insulin lispro  0-6 Units SubCUTAneous Nightly    bisacodyl  10 mg Oral Daily    naloxegol  25 mg Oral QAM    metoclopramide  10 mg IntraVENous Q6H    sodium chloride flush  5-40 mL IntraVENous 2 times per day    meropenem  500 mg IntraVENous Q6H    fluconazole  100 mg IntraVENous Q24H    polyethylene glycol  17 g Oral Daily    insulin glargine  35 Units SubCUTAneous BID    pantoprazole  40 mg Oral BID AC    docusate sodium  100 mg Oral BID    fenofibrate  160 mg Oral Daily    heparin (porcine)  5,000 Units SubCUTAneous BID     PRN Meds:.acetaminophen, sodium chloride flush, sodium chloride, ondansetron, glucose, dextrose, glucagon (rDNA), dextrose, dextrose    OBJECTIVE:  Vitals:    12/24/21 2130 12/24/21 2332 12/25/21 0504 12/25/21 0708   BP:  122/83 118/79 98/75   Pulse:  98 93 107   Resp:  16 16 15   Temp: 99.1 °F (37.3 °C) 99.2 °F (37.3 °C) 98.6 °F (37 °C) 98.3 °F (36.8 °C)   TempSrc: Oral Oral Oral Oral   SpO2:  91% 92% 92%   Weight:   (!) 311 lb 4.6 oz (141.2 kg)    Height:           MSE:   Appearance    alert, cooperative  Motor:  No abnormal movements, tics or mannerisms.   Speech    spontaneous, normal rate and normal volume  Language    0 - no aphasia, normal  Mood/Affect    Depressed / blunted  Thought Process    linear  Thought Content    intact , no delusions, no suicidal ideation  Associations    logical connections  Attention/Concentration    intact  Orientation oriented to person, place, time, and general circumstances  Memory    recent and remote memory intact  Fund of Knowledge    intact  Insight/Judgement    Good / Intact    Labs:   Recent Labs     12/23/21  0445 12/24/21  0608 12/25/21  0545   WBC 16.6* 13.8* 10.0   HGB 8.4* 8.6* 8.6*   HCT 25.4* 25.8* 26.1*   MCV 91.0 91.2 91.3    266 265     Recent Labs     12/23/21  0445 12/24/21  0608 12/25/21  0545    138 137   K 3.4* 3.8 3.9    99 97*   CO2 28 29 33*   BUN 10 10 10   MG 2.00  --  1.60*   PHOS 2.8 2.9 3.8     Recent Labs     12/24/21  0608   AST 37   ALT 18      Lab Results   Component Value Date    COLORU YELLOW 12/12/2021    NITRU Negative 12/12/2021    GLUCOSEU >=1000 12/12/2021    KETUA 40 12/12/2021    UROBILINOGEN 0.2 12/12/2021    BILIRUBINUR MODERATE 12/12/2021     Lab Results   Component Value Date    LABA1C 12.4 12/13/2021     Lab Results   Component Value Date    .2 12/13/2021     No results found for: CHOL  Lab Results   Component Value Date    TRIG 148 12/21/2021    TRIG 164 (H) 12/14/2021    TRIG 361 (H) 12/12/2021             Paige Najera MD   Psychiatrist

## 2021-12-25 NOTE — PROGRESS NOTES
Gastroenterology Progress Note    Syed Squires is a 54 y.o. male patient. Hospitalization Day:13    SUBJECTIVE:  No acute events overnight. Patient was resting comfortably when seen on rounds this am    ROS:  Gastrointestinal ROS: positive for - change in bowel habits    Physical    VITALS:  BP 98/75   Pulse 107   Temp 98.3 °F (36.8 °C) (Oral)   Resp 15   Ht 6' 1\" (1.854 m)   Wt (!) 311 lb 4.6 oz (141.2 kg)   SpO2 92%   BMI 41.07 kg/m²   TEMPERATURE:  Current - Temp: 98.3 °F (36.8 °C); Max - Temp  Av.9 °F (37.2 °C)  Min: 97.5 °F (36.4 °C)  Max: 100.4 °F (38 °C)    General:  Alert and oriented,  No apparent distress, obese, flat affect. Skin- without jaundice  Eyes: anicteric sclera  Cardiac: tachy, Nl s1s2, without murmurs  Lungs CTA Bilaterally, normal effort  Abdomen soft, moderate distension without T/G/R, no HSM, Bowel sounds normal  Ext: without edema  Neuro: no asterixis     Data    Data Review:    Recent Labs     21  0445 21  0608 21  0545   WBC 16.6* 13.8* 10.0   HGB 8.4* 8.6* 8.6*   HCT 25.4* 25.8* 26.1*   MCV 91.0 91.2 91.3    266 265     Recent Labs     21  0445 21  0608 21  0545    138 137   K 3.4* 3.8 3.9    99 97*   CO2 28 29 33*   PHOS 2.8 2.9 3.8   BUN 10 10 10   CREATININE 1.5* 1.6* 1.7*     Recent Labs     21  0608   AST 37   ALT 18   BILIDIR <0.2   BILITOT 0.4   ALKPHOS 98     Recent Labs     21  0545   LIPASE 230.0*     No results for input(s): PROTIME, INR in the last 72 hours. Radiology Review:    CT CHEST ABDOMEN PELVIS WO CONTRAST   Final Result   1. Findings remain consistent with acute pancreatitis. Evaluation is limited   due to the absence of contrast.   2. Findings concerning for diffuse small bowel ileus. 3. Significant atelectasis at the lung bases bilaterally. XR CHEST PORTABLE   Final Result   New retrocardiac left lower lobe opacification, possibly infiltrate or   atelectasis. CT ABDOMEN PELVIS WO CONTRAST Additional Contrast? Oral   Final Result   Persistent but decreased peripancreatic fluid and edema, in keeping with the   clinical diagnosis of pancreatitis      Increased body wall anasarca with increased pleural-parenchymal disease at   the lung bases, likely due to fluid overload      Fatty liver. Diverticulosis and normal caliber appendix      Subtle increased density is seen in the right femoral vein. It is uncertain   as whether not this is artifactual or due to a small amount of thrombus. Recommend correlation with lower extremity venous duplex ultrasound      RECOMMENDATIONS:   Unavailable         VL Extremity Venous Right   Final Result      US GALLBLADDER RUQ   Final Result   Pancreas not well visualized. This is likely due to the peripancreatic   inflammatory changes seen on recent CT. Hepatic steatosis. CT ABDOMEN PELVIS WO CONTRAST Additional Contrast? None   Final Result   1. Severe acute interstitial pancreatitis. No focal fluid collection. 2. Hepatomegaly with severe steatosis. 3. Possible gallbladder sludge. 4. Colonic diverticulosis without acute diverticulitis. 5. The distal esophagus is dilated and filled with fluid. RECOMMENDATIONS:   Unavailable         XR CHEST PORTABLE   Final Result   No radiographic evidence of acute pulmonary disease. ASSESSMENT:  1)Severe interstitial pancreatitis- slowly resolving.  Lipase still elevated at 230. WBC improving.  No signs or source of infection.    2) Protracted adynamic ileus- slow to resolve. 3) Leukocytosis- improving.  WBC down to 10K.  suspect leukemoid reaction. CT without obvious signs of infected peripancreatic fluid collection or necrotizing pancreatitis  4) RYAN- stable. Cr 1.7  5) Volume overload- on IV Lasix daily  6) Obesity  7) Hx of DKA- resolved  8) Sepsis- resolved  9) Hx of coffee ground emesis- resolved  10 )Hypertriglyceridemia- resolved.

## 2021-12-26 LAB
ALBUMIN SERPL-MCNC: 2.4 G/DL (ref 3.4–5)
ANION GAP SERPL CALCULATED.3IONS-SCNC: 7 MMOL/L (ref 3–16)
BANDED NEUTROPHILS RELATIVE PERCENT: 4 % (ref 0–7)
BASOPHILS ABSOLUTE: 0 K/UL (ref 0–0.2)
BASOPHILS RELATIVE PERCENT: 0 %
BUN BLDV-MCNC: 11 MG/DL (ref 7–20)
CALCIUM SERPL-MCNC: 8.4 MG/DL (ref 8.3–10.6)
CHLORIDE BLD-SCNC: 95 MMOL/L (ref 99–110)
CO2: 33 MMOL/L (ref 21–32)
CREAT SERPL-MCNC: 1.7 MG/DL (ref 0.9–1.3)
EOSINOPHILS ABSOLUTE: 0.1 K/UL (ref 0–0.6)
EOSINOPHILS RELATIVE PERCENT: 1 %
GFR AFRICAN AMERICAN: 52
GFR NON-AFRICAN AMERICAN: 43
GLUCOSE BLD-MCNC: 115 MG/DL (ref 70–99)
GLUCOSE BLD-MCNC: 133 MG/DL (ref 70–99)
GLUCOSE BLD-MCNC: 139 MG/DL (ref 70–99)
GLUCOSE BLD-MCNC: 173 MG/DL (ref 70–99)
GLUCOSE BLD-MCNC: 99 MG/DL (ref 70–99)
GLUTAMIC ACID DECARB AB: <5 IU/ML (ref 0–5)
HCT VFR BLD CALC: 25 % (ref 40.5–52.5)
HEMOGLOBIN: 8.5 G/DL (ref 13.5–17.5)
LIPASE: 225 U/L (ref 13–60)
LYMPHOCYTES ABSOLUTE: 1.9 K/UL (ref 1–5.1)
LYMPHOCYTES RELATIVE PERCENT: 20 %
MCH RBC QN AUTO: 30.8 PG (ref 26–34)
MCHC RBC AUTO-ENTMCNC: 34.1 G/DL (ref 31–36)
MCV RBC AUTO: 90.3 FL (ref 80–100)
MONOCYTES ABSOLUTE: 0.6 K/UL (ref 0–1.3)
MONOCYTES RELATIVE PERCENT: 6 %
NEUTROPHILS ABSOLUTE: 7.1 K/UL (ref 1.7–7.7)
NEUTROPHILS RELATIVE PERCENT: 69 %
PDW BLD-RTO: 13.1 % (ref 12.4–15.4)
PERFORMED ON: ABNORMAL
PERFORMED ON: NORMAL
PHOSPHORUS: 3.5 MG/DL (ref 2.5–4.9)
PLATELET # BLD: 324 K/UL (ref 135–450)
PLATELET SLIDE REVIEW: ADEQUATE
PMV BLD AUTO: 7.8 FL (ref 5–10.5)
POTASSIUM SERPL-SCNC: 3.8 MMOL/L (ref 3.5–5.1)
RBC # BLD: 2.77 M/UL (ref 4.2–5.9)
RBC # BLD: NORMAL 10*6/UL
SLIDE REVIEW: ABNORMAL
SMUDGE CELLS: PRESENT
SODIUM BLD-SCNC: 135 MMOL/L (ref 136–145)
WBC # BLD: 9.7 K/UL (ref 4–11)

## 2021-12-26 PROCEDURE — 2580000003 HC RX 258: Performed by: NURSE PRACTITIONER

## 2021-12-26 PROCEDURE — 2060000000 HC ICU INTERMEDIATE R&B

## 2021-12-26 PROCEDURE — 2700000000 HC OXYGEN THERAPY PER DAY

## 2021-12-26 PROCEDURE — 80069 RENAL FUNCTION PANEL: CPT

## 2021-12-26 PROCEDURE — 6370000000 HC RX 637 (ALT 250 FOR IP): Performed by: SURGERY

## 2021-12-26 PROCEDURE — 6370000000 HC RX 637 (ALT 250 FOR IP): Performed by: INTERNAL MEDICINE

## 2021-12-26 PROCEDURE — 6360000002 HC RX W HCPCS: Performed by: INTERNAL MEDICINE

## 2021-12-26 PROCEDURE — 94760 N-INVAS EAR/PLS OXIMETRY 1: CPT

## 2021-12-26 PROCEDURE — 83690 ASSAY OF LIPASE: CPT

## 2021-12-26 PROCEDURE — 85025 COMPLETE CBC W/AUTO DIFF WBC: CPT

## 2021-12-26 PROCEDURE — 6370000000 HC RX 637 (ALT 250 FOR IP): Performed by: PHYSICIAN ASSISTANT

## 2021-12-26 PROCEDURE — 2580000003 HC RX 258: Performed by: INTERNAL MEDICINE

## 2021-12-26 RX ADMIN — INSULIN LISPRO 7 UNITS: 100 INJECTION, SOLUTION INTRAVENOUS; SUBCUTANEOUS at 09:32

## 2021-12-26 RX ADMIN — MEROPENEM 500 MG: 500 INJECTION, POWDER, FOR SOLUTION INTRAVENOUS at 18:07

## 2021-12-26 RX ADMIN — MEROPENEM 500 MG: 500 INJECTION, POWDER, FOR SOLUTION INTRAVENOUS at 23:52

## 2021-12-26 RX ADMIN — PANTOPRAZOLE SODIUM 40 MG: 40 TABLET, DELAYED RELEASE ORAL at 05:12

## 2021-12-26 RX ADMIN — HEPARIN SODIUM 5000 UNITS: 5000 INJECTION INTRAVENOUS; SUBCUTANEOUS at 21:27

## 2021-12-26 RX ADMIN — METOCLOPRAMIDE HYDROCHLORIDE 10 MG: 5 INJECTION INTRAMUSCULAR; INTRAVENOUS at 00:22

## 2021-12-26 RX ADMIN — POTASSIUM CHLORIDE 40 MEQ: 20 TABLET, EXTENDED RELEASE ORAL at 09:23

## 2021-12-26 RX ADMIN — DOCUSATE SODIUM 100 MG: 100 CAPSULE ORAL at 21:27

## 2021-12-26 RX ADMIN — INSULIN GLARGINE 35 UNITS: 100 INJECTION, SOLUTION SUBCUTANEOUS at 09:32

## 2021-12-26 RX ADMIN — FENOFIBRATE 160 MG: 160 TABLET ORAL at 09:23

## 2021-12-26 RX ADMIN — SODIUM CHLORIDE, PRESERVATIVE FREE 10 ML: 5 INJECTION INTRAVENOUS at 09:23

## 2021-12-26 RX ADMIN — INSULIN GLARGINE 35 UNITS: 100 INJECTION, SOLUTION SUBCUTANEOUS at 21:26

## 2021-12-26 RX ADMIN — METOCLOPRAMIDE HYDROCHLORIDE 10 MG: 5 INJECTION INTRAMUSCULAR; INTRAVENOUS at 05:58

## 2021-12-26 RX ADMIN — SODIUM CHLORIDE, PRESERVATIVE FREE 10 ML: 5 INJECTION INTRAVENOUS at 21:27

## 2021-12-26 RX ADMIN — MEROPENEM 500 MG: 500 INJECTION, POWDER, FOR SOLUTION INTRAVENOUS at 06:07

## 2021-12-26 RX ADMIN — INSULIN LISPRO 7 UNITS: 100 INJECTION, SOLUTION INTRAVENOUS; SUBCUTANEOUS at 18:07

## 2021-12-26 RX ADMIN — FUROSEMIDE 40 MG: 10 INJECTION, SOLUTION INTRAVENOUS at 09:23

## 2021-12-26 RX ADMIN — BISACODYL 10 MG: 5 TABLET, COATED ORAL at 09:23

## 2021-12-26 RX ADMIN — HEPARIN SODIUM 5000 UNITS: 5000 INJECTION INTRAVENOUS; SUBCUTANEOUS at 09:22

## 2021-12-26 RX ADMIN — INSULIN LISPRO 7 UNITS: 100 INJECTION, SOLUTION INTRAVENOUS; SUBCUTANEOUS at 12:59

## 2021-12-26 RX ADMIN — FLUCONAZOLE 100 MG: 2 INJECTION, SOLUTION INTRAVENOUS at 09:32

## 2021-12-26 RX ADMIN — MEROPENEM 500 MG: 500 INJECTION, POWDER, FOR SOLUTION INTRAVENOUS at 12:36

## 2021-12-26 RX ADMIN — PANTOPRAZOLE SODIUM 40 MG: 40 TABLET, DELAYED RELEASE ORAL at 18:05

## 2021-12-26 RX ADMIN — DOCUSATE SODIUM 100 MG: 100 CAPSULE ORAL at 09:23

## 2021-12-26 RX ADMIN — MEROPENEM 500 MG: 500 INJECTION, POWDER, FOR SOLUTION INTRAVENOUS at 00:25

## 2021-12-26 ASSESSMENT — PAIN SCALES - GENERAL
PAINLEVEL_OUTOF10: 0

## 2021-12-26 NOTE — PROGRESS NOTES
Gastroenterology Progress Note    Cristiane Watkins is a 54 y.o. male patient. Hospitalization Day:14    SUBJECTIVE:  No acute events overnight. Patient was resting comfortably when seen on rounds this am. + BM. No N/V    ROS:  Gastrointestinal ROS: positive for - change in bowel habits    Physical    VITALS:  BP (!) 102/55   Pulse 98   Temp 98.2 °F (36.8 °C) (Oral)   Resp 18   Ht 6' 1\" (1.854 m)   Wt 281 lb 12 oz (127.8 kg)   SpO2 93%   BMI 37.17 kg/m²   TEMPERATURE:  Current - Temp: 98.2 °F (36.8 °C); Max - Temp  Av.9 °F (37.2 °C)  Min: 98.2 °F (36.8 °C)  Max: 99.6 °F (37.6 °C)    General:  Alert and oriented,  No apparent distress, obese, flat affect. Skin- without jaundice  Eyes: anicteric sclera  Cardiac: tachy, Nl s1s2, without murmurs  Lungs CTA Bilaterally, normal effort  Abdomen soft, mild  distension without T/G/R, no HSM, Bowel sounds normal  Ext: without edema  Neuro: no asterixis     Data    Data Review:    Recent Labs     21  0608 21  0545 21  0614   WBC 13.8* 10.0 9.7   HGB 8.6* 8.6* 8.5*   HCT 25.8* 26.1* 25.0*   MCV 91.2 91.3 90.3    265 324     Recent Labs     21  0608 21  0545 21  0614    137 135*   K 3.8 3.9 3.8   CL 99 97* 95*   CO2 29 33* 33*   PHOS 2.9 3.8 3.5   BUN 10 10 11   CREATININE 1.6* 1.7* 1.7*     Recent Labs     21  0608   AST 37   ALT 18   BILIDIR <0.2   BILITOT 0.4   ALKPHOS 98     Recent Labs     21  0545 21  0614   LIPASE 230.0* 225.0*     No results for input(s): PROTIME, INR in the last 72 hours. Radiology Review:    CT CHEST ABDOMEN PELVIS WO CONTRAST   Final Result   1. Findings remain consistent with acute pancreatitis. Evaluation is limited   due to the absence of contrast.   2. Findings concerning for diffuse small bowel ileus. 3. Significant atelectasis at the lung bases bilaterally.          XR CHEST PORTABLE   Final Result   New retrocardiac left lower lobe opacification, possibly infiltrate or   atelectasis. CT ABDOMEN PELVIS WO CONTRAST Additional Contrast? Oral   Final Result   Persistent but decreased peripancreatic fluid and edema, in keeping with the   clinical diagnosis of pancreatitis      Increased body wall anasarca with increased pleural-parenchymal disease at   the lung bases, likely due to fluid overload      Fatty liver. Diverticulosis and normal caliber appendix      Subtle increased density is seen in the right femoral vein. It is uncertain   as whether not this is artifactual or due to a small amount of thrombus. Recommend correlation with lower extremity venous duplex ultrasound      RECOMMENDATIONS:   Unavailable         VL Extremity Venous Right   Final Result      US GALLBLADDER RUQ   Final Result   Pancreas not well visualized. This is likely due to the peripancreatic   inflammatory changes seen on recent CT. Hepatic steatosis. CT ABDOMEN PELVIS WO CONTRAST Additional Contrast? None   Final Result   1. Severe acute interstitial pancreatitis. No focal fluid collection. 2. Hepatomegaly with severe steatosis. 3. Possible gallbladder sludge. 4. Colonic diverticulosis without acute diverticulitis. 5. The distal esophagus is dilated and filled with fluid. RECOMMENDATIONS:   Unavailable         XR CHEST PORTABLE   Final Result   No radiographic evidence of acute pulmonary disease. ASSESSMENT:  1) History of severe interstitial pancreatitis- slowly resolving.  Lipase still elevated at 225. WBC elevation resolved.   No signs or source of infection on CT  2) Protracted adynamic ileus- improving  3) Leukocytosis- improving.  WBC down to 9K.  suspect leukemoid reaction.  CT without obvious signs of infected peripancreatic fluid collection or necrotizing pancreatitis  4) RYAN- stable. Cr 1.7  5) Volume overload- on IV Lasix daily  6) Obesity  7) Hx of DKA- resolved  8) Sepsis- resolved  9) Hx of coffee ground emesis- resolved  10 )Hypertriglyceridemia- resolved.         Plan:  Continue Lasix IV 40mg daily for now. Nephrology managing fluid management  On dulcolax 10mg daily  and colace 100mg bid for constipation  Continue low fat diet as tolerated. If he fails to advance his diet or the lipase remains elevated, may need to consider NJ placement and feedings if ileus is improved  Continue to optimize glycemic control  Will defer duration of antibiotics Meropenem/Fluconazole to Dr. Evonne Zaman is no signs of infected peripancreatic fluid collection or overt necrosis noted on CT.  WBC improved. Day 8 of therapy  Continue Fenofibrate for hx of hypertriglyceridemia  PPI bid for hx of coffee ground emesis.  Monitor H and H daily      Thank you for allowing me to participate in the care of your patient. Please feel free to contact me with any concerns.   [address] [place] [address] Quoc Olu, DO

## 2021-12-26 NOTE — PROGRESS NOTES
Hospitalist Progress Note      PCP: [id number] [place] [address]    Date of Admission: 12/12/2021    Chief Complaint: increased thirst, urination, nausea, vomiting    Hospital Course: The patient is a 54 y.o. male with no significant past medical history who presents to [facility] with increased thirst, urination, nausea, and vomiting. Patient stated that over the past several days, he has had intractable nausea, vomiting, with increased thirst and urination. He denies fever, chills, chest pain, shortness of breath, constipation, diarrhea, and dysuria.     In the ED, labs were significant for a sodium of 123, chloride of 77, bicarb of 4, BUN/Cr of 35/3.2, glucose of 900, lactic acid of 8.5, WBC count of 20.1K with a pH of 6.831 and beta-hydroxyturate > 8. CXR showed no acute disease. Subjective:     +BM. Excellent diuresis - weight 336lbs at its peak - down to 281 today. Feels much better.            Medications:  Reviewed    Infusion Medications    sodium chloride 25 mL (12/25/21 1240)    dextrose       Scheduled Medications    insulin lispro  7 Units SubCUTAneous TID WC    potassium chloride  40 mEq Oral Daily    insulin lispro  0-12 Units SubCUTAneous TID WC    insulin lispro  0-6 Units SubCUTAneous Nightly    bisacodyl  10 mg Oral Daily    sodium chloride flush  5-40 mL IntraVENous 2 times per day    meropenem  500 mg IntraVENous Q6H    fluconazole  100 mg IntraVENous Q24H    insulin glargine  35 Units SubCUTAneous BID    pantoprazole  40 mg Oral BID AC    docusate sodium  100 mg Oral BID    fenofibrate  160 mg Oral Daily    heparin (porcine)  5,000 Units SubCUTAneous BID     PRN Meds: acetaminophen, sodium chloride flush, sodium chloride, ondansetron, glucose, dextrose, glucagon (rDNA), dextrose, dextrose      Intake/Output Summary (Last 24 hours) at 12/26/2021 1339  Last data filed at 12/26/2021 1245  Gross per 24 hour   Intake 660 ml   Output 4225 ml   Net -3565 ml Exam:    BP (!) 102/55   Pulse 98   Temp 98.2 °F (36.8 °C) (Oral)   Resp 18   Ht 6' 1\" (1.854 m)   Wt 281 lb 12 oz (127.8 kg)   SpO2 93%   BMI 37.17 kg/m²     General appearance: No apparent distress, appears stated age and cooperative. HEENT: Pupils equal, round, and reactive to light. Conjunctivae/corneas clear. Neck: Supple, with full range of motion. No jugular venous distention. Trachea midline. Respiratory:  Normal respiratory effort. Clear to auscultation, bilaterally without Rales/Wheezes/Rhonchi. Cardiovascular: Regular rate and rhythm with normal S1/S2 without murmurs, rubs or gallops. Abdomen: Soft, non-tender, non-distended with normal bowel sounds. Musculoskeletal: No clubbing, cyanosis or edema bilaterally. Full range of motion without deformity. Skin: Skin color, texture, turgor normal.  No rashes or lesions. Neurologic:  Neurovascularly intact without any focal sensory/motor deficits. Cranial nerves: II-XII intact, grossly non-focal.  Psychiatric: Alert and oriented, thought content appropriate, normal insight  Capillary Refill: Brisk,< 3 seconds   Peripheral Pulses: +2 palpable, equal bilaterally       Labs:   Recent Labs     12/24/21  0608 12/25/21  0545 12/26/21  0614   WBC 13.8* 10.0 9.7   HGB 8.6* 8.6* 8.5*   HCT 25.8* 26.1* 25.0*    265 324     Recent Labs     12/24/21  0608 12/25/21  0545 12/26/21  0614    137 135*   K 3.8 3.9 3.8   CL 99 97* 95*   CO2 29 33* 33*   BUN 10 10 11   CREATININE 1.6* 1.7* 1.7*   CALCIUM 8.1* 8.3 8.4   PHOS 2.9 3.8 3.5     Recent Labs     12/24/21  0608   AST 37   ALT 18   BILIDIR <0.2   BILITOT 0.4   ALKPHOS 98     No results for input(s): INR in the last 72 hours. No results for input(s): Acevedo Clifford in the last 72 hours.     Urinalysis:      Lab Results   Component Value Date    NITRU Negative 12/12/2021    WBCUA 2 12/12/2021    RBCUA 0-2 12/12/2021    BLOODU LARGE 12/12/2021    SPECGRAV 1.023 12/12/2021    GLUCOSEU >=1000 12/12/2021       Radiology:  CT CHEST ABDOMEN PELVIS WO CONTRAST   Final Result   1. Findings remain consistent with acute pancreatitis. Evaluation is limited   due to the absence of contrast.   2. Findings concerning for diffuse small bowel ileus. 3. Significant atelectasis at the lung bases bilaterally. XR CHEST PORTABLE   Final Result   New retrocardiac left lower lobe opacification, possibly infiltrate or   atelectasis. CT ABDOMEN PELVIS WO CONTRAST Additional Contrast? Oral   Final Result   Persistent but decreased peripancreatic fluid and edema, in keeping with the   clinical diagnosis of pancreatitis      Increased body wall anasarca with increased pleural-parenchymal disease at   the lung bases, likely due to fluid overload      Fatty liver. Diverticulosis and normal caliber appendix      Subtle increased density is seen in the right femoral vein. It is uncertain   as whether not this is artifactual or due to a small amount of thrombus. Recommend correlation with lower extremity venous duplex ultrasound      RECOMMENDATIONS:   Unavailable         VL Extremity Venous Right   Final Result      US GALLBLADDER RUQ   Final Result   Pancreas not well visualized. This is likely due to the peripancreatic   inflammatory changes seen on recent CT. Hepatic steatosis. CT ABDOMEN PELVIS WO CONTRAST Additional Contrast? None   Final Result   1. Severe acute interstitial pancreatitis. No focal fluid collection. 2. Hepatomegaly with severe steatosis. 3. Possible gallbladder sludge. 4. Colonic diverticulosis without acute diverticulitis. 5. The distal esophagus is dilated and filled with fluid. RECOMMENDATIONS:   Unavailable         XR CHEST PORTABLE   Final Result   No radiographic evidence of acute pulmonary disease.                  Assessment/Plan:    Active Hospital Problems    Diagnosis Date Noted    Schizophrenia ([facility] [address] [F20.9]     Acute kidney injury ([facility] [address] [N17.9]     Acute pancreatitis [K85.90]     DKA, type 2, not at goal [facility]) [E11.10] 12/12/2021       DKA resolved - suspect undiagnosed type 2 DM  -check Hgb A1c - 12.4  -NPO - to full liquid to low fat diet per GI  -DKA protocol completed  -insulin gtt - to SQ insulin  - check CARI and islet cell Abs   - lantus 35BID. - on 12/18 I switched him to IV Humulin sliding scale and 5 units prand bolus if eating - BG responded very well    - switched back to SQ insulin 12/22.          Severe interstitial pancreatitis  - s/p aggressive IVF   - IV merrem due to ongoing fevers - fever now resolved. -GI and general surgery consulted  -check RUQ U/S - pancreas not well visualized due to surrounding inflammatory changes. - pt reports 3 beers and hard liquor shots prior to onset of pancreatitis, but denies chronic alcohol abuse. - repeat CT on 12/16 showed improvement of pancreatitis, but WBC continued to rise   - I resumed him on Vanc, merrem and diflucan and sent repeat blood Cx  On 12/18 - plan for 10 day course - 3 days remaining.    - repeat CT chest abd pelvis (no contrast) 12/20 reviewed   - WBC now steadily improving and actually returned to normal.           Septic Shock - resolved   Required levophed short term early in admission.   - see abx as above.          CHAPIN - Cr improving slowly  s/p IVF with bicarb - switched to NS per nephrology team.    S/p single dose lasix on 12/17 with good effect. - avoid nephrotoxic medications  - nephrology following  - again showing signs of fluid retention - stopped IVF 12/22.    - lasix IV daily - will continue - increase to BID on 12/24      Coffee ground emesis  -cont PPI  -GI following  -Hgb slow downtrend.        Obesity - BMI 38.68  -nutritional counseling provided  -weight loss encouraged  -complicating medical management      Hypocalcemia - s/p IV replete. Improved. Constipation. CT 12/20 concerning for diffuse ileus.    Started on reglan per GI team. Movantik daily - started 12/20. Stop opiate pain control - pain resolved. Added dulcolax 10 daily    Pt refused miralax - makes him nauseated. DVT Prophylaxis: Heparin   Diet: ADULT DIET; Regular; 5 carb choices (75 gm/meal)  Code Status: Full Code      Dispo - continue care. Lasix decreased to daily today. I am planning discharge tomorrow if we are able to wean off O2. I asked nurses to start teaching him to do insulin injections - will plan for BID lantus on discharge - will try for insulin pens.        Lupillo Wilkerson MD

## 2021-12-26 NOTE — PROGRESS NOTES
Nephrology Progress Note   [facility]      Chief Complaint: Nausea/vomiting/pancreatitis    History of Present Illness: Mr Cherrie Mortimer is a is a 54 y.o. male with no significant past medical history who presents to [facility] with increased thirst, urination, nausea, and vomiting. He was noted to have a BS level of 900 mg/dl. CT scan abdomen and pelvis revealed severe acute Pancreatitis. He was Hyperkalemic, with serum HCO level of 5 and creatinine of 3.4 mg/dl. Patient was started on insulin gtt,treated for DKA     Subjective:    Resting in bed;   Reports edema and breathing better      Scheduled Meds:   furosemide  40 mg IntraVENous Daily    insulin lispro  7 Units SubCUTAneous TID WC    potassium chloride  40 mEq Oral Daily    insulin lispro  0-12 Units SubCUTAneous TID WC    insulin lispro  0-6 Units SubCUTAneous Nightly    bisacodyl  10 mg Oral Daily    sodium chloride flush  5-40 mL IntraVENous 2 times per day    meropenem  500 mg IntraVENous Q6H    fluconazole  100 mg IntraVENous Q24H    insulin glargine  35 Units SubCUTAneous BID    pantoprazole  40 mg Oral BID AC    docusate sodium  100 mg Oral BID    fenofibrate  160 mg Oral Daily    heparin (porcine)  5,000 Units SubCUTAneous BID        sodium chloride 25 mL (21 1240)    dextrose         PRN Meds:.acetaminophen, sodium chloride flush, sodium chloride, ondansetron, glucose, dextrose, glucagon (rDNA), dextrose, dextrose    Physical Exam:    TEMPERATURE:  Current - Temp: 98.5 °F (36.9 °C);  Max - Temp  Av °F (37.2 °C)  Min: 98.5 °F (36.9 °C)  Max: 99.6 °F (37.6 °C)  RESPIRATIONS RANGE: Resp  Av  Min: 18  Max: 20  PULSE RANGE: Pulse  Av.3  Min: 93  Max: 96  BLOOD PRESSURE RANGE:  Systolic (49OWZ), IOD:557 , Min:103 , IZD:089   ; Diastolic (64OWA), NKW:74, Min:65, Max:72    24HR INTAKE/OUTPUT:      Intake/Output Summary (Last 24 hours) at 2021 0917  Last data filed at 2021 0823  Gross per 24 hour   Intake 1570 ml   Output 5250 ml   Net -3680 ml         Patient Vitals for the past 96 hrs (Last 3 readings):   Weight   12/26/21 0450 281 lb 12 oz (127.8 kg)   12/25/21 0504 (!) 311 lb 4.6 oz (141.2 kg)   12/24/21 0506 (!) 317 lb 14.5 oz (144.2 kg)       General: in bed   HEENT: Normocephalic, atraumatic  Neck: No Thyromegaly, Trachea is midline  Chest: diminished at bases, otherwise clear    CVS: RRR, no murmur, no rub  Abdomen: less tenderness   Extremities: trace edema, no cyanosis. Skin: normal texture, normal skin turgor, no rash  Neurological: not done         LAB DATA:    CBC:   Lab Results   Component Value Date    WBC 9.7 12/26/2021    RBC 2.77 12/26/2021    HGB 8.5 12/26/2021    HCT 25.0 12/26/2021    MCV 90.3 12/26/2021    MCH 30.8 12/26/2021    MCHC 34.1 12/26/2021    RDW 13.1 12/26/2021     12/26/2021    MPV 7.8 12/26/2021     BMP:    Lab Results   Component Value Date     12/26/2021    K 3.8 12/26/2021    K 4.2 12/12/2021    CL 95 12/26/2021    CO2 33 12/26/2021    BUN 11 12/26/2021    CREATININE 1.7 12/26/2021    CALCIUM 8.4 12/26/2021    GFRAA 52 12/26/2021    LABGLOM 43 12/26/2021    GLUCOSE 173 12/26/2021     Ionized Calcium:  No results found for: IONCA  Magnesium:    Lab Results   Component Value Date    MG 1.60 12/25/2021     Phosphorus:    Lab Results   Component Value Date    PHOS 3.5 12/26/2021     U/A:    Lab Results   Component Value Date    COLORU YELLOW 12/12/2021    PHUR 5.0 12/12/2021    WBCUA 2 12/12/2021    RBCUA 0-2 12/12/2021    CLARITYU CLOUDY 12/12/2021    SPECGRAV 1.023 12/12/2021    LEUKOCYTESUR Negative 12/12/2021    UROBILINOGEN 0.2 12/12/2021    BILIRUBINUR MODERATE 12/12/2021    BLOODU LARGE 12/12/2021    GLUCOSEU >=1000 12/12/2021         IMPRESSION/RECOMMENDATIONS:      Active Problems:    DKA, type 2, not at goal [facility])    Acute pancreatitis    Acute kidney injury ([facility] [address]    Schizophrenia ([facility] [address]  Resolved Problems:    * No resolved hospital problems. *    1.  CHAPIN - likely prerenal/ATN - occurring in the setting of DKA/pancreatitis. - creatinine up a bit  -  Still with signs of volume excess but improving and can likely be changed to PO diuretics. Will defer to Dr. Doni Zapata as he wrote original orders     2. Edema    - weight trending down nicely  - still with trace -1+ edema     3. Hypokalemia   - scheduled supplement while on lasix     4.  Pancreatitis - management per GI      Nitin Seal

## 2021-12-26 NOTE — PLAN OF CARE
Problem: Falls - Risk of:  Goal: Will remain free from falls  Description: Will remain free from falls  Outcome: Ongoing  Goal: Absence of physical injury  Description: Absence of physical injury  Outcome: Ongoing     Problem: Pain:  Goal: Pain level will decrease  Description: Pain level will decrease  Outcome: Ongoing  Goal: Control of acute pain  Description: Control of acute pain  Outcome: Ongoing  Goal: Control of chronic pain  Description: Control of chronic pain  Outcome: Ongoing     Problem: Discharge Planning:  Goal: Participates in care planning  Description: Participates in care planning  Outcome: Ongoing  Goal: Discharged to appropriate level of care  Description: Discharged to appropriate level of care  Outcome: Ongoing     Problem:  Bowel Function - Altered:  Goal: Bowel elimination is within specified parameters  Description: Bowel elimination is within specified parameters  Outcome: Ongoing     Problem: Cardiac Output - Decreased:  Goal: Hemodynamic stability will improve  Description: Hemodynamic stability will improve  Outcome: Ongoing     Problem: Fluid Volume - Imbalance:  Goal: Absence of imbalanced fluid volume signs and symptoms  Description: Absence of imbalanced fluid volume signs and symptoms  Outcome: Ongoing  Goal: Will remain free of signs and symptoms of dehydration  Description: Will remain free of signs and symptoms of dehydration  Outcome: Ongoing     Problem: Serum Glucose Level - Abnormal:  Goal: Ability to maintain appropriate glucose levels will improve to within specified parameters  Description: Ability to maintain appropriate glucose levels will improve to within specified parameters  Outcome: Ongoing  Goal: Ability to maintain appropriate glucose levels will improve  Description: Ability to maintain appropriate glucose levels will improve  Outcome: Ongoing     Problem: Tissue Perfusion, Altered:  Goal: Circulatory function within specified parameters  Description: Circulatory function within specified parameters  Outcome: Ongoing     Problem: Tissue Perfusion - Cardiopulmonary, Altered:  Goal: Absence of angina  Description: Absence of angina  Outcome: Ongoing  Goal: Hemodynamic stability will improve  Description: Hemodynamic stability will improve  Outcome: Ongoing     Problem: Urinary Elimination:  Goal: Signs and symptoms of infection will decrease  Description: Signs and symptoms of infection will decrease  Outcome: Ongoing  Goal: Complications related to the disease process, condition or treatment will be avoided or minimized  Description: Complications related to the disease process, condition or treatment will be avoided or minimized  Outcome: Ongoing     Problem: Injury - Acid Base Imbalance:  Goal: Acid-base balance  Description: Acid-base balance  Outcome: Ongoing     Problem: Tissue Perfusion - Renal, Altered:  Goal: Electrolytes within specified parameters  Description: Electrolytes within specified parameters  Outcome: Ongoing  Goal: Urine creatinine clearance will be within specified parameters  Description: Urine creatinine clearance will be within specified parameters  Outcome: Ongoing  Goal: Serum creatinine will be within specified parameters  Description: Serum creatinine will be within specified parameters  Outcome: Ongoing  Goal: Ability to achieve a balanced intake and output will improve  Description: Ability to achieve a balanced intake and output will improve  Outcome: Ongoing     Problem: Nutrition  Goal: Optimal nutrition therapy  Outcome: Ongoing     Problem:  Activity:  Goal: Risk for activity intolerance will decrease  Description: Risk for activity intolerance will decrease  Outcome: Ongoing     Problem: Coping:  Goal: Ability to adjust to condition or change in health will improve  Description: Ability to adjust to condition or change in health will improve  Outcome: Ongoing     Problem: Fluid Volume:  Goal: Ability to maintain a balanced intake and output will improve  Description: Ability to maintain a balanced intake and output will improve  Outcome: Ongoing     Problem: Health Behavior:  Goal: Ability to identify and utilize available resources and services will improve  Description: Ability to identify and utilize available resources and services will improve  Outcome: Ongoing  Goal: Ability to manage health-related needs will improve  Description: Ability to manage health-related needs will improve  Outcome: Ongoing     Problem: Metabolic:  Goal: Ability to maintain appropriate glucose levels will improve  Description: Ability to maintain appropriate glucose levels will improve  Outcome: Ongoing     Problem: Nutritional:  Goal: Maintenance of adequate nutrition will improve  Description: Maintenance of adequate nutrition will improve  Outcome: Ongoing  Goal: Progress toward achieving an optimal weight will improve  Description: Progress toward achieving an optimal weight will improve  Outcome: Ongoing     Problem: Physical Regulation:  Goal: Complications related to the disease process, condition or treatment will be avoided or minimized  Description: Complications related to the disease process, condition or treatment will be avoided or minimized  Outcome: Ongoing  Goal: Diagnostic test results will improve  Description: Complications related to the disease process, condition or treatment will be avoided or minimized  Outcome: Ongoing     Problem: Skin Integrity:  Goal: Risk for impaired skin integrity will decrease  Description: Risk for impaired skin integrity will decrease  Outcome: Ongoing     Problem: Tissue Perfusion:  Goal: Adequacy of tissue perfusion will improve  Description: Adequacy of tissue perfusion will improve  Outcome: Ongoing

## 2021-12-27 ENCOUNTER — APPOINTMENT (OUTPATIENT)
Dept: CT IMAGING | Age: 51
DRG: 871 | End: 2021-12-27
Payer: COMMERCIAL

## 2021-12-27 LAB
ALBUMIN SERPL-MCNC: 2.5 G/DL (ref 3.4–5)
ANION GAP SERPL CALCULATED.3IONS-SCNC: 11 MMOL/L (ref 3–16)
BANDED NEUTROPHILS RELATIVE PERCENT: 5 % (ref 0–7)
BASOPHILS ABSOLUTE: 0.1 K/UL (ref 0–0.2)
BASOPHILS RELATIVE PERCENT: 1 %
BUN BLDV-MCNC: 9 MG/DL (ref 7–20)
CALCIUM SERPL-MCNC: 8.2 MG/DL (ref 8.3–10.6)
CHLORIDE BLD-SCNC: 99 MMOL/L (ref 99–110)
CO2: 31 MMOL/L (ref 21–32)
CREAT SERPL-MCNC: 1.6 MG/DL (ref 0.9–1.3)
EOSINOPHILS ABSOLUTE: 0.2 K/UL (ref 0–0.6)
EOSINOPHILS RELATIVE PERCENT: 2 %
GFR AFRICAN AMERICAN: 56
GFR NON-AFRICAN AMERICAN: 46
GLUCOSE BLD-MCNC: 100 MG/DL (ref 70–99)
GLUCOSE BLD-MCNC: 109 MG/DL (ref 70–99)
GLUCOSE BLD-MCNC: 118 MG/DL (ref 70–99)
GLUCOSE BLD-MCNC: 133 MG/DL (ref 70–99)
GLUCOSE BLD-MCNC: 172 MG/DL (ref 70–99)
HCT VFR BLD CALC: 25.7 % (ref 40.5–52.5)
HEMOGLOBIN: 8.5 G/DL (ref 13.5–17.5)
HYPOCHROMIA: ABNORMAL
LYMPHOCYTES ABSOLUTE: 1.7 K/UL (ref 1–5.1)
LYMPHOCYTES RELATIVE PERCENT: 21 %
MCH RBC QN AUTO: 30.1 PG (ref 26–34)
MCHC RBC AUTO-ENTMCNC: 33.3 G/DL (ref 31–36)
MCV RBC AUTO: 90.5 FL (ref 80–100)
METAMYELOCYTES RELATIVE PERCENT: 2 %
MONOCYTES ABSOLUTE: 0.9 K/UL (ref 0–1.3)
MONOCYTES RELATIVE PERCENT: 11 %
NEUTROPHILS ABSOLUTE: 5.1 K/UL (ref 1.7–7.7)
NEUTROPHILS RELATIVE PERCENT: 58 %
PDW BLD-RTO: 13.3 % (ref 12.4–15.4)
PERFORMED ON: ABNORMAL
PHOSPHORUS: 3.6 MG/DL (ref 2.5–4.9)
PLATELET # BLD: 308 K/UL (ref 135–450)
PMV BLD AUTO: 7.9 FL (ref 5–10.5)
POLYCHROMASIA: ABNORMAL
POTASSIUM SERPL-SCNC: 4.1 MMOL/L (ref 3.5–5.1)
RBC # BLD: 2.84 M/UL (ref 4.2–5.9)
SODIUM BLD-SCNC: 141 MMOL/L (ref 136–145)
TOXIC GRANULATION: PRESENT
WBC # BLD: 7.9 K/UL (ref 4–11)

## 2021-12-27 PROCEDURE — 97530 THERAPEUTIC ACTIVITIES: CPT | Performed by: PHYSICAL THERAPIST

## 2021-12-27 PROCEDURE — 6370000000 HC RX 637 (ALT 250 FOR IP): Performed by: SURGERY

## 2021-12-27 PROCEDURE — 2700000000 HC OXYGEN THERAPY PER DAY

## 2021-12-27 PROCEDURE — 80069 RENAL FUNCTION PANEL: CPT

## 2021-12-27 PROCEDURE — 94761 N-INVAS EAR/PLS OXIMETRY MLT: CPT

## 2021-12-27 PROCEDURE — 94680 O2 UPTK RST&XERS DIR SIMPLE: CPT

## 2021-12-27 PROCEDURE — 2580000003 HC RX 258: Performed by: NURSE PRACTITIONER

## 2021-12-27 PROCEDURE — 97116 GAIT TRAINING THERAPY: CPT | Performed by: PHYSICAL THERAPIST

## 2021-12-27 PROCEDURE — 71250 CT THORAX DX C-: CPT

## 2021-12-27 PROCEDURE — 6370000000 HC RX 637 (ALT 250 FOR IP): Performed by: INTERNAL MEDICINE

## 2021-12-27 PROCEDURE — 2580000003 HC RX 258: Performed by: INTERNAL MEDICINE

## 2021-12-27 PROCEDURE — 36592 COLLECT BLOOD FROM PICC: CPT

## 2021-12-27 PROCEDURE — 2060000000 HC ICU INTERMEDIATE R&B

## 2021-12-27 PROCEDURE — 6360000002 HC RX W HCPCS: Performed by: INTERNAL MEDICINE

## 2021-12-27 PROCEDURE — 85025 COMPLETE CBC W/AUTO DIFF WBC: CPT

## 2021-12-27 PROCEDURE — 6370000000 HC RX 637 (ALT 250 FOR IP): Performed by: PHYSICIAN ASSISTANT

## 2021-12-27 RX ORDER — TORSEMIDE 20 MG/1
20 TABLET ORAL DAILY
Status: DISCONTINUED | OUTPATIENT
Start: 2021-12-27 | End: 2021-12-31 | Stop reason: HOSPADM

## 2021-12-27 RX ADMIN — POTASSIUM CHLORIDE 40 MEQ: 20 TABLET, EXTENDED RELEASE ORAL at 08:58

## 2021-12-27 RX ADMIN — PANTOPRAZOLE SODIUM 40 MG: 40 TABLET, DELAYED RELEASE ORAL at 17:13

## 2021-12-27 RX ADMIN — MEROPENEM 500 MG: 500 INJECTION, POWDER, FOR SOLUTION INTRAVENOUS at 11:31

## 2021-12-27 RX ADMIN — PANTOPRAZOLE SODIUM 40 MG: 40 TABLET, DELAYED RELEASE ORAL at 06:02

## 2021-12-27 RX ADMIN — INSULIN LISPRO 2 UNITS: 100 INJECTION, SOLUTION INTRAVENOUS; SUBCUTANEOUS at 08:59

## 2021-12-27 RX ADMIN — INSULIN GLARGINE 35 UNITS: 100 INJECTION, SOLUTION SUBCUTANEOUS at 08:58

## 2021-12-27 RX ADMIN — HEPARIN SODIUM 5000 UNITS: 5000 INJECTION INTRAVENOUS; SUBCUTANEOUS at 21:06

## 2021-12-27 RX ADMIN — HEPARIN SODIUM 5000 UNITS: 5000 INJECTION INTRAVENOUS; SUBCUTANEOUS at 08:59

## 2021-12-27 RX ADMIN — BISACODYL 10 MG: 5 TABLET, COATED ORAL at 08:58

## 2021-12-27 RX ADMIN — INSULIN LISPRO 7 UNITS: 100 INJECTION, SOLUTION INTRAVENOUS; SUBCUTANEOUS at 08:59

## 2021-12-27 RX ADMIN — MEROPENEM 500 MG: 500 INJECTION, POWDER, FOR SOLUTION INTRAVENOUS at 17:14

## 2021-12-27 RX ADMIN — MEROPENEM 500 MG: 500 INJECTION, POWDER, FOR SOLUTION INTRAVENOUS at 06:02

## 2021-12-27 RX ADMIN — DOCUSATE SODIUM 100 MG: 100 CAPSULE ORAL at 21:06

## 2021-12-27 RX ADMIN — INSULIN LISPRO 7 UNITS: 100 INJECTION, SOLUTION INTRAVENOUS; SUBCUTANEOUS at 17:14

## 2021-12-27 RX ADMIN — FENOFIBRATE 160 MG: 160 TABLET ORAL at 08:58

## 2021-12-27 RX ADMIN — INSULIN GLARGINE 35 UNITS: 100 INJECTION, SOLUTION SUBCUTANEOUS at 21:06

## 2021-12-27 RX ADMIN — INSULIN LISPRO 7 UNITS: 100 INJECTION, SOLUTION INTRAVENOUS; SUBCUTANEOUS at 12:57

## 2021-12-27 RX ADMIN — DOCUSATE SODIUM 100 MG: 100 CAPSULE ORAL at 08:58

## 2021-12-27 RX ADMIN — SODIUM CHLORIDE, PRESERVATIVE FREE 10 ML: 5 INJECTION INTRAVENOUS at 21:06

## 2021-12-27 RX ADMIN — FLUCONAZOLE 100 MG: 2 INJECTION, SOLUTION INTRAVENOUS at 09:32

## 2021-12-27 RX ADMIN — TORSEMIDE 20 MG: 20 TABLET ORAL at 09:35

## 2021-12-27 RX ADMIN — MEROPENEM 500 MG: 500 INJECTION, POWDER, FOR SOLUTION INTRAVENOUS at 23:47

## 2021-12-27 ASSESSMENT — PAIN SCALES - GENERAL
PAINLEVEL_OUTOF10: 0

## 2021-12-27 NOTE — PROGRESS NOTES
Hospitalist Progress Note      PCP: [id number] [place] [address]    Date of Admission: 12/12/2021    Chief Complaint: increased thirst, urination, nausea, vomiting    Hospital Course: The patient is a 54 y.o. male with no significant past medical history who presents to [facility] with increased thirst, urination, nausea, and vomiting. Patient stated that over the past several days, he has had intractable nausea, vomiting, with increased thirst and urination. He denies fever, chills, chest pain, shortness of breath, constipation, diarrhea, and dysuria.     In the ED, labs were significant for a sodium of 123, chloride of 77, bicarb of 4, BUN/Cr of 35/3.2, glucose of 900, lactic acid of 8.5, WBC count of 20.1K with a pH of 6.831 and beta-hydroxyturate > 8. CXR showed no acute disease. Subjective:     +BM. Abd pain resolved. Tolerating PO. Excellent diuresis - weight 336lbs at its peak - down to 281 today. Feels much better. Still with small O2 requirement down to 2l/min and failed home o2 eval today.          Medications:  Reviewed    Infusion Medications    sodium chloride 5 mL/hr at 12/27/21 4413    dextrose       Scheduled Medications    torsemide  20 mg Oral Daily    insulin lispro  7 Units SubCUTAneous TID WC    potassium chloride  40 mEq Oral Daily    insulin lispro  0-12 Units SubCUTAneous TID WC    insulin lispro  0-6 Units SubCUTAneous Nightly    bisacodyl  10 mg Oral Daily    sodium chloride flush  5-40 mL IntraVENous 2 times per day    meropenem  500 mg IntraVENous Q6H    fluconazole  100 mg IntraVENous Q24H    insulin glargine  35 Units SubCUTAneous BID    pantoprazole  40 mg Oral BID AC    docusate sodium  100 mg Oral BID    fenofibrate  160 mg Oral Daily    heparin (porcine)  5,000 Units SubCUTAneous BID     PRN Meds: acetaminophen, sodium chloride flush, sodium chloride, ondansetron, glucose, dextrose, glucagon (rDNA), dextrose, dextrose      Intake/Output Summary (Last 24 hours) at 12/27/2021 1251  Last data filed at 12/27/2021 1131  Gross per 24 hour   Intake 3139.41 ml   Output 3375 ml   Net -235.59 ml       Exam:    /77   Pulse 74   Temp 98.1 °F (36.7 °C) (Oral)   Resp 16   Ht 6' 1\" (1.854 m)   Wt 281 lb 8.4 oz (127.7 kg)   SpO2 93%   BMI 37.14 kg/m²     General appearance: No apparent distress, appears stated age and cooperative. HEENT: Pupils equal, round, and reactive to light. Conjunctivae/corneas clear. Neck: Supple, with full range of motion. No jugular venous distention. Trachea midline. Respiratory:  Normal respiratory effort. Clear to auscultation, bilaterally without Rales/Wheezes/Rhonchi. Cardiovascular: Regular rate and rhythm with normal S1/S2 without murmurs, rubs or gallops. Abdomen: Soft, non-tender, non-distended with normal bowel sounds. Musculoskeletal: No clubbing, cyanosis or edema bilaterally. Full range of motion without deformity. Skin: Skin color, texture, turgor normal.  No rashes or lesions. Neurologic:  Neurovascularly intact without any focal sensory/motor deficits. Cranial nerves: II-XII intact, grossly non-focal.  Psychiatric: Alert and oriented, thought content appropriate, normal insight  Capillary Refill: Brisk,< 3 seconds   Peripheral Pulses: +2 palpable, equal bilaterally       Labs:   Recent Labs     12/25/21  0545 12/26/21  0614 12/27/21  0615   WBC 10.0 9.7 7.9   HGB 8.6* 8.5* 8.5*   HCT 26.1* 25.0* 25.7*    324 308     Recent Labs     12/25/21  0545 12/26/21  0614 12/27/21  0615    135* 141   K 3.9 3.8 4.1   CL 97* 95* 99   CO2 33* 33* 31   BUN 10 11 9   CREATININE 1.7* 1.7* 1.6*   CALCIUM 8.3 8.4 8.2*   PHOS 3.8 3.5 3.6     No results for input(s): AST, ALT, BILIDIR, BILITOT, ALKPHOS in the last 72 hours. No results for input(s): INR in the last 72 hours. No results for input(s): Chyrel Lions in the last 72 hours.     Urinalysis:      Lab Results   Component Value Date    NITRU Negative 12/12/2021    WBCUA 2 12/12/2021    RBCUA 0-2 12/12/2021    BLOODU LARGE 12/12/2021    SPECGRAV 1.023 12/12/2021    GLUCOSEU >=1000 12/12/2021       Radiology:  CT CHEST WO CONTRAST   Final Result   Consolidation in the lung bases bilaterally that is similar compared to prior   examination concerning for pneumonia. Trace effusions. Hepatic steatosis. Fluid versus inflammation adjacent to the visualized pancreatic tail. RECOMMENDATIONS:   Unavailable         CT CHEST ABDOMEN PELVIS WO CONTRAST   Final Result   1. Findings remain consistent with acute pancreatitis. Evaluation is limited   due to the absence of contrast.   2. Findings concerning for diffuse small bowel ileus. 3. Significant atelectasis at the lung bases bilaterally. XR CHEST PORTABLE   Final Result   New retrocardiac left lower lobe opacification, possibly infiltrate or   atelectasis. CT ABDOMEN PELVIS WO CONTRAST Additional Contrast? Oral   Final Result   Persistent but decreased peripancreatic fluid and edema, in keeping with the   clinical diagnosis of pancreatitis      Increased body wall anasarca with increased pleural-parenchymal disease at   the lung bases, likely due to fluid overload      Fatty liver. Diverticulosis and normal caliber appendix      Subtle increased density is seen in the right femoral vein. It is uncertain   as whether not this is artifactual or due to a small amount of thrombus. Recommend correlation with lower extremity venous duplex ultrasound      RECOMMENDATIONS:   Unavailable         VL Extremity Venous Right   Final Result      US GALLBLADDER RUQ   Final Result   Pancreas not well visualized. This is likely due to the peripancreatic   inflammatory changes seen on recent CT. Hepatic steatosis. CT ABDOMEN PELVIS WO CONTRAST Additional Contrast? None   Final Result   1. Severe acute interstitial pancreatitis. No focal fluid collection.    2. Hepatomegaly with severe steatosis. 3. Possible gallbladder sludge. 4. Colonic diverticulosis without acute diverticulitis. 5. The distal esophagus is dilated and filled with fluid. RECOMMENDATIONS:   Unavailable         XR CHEST PORTABLE   Final Result   No radiographic evidence of acute pulmonary disease. Assessment/Plan:    Active Hospital Problems    Diagnosis Date Noted    Schizophrenia ([facility] [address] [F20.9]     Acute kidney injury ([facility] [address] [N17.9]     Acute pancreatitis [K85.90]     DKA, type 2, not at goal [facility]) [E11.10] 12/12/2021       DKA resolved - suspect undiagnosed type 2 DM  -check Hgb A1c - 12.4  -NPO - to full liquid to low fat diet per GI  -DKA protocol completed  -insulin gtt - to SQ insulin  - check CARI and islet cell Abs - both negative. - lantus 35BID. - on 12/18 I switched him to IV Humulin sliding scale and 5 units prand bolus if eating - BG responded very well    - switched back to SQ insulin 12/22 and doing well with this.           Severe interstitial pancreatitis - much improved clinically. - s/p aggressive IVF   - IV merrem due to ongoing fevers - fever now resolved. -GI and general surgery consulted  -check RUQ U/S - pancreas not well visualized due to surrounding inflammatory changes. - pt reports 3 beers and hard liquor shots prior to onset of pancreatitis, but denies chronic alcohol abuse. - repeat CT on 12/16 showed improvement of pancreatitis, but WBC continued to rise   - I resumed him on Vanc, merrem and diflucan and sent repeat blood Cx  On 12/18 - plan for 10 day course - last day of Abx tomorrow. - WBC now steadily improving and actually returned to normal.           Septic Shock - resolved   Required levophed short term early in admission.   - see abx as above.          CHAPIN - Cr improving slowly  s/p IVF with bicarb - switched to NS per nephrology team.    S/p single dose lasix on 12/17 with good effect.    - avoid nephrotoxic medications  - nephrology following  - again showing signs of fluid retention - stopped IVF 12/22.    - lasix IV daily - increase to BID on 12/24 - transition to PO torsemide today. Coffee ground emesis  -cont PPI  -GI following  -Hgb stabilized.        Obesity - BMI 38.68  -nutritional counseling provided  -weight loss encouraged  -complicating medical management      Hypocalcemia - s/p IV replete. Improved. Constipation. CT 12/20 concerning for diffuse ileus. Started on reglan per GI team.   Movantik daily - started 12/20. Stop opiate pain control - pain resolved. Added dulcolax 10 daily    Pt refused miralax - makes him nauseated. Acute Hypox Resp Failure not POA. O2 requirement peaked at 6-7l/min and has been slowly improving  He is down to 2l/min now. He failed home O2 eval today, so I pursued repeat CT chest to reevaluate pulm status. He has basilar consolidative changes and much improved pleural effusion. I think we should be able to wean him off O2 in the next 24-48hrs. Abx will finish tomorrow. Encourage ambulation and deep breathing exercises. DVT Prophylaxis: Heparin   Diet: ADULT DIET; Regular; 5 carb choices (75 gm/meal)  Code Status: Full Code      Dispo - continue care. Lasix decreased to daily today. I asked nurses to start teaching him to do insulin injections - will plan for BID lantus on discharge - will try for insulin pens. Attempt to wean off O2 prior to discharge.        Jovan Correia MD

## 2021-12-27 NOTE — PROGRESS NOTES
Awakened to draw labs and give medication. States slept well. Blood pressure and blood sugars have been stable this shift. Is without need or complaint.

## 2021-12-27 NOTE — PROGRESS NOTES
Physical Therapy  Facility/Department: [facility]  Daily Treatment Note  NAME: Roosevelt Garcia  : 1970  MRN: [id number]    Date of Service: 2021    Discharge Recommendations:  Home with assist PRN,2-3 sessions per week   PT Equipment Recommendations  Equipment Needed: Yes  Kiara Lockwood: Rolling  Other: bariatric RW due to width needed  Roosevelt Garcia scored a 21/24 on the AM-PAC short mobility form. Current research shows that an AM-PAC score of 18 or greater is typically associated with a discharge to the patient's home setting. Based on the patient's AM-PAC score and their current functional mobility deficits, it is recommended that the patient have 2-3 sessions per week of Physical Therapy at d/c to increase the patient's independence. At this time, this patient demonstrates the endurance and safety to discharge home with home PT and a follow up treatment frequency of 2-3x/wk. Please see assessment section for further patient specific details. If patient discharges prior to next session this note will serve as a discharge summary. Please see below for the latest assessment towards goals. Assessment   Body structures, Functions, Activity limitations: Decreased functional mobility ; Decreased strength;Decreased safe awareness;Decreased endurance  Assessment: pt making progress in therapy; able to use bariatric RW safely in room however limited by endurance and still needing O2 line; discussed management of O2 line for at home if he needs to remain on O2; pt reports family can assist with household tasks and he is open to home therapy; pt will likely need PT and OT as he needed assist for his socks; recommend home with PRN assist and home PT/OT  Treatment Diagnosis: decreased functional mobility  Prognosis: Good  History: 53 y/o male admit 2021 with Acute Pancreatitis,CHAPIN, DKA and Shock. PMH insign otherwise.   PT Education: General Safety  REQUIRES PT FOLLOW UP: Yes  Activity Tolerance  Activity Tolerance: Patient Tolerated treatment well     Patient Diagnosis(es): The primary encounter diagnosis was Diabetic ketoacidosis without coma associated with diabetes mellitus due to underlying condition ([facility] [address] Diagnoses of Metabolic acidosis, Hypotension, unspecified hypotension type, Hyperglycemia, Lymphocytosis, and DKA, type 2, not at goal [facility]) were also pertinent to this visit. has a past medical history of H/O degenerative disc disease. has no past surgical history on file. Social/Functional History  Lives With: Alone  Type of Home: Apartment  Home Layout: Two level,Laundry in basement (1 story with basement.)  Home Access: Stairs to enter with rails  Entrance Stairs - Number of Steps: Primarily enters garage then has 12 PATRICE to main level with bed/bathroom; or could go in front door with 1 PATRICE. Bathroom Shower/Tub: Tub/Shower unit  Bathroom Toilet:  (Comfort height toilet)  Bathroom Accessibility: Accessible  Home Equipment:  (No DME.)  Receives Help From: Home [employer] (A 5 days/week for 2-3 hours for house chores)  ADL Assistance: Independent  Homemaking Assistance: Independent  Ambulation Assistance: Independent (Without assist device pta.)  Transfer Assistance: Independent  Active : Yes  Occupation: On disability  Additional Comments: [de-identified] y/o father and brother stay in the same building. Restrictions  Restrictions/Precautions  Restrictions/Precautions: Fall Risk  Position Activity Restriction  Other position/activity restrictions: O2 2L via NC  Subjective   General  Chart Reviewed: Yes  Additional Pertinent Hx: 55 y/o male admit 12/12/2021 with Acute Pancreatitis,CHAPIN, DKA and Shock. PMH insign otherwise. Response To Previous Treatment: Patient with no complaints from previous session. Family / Caregiver Present: No  Referring Practitioner: Li Keller NP.   Subjective  Subjective: pt agreeable to getting up with therapy          Orientation   [facility] Cognition  Overall Cognitive Status: WFL  Objective   Bed mobility  Supine to Sit: Modified independent  Transfers  Sit to Stand: Stand by assistance;Supervision  Stand to sit: Stand by assistance;Supervision  Ambulation  Ambulation?: Yes  Ambulation 1  Surface: level tile  Device: Rolling Walker (bariatric RW)  Assistance: Stand by assistance;Supervision  Quality of Gait: pt able to manage bariatric walker well; no LOB; therapist managed O2 and IV lines  Distance: 10', 3' and 45'x2  Comments: no c/o SOB during session     Balance  Sitting - Static: Good  Sitting - Dynamic: Good  Standing - Static: Good (with RW)  Standing - Dynamic: Good (with RW)            Comment: pt stood at toilet to urinate; stood at sink to wash his hands; assisted with positioning in chair for comfort; new waffle cushion obtained as previous cushion was deflated; assisted with changing his hospital gown as it was soiled; all needs in reach              G-Code     OutComes Score                                                     AM-PAC Score  AM-PAC Inpatient Mobility Raw Score : 21 (12/24/21 [facility])  AM-PAC Inpatient T-Scale Score : 50.25 (12/24/21 [facility])  Mobility Inpatient CMS 0-100% Score: 28.97 (12/24/21 0756)  Mobility Inpatient CMS G-Code Modifier : CJ (12/24/21 0756)          Goals  Short term goals  Time Frame for Short term goals: Upon d/c acute care setting. (pt progressing but goals ongoing 12-16)  Short term goal 1: Bed Mob Supervision. MET 12-20  Short term goal 2: Transfers with/without assist device SBA. MET 12-20  Short term goal 3: Amb with/without assist device 50' SBA. - met however not yet managing O2 line  Short term goal 4: Pt participating in approp Strength Exs. Patient Goals   Patient goals : Return home. Plan    Plan  Times per week: 3-5x week while in acute care setting.   Current Treatment Recommendations: Strengthening,Functional Mobility Training,Transfer [employer] &

## 2021-12-27 NOTE — PROGRESS NOTES
Attempted to wean patient down to room air in which patient did not tolerate dropping down to 87%. Patient placed back on 2 L.     Electronically signed by Adeola Landrum RN on 12/27/2021 at 8:45 AM

## 2021-12-27 NOTE — CARE COORDINATION
[facility]  Diabetes Education   Progress Note       NAME:  [address] [place] [address] RECORD NUMBER:  [id number]  AGE: 54 y.o. GENDER: male  : 1970  TODAY'S DATE:  2021    Subjective   Reason for Diabetic Education Evaluation and Assessment: general diabetes support, new insulin     Nimesh Rose is up in the chair. He is hoping to be going home soon. Visit Type: follow-up      Azeb Cuevas is a 54 y.o. male referred by:     [x] Physician  - Nursing  - Chart Review   - Other:     PAST MEDICAL HISTORY        Diagnosis Date    H/O degenerative disc disease        PAST SURGICAL HISTORY    No past surgical history on file. FAMILY HISTORY    No family history on file.     SOCIAL HISTORY    Social History     Tobacco Use    Smoking status: Never Smoker    Smokeless tobacco: Never Used   Substance Use Topics    Alcohol use: Yes     Comment: social    Drug use: Never       ALLERGIES    No Known Allergies    MEDICATIONS     torsemide  20 mg Oral Daily    insulin lispro  7 Units SubCUTAneous TID WC    potassium chloride  40 mEq Oral Daily    insulin lispro  0-12 Units SubCUTAneous TID WC    insulin lispro  0-6 Units SubCUTAneous Nightly    bisacodyl  10 mg Oral Daily    sodium chloride flush  5-40 mL IntraVENous 2 times per day    meropenem  500 mg IntraVENous Q6H    fluconazole  100 mg IntraVENous Q24H    insulin glargine  35 Units SubCUTAneous BID    pantoprazole  40 mg Oral BID AC    docusate sodium  100 mg Oral BID    fenofibrate  160 mg Oral Daily    heparin (porcine)  5,000 Units SubCUTAneous BID       Objective        Patient Active Problem List   Diagnosis Code    Back pain M54.9    DKA, type 2, not at goal [facility]) E11.10    Acute pancreatitis K85.90    Acute kidney injury ([facility] N17.9    Schizophrenia ([facility] F20.9        /77   Pulse 74   Temp 98.1 °F (36.7 °C) (Oral)   Resp 16   Ht 6' 1\" (1.854 m)   Wt 281 lb 8.4 oz (127.7 kg)   SpO2 93%   BMI 37.14 kg/m² HgBA1c:    Lab Results   Component Value Date    LABA1C 12.4 12/13/2021       Recent Labs     12/26/21  1710 12/26/21 2029 12/27/21  0828 12/27/21  1117   POCGLU 115* 133* 172* 109*       BUN/Creatinine:    Lab Results   Component Value Date    BUN 9 12/27/2021    CREATININE 1.6 12/27/2021       Assessment        Diabetes Management and Education    Does the patient have a Primary Care Physician? Yes, [address]       Does the patient require new medication instruction? Yes  Reviewed plan for insulin pen. Person responsible for administration of Insulin/Medication:        [x] Self     [] Caregiver       [] Spouse       [] Other Family Member   []  Other    Insulin Instruction:  insulin pen  Injection Site:   [x] location    [x] rotation      Level of patient/caregiver understanding able to:       [x] Verbalized Understanding   [x] Demonstrated Understanding       [] Teach Back       [] Needs Reinforcement     []  Other:        Does the patient/caregiver monitor Blood Glucoses? No:   Reviewed glycemic control targets, testing frequency and when to call PCP. Level of patient/caregiver understanding able to:        [x] Verbalized Understanding   [] Demonstrated Understanding       [] Teach Back       [x] Needs Reinforcement     []  Other:        Does the patient/caregiver follow a Meal Plan? No: Dislikes hospital food. Drinking diet soda. Recommend making water, unsweetened tea or coffee primary drinks. Reviewed importance of eating three meals per day and plate method for consistent carb intake. Level of patient/caregiver understanding able to:        [x] Verbalized Understanding   [] Demonstrated Understanding       [] Teach Back       [] Needs Reinforcement     []  Other:      Does the patient/caregiver understand S/S of Hypoglycemia? No:   Reviewed symptoms, prevention and treatment.      Level of patient/caregiver understanding able to:       [] Verbalized Understanding   [] Demonstrated Understanding       [] Teach Back       [x] Needs Reinforcement     []  Other:                  YesDoes the patient/caregiver understand S/S of Hyperglycemia? Yes    Reviewed symptoms, prevention and treatment. Level of patient/caregiver understanding able to:         [x] Verbalized Understanding   [] Demonstrated Understanding       [] Teach Back       [] Needs Reinforcement     []  Other:           Plan        Ongoing diabetes education and blood glucose monitoring. Recommend meds to beds. Recommend [facility] and/or home care support for medication management.                                            Discharge Plan:  Discharge needs: insulin pens and 4mm pen needles and glucometer/strips/lancets       Teaching Time Diabetes Education:  20 minutes     Electronically signed by Noel Kay on 12/27/2021 at 3:25 PM

## 2021-12-27 NOTE — PLAN OF CARE
Problem: Falls - Risk of:  Goal: Will remain free from falls  Description: Will remain free from falls  12/27/2021 0751 by Amanda Lara RN  Outcome: Ongoing     Problem: Falls - Risk of:  Goal: Absence of physical injury  Description: Absence of physical injury  12/27/2021 0751 by Amanda Lara RN  Outcome: Ongoing     Problem: Pain:  Description: Pain management should include both nonpharmacologic and pharmacologic interventions. Goal: Pain level will decrease  Description: Pain level will decrease  12/27/2021 0751 by Amanda Lara RN  Outcome: Ongoing     Problem: Pain:  Description: Pain management should include both nonpharmacologic and pharmacologic interventions. Goal: Control of acute pain  Description: Control of acute pain  12/27/2021 0751 by Amanda Lara RN  Outcome: Ongoing     Problem: Pain:  Description: Pain management should include both nonpharmacologic and pharmacologic interventions. Goal: Control of chronic pain  Description: Control of chronic pain  12/27/2021 0751 by Amanda Lara RN  Outcome: Ongoing     Problem: Discharge Planning:  Goal: Participates in care planning  Description: Participates in care planning  12/27/2021 0751 by Amanda Lara RN  Outcome: Ongoing     Problem: Discharge Planning:  Goal: Discharged to appropriate level of care  Description: Discharged to appropriate level of care  12/27/2021 0751 by Amanda Lara RN  Outcome: Ongoing     Problem:  Bowel Function - Altered:  Goal: Bowel elimination is within specified parameters  Description: Bowel elimination is within specified parameters  12/27/2021 0751 by Amanda Lara RN  Outcome: Ongoing     Problem: Cardiac Output - Decreased:  Goal: Hemodynamic stability will improve  Description: Hemodynamic stability will improve  12/27/2021 0751 by Amanda Lara RN  Outcome: Ongoing

## 2021-12-27 NOTE — PLAN OF CARE
Problem: Falls - Risk of:  Goal: Will remain free from falls  Description: Will remain free from falls  Outcome: Ongoing     Problem: Discharge Planning:  Goal: Participates in care planning  Description: Participates in care planning  Outcome: Ongoing     Problem: Discharge Planning:  Goal: Discharged to appropriate level of care  Description: Discharged to appropriate level of care  Outcome: Ongoing     Problem:  Bowel Function - Altered:  Goal: Bowel elimination is within specified parameters  Description: Bowel elimination is within specified parameters  Outcome: Ongoing     Problem: Cardiac Output - Decreased:  Goal: Hemodynamic stability will improve  Description: Hemodynamic stability will improve  Outcome: Ongoing     Problem: Fluid Volume - Imbalance:  Goal: Absence of imbalanced fluid volume signs and symptoms  Description: Absence of imbalanced fluid volume signs and symptoms  Outcome: Ongoing     Problem: Fluid Volume - Imbalance:  Goal: Will remain free of signs and symptoms of dehydration  Description: Will remain free of signs and symptoms of dehydration  Outcome: Ongoing     Problem: Serum Glucose Level - Abnormal:  Goal: Ability to maintain appropriate glucose levels will improve to within specified parameters  Description: Ability to maintain appropriate glucose levels will improve to within specified parameters  Outcome: Ongoing     Problem: Serum Glucose Level - Abnormal:  Goal: Ability to maintain appropriate glucose levels will improve  Description: Ability to maintain appropriate glucose levels will improve  Outcome: Ongoing     Problem: Urinary Elimination:  Goal: Complications related to the disease process, condition or treatment will be avoided or minimized  Description: Complications related to the disease process, condition or treatment will be avoided or minimized  Outcome: Ongoing     Problem: Tissue Perfusion - Renal, Altered:  Goal: Electrolytes within specified parameters  Description: Electrolytes within specified parameters  Outcome: Ongoing     Problem: Tissue Perfusion - Renal, Altered:  Goal: Urine creatinine clearance will be within specified parameters  Description: Urine creatinine clearance will be within specified parameters  Outcome: Ongoing     Problem: Tissue Perfusion - Renal, Altered:  Goal: Serum creatinine will be within specified parameters  Description: Serum creatinine will be within specified parameters  Outcome: Ongoing     Problem: Nutrition  Goal: Optimal nutrition therapy  Outcome: Ongoing     Problem:  Activity:  Goal: Risk for activity intolerance will decrease  Description: Risk for activity intolerance will decrease  Outcome: Ongoing     Problem: Nutritional:  Goal: Maintenance of adequate nutrition will improve  Description: Maintenance of adequate nutrition will improve  Outcome: Ongoing     Problem: Skin Integrity:  Goal: Risk for impaired skin integrity will decrease  Description: Risk for impaired skin integrity will decrease  Outcome: Ongoing     Problem: Tissue Perfusion:  Goal: Adequacy of tissue perfusion will improve  Description: Adequacy of tissue perfusion will improve  Outcome: Ongoing

## 2021-12-27 NOTE — PROGRESS NOTES
normal. No masses,  no organomegaly. Extremities: atraumatic, no cyanosis or edema  Skin: warm and dry, no jaundice  Neuro: Grossly intact, A&OX3    LABS AND IMAGING   Laboratory   Recent Labs     12/25/21 0545 12/26/21 0614 12/27/21 0615   WBC 10.0 9.7 7.9   HGB 8.6* 8.5* 8.5*   HCT 26.1* 25.0* 25.7*   MCV 91.3 90.3 90.5    324 308     Recent Labs     12/25/21 0545 12/26/21 0614 12/27/21 0615    135* 141   K 3.9 3.8 4.1   CL 97* 95* 99   CO2 33* 33* 31   PHOS 3.8 3.5 3.6   BUN 10 11 9   CREATININE 1.7* 1.7* 1.6*     No results for input(s): AST, ALT, ALB, BILIDIR, BILITOT, ALKPHOS in the last 72 hours. Recent Labs     12/25/21 0545 12/26/21 0614   LIPASE 230.0* 225.0*     No results for input(s): PROTIME, INR in the last 72 hours. Imaging  CT CHEST ABDOMEN PELVIS WO CONTRAST   Final Result   1. Findings remain consistent with acute pancreatitis. Evaluation is limited   due to the absence of contrast.   2. Findings concerning for diffuse small bowel ileus. 3. Significant atelectasis at the lung bases bilaterally. XR CHEST PORTABLE   Final Result   New retrocardiac left lower lobe opacification, possibly infiltrate or   atelectasis. CT ABDOMEN PELVIS WO CONTRAST Additional Contrast? Oral   Final Result   Persistent but decreased peripancreatic fluid and edema, in keeping with the   clinical diagnosis of pancreatitis      Increased body wall anasarca with increased pleural-parenchymal disease at   the lung bases, likely due to fluid overload      Fatty liver. Diverticulosis and normal caliber appendix      Subtle increased density is seen in the right femoral vein. It is uncertain   as whether not this is artifactual or due to a small amount of thrombus.    Recommend correlation with lower extremity venous duplex ultrasound      RECOMMENDATIONS:   Unavailable         VL Extremity Venous Right   Final Result      US GALLBLADDER RUQ   Final Result   Pancreas not well visualized. This is likely due to the peripancreatic   inflammatory changes seen on recent CT. Hepatic steatosis. CT ABDOMEN PELVIS WO CONTRAST Additional Contrast? None   Final Result   1. Severe acute interstitial pancreatitis. No focal fluid collection. 2. Hepatomegaly with severe steatosis. 3. Possible gallbladder sludge. 4. Colonic diverticulosis without acute diverticulitis. 5. The distal esophagus is dilated and filled with fluid. RECOMMENDATIONS:   Unavailable         XR CHEST PORTABLE   Final Result   No radiographic evidence of acute pulmonary disease. CT CHEST WO CONTRAST    (Results Pending)       Endoscopy      ASSESSMENT AND RECOMMENDATIONS   Marcos Rosenthal is a 54 y.o. male who presented on 12/12/2021 with shortness of breath, nausea, vomiting, increased thirst and urination.  Found with DKA, CHAPIN, hypotension/shock, leukocytosis, and acute pancreatitis.  GI consulted regarding acute pancreatitis. 1. History of severe interstitial pancreatitis- slowly resolving.  Lipase still elevated at 225. WBC elevation resolved.   No signs or source of infection on CT. 2. Protracted adynamic ileus- improving. 3. Leukocytosis- resolved. WBC down to 9K.  suspect leukemoid reaction. CT without obvious signs of infected peripancreatic fluid collection or necrotizing pancreatitis. 4. RYAN- stable. Cr 1.6  5. Volume overload  6. Obesity  7. Hx of DKA- resolved  8. Sepsis- resolved  9. Hx of coffee ground emesis- resolved  10. Hypertriglyceridemia- resolved. RECOMMENDATIONS:    -On dulcolax 10mg daily  and colace 100mg bid for constipation  -Continue low fat diet as tolerated. If he fails to advance his diet or the lipase remains elevated, may need to consider [place] placement and feedings if ileus is improved  -Continue to optimize glycemic control  -Will defer duration of antibiotics Meropenem/Fluconazole to Dr. Prabhakar.  There is no signs of infected peripancreatic fluid collection or overt necrosis noted on CT.  WBC resolved. Day 9 of therapy  -Continue Fenofibrate for hx of hypertriglyceridemia  -PPI bid for hx of coffee ground emesis.  Monitor H and H daily.       If you have any questions or need any further information, please feel free to contact us [phone]. Thank you for allowing us to participate in the care of Gabriel Alberts. The note was completed using Dragon voice recognition transcription. Every effort was made to ensure accuracy; however, inadvertent transcription errors may be present despite my best efforts to edit errors.     Cedric Lora PA-C

## 2021-12-27 NOTE — CARE COORDINATION
[facility] received referral from [facility] for HOME OXYGEN    No Qualifying Diagnosis for insurance coverage; Home Oxygen would be Self-Pay. Will need DME ORDERS     [facility] rep will verify patient's insurance and once DME Orders are received, [facility] rep will follow up with patient prior to discharge to deliver Oxygen Equipment.     Thank you for the referral.  Electronically signed by Samantha Scott on 12/27/2021 at 9:22 AM  Cell ph# [phone]

## 2021-12-27 NOTE — PROGRESS NOTES
[facility]    Respiratory Therapy   Home Oxygen Evaluation        Name: Magnus Nance  Medical Record Number: [id number]  Age: 54 y.o. Gender:  male   : 1970  Today's date: 2021  Room: G6X-6394/5129-01      Assessment        /66   Pulse 88   Temp 97.7 °F (36.5 °C) (Oral)   Resp 18   Ht 6' 1\" (1.854 m)   Wt 281 lb 8.4 oz (127.7 kg)   SpO2 93%   BMI 37.14 kg/m²     Patient Active Problem List   Diagnosis    Back pain    DKA, type 2, not at goal [facility])    Acute pancreatitis    Acute kidney injury ([facility]    Schizophrenia ([facility]       Social History:  Social History     Tobacco Use    Smoking status: Never Smoker    Smokeless tobacco: Never Used   Substance Use Topics    Alcohol use: Yes     Comment: social    Drug use: Never       Patient Room Air saturation at rest 86  %    Oxygen saturations of 88% or less on RA qualifies patient for Home Oxygen    Patient resting on 2  lmp  with an oxygen saturation of  93 %     Qualifying patient for home oxygen with ambulation and continuous flow  @ 2 lpm.      In your clinical assessment does the Patient Require Portable Oxygen Tanks?     Yes              Lily Nesbitt from [address] [place] [address] contacted to follow care of patients home oxygen needs on 2021 at 8:59 AM    Patient/caregiver was educated on Home Oxygen process:  Yes      Level of patient/caregiver understanding able to:   [] Verbalize understanding   [] Demonstrate understanding       [] Teach back        [] Needs reinforcement        []  No available caregiver               []  Other:     Response to education:  Good     Time Spent with Home O2 Set Up:  10  minutes     Ramy Wilkerson RCP on 2021 at 8:59 AM

## 2021-12-27 NOTE — PROGRESS NOTES
Nephrology Progress Note   [facility]      Chief Complaint: Nausea/vomiting/pancreatitis    History of Present Illness: Mr Katja Horton is a is a 54 y.o. male with no significant past medical history who presents to [facility] with increased thirst, urination, nausea, and vomiting. He was noted to have a BS level of 900 mg/dl. CT scan abdomen and pelvis revealed severe acute Pancreatitis. He was Hyperkalemic, with serum HCO level of 5 and creatinine of 3.4 mg/dl. Patient was started on insulin gtt,treated for DKA     Subjective:    HPI:  Breathing comfortably. No CP.  O > I. On torsemide. ROS:  In bed. [address]:  medications reviewed. Physical Exam:    BP (!) 117/56   Pulse 89   Temp 98.6 °F (37 °C) (Oral)   Resp 16   Ht 6' 1\" (1.854 m)   Wt 281 lb 8.4 oz (127.7 kg)   SpO2 95%   BMI 37.14 kg/m²       Intake/Output Summary (Last 24 hours) at 12/27/2021 1753  Last data filed at 12/27/2021 1626  Gross per 24 hour   Intake 2659.41 ml   Output 4775 ml   Net -2115.59 ml         Patient Vitals for the past 96 hrs (Last 3 readings):   Weight   12/27/21 0438 281 lb 8.4 oz (127.7 kg)   12/26/21 0450 281 lb 12 oz (127.8 kg)   12/25/21 0504 (!) 311 lb 4.6 oz (141.2 kg)       General: in bed   Chest: diminished at bases, otherwise clear    CVS: RRR, no murmur, no rub  Abdomen: less tenderness   Extremities: trace edema, no cyanosis.   Skin: normal texture, normal skin turgor, no rash  Neurological: not done         LAB DATA:    CBC:   Lab Results   Component Value Date    WBC 7.9 12/27/2021    RBC 2.84 12/27/2021    HGB 8.5 12/27/2021    HCT 25.7 12/27/2021    MCV 90.5 12/27/2021    MCH 30.1 12/27/2021    MCHC 33.3 12/27/2021    RDW 13.3 12/27/2021     12/27/2021    MPV 7.9 12/27/2021     BMP:    Lab Results   Component Value Date     12/27/2021    K 4.1 12/27/2021    K 4.2 12/12/2021    CL 99 12/27/2021    CO2 31 12/27/2021    BUN 9 12/27/2021    CREATININE 1.6 12/27/2021    CALCIUM 8.2 12/27/2021 GFRAA 56 12/27/2021    LABGLOM 46 12/27/2021    GLUCOSE 100 12/27/2021     Ionized Calcium:  No results found for: IONCA  Magnesium:    Lab Results   Component Value Date    MG 1.60 12/25/2021     Phosphorus:    Lab Results   Component Value Date    PHOS 3.6 12/27/2021     U/A:    Lab Results   Component Value Date    COLORU YELLOW 12/12/2021    PHUR 5.0 12/12/2021    WBCUA 2 12/12/2021    RBCUA 0-2 12/12/2021    CLARITYU CLOUDY 12/12/2021    SPECGRAV 1.023 12/12/2021    LEUKOCYTESUR Negative 12/12/2021    UROBILINOGEN 0.2 12/12/2021    BILIRUBINUR MODERATE 12/12/2021    BLOODU LARGE 12/12/2021    GLUCOSEU >=1000 12/12/2021         IMPRESSION/RECOMMENDATIONS:      1. CHAPIN - likely prerenal/ATN - occurring in the setting of DKA/pancreatitis. - creatinine slightly better  -  edema    2. Edema    - still with trace -1+ edema   - O > I  - on torsemide    3. Hypokalemia   - scheduled supplement while on lasix      4.  Pancreatitis - management per GI

## 2021-12-28 LAB
ALBUMIN SERPL-MCNC: 2.7 G/DL (ref 3.4–5)
ANION GAP SERPL CALCULATED.3IONS-SCNC: 10 MMOL/L (ref 3–16)
ATYPICAL LYMPHOCYTE RELATIVE PERCENT: 2 % (ref 0–6)
BANDED NEUTROPHILS RELATIVE PERCENT: 5 % (ref 0–7)
BASOPHILS ABSOLUTE: 0.1 K/UL (ref 0–0.2)
BASOPHILS RELATIVE PERCENT: 2 %
BUN BLDV-MCNC: 11 MG/DL (ref 7–20)
CALCIUM SERPL-MCNC: 8.5 MG/DL (ref 8.3–10.6)
CHLORIDE BLD-SCNC: 98 MMOL/L (ref 99–110)
CO2: 31 MMOL/L (ref 21–32)
CREAT SERPL-MCNC: 1.7 MG/DL (ref 0.9–1.3)
EOSINOPHILS ABSOLUTE: 0.1 K/UL (ref 0–0.6)
EOSINOPHILS RELATIVE PERCENT: 2 %
GFR AFRICAN AMERICAN: 52
GFR NON-AFRICAN AMERICAN: 43
GLUCOSE BLD-MCNC: 118 MG/DL (ref 70–99)
GLUCOSE BLD-MCNC: 124 MG/DL (ref 70–99)
GLUCOSE BLD-MCNC: 133 MG/DL (ref 70–99)
GLUCOSE BLD-MCNC: 90 MG/DL (ref 70–99)
GLUCOSE BLD-MCNC: 96 MG/DL (ref 70–99)
HCT VFR BLD CALC: 26.2 % (ref 40.5–52.5)
HEMOGLOBIN: 8.7 G/DL (ref 13.5–17.5)
LYMPHOCYTES ABSOLUTE: 1.7 K/UL (ref 1–5.1)
LYMPHOCYTES RELATIVE PERCENT: 22 %
MCH RBC QN AUTO: 29.9 PG (ref 26–34)
MCHC RBC AUTO-ENTMCNC: 33.1 G/DL (ref 31–36)
MCV RBC AUTO: 90.5 FL (ref 80–100)
METAMYELOCYTES RELATIVE PERCENT: 1 %
MONOCYTES ABSOLUTE: 0.6 K/UL (ref 0–1.3)
MONOCYTES RELATIVE PERCENT: 9 %
NEUTROPHILS ABSOLUTE: 4.5 K/UL (ref 1.7–7.7)
NEUTROPHILS RELATIVE PERCENT: 57 %
PDW BLD-RTO: 13.2 % (ref 12.4–15.4)
PERFORMED ON: ABNORMAL
PERFORMED ON: ABNORMAL
PERFORMED ON: NORMAL
PERFORMED ON: NORMAL
PHOSPHORUS: 4 MG/DL (ref 2.5–4.9)
PLATELET # BLD: 345 K/UL (ref 135–450)
PLATELET SLIDE REVIEW: ADEQUATE
PMV BLD AUTO: 7.7 FL (ref 5–10.5)
POTASSIUM SERPL-SCNC: 4 MMOL/L (ref 3.5–5.1)
RBC # BLD: 2.89 M/UL (ref 4.2–5.9)
RBC # BLD: NORMAL 10*6/UL
SLIDE REVIEW: ABNORMAL
SODIUM BLD-SCNC: 139 MMOL/L (ref 136–145)
WBC # BLD: 7.2 K/UL (ref 4–11)

## 2021-12-28 PROCEDURE — 6370000000 HC RX 637 (ALT 250 FOR IP): Performed by: PHYSICIAN ASSISTANT

## 2021-12-28 PROCEDURE — 6370000000 HC RX 637 (ALT 250 FOR IP): Performed by: INTERNAL MEDICINE

## 2021-12-28 PROCEDURE — 2580000003 HC RX 258: Performed by: NURSE PRACTITIONER

## 2021-12-28 PROCEDURE — 85025 COMPLETE CBC W/AUTO DIFF WBC: CPT

## 2021-12-28 PROCEDURE — 97110 THERAPEUTIC EXERCISES: CPT | Performed by: PHYSICAL THERAPIST

## 2021-12-28 PROCEDURE — 6360000002 HC RX W HCPCS: Performed by: INTERNAL MEDICINE

## 2021-12-28 PROCEDURE — 94761 N-INVAS EAR/PLS OXIMETRY MLT: CPT

## 2021-12-28 PROCEDURE — 2060000000 HC ICU INTERMEDIATE R&B

## 2021-12-28 PROCEDURE — 2580000003 HC RX 258: Performed by: INTERNAL MEDICINE

## 2021-12-28 PROCEDURE — 2700000000 HC OXYGEN THERAPY PER DAY

## 2021-12-28 PROCEDURE — 97116 GAIT TRAINING THERAPY: CPT | Performed by: PHYSICAL THERAPIST

## 2021-12-28 PROCEDURE — 6370000000 HC RX 637 (ALT 250 FOR IP): Performed by: SURGERY

## 2021-12-28 PROCEDURE — 97530 THERAPEUTIC ACTIVITIES: CPT | Performed by: PHYSICAL THERAPIST

## 2021-12-28 PROCEDURE — 80069 RENAL FUNCTION PANEL: CPT

## 2021-12-28 RX ORDER — INSULIN GLARGINE 100 [IU]/ML
30 INJECTION, SOLUTION SUBCUTANEOUS 2 TIMES DAILY
Status: DISCONTINUED | OUTPATIENT
Start: 2021-12-28 | End: 2021-12-31 | Stop reason: HOSPADM

## 2021-12-28 RX ADMIN — SODIUM CHLORIDE, PRESERVATIVE FREE 10 ML: 5 INJECTION INTRAVENOUS at 09:04

## 2021-12-28 RX ADMIN — BISACODYL 10 MG: 5 TABLET, COATED ORAL at 09:04

## 2021-12-28 RX ADMIN — FENOFIBRATE 160 MG: 160 TABLET ORAL at 09:04

## 2021-12-28 RX ADMIN — INSULIN LISPRO 7 UNITS: 100 INJECTION, SOLUTION INTRAVENOUS; SUBCUTANEOUS at 12:33

## 2021-12-28 RX ADMIN — HEPARIN SODIUM 5000 UNITS: 5000 INJECTION INTRAVENOUS; SUBCUTANEOUS at 22:03

## 2021-12-28 RX ADMIN — FLUCONAZOLE 100 MG: 2 INJECTION, SOLUTION INTRAVENOUS at 09:04

## 2021-12-28 RX ADMIN — INSULIN LISPRO 7 UNITS: 100 INJECTION, SOLUTION INTRAVENOUS; SUBCUTANEOUS at 09:05

## 2021-12-28 RX ADMIN — HEPARIN SODIUM 5000 UNITS: 5000 INJECTION INTRAVENOUS; SUBCUTANEOUS at 09:04

## 2021-12-28 RX ADMIN — MEROPENEM 500 MG: 500 INJECTION, POWDER, FOR SOLUTION INTRAVENOUS at 12:33

## 2021-12-28 RX ADMIN — INSULIN GLARGINE 35 UNITS: 100 INJECTION, SOLUTION SUBCUTANEOUS at 09:05

## 2021-12-28 RX ADMIN — INSULIN GLARGINE 30 UNITS: 100 INJECTION, SOLUTION SUBCUTANEOUS at 22:03

## 2021-12-28 RX ADMIN — SODIUM CHLORIDE, PRESERVATIVE FREE 10 ML: 5 INJECTION INTRAVENOUS at 22:03

## 2021-12-28 RX ADMIN — MEROPENEM 500 MG: 500 INJECTION, POWDER, FOR SOLUTION INTRAVENOUS at 23:42

## 2021-12-28 RX ADMIN — INSULIN LISPRO 7 UNITS: 100 INJECTION, SOLUTION INTRAVENOUS; SUBCUTANEOUS at 17:06

## 2021-12-28 RX ADMIN — POTASSIUM CHLORIDE 40 MEQ: 20 TABLET, EXTENDED RELEASE ORAL at 09:04

## 2021-12-28 RX ADMIN — DOCUSATE SODIUM 100 MG: 100 CAPSULE ORAL at 22:03

## 2021-12-28 RX ADMIN — TORSEMIDE 20 MG: 20 TABLET ORAL at 09:04

## 2021-12-28 RX ADMIN — PANTOPRAZOLE SODIUM 40 MG: 40 TABLET, DELAYED RELEASE ORAL at 06:06

## 2021-12-28 RX ADMIN — PANTOPRAZOLE SODIUM 40 MG: 40 TABLET, DELAYED RELEASE ORAL at 17:06

## 2021-12-28 RX ADMIN — MEROPENEM 500 MG: 500 INJECTION, POWDER, FOR SOLUTION INTRAVENOUS at 17:06

## 2021-12-28 RX ADMIN — MEROPENEM 500 MG: 500 INJECTION, POWDER, FOR SOLUTION INTRAVENOUS at 06:09

## 2021-12-28 RX ADMIN — DOCUSATE SODIUM 100 MG: 100 CAPSULE ORAL at 09:04

## 2021-12-28 ASSESSMENT — PAIN SCALES - GENERAL
PAINLEVEL_OUTOF10: 0

## 2021-12-28 NOTE — PLAN OF CARE
Ongoing     Problem:  Activity:  Goal: Risk for activity intolerance will decrease  Description: Risk for activity intolerance will decrease  Outcome: Ongoing     Problem: Coping:  Goal: Ability to adjust to condition or change in health will improve  Description: Ability to adjust to condition or change in health will improve  Outcome: Ongoing     Problem: Fluid Volume:  Goal: Ability to maintain a balanced intake and output will improve  Description: Ability to maintain a balanced intake and output will improve  Outcome: Ongoing     Problem: Health Behavior:  Goal: Ability to identify and utilize available resources and services will improve  Description: Ability to identify and utilize available resources and services will improve  Outcome: Ongoing  Goal: Ability to manage health-related needs will improve  Description: Ability to manage health-related needs will improve  Outcome: Ongoing     Problem: Metabolic:  Goal: Ability to maintain appropriate glucose levels will improve  Description: Ability to maintain appropriate glucose levels will improve  Outcome: Ongoing     Problem: Nutritional:  Goal: Maintenance of adequate nutrition will improve  Description: Maintenance of adequate nutrition will improve  Outcome: Ongoing  Goal: Progress toward achieving an optimal weight will improve  Description: Progress toward achieving an optimal weight will improve  Outcome: Ongoing     Problem: Physical Regulation:  Goal: Diagnostic test results will improve  Description: Diagnostic test results will improve  Outcome: Ongoing     Problem: Skin Integrity:  Goal: Risk for impaired skin integrity will decrease  Description: Risk for impaired skin integrity will decrease  Outcome: Ongoing     Problem: Tissue Perfusion:  Goal: Adequacy of tissue perfusion will improve  Description: Adequacy of tissue perfusion will improve  Outcome: Ongoing

## 2021-12-28 NOTE — PROGRESS NOTES
Patient has successfully injected himself with insulin twice as a teach back method for future new to insulin injection that the patient will go home with. Will continue to teach and watch patient self inject insulin.     Electronically signed by Maxi Fairbanks RN on 12/28/2021 at 6:11 PM

## 2021-12-28 NOTE — PLAN OF CARE
Problem: Falls - Risk of:  Goal: Will remain free from falls  Description: Will remain free from falls  12/28/2021 0741 by Jody Cruz RN  Outcome: Ongoing     Problem: Falls - Risk of:  Goal: Absence of physical injury  Description: Absence of physical injury  12/28/2021 0741 by Jody Cruz RN  Outcome: Ongoing     Problem: Pain:  Description: Pain management should include both nonpharmacologic and pharmacologic interventions. Goal: Pain level will decrease  Description: Pain level will decrease  12/28/2021 0741 by Jody Cruz RN  Outcome: Ongoing     Problem: Pain:  Description: Pain management should include both nonpharmacologic and pharmacologic interventions. Goal: Control of acute pain  Description: Control of acute pain  12/28/2021 0741 by Jody Cruz RN  Outcome: Ongoing     Problem: Pain:  Description: Pain management should include both nonpharmacologic and pharmacologic interventions.   Goal: Control of chronic pain  Description: Control of chronic pain  12/28/2021 0741 by Jody Cruz RN  Outcome: Ongoing     Problem: Discharge Planning:  Goal: Participates in care planning  Description: Participates in care planning  Outcome: Ongoing     Problem: Discharge Planning:  Goal: Discharged to appropriate level of care  Description: Discharged to appropriate level of care  Outcome: Ongoing

## 2021-12-28 NOTE — PROGRESS NOTES
Occupational Therapy    Attempted to see pt for OT tx. Pt declining therapy 2/2 just getting back to bed and wanting and afternoon nap. Will follow up as schedule and pt condition permit.     Electronically signed by Brittny Webber OT on 12/28/2021 at 2:41 PM

## 2021-12-28 NOTE — PROGRESS NOTES
Physical Therapy  Facility/Department: [facility] 5 PROGRESSIVE CARE  Daily Treatment Note  NAME: Zeinab Smith  : 1970  MRN: [id number]    Date of Service: 2021    Discharge Recommendations:  Home with assist PRN,2-3 sessions per week   PT Equipment Recommendations  Equipment Needed: No  Zeinab Smith scored a 21/24 on the AM-PAC short mobility form. Current research shows that an AM-PAC score of 18 or greater is typically associated with a discharge to the patient's home setting. Based on the patient's AM-PAC score and their current functional mobility deficits, it is recommended that the patient have 2-3 sessions per week of Physical Therapy at d/c to increase the patient's independence. At this time, this patient demonstrates the endurance and safety to discharge home with home PT and a follow up treatment frequency of 2-3x/wk. Please see assessment section for further patient specific details. If patient discharges prior to next session this note will serve as a discharge summary. Please see below for the latest assessment towards goals. Assessment   Body structures, Functions, Activity limitations: Decreased functional mobility ; Decreased strength;Decreased safe awareness;Decreased endurance  Assessment: pt continues to progress in therapy; anticipate pt will be able to return home with PRN assist and home PT  Treatment Diagnosis: decreased functional mobility  Prognosis: Good  History: 55 y/o male admit 2021 with Acute Pancreatitis,CHAPIN, DKA and Shock. PMH insign otherwise. Patient Education: discussed that he will not need a walker any longer; good management of O2 line  Barriers to Learning: none  REQUIRES PT FOLLOW UP: Yes  Activity Tolerance  Activity Tolerance: Patient Tolerated treatment well     Patient Diagnosis(es): The primary encounter diagnosis was Diabetic ketoacidosis without coma associated with diabetes mellitus due to underlying condition ([facility] [address]  Diagnoses of Metabolic acidosis, Hypotension, unspecified hypotension type, Hyperglycemia, Lymphocytosis, and DKA, type 2, not at goal [facility]) were also pertinent to this visit. has a past medical history of H/O degenerative disc disease. has no past surgical history on file. Social/Functional History  Lives With: Alone  Type of Home: Apartment  Home Layout: Two level,Laundry in basement (1 story with basement.)  Home Access: Stairs to enter with rails  Entrance Stairs - Number of Steps: Primarily enters garage then has 12 PATRICE to main level with bed/bathroom; or could go in front door with 1 PATRICE. Bathroom Shower/Tub: Tub/Shower unit  Bathroom Toilet:  (Comfort height toilet)  Bathroom Accessibility: Accessible  Home Equipment:  (No DME.)  Receives Help From: Home health ([facility] 5 days/week for 2-3 hours for house chores)  ADL Assistance: Independent  Homemaking Assistance: Independent  Ambulation Assistance: Independent (Without assist device pta.)  Transfer Assistance: Independent  Active : Yes  Occupation: On disability  Additional Comments: [de-identified] y/o father and brother stay in the same building. Restrictions  Restrictions/Precautions  Restrictions/Precautions: Fall Risk  Position Activity Restriction  Other position/activity restrictions: O2 2L via NC  Subjective   General  Chart Reviewed: Yes  Additional Pertinent Hx: 55 y/o male admit 12/12/2021 with Acute Pancreatitis,CHAPIN, DKA and Shock. PMH insign otherwise. Response To Previous Treatment: Patient with no complaints from previous session. Family / Caregiver Present: No  Referring Practitioner: Cherilynn Schilder NP.   Subjective  Subjective: pt agreeable to getting up with therapy; denies any pain          Orientation  Orientation  Overall Orientation Status: Within Functional Limits  Cognition   Cognition  Overall Cognitive Status: WFL  Objective   Bed mobility  Supine to Sit: Modified independent  Transfers  Sit to Stand: Stand by assistance;Supervision  Stand to sit: Stand by assistance;Supervision  Ambulation  Ambulation?: Yes  Ambulation 1  Surface: level tile  Device: No Device  Assistance: Stand by assistance;Supervision  Quality of Gait: pt had no LOB during session; slow pace with increased lateral sway due to body habitus; pt able to manage O2 line, therapist managed IV pole  Distance: 10', 3', 50'x2 in room with several turns  Comments: no c/o SOB during session     Balance  Sitting - Static: Good  Sitting - Dynamic: Good  Standing - Static: Good  Standing - Dynamic: Good  Exercises  Comments: performed B LE ex in sitting x 20 reps         Comment: pt stood at toilet to urinate and wash his hands at sink Ind; assisted with positioning in chair for comfort with all needs in reach              G-Code     OutComes Score                                                     AM-PAC Score  AM-PAC Inpatient Mobility Raw Score : 21 (12/24/21 [facility]6)  AM-PAC Inpatient T-Scale Score : 50.25 (12/24/21 0756)  Mobility Inpatient CMS 0-100% Score: 28.97 (12/24/21 [facility]6)  Mobility Inpatient CMS G-Code Modifier : CJ (12/24/21 0756)          Goals  Short term goals  Time Frame for Short term goals: Upon d/c acute care setting. (pt progressing but goals ongoing 12-16)  Short term goal 1: Bed Mob Supervision. MET 12-20  Short term goal 2: Transfers with/without assist device SBA. MET 12-20  Short term goal 3: Amb with/without assist device 50' SBA. - met however not yet managing O2 line -met; new goal amb ' without AD and without O2 SBA while maintaining O2 sats in 90s  Short term goal 4: Pt participating in approp Strength Exs. - met 12-28  Patient Goals   Patient goals : Return home. Plan    Plan  Times per week: 3-5x week while in acute care setting.   Current Treatment Recommendations: Strengthening,Functional Mobility Training,Transfer Training,Gait Training,Safety Education & Training,Patient/Caregiver Education & Training  Safety Devices  Type of devices: Call light within reach,Left in chair,Nurse notified,All fall risk precautions in place  Restraints  Initially in place: No     Therapy Time   Individual Concurrent Group Co-treatment   Time In 1119         Time Out 1157         Minutes 38                 SYED HERNÁNDEZ, JAMES    Electronically signed by SYED HERNÁNDEZ PT on 12/28/2021 at 11:58 AM

## 2021-12-28 NOTE — PROGRESS NOTES
Nephrology Progress Note   [facility]      Chief Complaint: Nausea/vomiting/pancreatitis    History of Present Illness: Mr Stephie Yeh is a is a 54 y.o. male with no significant past medical history who presents to [facility] with increased thirst, urination, nausea, and vomiting. He was noted to have a BS level of 900 mg/dl. CT scan abdomen and pelvis revealed severe acute Pancreatitis. He was Hyperkalemic, with serum HCO level of 5 and creatinine of 3.4 mg/dl. Patient was started on insulin gtt,treated for DKA     Subjective:    HPI:  Breathing comfortably. No CP. On torsemide. ROS:  In bed. No CP/SOB . Weakness unchanged   PMFSH:  medications reviewed. Physical Exam:    BP 95/67   Pulse 95   Temp 98.3 °F (36.8 °C) (Axillary)   Resp 16   Ht 6' 1\" (1.854 m)   Wt 294 lb 12.1 oz (133.7 kg) Comment: accurate standing weight  SpO2 93%   BMI 38.89 kg/m²       Intake/Output Summary (Last 24 hours) at 12/28/2021 1342  Last data filed at 12/28/2021 1058  Gross per 24 hour   Intake 1475.78 ml   Output 4300 ml   Net -2824.22 ml         Patient Vitals for the past 96 hrs (Last 3 readings):   Weight   12/28/21 0431 294 lb 12.1 oz (133.7 kg)   12/27/21 0438 281 lb 8.4 oz (127.7 kg)   12/26/21 0450 281 lb 12 oz (127.8 kg)       General: in bed   Chest: diminished at bases, otherwise clear    CVS: RRR, no murmur, no rub  Abdomen: less tenderness   Extremities: trace edema, no cyanosis.   Skin: normal texture, normal skin turgor, no rash  Neurological: not done         LAB DATA:    CBC:   Lab Results   Component Value Date    WBC 7.2 12/28/2021    RBC 2.89 12/28/2021    HGB 8.7 12/28/2021    HCT 26.2 12/28/2021    MCV 90.5 12/28/2021    MCH 29.9 12/28/2021    MCHC 33.1 12/28/2021    RDW 13.2 12/28/2021     12/28/2021    MPV 7.7 12/28/2021     BMP:    Lab Results   Component Value Date     12/28/2021    K 4.0 12/28/2021    K 4.2 12/12/2021    CL 98 12/28/2021    CO2 31 12/28/2021    BUN 11 12/28/2021    CREATININE 1.7 12/28/2021    CALCIUM 8.5 12/28/2021    GFRAA 52 12/28/2021    LABGLOM 43 12/28/2021    GLUCOSE 124 12/28/2021     Ionized Calcium:  No results found for: IONCA  Magnesium:    Lab Results   Component Value Date    MG 1.60 12/25/2021     Phosphorus:    Lab Results   Component Value Date    PHOS 4.0 12/28/2021     U/A:    Lab Results   Component Value Date    COLORU YELLOW 12/12/2021    PHUR 5.0 12/12/2021    WBCUA 2 12/12/2021    RBCUA 0-2 12/12/2021    CLARITYU CLOUDY 12/12/2021    SPECGRAV 1.023 12/12/2021    LEUKOCYTESUR Negative 12/12/2021    UROBILINOGEN 0.2 12/12/2021    BILIRUBINUR MODERATE 12/12/2021    BLOODU LARGE 12/12/2021    GLUCOSEU >=1000 12/12/2021         IMPRESSION/RECOMMENDATIONS:      1. CHAPIN - likely prerenal/ATN - occurring in the setting of DKA/pancreatitis. - creatinine stable   -  Edema on demadex      2. Edema    - still with trace -1+ edema   - O > I  - on torsemide    3. Hypokalemia   - on supp      4.  Pancreatitis - management per DERIC Gaviria MD FACP

## 2021-12-29 ENCOUNTER — APPOINTMENT (OUTPATIENT)
Dept: GENERAL RADIOLOGY | Age: 51
DRG: 871 | End: 2021-12-29
Payer: COMMERCIAL

## 2021-12-29 LAB
ALBUMIN SERPL-MCNC: 2.6 G/DL (ref 3.4–5)
ANION GAP SERPL CALCULATED.3IONS-SCNC: 9 MMOL/L (ref 3–16)
BASOPHILS ABSOLUTE: 0.1 K/UL (ref 0–0.2)
BASOPHILS RELATIVE PERCENT: 1.1 %
BUN BLDV-MCNC: 12 MG/DL (ref 7–20)
CALCIUM SERPL-MCNC: 8.9 MG/DL (ref 8.3–10.6)
CHLORIDE BLD-SCNC: 97 MMOL/L (ref 99–110)
CO2: 31 MMOL/L (ref 21–32)
CREAT SERPL-MCNC: 1.7 MG/DL (ref 0.9–1.3)
EOSINOPHILS ABSOLUTE: 0.1 K/UL (ref 0–0.6)
EOSINOPHILS RELATIVE PERCENT: 2.1 %
GFR AFRICAN AMERICAN: 52
GFR NON-AFRICAN AMERICAN: 43
GLUCOSE BLD-MCNC: 102 MG/DL (ref 70–99)
GLUCOSE BLD-MCNC: 109 MG/DL (ref 70–99)
GLUCOSE BLD-MCNC: 122 MG/DL (ref 70–99)
GLUCOSE BLD-MCNC: 124 MG/DL (ref 70–99)
GLUCOSE BLD-MCNC: 94 MG/DL (ref 70–99)
HCT VFR BLD CALC: 25.7 % (ref 40.5–52.5)
HEMOGLOBIN: 8.7 G/DL (ref 13.5–17.5)
LYMPHOCYTES ABSOLUTE: 2.6 K/UL (ref 1–5.1)
LYMPHOCYTES RELATIVE PERCENT: 37.7 %
MCH RBC QN AUTO: 30.3 PG (ref 26–34)
MCHC RBC AUTO-ENTMCNC: 33.8 G/DL (ref 31–36)
MCV RBC AUTO: 89.8 FL (ref 80–100)
MONOCYTES ABSOLUTE: 0.8 K/UL (ref 0–1.3)
MONOCYTES RELATIVE PERCENT: 11.7 %
NEUTROPHILS ABSOLUTE: 3.2 K/UL (ref 1.7–7.7)
NEUTROPHILS RELATIVE PERCENT: 47.4 %
PDW BLD-RTO: 12.9 % (ref 12.4–15.4)
PERFORMED ON: ABNORMAL
PERFORMED ON: NORMAL
PHOSPHORUS: 4.1 MG/DL (ref 2.5–4.9)
PLATELET # BLD: 377 K/UL (ref 135–450)
PMV BLD AUTO: 7.7 FL (ref 5–10.5)
POTASSIUM SERPL-SCNC: 4 MMOL/L (ref 3.5–5.1)
RBC # BLD: 2.86 M/UL (ref 4.2–5.9)
SODIUM BLD-SCNC: 137 MMOL/L (ref 136–145)
TROPONIN: <0.01 NG/ML
WBC # BLD: 6.8 K/UL (ref 4–11)

## 2021-12-29 PROCEDURE — 94761 N-INVAS EAR/PLS OXIMETRY MLT: CPT

## 2021-12-29 PROCEDURE — 2580000003 HC RX 258: Performed by: NURSE PRACTITIONER

## 2021-12-29 PROCEDURE — 71045 X-RAY EXAM CHEST 1 VIEW: CPT

## 2021-12-29 PROCEDURE — 84484 ASSAY OF TROPONIN QUANT: CPT

## 2021-12-29 PROCEDURE — 6370000000 HC RX 637 (ALT 250 FOR IP): Performed by: INTERNAL MEDICINE

## 2021-12-29 PROCEDURE — 80069 RENAL FUNCTION PANEL: CPT

## 2021-12-29 PROCEDURE — 6370000000 HC RX 637 (ALT 250 FOR IP): Performed by: SURGERY

## 2021-12-29 PROCEDURE — 9990000010 HC NO CHARGE VISIT: Performed by: PHYSICAL THERAPIST

## 2021-12-29 PROCEDURE — 2060000000 HC ICU INTERMEDIATE R&B

## 2021-12-29 PROCEDURE — 2700000000 HC OXYGEN THERAPY PER DAY

## 2021-12-29 PROCEDURE — 85025 COMPLETE CBC W/AUTO DIFF WBC: CPT

## 2021-12-29 PROCEDURE — 93005 ELECTROCARDIOGRAM TRACING: CPT | Performed by: INTERNAL MEDICINE

## 2021-12-29 PROCEDURE — 97530 THERAPEUTIC ACTIVITIES: CPT

## 2021-12-29 PROCEDURE — 97535 SELF CARE MNGMENT TRAINING: CPT

## 2021-12-29 PROCEDURE — 6370000000 HC RX 637 (ALT 250 FOR IP): Performed by: PHYSICIAN ASSISTANT

## 2021-12-29 PROCEDURE — 2580000003 HC RX 258: Performed by: INTERNAL MEDICINE

## 2021-12-29 PROCEDURE — 6360000002 HC RX W HCPCS: Performed by: INTERNAL MEDICINE

## 2021-12-29 RX ORDER — POTASSIUM CHLORIDE 20 MEQ/1
20 TABLET, EXTENDED RELEASE ORAL DAILY
Status: DISCONTINUED | OUTPATIENT
Start: 2021-12-30 | End: 2021-12-31

## 2021-12-29 RX ADMIN — SODIUM CHLORIDE, PRESERVATIVE FREE 20 ML: 5 INJECTION INTRAVENOUS at 21:13

## 2021-12-29 RX ADMIN — FENOFIBRATE 160 MG: 160 TABLET ORAL at 08:42

## 2021-12-29 RX ADMIN — POTASSIUM CHLORIDE 40 MEQ: 20 TABLET, EXTENDED RELEASE ORAL at 08:42

## 2021-12-29 RX ADMIN — SODIUM CHLORIDE, PRESERVATIVE FREE 10 ML: 5 INJECTION INTRAVENOUS at 08:42

## 2021-12-29 RX ADMIN — INSULIN LISPRO 7 UNITS: 100 INJECTION, SOLUTION INTRAVENOUS; SUBCUTANEOUS at 12:45

## 2021-12-29 RX ADMIN — INSULIN GLARGINE 30 UNITS: 100 INJECTION, SOLUTION SUBCUTANEOUS at 08:43

## 2021-12-29 RX ADMIN — BISACODYL 10 MG: 5 TABLET, COATED ORAL at 08:42

## 2021-12-29 RX ADMIN — DOCUSATE SODIUM 100 MG: 100 CAPSULE ORAL at 08:42

## 2021-12-29 RX ADMIN — HEPARIN SODIUM 5000 UNITS: 5000 INJECTION INTRAVENOUS; SUBCUTANEOUS at 21:03

## 2021-12-29 RX ADMIN — PANTOPRAZOLE SODIUM 40 MG: 40 TABLET, DELAYED RELEASE ORAL at 05:52

## 2021-12-29 RX ADMIN — INSULIN GLARGINE 30 UNITS: 100 INJECTION, SOLUTION SUBCUTANEOUS at 21:03

## 2021-12-29 RX ADMIN — INSULIN LISPRO 7 UNITS: 100 INJECTION, SOLUTION INTRAVENOUS; SUBCUTANEOUS at 08:43

## 2021-12-29 RX ADMIN — PANTOPRAZOLE SODIUM 40 MG: 40 TABLET, DELAYED RELEASE ORAL at 17:39

## 2021-12-29 RX ADMIN — FLUCONAZOLE 100 MG: 2 INJECTION, SOLUTION INTRAVENOUS at 09:21

## 2021-12-29 RX ADMIN — INSULIN LISPRO 7 UNITS: 100 INJECTION, SOLUTION INTRAVENOUS; SUBCUTANEOUS at 17:39

## 2021-12-29 RX ADMIN — TORSEMIDE 20 MG: 20 TABLET ORAL at 08:42

## 2021-12-29 RX ADMIN — MEROPENEM 500 MG: 500 INJECTION, POWDER, FOR SOLUTION INTRAVENOUS at 05:52

## 2021-12-29 RX ADMIN — DOCUSATE SODIUM 100 MG: 100 CAPSULE ORAL at 21:03

## 2021-12-29 RX ADMIN — HEPARIN SODIUM 5000 UNITS: 5000 INJECTION INTRAVENOUS; SUBCUTANEOUS at 08:42

## 2021-12-29 ASSESSMENT — PAIN SCALES - GENERAL
PAINLEVEL_OUTOF10: 0

## 2021-12-29 NOTE — PROGRESS NOTES
Physical Therapy  Zeinab Smith  [id number]  [id number]    Attempted again to see pt for PT session however pt having chest pain; will defer until tomorrow  Electronically signed by SYED HERNÁNDEZ, PT on 12/29/2021 at 3:13 PM

## 2021-12-29 NOTE — PROGRESS NOTES
Patient with complaints of chest pain and SOB. Stat ekg completed. CXR ordered. MD aware. Patient stated some relief after belching but states he has never had this pain before and does not feel like it is indigestion. Electronically signed by Moncho Monroe RN on 12/29/2021 at 3:00 PM       Patient chest has fully resolved at [phone].     Electronically signed by Moncho Monroe RN on 12/29/2021 at 4:25 PM

## 2021-12-29 NOTE — PROGRESS NOTES
Occupational Therapy  Facility/Department: [facility] PROGRESSIVE CARE  Daily Treatment Note  NAME: Addy Lopez  : 1970  MRN: [id number]    Date of Service: 2021    Discharge Recommendations:  Home with assist PRN,Home with Home health OT  OT Equipment Recommendations  Walker: Cassius Kong scored a 20/24 on the AM-PAC ADL Inpatient form. Current research shows that an AM-PAC score of 18 or greater is typically associated with a discharge to the patient's home setting. Based on the patient's AM-PAC score, and their current ADL deficits, it is recommended that the patient have 2-3 sessions per week of Occupational Therapy at d/c to increase the patient's independence. At this time, this patient demonstrates the endurance and safety to discharge home with [address] [place] [address] and a follow up treatment frequency of 2-3x/wk. Please see assessment section for further patient specific details. If patient discharges prior to next session this note will serve as a discharge summary. Please see below for the latest assessment towards goals. Assessment   Performance deficits / Impairments: Decreased functional mobility ; Decreased strength;Decreased endurance;Decreased ADL status; Decreased safe awareness;Decreased high-level IADLs;Decreased balance  Assessment: Discussed with OTR: Pt tolerated session fairly well. Pt on 2L 02 and sats remained >90% with activity. HR to 120's. Pt transferred SB/Supervision and amb with SBA, no device. Pt completed ADL's with up to [address] for LB dressing d/t increased fatigue. Anticipate pt will be able to d/c home with assist prn(stated having brothers who can assist) and HHOT to advance to independence in ADL's. Prognosis: Good  OT Education: OT Role;Transfer Training;Plan of Care;Energy Conservation; ADL Adaptive Strategies  Patient Education: Recommend use of shower chair for safety, energy conservation. Pt verb understanding.   REQUIRES OT FOLLOW UP: Yes  Activity Tolerance  Activity Tolerance: Patient Tolerated treatment well;Patient limited by fatigue  Activity Tolerance: o2 sats WNL on 2L throughout tx. HR to 120's. Safety Devices  Safety Devices in place: Yes  Type of devices: Call light within reach;Gait belt;Nurse notified; Left in chair;Chair alarm in place         Patient Diagnosis(es): The primary encounter diagnosis was Diabetic ketoacidosis without coma associated with diabetes mellitus due to underlying condition ([facility] [address] Diagnoses of Metabolic acidosis, Hypotension, unspecified hypotension type, Hyperglycemia, Lymphocytosis, and DKA, type 2, not at goal [facility]) were also pertinent to this visit. has a past medical history of H/O degenerative disc disease. has no past surgical history on file. Restrictions  Restrictions/Precautions  Restrictions/Precautions: Fall Risk  Position Activity Restriction  Other position/activity restrictions: O2 2L via NC; IV  Subjective   General  Chart Reviewed: Yes,Orders,Progress Notes  Patient assessed for rehabilitation services?: Yes  Additional Pertinent Hx: 53 y/o male admitted 12/12/2021 with DKA, severe acute pancreatitis, acute kidney injury, and shock. Response to previous treatment: Patient with no complaints from previous session  Family / Caregiver Present: No  Referring Practitioner: MICKI Diaz CNP  Diagnosis: DKA, severe acute pancreatitis  Subjective  Subjective: Pt met BS, in bed. Pt agreeable to OT. Pt denied pain. Pt on 2L 02, sats 93% sitting at EOB. Pt requesting to use the bathroom. General Comment  Comments: Per RN ok for therapy      Orientation  Orientation  Overall Orientation Status: Within Functional Limits  Objective    ADL  Grooming: Independent (seated from chair at sink to brush teeth)  UE Bathing: Setup  LE Bathing: Stand by assistance (stance at sink to wash karina area, buttocks.  Seated to reach feet, fair quaility)  UE Dressing:  (IV, monitor gown changed)  LE Dressing: Minimal assistance (very Dani to don AMITA mcintosh. [facility] pants SBA, at EOB)  Toileting: Stand by assistance (voided BM and completed hygiene w/o assist(gown only on, no underwear to manage). )  Additional Comments: Fatigued easily. Reports he can have assist if needed for LB ADL's. Standing Balance  Time: 2 min or less  Activity: ADL's, functional mob SBA without device  Functional Mobility  Functional - Mobility Device: No device  Activity: To/from bathroom  Assist Level: Stand by assistance  Functional Mobility Comments: Pt with min sway, no LOB. Toilet Transfers  Toilet - Technique: Ambulating  Equipment Used: Standard toilet  Toilet Transfer: Stand by [employer] Transfers Comments: Use of L grab bar  Wheelchair Bed Transfers  Wheelchair/Bed - Technique: Ambulating  Equipment Used: Bed (arm chair and recliner)  Level of Asssistance: Stand by [facility] Transfers Comments: no device  Bed mobility  Supine to Sit: Modified independent      Cognition  Overall Cognitive Status: [facility]      Plan   Plan  Times per week: 3-5  Times per day: Daily  Current Treatment Recommendations: Strengthening,Endurance Training,Balance Training,Gait Training,Functional Mobility Training,Self-Care / ADL,ROM,Patient/Caregiver Education & Training,Safety Education & Training,Equipment Evaluation, Education, & procurement    AM-PAC Score        AM-[facility] Inpatient Daily Activity Raw Score: 20 (12/29/21 1056)  AM-PAC Inpatient ADL T-Scale Score : 42.03 (12/29/21 1056)  ADL Inpatient CMS 0-100% Score: 38.32 (12/29/21 1056)  ADL Inpatient CMS G-Code Modifier : [address] (12/29/21 1056)    Goals  Short term goals  Time Frame for Short term goals: Prior to DC.  Status: goals ongoing  Short term goal 1: Pt will complete ADL transfers/mobility with supervision  Short term goal 2: Pt will complete toileting with supervision  Short term goal 3: Pt will complete LB dressing with supervision  Short term goal 4: Pt will tolerate standing > 5 min for functional task with supervision  Short term goal 5: Pt will complete UE exercises in all planes to inc strength/endurance  Patient Goals   Patient goals : to return home       Therapy Time   Individual Concurrent Group Co-treatment   Time In 1015         Time Out Sho [id number]

## 2021-12-29 NOTE — PROGRESS NOTES
Hospitalist Progress Note      PCP: [id number] [place] [address]    Date of Admission: 12/12/2021    Subjective: feels SOB better    Medications:  Reviewed    Infusion Medications    sodium chloride Stopped (12/27/21 1131)    dextrose       Scheduled Medications    insulin glargine  30 Units SubCUTAneous BID    torsemide  20 mg Oral Daily    insulin lispro  7 Units SubCUTAneous TID WC    potassium chloride  40 mEq Oral Daily    insulin lispro  0-12 Units SubCUTAneous TID WC    insulin lispro  0-6 Units SubCUTAneous Nightly    bisacodyl  10 mg Oral Daily    sodium chloride flush  5-40 mL IntraVENous 2 times per day    meropenem  500 mg IntraVENous Q6H    fluconazole  100 mg IntraVENous Q24H    pantoprazole  40 mg Oral BID AC    docusate sodium  100 mg Oral BID    fenofibrate  160 mg Oral Daily    heparin (porcine)  5,000 Units SubCUTAneous BID     PRN Meds: acetaminophen, sodium chloride flush, sodium chloride, ondansetron, glucose, dextrose, glucagon (rDNA), dextrose, dextrose      Intake/Output Summary (Last 24 hours) at 12/29/2021 0256  Last data filed at 12/28/2021 2259  Gross per 24 hour   Intake 927.33 ml   Output 3100 ml   Net -2172.67 ml       Physical Exam Performed:    BP (!) 90/54   Pulse 89   Temp 98.2 °F (36.8 °C) (Oral)   Resp 18   Ht 6' 1\" (1.854 m)   Wt 294 lb 12.1 oz (133.7 kg) Comment: accurate standing weight  SpO2 94%   BMI 38.89 kg/m²        General appearance: No apparent distress, appears stated age and cooperative. HEENT: Pupils equal, round, and reactive to light. Conjunctivae/corneas clear. Neck: Supple, with full range of motion. No jugular venous distention. Trachea midline. Respiratory:  Normal respiratory effort. Clear to auscultation, bilaterally without Rales/Wheezes/Rhonchi. Cardiovascular: Regular rate and rhythm with normal S1/S2 without murmurs, rubs or gallops. Abdomen: Soft, non-tender, non-distended with normal bowel sounds.   Musculoskeletal: No clubbing, cyanosis or edema bilaterally. Full range of motion without deformity. Skin: Skin color, texture, turgor normal.  No rashes or lesions. Neurologic:  Neurovascularly intact without any focal sensory/motor deficits. Cranial nerves: II-XII intact, grossly non-focal.  Psychiatric: Alert and oriented, thought content appropriate, normal insight  Capillary Refill: Brisk,< 3 seconds   Peripheral Pulses: +2 palpable, equal bilaterally        Labs:   Recent Labs     12/26/21  0614 12/27/21  0615 12/28/21  0603   WBC 9.7 7.9 7.2   HGB 8.5* 8.5* 8.7*   HCT 25.0* 25.7* 26.2*    308 345     Recent Labs     12/26/21  0614 12/27/21  0615 12/28/21  0603   * 141 139   K 3.8 4.1 4.0   CL 95* 99 98*   CO2 33* 31 31   BUN 11 9 11   CREATININE 1.7* 1.6* 1.7*   CALCIUM 8.4 8.2* 8.5   PHOS 3.5 3.6 4.0     No results for input(s): AST, ALT, BILIDIR, BILITOT, ALKPHOS in the last 72 hours. No results for input(s): INR in the last 72 hours. No results for input(s): Aldo Snuffer in the last 72 hours. Urinalysis:      Lab Results   Component Value Date    NITRU Negative 12/12/2021    WBCUA 2 12/12/2021    RBCUA 0-2 12/12/2021    BLOODU LARGE 12/12/2021    SPECGRAV 1.023 12/12/2021    GLUCOSEU >=1000 12/12/2021       Radiology:  CT CHEST WO CONTRAST   Final Result   Consolidation in the lung bases bilaterally that is similar compared to prior   examination concerning for pneumonia. Trace effusions. Hepatic steatosis. Fluid versus inflammation adjacent to the visualized pancreatic tail. RECOMMENDATIONS:   Unavailable         CT CHEST ABDOMEN PELVIS WO CONTRAST   Final Result   1. Findings remain consistent with acute pancreatitis. Evaluation is limited   due to the absence of contrast.   2. Findings concerning for diffuse small bowel ileus. 3. Significant atelectasis at the lung bases bilaterally.          XR CHEST PORTABLE   Final Result   New retrocardiac left lower lobe opacification, possibly infiltrate or   atelectasis. CT ABDOMEN PELVIS WO CONTRAST Additional Contrast? Oral   Final Result   Persistent but decreased peripancreatic fluid and edema, in keeping with the   clinical diagnosis of pancreatitis      Increased body wall anasarca with increased pleural-parenchymal disease at   the lung bases, likely due to fluid overload      Fatty liver. Diverticulosis and normal caliber appendix      Subtle increased density is seen in the right femoral vein. It is uncertain   as whether not this is artifactual or due to a small amount of thrombus. Recommend correlation with lower extremity venous duplex ultrasound      RECOMMENDATIONS:   Unavailable         VL Extremity Venous Right   Final Result      US GALLBLADDER RUQ   Final Result   Pancreas not well visualized. This is likely due to the peripancreatic   inflammatory changes seen on recent CT. Hepatic steatosis. CT ABDOMEN PELVIS WO CONTRAST Additional Contrast? None   Final Result   1. Severe acute interstitial pancreatitis. No focal fluid collection. 2. Hepatomegaly with severe steatosis. 3. Possible gallbladder sludge. 4. Colonic diverticulosis without acute diverticulitis. 5. The distal esophagus is dilated and filled with fluid. RECOMMENDATIONS:   Unavailable         XR CHEST PORTABLE   Final Result   No radiographic evidence of acute pulmonary disease. Assessment/Plan:    Active Hospital Problems    Diagnosis     Schizophrenia ([facility] [address] [F20.9]     Acute kidney injury ([address] [N17.9]     Acute pancreatitis [K85.90]     DKA, type 2, not at goal [facility]) [E11.10]          DKA resolved - suspect undiagnosed type 2 DM  -check Hgb A1c - 12.4  -NPO - to full liquid to low fat diet per GI  -DKA protocol completed  -insulin gtt - to SQ insulin  - check CARI and islet cell Abs - both negative. - lantus 35-->30 BID.                - on 12/18 I switched him to IV Humulin sliding scale and 5 units prand bolus if eating - BG responded very well               - switched back to SQ insulin 12/22 and doing well with this.             Severe interstitial pancreatitis - much improved clinically. - s/p aggressive IVF              - IV merrem due to ongoing fevers - fever now resolved. -GI and general surgery consulted  -check RUQ U/S - pancreas not well visualized due to surrounding inflammatory changes. - pt reports 3 beers and hard liquor shots prior to onset of pancreatitis, but denies chronic alcohol abuse. - repeat CT on 12/16 showed improvement of pancreatitis, but WBC continued to rise              - I resumed him on Vanc, merrem and diflucan and sent repeat blood Cx  On 12/18 - plan for 10 day course - last day of Abx tomorrow. - WBC now steadily improving and actually returned to normal.             Septic Shock - resolved   Required levophed short term early in admission.   - see abx as above.            CHAPIN - Cr improving slowly  s/p IVF with bicarb - switched to NS per nephrology team.    S/p single dose lasix on 12/17 with good effect. - avoid nephrotoxic medications  - nephrology following  - again showing signs of fluid retention - stopped IVF 12/22.               - lasix IV daily - increase to BID on 12/24 - transition to PO torsemide today.         Coffee ground emesis  -cont PPI  -GI following  -Hgb stabilized.         Obesity - BMI 38.68  -nutritional counseling provided  -weight loss encouraged  -complicating medical management        Hypocalcemia - s/p IV replete. Improved.         Constipation. CT 12/20 concerning for diffuse ileus. Started on reglan per GI team.   Movantik daily - started 12/20. Stop opiate pain control - pain resolved. Added dulcolax 10 daily    Pt refused miralax - makes him nauseated.            Acute Hypox Resp Failure not POA.    O2 requirement peaked at 6-7l/min and has been slowly improving  He is down to 2l/min now.   He failed home O2 eval today, so I pursued repeat CT chest to reevaluate pulm status. He has basilar consolidative changes and much improved pleural effusion. I think we should be able to wean him off O2 in the next 24-48hrs. Abx will finish tomorrow. Encourage ambulation and deep breathing exercises.            DVT Prophylaxis: Heparin   Diet: ADULT DIET;  Regular; 5 carb choices (75 gm/meal)  Code Status: Full Code    PT/OT Eval Status: ordered    Km Berman MD

## 2021-12-29 NOTE — PROGRESS NOTES
Physical Therapy  Azeb Mason  [phone]  [id number]    Attempted to see for PT session however pt currently working with OT; will attempt later as schedule permits  Electronically signed by SYED HERNÁNDEZ PT on 12/29/2021 at 10:26 AM

## 2021-12-29 NOTE — PROGRESS NOTES
Nephrology Progress Note   [facility]      Chief Complaint: Nausea/vomiting/pancreatitis    History of Present Illness: Mr Yvonna Lesches is a is a 54 y.o. male with no significant past medical history who presents to [facility] with increased thirst, urination, nausea, and vomiting. He was noted to have a BS level of 900 mg/dl. CT scan abdomen and pelvis revealed severe acute Pancreatitis. He was Hyperkalemic, with serum HCO level of 5 and creatinine of 3.4 mg/dl. Patient was started on insulin gtt,treated for DKA     Subjective:    Over all feels better . ROS:  C/o CP, no WATT / SOB . Weakness unchanged   PMFSH:  medications reviewed.     Physical Exam:    /76   Pulse 98   Temp 98.4 °F (36.9 °C) (Oral)   Resp 20   Ht 6' 1\" (1.854 m)   Wt 291 lb 3.6 oz (132.1 kg)   SpO2 94%   BMI 38.42 kg/m²       Intake/Output Summary (Last 24 hours) at 12/29/2021 1613  Last data filed at 12/29/2021 1607  Gross per 24 hour   Intake 1047.33 ml   Output 2500 ml   Net -1452.67 ml         Patient Vitals for the past 96 hrs (Last 3 readings):   Weight   12/29/21 0419 291 lb 3.6 oz (132.1 kg)   12/28/21 0431 294 lb 12.1 oz (133.7 kg)   12/27/21 0438 281 lb 8.4 oz (127.7 kg)       General: in bed   Chest: diminished at bases, otherwise clear    CVS: RRR, no murmur, no rub  Abdomen: less tenderness   Extremities: trace edema         LAB DATA:    CBC:   Lab Results   Component Value Date    WBC 6.8 12/29/2021    RBC 2.86 12/29/2021    HGB 8.7 12/29/2021    HCT 25.7 12/29/2021    MCV 89.8 12/29/2021    MCH 30.3 12/29/2021    MCHC 33.8 12/29/2021    RDW 12.9 12/29/2021     12/29/2021    MPV 7.7 12/29/2021     BMP:    Lab Results   Component Value Date     12/29/2021    K 4.0 12/29/2021    K 4.2 12/12/2021    CL 97 12/29/2021    CO2 31 12/29/2021    BUN 12 12/29/2021    CREATININE 1.7 12/29/2021    CALCIUM 8.9 12/29/2021    GFRAA 52 12/29/2021    LABGLOM 43 12/29/2021    GLUCOSE 122 12/29/2021     Ionized Calcium:  No results found for: IONCA  Magnesium:    Lab Results   Component Value Date    MG 1.60 12/25/2021     Phosphorus:    Lab Results   Component Value Date    PHOS 4.1 12/29/2021     U/A:    Lab Results   Component Value Date    COLORU YELLOW 12/12/2021    PHUR 5.0 12/12/2021    WBCUA 2 12/12/2021    RBCUA 0-2 12/12/2021    CLARITYU CLOUDY 12/12/2021    SPECGRAV 1.023 12/12/2021    LEUKOCYTESUR Negative 12/12/2021    UROBILINOGEN 0.2 12/12/2021    BILIRUBINUR MODERATE 12/12/2021    BLOODU LARGE 12/12/2021    GLUCOSEU >=1000 12/12/2021         IMPRESSION/RECOMMENDATIONS:      1. CHAPIN - likely prerenal/ATN - occurring in the setting of DKA/pancreatitis. - creatinine stable . Tolerating diuretics . K dose adjusted     2. Edema    - improved     3. Hypokalemia   - on supp      4.  Pancreatitis - management per DERIC Merchant MD FACP

## 2021-12-29 NOTE — PROGRESS NOTES
Hospitalist Progress Note      PCP: [id number] [place] [address]    Date of Admission: 12/12/2021    Subjective: states he feels better, still on 2L O2    Medications:  Reviewed    Infusion Medications    sodium chloride Stopped (12/27/21 1131)    dextrose       Scheduled Medications    [START ON 12/30/2021] potassium chloride  20 mEq Oral Daily    insulin glargine  30 Units SubCUTAneous BID    torsemide  20 mg Oral Daily    insulin lispro  7 Units SubCUTAneous TID WC    insulin lispro  0-12 Units SubCUTAneous TID WC    insulin lispro  0-6 Units SubCUTAneous Nightly    bisacodyl  10 mg Oral Daily    sodium chloride flush  5-40 mL IntraVENous 2 times per day    pantoprazole  40 mg Oral BID AC    docusate sodium  100 mg Oral BID    fenofibrate  160 mg Oral Daily    heparin (porcine)  5,000 Units SubCUTAneous BID     PRN Meds: acetaminophen, sodium chloride flush, sodium chloride, ondansetron, glucose, dextrose, glucagon (rDNA), dextrose, dextrose      Intake/Output Summary (Last 24 hours) at 12/29/2021 1828  Last data filed at 12/29/2021 1748  Gross per 24 hour   Intake 841.71 ml   Output 3300 ml   Net -2458.29 ml       Physical Exam Performed:    /76   Pulse 98   Temp 98.4 °F (36.9 °C) (Oral)   Resp 18   Ht 6' 1\" (1.854 m)   Wt 291 lb 3.6 oz (132.1 kg)   SpO2 91%   BMI 38.42 kg/m²       General appearance: No apparent distress, appears stated age and cooperative. HEENT: Pupils equal, round, and reactive to light. Conjunctivae/corneas clear. Neck: Supple, with full range of motion. No jugular venous distention. Trachea midline. Respiratory:  Normal respiratory effort. Clear to auscultation, bilaterally without Rales/Wheezes/Rhonchi. Cardiovascular: Regular rate and rhythm with normal S1/S2 without murmurs, rubs or gallops. Abdomen: Soft, non-tender, non-distended with normal bowel sounds.   Musculoskeletal: No clubbing, cyanosis or edema bilaterally.  Full range of motion without deformity. Skin: Skin color, texture, turgor normal.  No rashes or lesions. Neurologic:  Neurovascularly intact without any focal sensory/motor deficits. Cranial nerves: II-XII intact, grossly non-focal.  Psychiatric: Alert and oriented, thought content appropriate, normal insight  Capillary Refill: Brisk,< 3 seconds   Peripheral Pulses: +2 palpable, equal bilaterally     Labs:   Recent Labs     12/27/21  0615 12/28/21  0603 12/29/21  0620   WBC 7.9 7.2 6.8   HGB 8.5* 8.7* 8.7*   HCT 25.7* 26.2* 25.7*    345 377     Recent Labs     12/27/21  0615 12/28/21  0603 12/29/21  0620    139 137   K 4.1 4.0 4.0   CL 99 98* 97*   CO2 31 31 31   BUN 9 11 12   CREATININE 1.6* 1.7* 1.7*   CALCIUM 8.2* 8.5 8.9   PHOS 3.6 4.0 4.1     No results for input(s): AST, ALT, BILIDIR, BILITOT, ALKPHOS in the last 72 hours. No results for input(s): INR in the last 72 hours. Recent Labs     12/29/21  1510   TROPONINI <0.01       Urinalysis:      Lab Results   Component Value Date    NITRU Negative 12/12/2021    WBCUA 2 12/12/2021    RBCUA 0-2 12/12/2021    BLOODU LARGE 12/12/2021    SPECGRAV 1.023 12/12/2021    GLUCOSEU >=1000 12/12/2021       Radiology:  XR CHEST PORTABLE   Final Result   Bibasilar airspace disease may represent atelectasis and/or pneumonia. CT CHEST WO CONTRAST   Final Result   Consolidation in the lung bases bilaterally that is similar compared to prior   examination concerning for pneumonia. Trace effusions. Hepatic steatosis. Fluid versus inflammation adjacent to the visualized pancreatic tail. RECOMMENDATIONS:   Unavailable         CT CHEST ABDOMEN PELVIS WO CONTRAST   Final Result   1. Findings remain consistent with acute pancreatitis. Evaluation is limited   due to the absence of contrast.   2. Findings concerning for diffuse small bowel ileus. 3. Significant atelectasis at the lung bases bilaterally.          XR CHEST PORTABLE   Final Result   New retrocardiac left lower lobe opacification, possibly infiltrate or   atelectasis. CT ABDOMEN PELVIS WO CONTRAST Additional Contrast? Oral   Final Result   Persistent but decreased peripancreatic fluid and edema, in keeping with the   clinical diagnosis of pancreatitis      Increased body wall anasarca with increased pleural-parenchymal disease at   the lung bases, likely due to fluid overload      Fatty liver. Diverticulosis and normal caliber appendix      Subtle increased density is seen in the right femoral vein. It is uncertain   as whether not this is artifactual or due to a small amount of thrombus. Recommend correlation with lower extremity venous duplex ultrasound      RECOMMENDATIONS:   Unavailable         VL Extremity Venous Right   Final Result      US GALLBLADDER RUQ   Final Result   Pancreas not well visualized. This is likely due to the peripancreatic   inflammatory changes seen on recent CT. Hepatic steatosis. CT ABDOMEN PELVIS WO CONTRAST Additional Contrast? None   Final Result   1. Severe acute interstitial pancreatitis. No focal fluid collection. 2. Hepatomegaly with severe steatosis. 3. Possible gallbladder sludge. 4. Colonic diverticulosis without acute diverticulitis. 5. The distal esophagus is dilated and filled with fluid. RECOMMENDATIONS:   Unavailable         XR CHEST PORTABLE   Final Result   No radiographic evidence of acute pulmonary disease. Assessment/Plan:    Active Hospital Problems    Diagnosis     Schizophrenia ([facility] [address] [F20.9]     Acute kidney injury ([address] [N17.9]     Acute pancreatitis [K85.90]     DKA, type 2, not at goal [facility]) [E11.10]          DKA resolved - suspect undiagnosed type 2 DM  -check Hgb A1c - 12.4  -NPO - to full liquid to low fat diet per GI  -DKA protocol completed  -insulin gtt - to SQ insulin  - check CARI and islet cell Abs - both negative.               - lantus 35-->30 BID.                - on 12/18 I switched him to IV Humulin sliding scale and 5 units prand bolus if eating - BG responded very well               - switched back to SQ insulin 12/22 and doing well with this.             Severe interstitial pancreatitis - much improved clinically.   - s/p aggressive IVF              - IV merrem due to ongoing fevers - fever now resolved. -GI and general surgery consulted  -check RUQ U/S - pancreas not well visualized due to surrounding inflammatory changes.             - pt reports 3 beers and hard liquor shots prior to onset of pancreatitis, but denies chronic alcohol abuse. - repeat CT on 12/16 showed improvement of pancreatitis, but WBC continued to rise              - I resumed him on Vanc, merrem and diflucan and sent repeat blood Cx  On 12/18 - plan for 10 day course - last day of Abx tomorrow.                - WBC now steadily improving and actually returned to normal.             Septic Shock - resolved   Required levophed short term early in admission.   - see abx as above.            CHAPIN - Cr improving slowly  s/p IVF with bicarb - switched to NS per nephrology team.    S/p single dose lasix on 12/17 with good effect. - avoid nephrotoxic medications  - nephrology following  - again showing signs of fluid retention - stopped IVF 12/22.               - lasix IV daily - increase to BID on 12/24 - transition to PO torsemide        Coffee ground emesis  -cont PPI  -GI following  -Hgb stabilized.         Obesity - BMI 38.68  -nutritional counseling provided  -weight loss encouraged  -complicating medical management        Hypocalcemia - s/p IV replete. Improved.         Constipation. CT 12/20 concerning for diffuse ileus. Started on reglan per GI team.   Movantik daily - started 12/20. Stop opiate pain control - pain resolved. Added dulcolax 10 daily    Pt refused miralax - makes him nauseated.            Acute Hypox Resp Failure not POA.    O2 requirement peaked at 6-7l/min and has been slowly improving  He is down to 2l/min now. He failed home O2 eval today, so I pursued repeat CT chest to reevaluate pulm status. He has basilar consolidative changes and much improved pleural effusion. I think we should be able to wean him off O2 in the next 24-48hrs. Abx will finish by dc   Encourage ambulation and deep breathing exercises.            DVT Prophylaxis: Heparin   Diet: ADULT DIET;  Regular; 5 carb choices (75 gm/meal)  Code Status: Full Code    PT/OT Eval Status: ordered    Dispo - wean off O2, poss dc in am    Hardik Solis MD

## 2021-12-29 NOTE — PLAN OF CARE
Problem: Falls - Risk of:  Goal: Will remain free from falls  Description: Will remain free from falls  Outcome: Ongoing  Goal: Absence of physical injury  Description: Absence of physical injury  Outcome: Ongoing     Problem: Pain:  Goal: Pain level will decrease  Description: Pain level will decrease  Outcome: Ongoing  Goal: Control of acute pain  Description: Control of acute pain  Outcome: Ongoing  Goal: Control of chronic pain  Description: Control of chronic pain  Outcome: Ongoing     Problem: Discharge Planning:  Goal: Participates in care planning  Description: Participates in care planning  Outcome: Ongoing  Goal: Discharged to appropriate level of care  Description: Discharged to appropriate level of care  Outcome: Ongoing     Problem:  Bowel Function - Altered:  Goal: Bowel elimination is within specified parameters  Description: Bowel elimination is within specified parameters  Outcome: Ongoing     Problem: Cardiac Output - Decreased:  Goal: Hemodynamic stability will improve  Description: Hemodynamic stability will improve  Outcome: Ongoing     Problem: Fluid Volume - Imbalance:  Goal: Absence of imbalanced fluid volume signs and symptoms  Description: Absence of imbalanced fluid volume signs and symptoms  Outcome: Ongoing  Goal: Will remain free of signs and symptoms of dehydration  Description: Will remain free of signs and symptoms of dehydration  Outcome: Ongoing     Problem: Serum Glucose Level - Abnormal:  Goal: Ability to maintain appropriate glucose levels will improve to within specified parameters  Description: Ability to maintain appropriate glucose levels will improve to within specified parameters  Outcome: Ongoing  Goal: Ability to maintain appropriate glucose levels will improve  Description: Ability to maintain appropriate glucose levels will improve  Outcome: Ongoing     Problem: Tissue Perfusion, Altered:  Goal: Circulatory function within specified parameters  Description: Circulatory function within specified parameters  Outcome: Ongoing     Problem: Tissue Perfusion - Cardiopulmonary, Altered:  Goal: Absence of angina  Description: Absence of angina  Outcome: Ongoing  Goal: Hemodynamic stability will improve  Description: Hemodynamic stability will improve  Outcome: Ongoing     Problem: Urinary Elimination:  Goal: Signs and symptoms of infection will decrease  Description: Signs and symptoms of infection will decrease  Outcome: Ongoing  Goal: Complications related to the disease process, condition or treatment will be avoided or minimized  Description: Complications related to the disease process, condition or treatment will be avoided or minimized  Outcome: Ongoing     Problem: Injury - Acid Base Imbalance:  Goal: Acid-base balance  Description: Acid-base balance  Outcome: Ongoing     Problem: Tissue Perfusion - Renal, Altered:  Goal: Electrolytes within specified parameters  Description: Electrolytes within specified parameters  Outcome: Ongoing  Goal: Urine creatinine clearance will be within specified parameters  Description: Urine creatinine clearance will be within specified parameters  Outcome: Ongoing  Goal: Serum creatinine will be within specified parameters  Description: Serum creatinine will be within specified parameters  Outcome: Ongoing  Goal: Ability to achieve a balanced intake and output will improve  Description: Ability to achieve a balanced intake and output will improve  Outcome: Ongoing     Problem: Nutrition  Goal: Optimal nutrition therapy  Outcome: Ongoing     Problem:  Activity:  Goal: Risk for activity intolerance will decrease  Description: Risk for activity intolerance will decrease  Outcome: Ongoing     Problem: Coping:  Goal: Ability to adjust to condition or change in health will improve  Description: Ability to adjust to condition or change in health will improve  Outcome: Ongoing     Problem: Fluid Volume:  Goal: Ability to maintain a balanced intake and output will improve  Description: Ability to maintain a balanced intake and output will improve  Outcome: Ongoing     Problem: Health Behavior:  Goal: Ability to identify and utilize available resources and services will improve  Description: Ability to identify and utilize available resources and services will improve  Outcome: Ongoing  Goal: Ability to manage health-related needs will improve  Description: Ability to manage health-related needs will improve  Outcome: Ongoing     Problem: Metabolic:  Goal: Ability to maintain appropriate glucose levels will improve  Description: Ability to maintain appropriate glucose levels will improve  Outcome: Ongoing     Problem: Nutritional:  Goal: Maintenance of adequate nutrition will improve  Description: Maintenance of adequate nutrition will improve  Outcome: Ongoing  Goal: Progress toward achieving an optimal weight will improve  Description: Progress toward achieving an optimal weight will improve  Outcome: Ongoing     Problem: Physical Regulation:  Goal: Complications related to the disease process, condition or treatment will be avoided or minimized  Description: Complications related to the disease process, condition or treatment will be avoided or minimized  Outcome: Ongoing  Goal: Diagnostic test results will improve  Description: Diagnostic test results will improve  Outcome: Ongoing     Problem: Skin Integrity:  Goal: Risk for impaired skin integrity will decrease  Description: Risk for impaired skin integrity will decrease  Outcome: Ongoing     Problem: Tissue Perfusion:  Goal: Adequacy of tissue perfusion will improve  Description: Adequacy of tissue perfusion will improve  Outcome: Ongoing

## 2021-12-30 LAB
ALBUMIN SERPL-MCNC: 2.8 G/DL (ref 3.4–5)
ANION GAP SERPL CALCULATED.3IONS-SCNC: 10 MMOL/L (ref 3–16)
BASOPHILS ABSOLUTE: 0.1 K/UL (ref 0–0.2)
BASOPHILS RELATIVE PERCENT: 0.8 %
BUN BLDV-MCNC: 14 MG/DL (ref 7–20)
CALCIUM SERPL-MCNC: 9 MG/DL (ref 8.3–10.6)
CHLORIDE BLD-SCNC: 96 MMOL/L (ref 99–110)
CO2: 30 MMOL/L (ref 21–32)
CREAT SERPL-MCNC: 1.6 MG/DL (ref 0.9–1.3)
EKG ATRIAL RATE: 97 BPM
EKG DIAGNOSIS: NORMAL
EKG P AXIS: 35 DEGREES
EKG P-R INTERVAL: 146 MS
EKG Q-T INTERVAL: 378 MS
EKG QRS DURATION: 88 MS
EKG QTC CALCULATION (BAZETT): 480 MS
EKG R AXIS: -7 DEGREES
EKG T AXIS: 14 DEGREES
EKG VENTRICULAR RATE: 97 BPM
EOSINOPHILS ABSOLUTE: 0.1 K/UL (ref 0–0.6)
EOSINOPHILS RELATIVE PERCENT: 2.1 %
GFR AFRICAN AMERICAN: 56
GFR NON-AFRICAN AMERICAN: 46
GLUCOSE BLD-MCNC: 112 MG/DL (ref 70–99)
GLUCOSE BLD-MCNC: 118 MG/DL (ref 70–99)
GLUCOSE BLD-MCNC: 157 MG/DL (ref 70–99)
GLUCOSE BLD-MCNC: 162 MG/DL (ref 70–99)
GLUCOSE BLD-MCNC: 183 MG/DL (ref 70–99)
HCT VFR BLD CALC: 27.3 % (ref 40.5–52.5)
HEMOGLOBIN: 9 G/DL (ref 13.5–17.5)
LYMPHOCYTES ABSOLUTE: 2.5 K/UL (ref 1–5.1)
LYMPHOCYTES RELATIVE PERCENT: 38.5 %
MCH RBC QN AUTO: 29.9 PG (ref 26–34)
MCHC RBC AUTO-ENTMCNC: 32.8 G/DL (ref 31–36)
MCV RBC AUTO: 91 FL (ref 80–100)
MONOCYTES ABSOLUTE: 0.7 K/UL (ref 0–1.3)
MONOCYTES RELATIVE PERCENT: 10.5 %
NEUTROPHILS ABSOLUTE: 3.1 K/UL (ref 1.7–7.7)
NEUTROPHILS RELATIVE PERCENT: 48.1 %
PDW BLD-RTO: 13.3 % (ref 12.4–15.4)
PERFORMED ON: ABNORMAL
PHOSPHORUS: 4 MG/DL (ref 2.5–4.9)
PLATELET # BLD: 387 K/UL (ref 135–450)
PMV BLD AUTO: 7.7 FL (ref 5–10.5)
POTASSIUM SERPL-SCNC: 4 MMOL/L (ref 3.5–5.1)
PRO-BNP: 51 PG/ML (ref 0–124)
RBC # BLD: 3 M/UL (ref 4.2–5.9)
SODIUM BLD-SCNC: 136 MMOL/L (ref 136–145)
WBC # BLD: 6.5 K/UL (ref 4–11)

## 2021-12-30 PROCEDURE — 97535 SELF CARE MNGMENT TRAINING: CPT

## 2021-12-30 PROCEDURE — 97530 THERAPEUTIC ACTIVITIES: CPT

## 2021-12-30 PROCEDURE — 6370000000 HC RX 637 (ALT 250 FOR IP): Performed by: PHYSICIAN ASSISTANT

## 2021-12-30 PROCEDURE — 83880 ASSAY OF NATRIURETIC PEPTIDE: CPT

## 2021-12-30 PROCEDURE — 36415 COLL VENOUS BLD VENIPUNCTURE: CPT

## 2021-12-30 PROCEDURE — 6370000000 HC RX 637 (ALT 250 FOR IP): Performed by: INTERNAL MEDICINE

## 2021-12-30 PROCEDURE — 85025 COMPLETE CBC W/AUTO DIFF WBC: CPT

## 2021-12-30 PROCEDURE — 93010 ELECTROCARDIOGRAM REPORT: CPT | Performed by: INTERNAL MEDICINE

## 2021-12-30 PROCEDURE — 94761 N-INVAS EAR/PLS OXIMETRY MLT: CPT

## 2021-12-30 PROCEDURE — 2700000000 HC OXYGEN THERAPY PER DAY

## 2021-12-30 PROCEDURE — 2060000000 HC ICU INTERMEDIATE R&B

## 2021-12-30 PROCEDURE — 6360000002 HC RX W HCPCS: Performed by: INTERNAL MEDICINE

## 2021-12-30 PROCEDURE — 6370000000 HC RX 637 (ALT 250 FOR IP): Performed by: SURGERY

## 2021-12-30 PROCEDURE — 80069 RENAL FUNCTION PANEL: CPT

## 2021-12-30 PROCEDURE — 2580000003 HC RX 258: Performed by: NURSE PRACTITIONER

## 2021-12-30 RX ORDER — LANCETS 30 GAUGE
1 EACH MISCELLANEOUS 2 TIMES DAILY
Qty: 200 EACH | Refills: 0 | Status: SHIPPED | OUTPATIENT
Start: 2021-12-30

## 2021-12-30 RX ORDER — TORSEMIDE 20 MG/1
20 TABLET ORAL DAILY
Qty: 30 TABLET | Refills: 0 | Status: SHIPPED | OUTPATIENT
Start: 2021-12-31

## 2021-12-30 RX ORDER — INSULIN GLARGINE 100 [IU]/ML
30 INJECTION, SOLUTION SUBCUTANEOUS 2 TIMES DAILY
Qty: 5 PEN | Refills: 3 | Status: SHIPPED | OUTPATIENT
Start: 2021-12-30

## 2021-12-30 RX ORDER — TORSEMIDE 20 MG/1
20 TABLET ORAL ONCE
Status: COMPLETED | OUTPATIENT
Start: 2021-12-30 | End: 2021-12-30

## 2021-12-30 RX ORDER — FENOFIBRATE 160 MG/1
160 TABLET ORAL DAILY
Qty: 30 TABLET | Refills: 3 | Status: SHIPPED | OUTPATIENT
Start: 2021-12-31

## 2021-12-30 RX ORDER — CALCIUM CITRATE/VITAMIN D3 200MG-6.25
1 TABLET ORAL 2 TIMES DAILY
Qty: 100 EACH | Refills: 3 | Status: SHIPPED | OUTPATIENT
Start: 2021-12-30

## 2021-12-30 RX ORDER — PANTOPRAZOLE SODIUM 40 MG/1
40 TABLET, DELAYED RELEASE ORAL
Qty: 60 TABLET | Refills: 3 | Status: SHIPPED | OUTPATIENT
Start: 2021-12-30

## 2021-12-30 RX ADMIN — SODIUM CHLORIDE, PRESERVATIVE FREE 10 ML: 5 INJECTION INTRAVENOUS at 08:48

## 2021-12-30 RX ADMIN — POTASSIUM CHLORIDE 20 MEQ: 1500 TABLET, EXTENDED RELEASE ORAL at 08:48

## 2021-12-30 RX ADMIN — INSULIN LISPRO 2 UNITS: 100 INJECTION, SOLUTION INTRAVENOUS; SUBCUTANEOUS at 08:49

## 2021-12-30 RX ADMIN — BISACODYL 10 MG: 5 TABLET, COATED ORAL at 08:48

## 2021-12-30 RX ADMIN — INSULIN GLARGINE 30 UNITS: 100 INJECTION, SOLUTION SUBCUTANEOUS at 22:00

## 2021-12-30 RX ADMIN — PANTOPRAZOLE SODIUM 40 MG: 40 TABLET, DELAYED RELEASE ORAL at 15:22

## 2021-12-30 RX ADMIN — INSULIN LISPRO 7 UNITS: 100 INJECTION, SOLUTION INTRAVENOUS; SUBCUTANEOUS at 17:46

## 2021-12-30 RX ADMIN — INSULIN LISPRO 7 UNITS: 100 INJECTION, SOLUTION INTRAVENOUS; SUBCUTANEOUS at 12:27

## 2021-12-30 RX ADMIN — DOCUSATE SODIUM 100 MG: 100 CAPSULE ORAL at 22:01

## 2021-12-30 RX ADMIN — DOCUSATE SODIUM 100 MG: 100 CAPSULE ORAL at 08:48

## 2021-12-30 RX ADMIN — FENOFIBRATE 160 MG: 160 TABLET ORAL at 08:48

## 2021-12-30 RX ADMIN — INSULIN GLARGINE 30 UNITS: 100 INJECTION, SOLUTION SUBCUTANEOUS at 08:49

## 2021-12-30 RX ADMIN — INSULIN LISPRO 7 UNITS: 100 INJECTION, SOLUTION INTRAVENOUS; SUBCUTANEOUS at 08:49

## 2021-12-30 RX ADMIN — HEPARIN SODIUM 5000 UNITS: 5000 INJECTION INTRAVENOUS; SUBCUTANEOUS at 08:48

## 2021-12-30 RX ADMIN — INSULIN LISPRO 2 UNITS: 100 INJECTION, SOLUTION INTRAVENOUS; SUBCUTANEOUS at 17:47

## 2021-12-30 RX ADMIN — TORSEMIDE 20 MG: 20 TABLET ORAL at 08:48

## 2021-12-30 RX ADMIN — TORSEMIDE 20 MG: 20 TABLET ORAL at 15:22

## 2021-12-30 RX ADMIN — HEPARIN SODIUM 5000 UNITS: 5000 INJECTION INTRAVENOUS; SUBCUTANEOUS at 22:00

## 2021-12-30 RX ADMIN — PANTOPRAZOLE SODIUM 40 MG: 40 TABLET, DELAYED RELEASE ORAL at 06:03

## 2021-12-30 ASSESSMENT — PAIN SCALES - GENERAL
PAINLEVEL_OUTOF10: 0

## 2021-12-30 NOTE — PROGRESS NOTES
Attempted to wean patient off 1L of oxygen. While weaning for 5 minutes patient dropped to 88% without movement. Patient placed back on 1 L of oxygen.     Electronically signed by Elsa Ndiaye RN on 12/30/2021 at 9:59 AM

## 2021-12-30 NOTE — PROGRESS NOTES
1 MD messaged regarding PICC line unable to flush or pull blood. MD messaged asked if patient will need this upon discharge for antibiotics. 1511 no new orders. MD messaged reminder. Electronically signed by Adilene Madrid RN on 12/30/2021 at 3:11 PM      Per MD patient will be discharged tomorrow hopefully. Will off on cath claudia since patient is not getting any infusing medications and hopefully PICC like will be removed upon discharge.      Electronically signed by Adilene Madrid RN on 12/30/2021 at 3:18 PM

## 2021-12-30 NOTE — CARE COORDINATION
[facility]  Diabetes Education   Progress Note       NAME:  [address] [place] [address] RECORD NUMBER:  [id number]  AGE: 54 y.o. GENDER: male  : 1970  TODAY'S DATE:  2021    Subjective   Reason for Diabetic Education Evaluation and Assessment: new insulin    Jayce Peck is planning on discharge tomorrow. He expresses confidence with insulin administration. He states he has practiced self injection with the nursing staff. Visit Type: follow-up      Angela Stubbs is a 54 y.o. male referred by:     [x] Physician  [] Nursing  [] Chart Review   [] Other:     PAST MEDICAL HISTORY        Diagnosis Date    H/O degenerative disc disease        PAST SURGICAL HISTORY    No past surgical history on file. FAMILY HISTORY    No family history on file.     SOCIAL HISTORY    Social History     Tobacco Use    Smoking status: Never Smoker    Smokeless tobacco: Never Used   Substance Use Topics    Alcohol use: Yes     Comment: social    Drug use: Never       ALLERGIES    No Known Allergies    MEDICATIONS     potassium chloride  20 mEq Oral Daily    insulin glargine  30 Units SubCUTAneous BID    torsemide  20 mg Oral Daily    insulin lispro  7 Units SubCUTAneous TID WC    insulin lispro  0-12 Units SubCUTAneous TID WC    insulin lispro  0-6 Units SubCUTAneous Nightly    bisacodyl  10 mg Oral Daily    sodium chloride flush  5-40 mL IntraVENous 2 times per day    pantoprazole  40 mg Oral BID AC    docusate sodium  100 mg Oral BID    fenofibrate  160 mg Oral Daily    heparin (porcine)  5,000 Units SubCUTAneous BID       Objective        Patient Active Problem List   Diagnosis Code    Back pain M54.9    DKA, type 2, not at goal [facility]) E11.10    Acute pancreatitis K85.90    Acute kidney injury ([facility] [address] N17.9    Schizophrenia ([facility] [address] F20.9        BP 97/65   Pulse 89   Temp 98.1 °F (36.7 °C)   Resp 18   Ht 6' 1\" (1.854 m)   Wt 290 lb 2 oz (131.6 kg)   SpO2 (!) 87%   BMI 38.28 kg/m² HgBA1c:    Lab Results   Component Value Date    LABA1C 12.4 12/13/2021       Recent Labs     12/29/21  1711 12/29/21  1957 12/30/21  0748 12/30/21  1135   POCGLU 124* 102* 162* 118*       BUN/Creatinine:    Lab Results   Component Value Date    BUN 14 12/30/2021    CREATININE 1.6 12/30/2021       Assessment        Diabetes Management and Education    Does the patient have a Primary Care Physician? Yes, [address]       Does the patient require new medication instruction? Yes  Reviewed plan for Lantus twice daily. Level of patient/caregiver understanding able to:       [x] Verbalized Understanding   [] Demonstrated Understanding       [] Teach Back       [] Needs Reinforcement     []  Other:        Does the patient/caregiver monitor Blood Glucoses? No:   Reviewed glycemic control targets, testing frequency and when to call PCP. Level of patient/caregiver understanding able to:        [] Verbalized Understanding   [] Demonstrated Understanding       [] Teach Back       [x] Needs Reinforcement     []  Other:        Does the patient/caregiver follow a Meal Plan? No:    Reviewed importance of eating three meals per day. Level of patient/caregiver understanding able to:          [x] Verbalized Understanding   [] Demonstrated Understanding       [] Teach Back       [] Needs Reinforcement     []  Other:      Does the patient/caregiver understand S/S of Hypoglycemia? No:   Reviewed symptoms, prevention and treatment. Level of patient/caregiver understanding able to:       [x] Verbalized Understanding   [] Demonstrated Understanding       [] Teach Back       [] Needs Reinforcement     []  Other:                    Plan        Ongoing diabetes education and blood glucose monitoring. Recommend meds to beds. [facility] and/or home care support for medication management.                                              Discharge Plan:  Discharge needs: insulin pens and 4mm pen needles and glucometer/strips/lancets         Teaching Time Diabetes Education:  15 minutes     Electronically signed by Sonia Weinstein on 12/30/2021 at 3:40 PM

## 2021-12-30 NOTE — PROGRESS NOTES
CLINICAL PHARMACY NOTE: MEDS TO BEDS    Discharge medications are ready in inpatient pharmacy for weekend delivery.     12/30/21 4:04 PM

## 2021-12-30 NOTE — PLAN OF CARE
Problem: Falls - Risk of:  Goal: Will remain free from falls  Description: Will remain free from falls  Outcome: Ongoing  Goal: Absence of physical injury  Description: Absence of physical injury  Outcome: Ongoing     Problem: Pain:  Description: Pain management should include both nonpharmacologic and pharmacologic interventions. Goal: Pain level will decrease  Description: Pain level will decrease  Outcome: Ongoing  Goal: Control of acute pain  Description: Control of acute pain  Outcome: Ongoing  Goal: Control of chronic pain  Description: Control of chronic pain  Outcome: Ongoing     Problem: Discharge Planning:  Goal: Participates in care planning  Description: Participates in care planning  Outcome: Ongoing  Goal: Discharged to appropriate level of care  Description: Discharged to appropriate level of care  Outcome: Ongoing     Problem:  Bowel Function - Altered:  Goal: Bowel elimination is within specified parameters  Description: Bowel elimination is within specified parameters  Outcome: Ongoing     Problem: Cardiac Output - Decreased:  Goal: Hemodynamic stability will improve  Description: Hemodynamic stability will improve  Outcome: Ongoing     Problem: Fluid Volume - Imbalance:  Goal: Absence of imbalanced fluid volume signs and symptoms  Description: Absence of imbalanced fluid volume signs and symptoms  Outcome: Ongoing  Goal: Will remain free of signs and symptoms of dehydration  Description: Will remain free of signs and symptoms of dehydration  Outcome: Ongoing     Problem: Serum Glucose Level - Abnormal:  Goal: Ability to maintain appropriate glucose levels will improve to within specified parameters  Description: Ability to maintain appropriate glucose levels will improve to within specified parameters  Outcome: Ongoing  Goal: Ability to maintain appropriate glucose levels will improve  Description: Ability to maintain appropriate glucose levels will improve  Outcome: Ongoing     Problem: Tissue Perfusion, Altered:  Goal: Circulatory function within specified parameters  Description: Circulatory function within specified parameters  Outcome: Ongoing     Problem: Tissue Perfusion - Cardiopulmonary, Altered:  Goal: Absence of angina  Description: Absence of angina  Outcome: Ongoing  Goal: Hemodynamic stability will improve  Description: Hemodynamic stability will improve  Outcome: Ongoing     Problem: Urinary Elimination:  Goal: Signs and symptoms of infection will decrease  Description: Signs and symptoms of infection will decrease  Outcome: Ongoing  Goal: Complications related to the disease process, condition or treatment will be avoided or minimized  Description: Complications related to the disease process, condition or treatment will be avoided or minimized  Outcome: Ongoing     Problem: Injury - Acid Base Imbalance:  Goal: Acid-base balance  Description: Acid-base balance  Outcome: Ongoing     Problem: Tissue Perfusion - Renal, Altered:  Goal: Electrolytes within specified parameters  Description: Electrolytes within specified parameters  Outcome: Ongoing  Goal: Urine creatinine clearance will be within specified parameters  Description: Urine creatinine clearance will be within specified parameters  Outcome: Ongoing  Goal: Serum creatinine will be within specified parameters  Description: Serum creatinine will be within specified parameters  Outcome: Ongoing  Goal: Ability to achieve a balanced intake and output will improve  Description: Ability to achieve a balanced intake and output will improve  Outcome: Ongoing     Problem: Nutrition  Goal: Optimal nutrition therapy  Outcome: Ongoing     Problem:  Activity:  Goal: Risk for activity intolerance will decrease  Description: Risk for activity intolerance will decrease  Outcome: Ongoing     Problem: Coping:  Goal: Ability to adjust to condition or change in health will improve  Description: Ability to adjust to condition or change in health will improve  Outcome: Ongoing     Problem: Fluid Volume:  Goal: Ability to maintain a balanced intake and output will improve  Description: Ability to maintain a balanced intake and output will improve  Outcome: Ongoing     Problem: Health Behavior:  Goal: Ability to identify and utilize available resources and services will improve  Description: Ability to identify and utilize available resources and services will improve  Outcome: Ongoing  Goal: Ability to manage health-related needs will improve  Description: Ability to manage health-related needs will improve  Outcome: Ongoing     Problem: Metabolic:  Goal: Ability to maintain appropriate glucose levels will improve  Description: Ability to maintain appropriate glucose levels will improve  Outcome: Ongoing     Problem: Nutritional:  Goal: Maintenance of adequate nutrition will improve  Description: Maintenance of adequate nutrition will improve  Outcome: Ongoing  Goal: Progress toward achieving an optimal weight will improve  Description: Progress toward achieving an optimal weight will improve  Outcome: Ongoing     Problem: Physical Regulation:  Goal: Complications related to the disease process, condition or treatment will be avoided or minimized  Description: Complications related to the disease process, condition or treatment will be avoided or minimized  Outcome: Ongoing  Goal: Diagnostic test results will improve  Description: Diagnostic test results will improve  Outcome: Ongoing     Problem: Skin Integrity:  Goal: Risk for impaired skin integrity will decrease  Description: Risk for impaired skin integrity will decrease  Outcome: Ongoing     Problem: Tissue Perfusion:  Goal: Adequacy of tissue perfusion will improve  Description: Adequacy of tissue perfusion will improve  Outcome: Ongoing

## 2021-12-30 NOTE — PROGRESS NOTES
Occupational Therapy  Facility/Department: [facility] 6X PROGRESSIVE CARE  Daily Treatment Note  NAME: Attila Castorena  : 1970  MRN: [id number]    Date of Service: 2021    Discharge Recommendations:  Home with assist PRN,Home with Home health OT     Attila Castorena scored a 20/24 on the AM-PAC ADL Inpatient form. Current research shows that an AM-PAC score of 18 or greater is typically associated with a discharge to the patient's home setting. Based on the patient's AM-PAC score, and their current ADL deficits, it is recommended that the patient have 2-3 sessions per week of Occupational Therapy at d/c to increase the patient's independence. At this time, this patient demonstrates the endurance and safety to discharge home with [facility] and a follow up treatment frequency of 2-3x/wk. Please see assessment section for further patient specific details. If patient discharges prior to next session this note will serve as a discharge summary. Please see below for the latest assessment towards goals. Assessment   Performance deficits / Impairments: Decreased functional mobility ; Decreased strength;Decreased endurance;Decreased ADL status; Decreased safe awareness;Decreased high-level IADLs;Decreased balance  Assessment: Pt tolerated session well this date. Pt is on 1L O2 via NC. Pt performed bed mobility mod I and fxl mob/transfers with no device SBA/SUP. No LOB or signficant signs of fatigue. Pt performed seated grooming tasks mod I. Pt has demo'd improvements and anticipate pt to be safe to return home with assist PRN and home OT to faciliate safe transition home. Will continue to follow during acute hospitalization to maximize safety and independence.   Prognosis: Good  OT Education: OT Role;Transfer Training;Plan of Care;ADL Adaptive Strategies  REQUIRES OT FOLLOW UP: Yes  Activity Tolerance  Activity Tolerance: Patient Tolerated treatment well;Patient limited by fatigue  Safety Devices  Safety Devices in place: Yes  Type of devices: Call light within reach;Gait belt;Nurse notified; Left in chair;Chair alarm in place         Patient Diagnosis(es): The primary encounter diagnosis was Diabetic ketoacidosis without coma associated with diabetes mellitus due to underlying condition ([facility] [address] Diagnoses of Metabolic acidosis, Hypotension, unspecified hypotension type, Hyperglycemia, Lymphocytosis, and DKA, type 2, not at goal [facility]) were also pertinent to this visit. has a past medical history of H/O degenerative disc disease. has no past surgical history on file. Restrictions  Restrictions/Precautions  Restrictions/Precautions: Fall Risk  Position Activity Restriction  Other position/activity restrictions: O2 1L via NC  Subjective   General  Chart Reviewed: Yes,Orders,Progress Notes  Patient assessed for rehabilitation services?: Yes  Additional Pertinent Hx: 55 y/o male admitted 12/12/2021 with DKA, severe acute pancreatitis, acute kidney injury, and shock. Response to previous treatment: Patient with no complaints from previous session  Family / Caregiver Present: No  Referring Practitioner: MICKI Rainey CNP  Diagnosis: DKA, severe acute pancreatitis  Subjective  Subjective: Pt met BS, in bed. Pt agreeable to OT. Pt denied pain.   General Comment  Comments: Per RN ok for therapy      Orientation  Orientation  Overall Orientation Status: Within Functional Limits  Objective    ADL  Grooming: Modified independent  (Brushed teeth and washed face seated at sink)  Toileting:  (Declined toileting needs)        Balance  Sitting Balance: Supervision  Standing Balance: Stand by assistance  Functional Mobility  Functional - Mobility Device: No device  Activity: To/from bathroom  Assist Level: Stand by assistance  Functional Mobility Comments: No LOB or signficant signs of fatigue  Bed mobility  Supine to Sit: Modified independent  Sit to Supine: Unable to assess (in recliner at end of session)  Transfers  Sit to stand: Stand by assistance;Supervision  Stand to sit: Stand by assistance;Supervision  Transfer Comments: to/from no device ; from bed and standard arm chair     Cognition  Overall Cognitive Status: [address] [place]  Times per week: 3-5  Times per day: Daily  Current Treatment Recommendations: Vassie Fall Training,Gait Training,Functional Mobility Training,Self-Care / ADL,ROM,Patient/Caregiver Education & Training,Safety Education & Training,Equipment Evaluation, Education, & procurement    AM-PAC Score        AM-PAC Inpatient Daily Activity Raw Score: 20 (12/30/21 1230)  AM-PAC Inpatient ADL T-Scale Score : 42.03 (12/30/21 1230)  ADL Inpatient CMS 0-100% Score: 38.32 (12/30/21 1230)  ADL Inpatient CMS G-Code Modifier : Jason Negrete (12/30/21 1230)    Goals  Short term goals  Time Frame for Short term goals: Prior to DC.  Status: goals ongoing  Short term goal 1: Pt will complete ADL transfers/mobility with supervision  Short term goal 2: Pt will complete toileting with supervision  Short term goal 3: Pt will complete LB dressing with supervision  Short term goal 4: Pt will tolerate standing > 5 min for functional task with supervision  Short term goal 5: Pt will complete UE exercises in all planes to inc strength/endurance  Patient Goals   Patient goals : to return home       Therapy Time   Individual Concurrent Group Co-treatment   Time In 1210         Time Out 1237         Minutes 27         Timed Code Treatment Minutes: [address] [facility] [address], OT   Electronically signed by Karly Garcia OTR/L  License # [id number]

## 2021-12-30 NOTE — PROGRESS NOTES
Nephrology Progress Note   [facility]. [facility]      Chief Complaint: Nausea/vomiting/pancreatitis    History of Present Illness: Mr Wilma Greer is a is a 54 y.o. male with no significant past medical history who presents to [facility] with increased thirst, urination, nausea, and vomiting. He was noted to have a BS level of 900 mg/dl. CT scan abdomen and pelvis revealed severe acute Pancreatitis. He was Hyperkalemic, with serum HCO level of 5 and creatinine of 3.4 mg/dl. Patient was started on insulin gtt,treated for DKA     Events noted  . ROS: No CP/  WATT / SOB   PMFSH:  medications reviewed.     Physical Exam:    /66   Pulse 81   Temp 98.7 °F (37.1 °C)   Resp 20   Ht 6' 1\" (1.854 m)   Wt 290 lb 2 oz (131.6 kg)   SpO2 93%   BMI 38.28 kg/m²       Intake/Output Summary (Last 24 hours) at 12/30/2021 1501  Last data filed at 12/30/2021 1230  Gross per 24 hour   Intake 841.71 ml   Output 2800 ml   Net -1958.29 ml         Patient Vitals for the past 96 hrs (Last 3 readings):   Weight   12/30/21 0317 290 lb 2 oz (131.6 kg)   12/29/21 0419 291 lb 3.6 oz (132.1 kg)   12/28/21 0431 294 lb 12.1 oz (133.7 kg)       General: in bed   Chest: diminished at bases, otherwise clear    CVS: RRR, no murmur, no rub  Abdomen: less tenderness   Extremities: trace edema         LAB DATA:    CBC:   Lab Results   Component Value Date    WBC 6.5 12/30/2021    RBC 3.00 12/30/2021    HGB 9.0 12/30/2021    HCT 27.3 12/30/2021    MCV 91.0 12/30/2021    MCH 29.9 12/30/2021    MCHC 32.8 12/30/2021    RDW 13.3 12/30/2021     12/30/2021    MPV 7.7 12/30/2021     BMP:    Lab Results   Component Value Date     12/30/2021    K 4.0 12/30/2021    K 4.2 12/12/2021    CL 96 12/30/2021    CO2 30 12/30/2021    BUN 14 12/30/2021    CREATININE 1.6 12/30/2021    CALCIUM 9.0 12/30/2021    GFRAA 56 12/30/2021    LABGLOM 46 12/30/2021    GLUCOSE 157 12/30/2021     Ionized Calcium:  No results found for: IONCA  Magnesium:    Lab Results Component Value Date    MG 1.60 12/25/2021     Phosphorus:    Lab Results   Component Value Date    PHOS 4.0 12/30/2021     U/A:    Lab Results   Component Value Date    COLORU YELLOW 12/12/2021    PHUR 5.0 12/12/2021    WBCUA 2 12/12/2021    RBCUA 0-2 12/12/2021    CLARITYU CLOUDY 12/12/2021    SPECGRAV 1.023 12/12/2021    LEUKOCYTESUR Negative 12/12/2021    UROBILINOGEN 0.2 12/12/2021    BILIRUBINUR MODERATE 12/12/2021    BLOODU LARGE 12/12/2021    GLUCOSEU >=1000 12/12/2021         IMPRESSION/RECOMMENDATIONS:      1. CHAPIN - likely prerenal/ATN - occurring in the setting of DKA/pancreatitis. - creatinine stable . Tolerating diuretics . K dose adjusted     2. Edema    -stable     3. Hypokalemia   - on supp      Dc plan per admitting team . Advised to follow up with Dr Jim Greenwood 2-3 wks .         Rina Gonzalez MD FACP

## 2021-12-31 VITALS
SYSTOLIC BLOOD PRESSURE: 100 MMHG | WEIGHT: 281.31 LBS | DIASTOLIC BLOOD PRESSURE: 66 MMHG | HEART RATE: 103 BPM | HEIGHT: 73 IN | TEMPERATURE: 98 F | OXYGEN SATURATION: 95 % | BODY MASS INDEX: 37.28 KG/M2 | RESPIRATION RATE: 16 BRPM

## 2021-12-31 LAB
ALBUMIN SERPL-MCNC: 3.2 G/DL (ref 3.4–5)
ANION GAP SERPL CALCULATED.3IONS-SCNC: 12 MMOL/L (ref 3–16)
BASOPHILS ABSOLUTE: 0.1 K/UL (ref 0–0.2)
BASOPHILS RELATIVE PERCENT: 1.2 %
BUN BLDV-MCNC: 15 MG/DL (ref 7–20)
CALCIUM SERPL-MCNC: 9 MG/DL (ref 8.3–10.6)
CHLORIDE BLD-SCNC: 94 MMOL/L (ref 99–110)
CO2: 28 MMOL/L (ref 21–32)
CREAT SERPL-MCNC: 1.9 MG/DL (ref 0.9–1.3)
EOSINOPHILS ABSOLUTE: 0.2 K/UL (ref 0–0.6)
EOSINOPHILS RELATIVE PERCENT: 2.6 %
GFR AFRICAN AMERICAN: 46
GFR NON-AFRICAN AMERICAN: 38
GLUCOSE BLD-MCNC: 111 MG/DL (ref 70–99)
GLUCOSE BLD-MCNC: 95 MG/DL (ref 70–99)
GLUCOSE BLD-MCNC: 99 MG/DL (ref 70–99)
HCT VFR BLD CALC: 28.2 % (ref 40.5–52.5)
HEMOGLOBIN: 9.4 G/DL (ref 13.5–17.5)
LYMPHOCYTES ABSOLUTE: 2.8 K/UL (ref 1–5.1)
LYMPHOCYTES RELATIVE PERCENT: 41.5 %
MCH RBC QN AUTO: 30 PG (ref 26–34)
MCHC RBC AUTO-ENTMCNC: 33.3 G/DL (ref 31–36)
MCV RBC AUTO: 90.1 FL (ref 80–100)
MONOCYTES ABSOLUTE: 0.8 K/UL (ref 0–1.3)
MONOCYTES RELATIVE PERCENT: 11.3 %
NEUTROPHILS ABSOLUTE: 3 K/UL (ref 1.7–7.7)
NEUTROPHILS RELATIVE PERCENT: 43.4 %
PDW BLD-RTO: 12.8 % (ref 12.4–15.4)
PERFORMED ON: NORMAL
PERFORMED ON: NORMAL
PHOSPHORUS: 4.8 MG/DL (ref 2.5–4.9)
PLATELET # BLD: 432 K/UL (ref 135–450)
PMV BLD AUTO: 7.3 FL (ref 5–10.5)
POTASSIUM SERPL-SCNC: 4.1 MMOL/L (ref 3.5–5.1)
RBC # BLD: 3.13 M/UL (ref 4.2–5.9)
SODIUM BLD-SCNC: 134 MMOL/L (ref 136–145)
WBC # BLD: 6.9 K/UL (ref 4–11)

## 2021-12-31 PROCEDURE — 6360000002 HC RX W HCPCS: Performed by: INTERNAL MEDICINE

## 2021-12-31 PROCEDURE — 6370000000 HC RX 637 (ALT 250 FOR IP): Performed by: INTERNAL MEDICINE

## 2021-12-31 PROCEDURE — 94680 O2 UPTK RST&XERS DIR SIMPLE: CPT

## 2021-12-31 PROCEDURE — 6370000000 HC RX 637 (ALT 250 FOR IP): Performed by: PHYSICIAN ASSISTANT

## 2021-12-31 PROCEDURE — 2580000003 HC RX 258: Performed by: NURSE PRACTITIONER

## 2021-12-31 PROCEDURE — 97530 THERAPEUTIC ACTIVITIES: CPT | Performed by: PHYSICAL THERAPIST

## 2021-12-31 PROCEDURE — 2700000000 HC OXYGEN THERAPY PER DAY

## 2021-12-31 PROCEDURE — 6370000000 HC RX 637 (ALT 250 FOR IP): Performed by: SURGERY

## 2021-12-31 PROCEDURE — 80069 RENAL FUNCTION PANEL: CPT

## 2021-12-31 PROCEDURE — 94760 N-INVAS EAR/PLS OXIMETRY 1: CPT

## 2021-12-31 PROCEDURE — 85025 COMPLETE CBC W/AUTO DIFF WBC: CPT

## 2021-12-31 PROCEDURE — 36415 COLL VENOUS BLD VENIPUNCTURE: CPT

## 2021-12-31 PROCEDURE — 97116 GAIT TRAINING THERAPY: CPT | Performed by: PHYSICAL THERAPIST

## 2021-12-31 RX ADMIN — FENOFIBRATE 160 MG: 160 TABLET ORAL at 09:48

## 2021-12-31 RX ADMIN — DOCUSATE SODIUM 100 MG: 100 CAPSULE ORAL at 09:43

## 2021-12-31 RX ADMIN — BISACODYL 10 MG: 5 TABLET, COATED ORAL at 09:43

## 2021-12-31 RX ADMIN — SODIUM CHLORIDE, PRESERVATIVE FREE 10 ML: 5 INJECTION INTRAVENOUS at 09:49

## 2021-12-31 RX ADMIN — POTASSIUM CHLORIDE 20 MEQ: 1500 TABLET, EXTENDED RELEASE ORAL at 09:43

## 2021-12-31 RX ADMIN — PANTOPRAZOLE SODIUM 40 MG: 40 TABLET, DELAYED RELEASE ORAL at 06:35

## 2021-12-31 RX ADMIN — TORSEMIDE 20 MG: 20 TABLET ORAL at 09:43

## 2021-12-31 RX ADMIN — INSULIN GLARGINE 30 UNITS: 100 INJECTION, SOLUTION SUBCUTANEOUS at 09:49

## 2021-12-31 RX ADMIN — HEPARIN SODIUM 5000 UNITS: 5000 INJECTION INTRAVENOUS; SUBCUTANEOUS at 09:42

## 2021-12-31 ASSESSMENT — PAIN SCALES - WONG BAKER
WONGBAKER_NUMERICALRESPONSE: 0

## 2021-12-31 ASSESSMENT — PAIN SCALES - GENERAL
PAINLEVEL_OUTOF10: 0

## 2021-12-31 NOTE — PROGRESS NOTES
Nephrology Progress Note   [facility]. [facility]      Chief Complaint: Nausea/vomiting/pancreatitis    History of Present Illness: Mr Zo Carlisle is a is a 54 y.o. male with no significant past medical history who presents to [facility] with increased thirst, urination, nausea, and vomiting. He was noted to have a BS level of 900 mg/dl. CT scan abdomen and pelvis revealed severe acute Pancreatitis. He was Hyperkalemic, with serum HCO level of 5 and creatinine of 3.4 mg/dl. Patient was started on insulin gtt,treated for DKA     Events noted  . ROS: No CP/  WATT / SOB   PMFSH:  medications reviewed.     Physical Exam:    BP (!) 83/57   Pulse 68   Temp 98.2 °F (36.8 °C) (Oral)   Resp 18   Ht 6' 1\" (1.854 m)   Wt 281 lb 4.9 oz (127.6 kg)   SpO2 93%   BMI 37.11 kg/m²       Intake/Output Summary (Last 24 hours) at 12/31/2021 1455  Last data filed at 12/31/2021 1212  Gross per 24 hour   Intake 780 ml   Output 3200 ml   Net -2420 ml         Patient Vitals for the past 96 hrs (Last 3 readings):   Weight   12/31/21 0440 281 lb 4.9 oz (127.6 kg)   12/30/21 0317 290 lb 2 oz (131.6 kg)   12/29/21 0419 291 lb 3.6 oz (132.1 kg)       General: in bed   Chest: diminished at bases, otherwise clear    CVS: RRR, no murmur, no rub  Abdomen: less tenderness   Extremities: trace edema         LAB DATA:    CBC:   Lab Results   Component Value Date    WBC 6.9 12/31/2021    RBC 3.13 12/31/2021    HGB 9.4 12/31/2021    HCT 28.2 12/31/2021    MCV 90.1 12/31/2021    MCH 30.0 12/31/2021    MCHC 33.3 12/31/2021    RDW 12.8 12/31/2021     12/31/2021    MPV 7.3 12/31/2021     BMP:    Lab Results   Component Value Date     12/31/2021    K 4.1 12/31/2021    K 4.2 12/12/2021    CL 94 12/31/2021    CO2 28 12/31/2021    BUN 15 12/31/2021    CREATININE 1.9 12/31/2021    CALCIUM 9.0 12/31/2021    GFRAA 46 12/31/2021    LABGLOM 38 12/31/2021    GLUCOSE 111 12/31/2021     Ionized Calcium:  No results found for: IONCA  Magnesium:    Lab Results   Component Value Date    MG 1.60 12/25/2021     Phosphorus:    Lab Results   Component Value Date    PHOS 4.8 12/31/2021     U/A:    Lab Results   Component Value Date    COLORU YELLOW 12/12/2021    PHUR 5.0 12/12/2021    WBCUA 2 12/12/2021    RBCUA 0-2 12/12/2021    CLARITYU CLOUDY 12/12/2021    SPECGRAV 1.023 12/12/2021    LEUKOCYTESUR Negative 12/12/2021    UROBILINOGEN 0.2 12/12/2021    BILIRUBINUR MODERATE 12/12/2021    BLOODU LARGE 12/12/2021    GLUCOSEU >=1000 12/12/2021         IMPRESSION/RECOMMENDATIONS:      1. CHAPIN - likely prerenal/ATN - occurring in the setting of DKA/pancreatitis. - creatinine stable . Tolerating diuretics . K dose adjusted. Cr 1.9 today ,started back on diuretics     2. Edema    -stable     3. Hypokalemia   - on supp      Dc plan per admitting team .   Check labs next  Wk. Advised to follow up with Dr Bryant Monzon in 2-3 wks .         Kath Lopez MD FACP

## 2021-12-31 NOTE — CARE COORDINATION
Case Management Assessment            Discharge Note                    Date / Time of Note: 12/31/2021 1:30 PM                  Discharge Note Completed by: Elizabeth Martino RN     Pt needs a ride home. [facility] transport arranged for 1630/ Meds to Beds. Patient Name: Ade Moran   YOB: 1970  Diagnosis: Lymphocytosis [K37.981]  Metabolic acidosis [X34.3]  Hyperglycemia [R73.9]  DKA, type 2, not at goal [facility]) [E11.10]  Hypotension, unspecified hypotension type [I95.9]  Diabetic ketoacidosis without coma associated with diabetes mellitus due to underlying condition ([facility] [address] [E08.10]   Date / Time: 12/12/2021  9:08 AM    Current PCP: [address] Directives:  Code Status: Full Code    Financial:  Payor: [address] [place] [address] [address] / Plan: Davene [employer] / Product Type: *No Product type* /      Pharmacy:    [address] [place], [place] [id number], [address] [facility] [phone] Jennifer Carson [phone]  [address] [facility] [address]  Phone: [phone] Fax: [phone]      Does patient want to participate in local refill/ meds to beds program?: Yes    Meds To Beds General Rules:  1. Can ONLY be done Monday- Friday between 8:30am-5pm  2. Prescription(s) must be in pharmacy by 3pm to be filled same day  3. Copy of patient's insurance/ prescription drug card and patient face sheet must be sent along with the prescription(s)  4. Cost of Rx cannot be added to hospital bill. If financial assistance is needed, please contact unit  or ;  or  CANNOT provide pharmacy voucher for patients co-pays  5.  Patients can then  the prescription on their way out of the hospital at discharge, or pharmacy can deliver to the bedside if staff is available. (payment due at time of pick-up or delivery - cash, check, or card accepted)     Able to afford home medications/ co-pay costs: Yes    ADLS:  Current PT AM-PAC Score: 23 /24  Current OT AM-PAC Score: 20 /24      DISCHARGE Disposition: Home no services. Active with a CM from [employer]:  Transportation PLAN for discharge: [facility]   Mode of Transport: Private Car      The Patient and/or patient representative Marlee Spurling and his family were provided with a choice of provider and agrees with the discharge plan Yes    Freedom of choice list was provided with basic dialogue that supports the patient's individualized plan of care/goals and shares the quality data associated with the providers.  Yes      Wilmar Billy RN  Case Management  [phone]

## 2021-12-31 NOTE — DISCHARGE INSTR - COC
Continuity of Care Form    Patient Name: Ophelia Chau   :  1970  MRN:  [id number]    Admit date:  2021  Discharge date: 2021    Code Status Order: Full Code   Advance Directives:      Admitting Physician:  Tonnie Fleischer, MD  PCP: [id number] [facility] [address]    Discharging Nurse: Tobin Bar Unit/Room#: [address] Phone Number: [phone]    Emergency Contact:   Extended Emergency Contact Information  Primary Emergency Contact: Deanna Barillas, [address] [facility] Phone: [phone]  Relation: Parent  Secondary Emergency Contact: Hussein Sánchez  Relation: Brother/Sister    Past Surgical History:  No past surgical history on file.     Immunization History:   Immunization History   Administered Date(s) Administered    COVID-19, Pfizer, PF, 30mcg/0.3mL 2021, 04/15/2021, 2021       Active Problems:  Patient Active Problem List   Diagnosis Code    Back pain M54.9    DKA, type 2, not at goal [facility]) E11.10    Acute pancreatitis K85.90    Acute kidney injury ([facility] [address] N17.9    Schizophrenia ([facility] [address] F20.9       Isolation/Infection:   Isolation            No Isolation          Patient Infection Status       Infection Onset Added Last Indicated Last Indicated By Review Planned Expiration Resolved Resolved By    None active    Resolved    COVID-19 (Rule Out) 21 COVID-19, Rapid (Ordered)   21 Rule-Out Test Resulted            Nurse Assessment:  Last Vital Signs: /66   Pulse 103   Temp 98 °F (36.7 °C) (Oral)   Resp 16   Ht 6' 1\" (1.854 m)   Wt 281 lb 4.9 oz (127.6 kg)   SpO2 95%   BMI 37.11 kg/m²     Last documented pain score (0-10 scale): Pain Level: 0  Last Weight:   Wt Readings from Last 1 Encounters:   21 281 lb 4.9 oz (127.6 kg)     Mental Status:  oriented, alert, coherent, logical, thought processes intact, and able to concentrate and follow conversation    IV Access:  - None    Nursing Mobility/ADLs:  Walking Independent  Transfer  Independent  Bathing  Independent  Dressing  Independent  [address] Delivery   whole    Wound Care Documentation and Therapy:        Elimination:  Continence: Bowel: Yes  Bladder: Yes  Urinary Catheter: None   Colostomy/Ileostomy/Ileal Conduit: No       Date of Last BM:    Intake/Output Summary (Last 24 hours) at 12/31/2021 1709  Last data filed at 12/31/2021 1212  Gross per 24 hour   Intake 540 ml   Output 2800 ml   Net -2260 ml     I/O last 3 completed shifts: In: 80 [P.O.:780]  Out: 3200 [Urine:3200]    Safety Concerns:     None    Impairments/Disabilities:      None    Nutrition Therapy:  Current Nutrition Therapy:   - Oral Diet:  General and Carb Control 5 carbs/meal (2000kcals/day)    Routes of Feeding: Oral  Liquids: Thin Liquids  Daily Fluid Restriction: no  Last Modified Barium Swallow with Video (Video Swallowing Test): not done    Treatments at the Time of Hospital Discharge:   Respiratory Treatments:   Oxygen Therapy:  is not on home oxygen therapy. Ventilator:    - No ventilator support    Rehab Therapies:  Weight Bearing Status/Restrictions: No weight bearing restirctions  Other Medical Equipment (for information only, NOT a DME order):    Other Treatments: ***    Patient's personal belongings (please select all that are sent with patient):  Personal clothing    RN SIGNATURE:  Electronically signed by Jess Alvarez RN on 12/31/21 at 5:17 PM EST    CASE MANAGEMENT/SOCIAL WORK SECTION    Inpatient Status Date: ***    Readmission Risk Assessment Score:  Readmission Risk              Risk of Unplanned Readmission:  0           Discharging to Facility/ Agency   Name:   Address:  Phone:  Fax:    Dialysis Facility (if applicable)   Name:  Address:  Dialysis Schedule:  Phone:  Fax:    / signature: {Esignature:[id number]}    PHYSICIAN SECTION    Prognosis: {Prognosis:6348846157}    Condition at Discharge: [address] [facility] Patient Condition:[id number]}    Rehab Potential (if transferring to Rehab): {Prognosis:7113104408}    Recommended Labs or Other Treatments After Discharge: ***    Physician Certification: I certify the above information and transfer of Emre Uribe  is necessary for the continuing treatment of the diagnosis listed and that he requires {Admit to Appropriate Level of Care:44950} for {GREATER/LESS:089120811} 30 days.      Update Admission H&P: {CHP DME Changes in [facility]:730469158}    PHYSICIAN SIGNATURE:  {Esignature:847356988}

## 2021-12-31 NOTE — PLAN OF CARE
Problem: Falls - Risk of:  Goal: Will remain free from falls  Description: Will remain free from falls  Outcome: Met This Shift  Goal: Absence of physical injury  Description: Absence of physical injury  Outcome: Met This Shift     Problem: Pain:  Description: Pain management should include both nonpharmacologic and pharmacologic interventions. Goal: Pain level will decrease  Description: Pain level will decrease  Outcome: Met This Shift  Goal: Control of acute pain  Description: Control of acute pain  Outcome: Met This Shift     Problem: Discharge Planning:  Goal: Discharged to appropriate level of care  Description: Discharged to appropriate level of care  Outcome: Met This Shift     Problem:  Bowel Function - Altered:  Goal: Bowel elimination is within specified parameters  Description: Bowel elimination is within specified parameters  Outcome: Met This Shift     Problem: Cardiac Output - Decreased:  Goal: Hemodynamic stability will improve  Description: Hemodynamic stability will improve  Outcome: Met This Shift     Problem: Fluid Volume - Imbalance:  Goal: Absence of imbalanced fluid volume signs and symptoms  Description: Absence of imbalanced fluid volume signs and symptoms  Outcome: Met This Shift  Goal: Will remain free of signs and symptoms of dehydration  Description: Will remain free of signs and symptoms of dehydration  Outcome: Met This Shift     Problem: Serum Glucose Level - Abnormal:  Goal: Ability to maintain appropriate glucose levels will improve to within specified parameters  Description: Ability to maintain appropriate glucose levels will improve to within specified parameters  Outcome: Met This Shift  Goal: Ability to maintain appropriate glucose levels will improve  Description: Ability to maintain appropriate glucose levels will improve  Outcome: Met This Shift     Problem: Tissue Perfusion, Altered:  Goal: Circulatory function within specified parameters  Description: Circulatory function within specified parameters  Outcome: Met This Shift     Problem: Tissue Perfusion - Cardiopulmonary, Altered:  Goal: Absence of angina  Description: Absence of angina  Outcome: Met This Shift     Problem: Urinary Elimination:  Goal: Signs and symptoms of infection will decrease  Description: Signs and symptoms of infection will decrease  Outcome: Met This Shift     Problem: Nutrition  Goal: Optimal nutrition therapy  Outcome: Met This Shift     Problem: Skin Integrity:  Goal: Risk for impaired skin integrity will decrease  Description: Risk for impaired skin integrity will decrease  Outcome: Met This Shift

## 2021-12-31 NOTE — PROGRESS NOTES
Physical Therapy  Facility/Department: [address] PROGRESSIVE CARE  Daily Treatment Note  NAME: Attila Castorena  : 1970  MRN: [id number]    Date of Service: 2021    Discharge Recommendations:  Home with assist PRN,2-3 sessions per week   PT Equipment Recommendations  Equipment Needed: No  Attila Castorena scored a 23/24 on the AM-PAC short mobility form. Current research shows that an AM-PAC score of 18 or greater is typically associated with a discharge to the patient's home setting. Based on the patient's AM-PAC score and their current functional mobility deficits, it is recommended that the patient have 2-3 sessions per week of Physical Therapy at d/c to increase the patient's independence. At this time, this patient demonstrates the endurance and safety to discharge home with home PT and a follow up treatment frequency of 2-3x/wk. Please see assessment section for further patient specific details. If patient discharges prior to next session this note will serve as a discharge summary. Please see below for the latest assessment towards goals. Assessment   Assessment: pt progressing well; was able to remove O2 for session and pt able to maintain his O2 sats in 90s during session; pt will be safe to return home however recommend home PT to ensure pt's endurance improves  Prognosis: Good  History: 53 y/o male admit 2021 with Acute Pancreatitis,CHAPIN, DKA and Shock. PMH insign otherwise. Patient Education: educated pt on need to take deeper breaths with activity to ensure O2 sats remain in 90's  REQUIRES PT FOLLOW UP: Yes  Activity Tolerance  Activity Tolerance: Patient Tolerated treatment well     Patient Diagnosis(es): The primary encounter diagnosis was Diabetic ketoacidosis without coma associated with diabetes mellitus due to underlying condition ([facility] [address]  Diagnoses of Metabolic acidosis, Hypotension, unspecified hypotension type, Hyperglycemia, Lymphocytosis, and DKA, type 2, not at goal [facility]) were also pertinent to this visit. has a past medical history of H/O degenerative disc disease. has no past surgical history on file. Social/Functional History  Lives With: Alone  Type of Home: Apartment  Home Layout: Two level,Laundry in basement (1 story with basement.)  Home Access: Stairs to enter with rails  Entrance Stairs - Number of Steps: Primarily enters garage then has 12 PATRICE to main level with bed/bathroom; or could go in front door with 1 PATRICE. Bathroom Shower/Tub: Tub/Shower unit  Bathroom Toilet:  (Comfort height toilet)  Bathroom Accessibility: Accessible  Home Equipment:  (No DME.)  Receives Help From: Home health ([facility] 5 days/week for 2-3 hours for house chores)  ADL Assistance: Independent  Homemaking Assistance: Independent  Ambulation Assistance: Independent (Without assist device pta.)  Transfer Assistance: Independent  Active : Yes  Occupation: On disability  Additional Comments: [de-identified] y/o father and brother stay in the same building. Restrictions  Restrictions/Precautions  Restrictions/Precautions: Fall Risk  Position Activity Restriction  Other position/activity restrictions: pt was on 1 liter upon entering room and O2 sats 95%; discussed with nursing and O2 removed for PT session  Subjective   General  Chart Reviewed: Yes  Additional Pertinent Hx: 53 y/o male admit 12/12/2021 with Acute Pancreatitis,CHAPIN, DKA and Shock. PMH insign otherwise. Response To Previous Treatment: Patient with no complaints from previous session. Family / Caregiver Present: No  Referring Practitioner: Anna Curiel NP.   Subjective  Subjective: pt agreeable to getting up with therapy; denies any pain; pt declined sitting up after session therefore returned to bed  General Comment  Comments: pt had one episode while sitting EOB when O2 decreased to 88%; remined pt that periodically he needs to take some deeper breaths to maintain O2 sats as pt was breathing very shallow Orientation  Orientation  Overall Orientation Status: Within Functional Limits  Cognition   Cognition  Overall Cognitive Status: WFL  Objective   Bed mobility  Supine to Sit: Independent  Sit to Supine: Independent  Transfers  Sit to Stand: Independent  Stand to sit: Independent  Ambulation  Ambulation?: Yes  Ambulation 1  Surface: level tile  Device: No Device  Assistance: Supervision;Modified Independent  Quality of Gait: slow pace but no LOB  Distance: 180'  Comments: pt maintained O2 sats in 90s during walking and remained at 94% after sitting on EOB after walking; per nurse O2 left off and she will check it to ensure O2 sats remain >90%     Balance  Sitting - Static: Good  Sitting - Dynamic: Good  Standing - Static: Good  Standing - Dynamic: Good                           G-Code     OutComes Score                                                     AM-PAC Score  AM-PAC Inpatient Mobility Raw Score : 23 (12/31/21 0826)  AM-PAC Inpatient T-Scale Score : 56.93 (12/31/21 0826)  Mobility Inpatient CMS 0-100% Score: 11.2 (12/31/21 0826)  Mobility Inpatient CMS G-Code Modifier : CI (12/31/21 0826)          Goals  Short term goals  Time Frame for Short term goals: Upon d/c acute care setting. (pt progressing but goals ongoing 12-16)  Short term goal 1: Bed Mob Supervision. MET 12-20  Short term goal 2: Transfers with/without assist device SBA. MET 12-20  Short term goal 3: Amb with/without assist device 50' SBA. - met however not yet managing O2 line -met; new goal amb ' without AD and without O2 SBA while maintaining O2 sats in 90s - met 12-31  Short term goal 4: Pt participating in approp Strength Exs. - met 12-28  Patient Goals   Patient goals : Return home. Plan    Plan  Times per week: 3-5x week while in acute care setting.   Current Treatment Recommendations: Functional Mobility Training  Safety Devices  Type of devices: Call light within reach,Left in bed,Nurse notified,All fall risk precautions in place  Restraints  Initially in place: No     Therapy Time   Individual Concurrent Group Co-treatment   Time In 0751         Time Out 0825         Minutes 34                 SYED HERNÁNDEZ PT    Electronically signed by SYED HERNÁNDEZ PT on 12/31/2021 at 8:26 AM

## 2021-12-31 NOTE — PROGRESS NOTES
Patient discharged to home. PICC line and heart monitor removed. Patient left floor via wheelchair and was transport home by [facility]. Patient was discharged without AVS Summary by mistake. Patient's sister, Joshua Esqueda, came into the hospital to  the AVS Summary. Discharge instructions and medications discussed with Cydney. Phone of diabetic nurse also provided to patient so he could contact her the following Monday if he has any further questions.

## 2021-12-31 NOTE — PROGRESS NOTES
Hospitalist Progress Note      PCP: [address] [place] [address]    Date of Admission: 12/12/2021    Subjective: still needing 1L O2    Medications:  Reviewed    Infusion Medications    sodium chloride Stopped (12/27/21 1131)    dextrose       Scheduled Medications    potassium chloride  20 mEq Oral Daily    insulin glargine  30 Units SubCUTAneous BID    torsemide  20 mg Oral Daily    insulin lispro  7 Units SubCUTAneous TID WC    insulin lispro  0-12 Units SubCUTAneous TID WC    insulin lispro  0-6 Units SubCUTAneous Nightly    bisacodyl  10 mg Oral Daily    sodium chloride flush  5-40 mL IntraVENous 2 times per day    pantoprazole  40 mg Oral BID AC    docusate sodium  100 mg Oral BID    fenofibrate  160 mg Oral Daily    heparin (porcine)  5,000 Units SubCUTAneous BID     PRN Meds: acetaminophen, sodium chloride flush, sodium chloride, ondansetron, glucose, dextrose, glucagon (rDNA), dextrose, dextrose      Intake/Output Summary (Last 24 hours) at 12/30/2021 2251  Last data filed at 12/30/2021 2025  Gross per 24 hour   Intake 780 ml   Output 3525 ml   Net -2745 ml       Physical Exam Performed:    /80   Pulse 96   Temp 97.9 °F (36.6 °C) (Oral)   Resp 16   Ht 6' 1\" (1.854 m)   Wt 290 lb 2 oz (131.6 kg)   SpO2 93%   BMI 38.28 kg/m²          General appearance: No apparent distress, appears stated age and cooperative. HEENT: Pupils equal, round, and reactive to light. Conjunctivae/corneas clear. Neck: Supple, with full range of motion. No jugular venous distention. Trachea midline. Respiratory:  Normal respiratory effort. Clear to auscultation, bilaterally without Rales/Wheezes/Rhonchi. Cardiovascular: Regular rate and rhythm with normal S1/S2 without murmurs, rubs or gallops. Abdomen: Soft, non-tender, non-distended with normal bowel sounds. Musculoskeletal: No clubbing, cyanosis or edema bilaterally.  Full range of motion without deformity.   Skin: Skin color, texture, turgor normal.  No rashes or lesions. Neurologic:  Neurovascularly intact without any focal sensory/motor deficits. Cranial nerves: II-XII intact, grossly non-focal.  Psychiatric: Alert and oriented, thought content appropriate, normal insight  Capillary Refill: Brisk,< 3 seconds   Peripheral Pulses: +2 palpable, equal bilaterally        Labs:   Recent Labs     12/28/21  0603 12/29/21  0620 12/30/21  0744   WBC 7.2 6.8 6.5   HGB 8.7* 8.7* 9.0*   HCT 26.2* 25.7* 27.3*    377 387     Recent Labs     12/28/21  0603 12/29/21  0620 12/30/21  0744    137 136   K 4.0 4.0 4.0   CL 98* 97* 96*   CO2 31 31 30   BUN 11 12 14   CREATININE 1.7* 1.7* 1.6*   CALCIUM 8.5 8.9 9.0   PHOS 4.0 4.1 4.0     No results for input(s): AST, ALT, BILIDIR, BILITOT, ALKPHOS in the last 72 hours. No results for input(s): INR in the last 72 hours. Recent Labs     12/29/21  1510   TROPONINI <0.01       Urinalysis:      Lab Results   Component Value Date    NITRU Negative 12/12/2021    WBCUA 2 12/12/2021    RBCUA 0-2 12/12/2021    BLOODU LARGE 12/12/2021    SPECGRAV 1.023 12/12/2021    GLUCOSEU >=1000 12/12/2021       Radiology:  XR CHEST PORTABLE   Final Result   Bibasilar airspace disease may represent atelectasis and/or pneumonia. CT CHEST WO CONTRAST   Final Result   Consolidation in the lung bases bilaterally that is similar compared to prior   examination concerning for pneumonia. Trace effusions. Hepatic steatosis. Fluid versus inflammation adjacent to the visualized pancreatic tail. RECOMMENDATIONS:   Unavailable         CT CHEST ABDOMEN PELVIS WO CONTRAST   Final Result   1. Findings remain consistent with acute pancreatitis. Evaluation is limited   due to the absence of contrast.   2. Findings concerning for diffuse small bowel ileus. 3. Significant atelectasis at the lung bases bilaterally.          XR CHEST PORTABLE   Final Result   New retrocardiac left lower lobe opacification, possibly infiltrate or atelectasis. CT ABDOMEN PELVIS WO CONTRAST Additional Contrast? Oral   Final Result   Persistent but decreased peripancreatic fluid and edema, in keeping with the   clinical diagnosis of pancreatitis      Increased body wall anasarca with increased pleural-parenchymal disease at   the lung bases, likely due to fluid overload      Fatty liver. Diverticulosis and normal caliber appendix      Subtle increased density is seen in the right femoral vein. It is uncertain   as whether not this is artifactual or due to a small amount of thrombus. Recommend correlation with lower extremity venous duplex ultrasound      RECOMMENDATIONS:   Unavailable         VL Extremity Venous Right   Final Result      US GALLBLADDER RUQ   Final Result   Pancreas not well visualized. This is likely due to the peripancreatic   inflammatory changes seen on recent CT. Hepatic steatosis. CT ABDOMEN PELVIS WO CONTRAST Additional Contrast? None   Final Result   1. Severe acute interstitial pancreatitis. No focal fluid collection. 2. Hepatomegaly with severe steatosis. 3. Possible gallbladder sludge. 4. Colonic diverticulosis without acute diverticulitis. 5. The distal esophagus is dilated and filled with fluid. RECOMMENDATIONS:   Unavailable         XR CHEST PORTABLE   Final Result   No radiographic evidence of acute pulmonary disease. Assessment/Plan:    Active Hospital Problems    Diagnosis     Schizophrenia ([facility] [address] [F20.9]     Acute kidney injury ([facility] [address] [N17.9]     Acute pancreatitis [K85.90]     DKA, type 2, not at goal [facility]) [E11.10]            DKA resolved - suspect undiagnosed type 2 DM  -check Hgb A1c - 12.4  -NPO - to full liquid to low fat diet per GI  -DKA protocol completed  -insulin gtt - to SQ insulin  - check CARI and islet cell Abs - both negative.               - lantus 35-->30 BID.                - on 12/18 I switched him to IV Humulin sliding scale and 5 units prand bolus if eating - BG responded very well               - switched back to SQ insulin 12/22 and doing well with this.             Severe interstitial pancreatitis - much improved clinically.   - s/p aggressive IVF              - IV merrem due to ongoing fevers - fever now resolved. -GI and general surgery consulted  -check RUQ U/S - pancreas not well visualized due to surrounding inflammatory changes.             - pt reports 3 beers and hard liquor shots prior to onset of pancreatitis, but denies chronic alcohol abuse. - repeat CT on 12/16 showed improvement of pancreatitis, but WBC continued to rise              - I resumed him on Vanc, merrem and diflucan and sent repeat blood Cx  On 12/18 - plan for 10 day course - last day of Abx tomorrow.                - WBC now steadily improving and actually returned to normal.             Septic Shock - resolved   Required levophed short term early in admission.   - see abx as above.            CHAPIN - Cr improving slowly  s/p IVF with bicarb - switched to NS per nephrology team.    S/p single dose lasix on 12/17 with good effect. - avoid nephrotoxic medications  - nephrology following  - again showing signs of fluid retention - stopped IVF 12/22.   - lasix IV daily - increase to BID on 12/24 - transition to PO torsemide  - additional torsemide this evening        Coffee ground emesis  -cont PPI  -GI following  -Hgb stabilized.         Obesity - BMI 38.68  -nutritional counseling provided  -weight loss encouraged  -complicating medical management        Hypocalcemia - s/p IV replete. Improved.         Constipation. CT 12/20 concerning for diffuse ileus. Started on reglan per GI team.   Movantik daily - started 12/20. Stop opiate pain control - pain resolved. Added dulcolax 10 daily    Pt refused miralax - makes him nauseated.            Acute Hypox Resp Failure not POA.    O2 requirement peaked at 6-7l/min and has been slowly improving  He is down to 2l/min now.   He failed home O2 eval today, so I pursued repeat CT chest to reevaluate pulm status. He has basilar consolidative changes and much improved pleural effusion. I think we should be able to wean him off O2 in the next 24-48hrs. Abx will finish by dc   Encourage ambulation and deep breathing exercises.            DVT Prophylaxis: Heparin  Diet: ADULT DIET;  Regular; 5 carb choices (75 gm/meal)  Code Status: Full Code    PT/OT Eval Status:ordered    Dispo - cont to wean O2, poss dc in 1-2 days when off Ishmael Power MD

## 2021-12-31 NOTE — PLAN OF CARE
Problem: Falls - Risk of:  Goal: Will remain free from falls  Description: Will remain free from falls  Outcome: Ongoing   Patient assessed for fall risk; fall precautions initiated. Patient and family instructed about safety devices. Environment kept free of clutter and adequate lighting provided. Bed locked and in lowest position. Call light within reach. Will continue to monitor. Problem: Falls - Risk of:  Goal: Absence of physical injury  Description: Absence of physical injury  Outcome: Ongoing     Problem: Pain:  Goal: Pain level will decrease  Description: Pain level will decrease  Outcome: Ongoing   Pain/discomfort being managed with PRN analgesics per MD orders. Pt able to express presence and absence of pain and rate pain appropriately using numerical scale. Problem: Pain:  Goal: Control of acute pain  Description: Control of acute pain  Outcome: Ongoing     Problem: Pain:  Goal: Control of chronic pain  Description: Control of chronic pain  Outcome: Ongoing     Problem:  Bowel Function - Altered:  Goal: Bowel elimination is within specified parameters  Description: Bowel elimination is within specified parameters  Outcome: Ongoing     Problem: Cardiac Output - Decreased:  Goal: Hemodynamic stability will improve  Description: Hemodynamic stability will improve  Outcome: Ongoing     Problem: Serum Glucose Level - Abnormal:  Goal: Ability to maintain appropriate glucose levels will improve to within specified parameters  Description: Ability to maintain appropriate glucose levels will improve to within specified parameters  Outcome: Ongoing     Problem: Serum Glucose Level - Abnormal:  Goal: Ability to maintain appropriate glucose levels will improve  Description: Ability to maintain appropriate glucose levels will improve  Outcome: Ongoing

## 2022-01-03 ENCOUNTER — TELEPHONE (OUTPATIENT)
Dept: PHARMACY | Age: 52
End: 2022-01-03

## 2022-01-03 NOTE — TELEPHONE ENCOUNTER
New Diabetes referral from the hospitalist.  Recommended by the Diabetes Educator. Reached out to schedule initial appointment. Left message to please return my call.      Vero Mcginnis, PharmD  [address] [facility]  Diabetes Service  [phone]

## 2022-01-13 ENCOUNTER — TELEPHONE (OUTPATIENT)
Dept: PHARMACY | Age: 52
End: 2022-01-13

## 2022-01-13 NOTE — TELEPHONE ENCOUNTER
2nd attempt. New Diabetes referral from the hospitalist.  Recommended by the Diabetes Educator. Reached out to schedule initial appointment. Left message to please return my call. There is a note that he was to see his PCP 1/6/22. I cannot see records of this in Epic to see how he is doing. I will try again ~ next week.      Kaitlyn Mcrae, PharmD  [facility]  Diabetes Service  [phone]

## 2022-01-17 ENCOUNTER — TELEPHONE (OUTPATIENT)
Dept: PHARMACY | Age: 52
End: 2022-01-17

## 2022-01-17 NOTE — TELEPHONE ENCOUNTER
Outbound call from the [facility]. Attempting to schedule new DM II visit per referral. Left message for return call. This is our 3rd and final attempt.

## 2022-01-25 NOTE — DISCHARGE SUMMARY
Hospital Medicine Discharge Summary    Patient ID: Blanca Crisostomo      Patient's PCP: [address] Date: 12/12/2021     Discharge Date: 12/31/2021      Admitting Provider: Lisy Winters MD     Discharge Provider: Aurora Estevez MD     Discharge Diagnoses: Active Hospital Problems    Diagnosis     Schizophrenia ([facility] [address] [F20.9]     Acute kidney injury ([facility] [address] [N17.9]     Acute pancreatitis [K85.90]     DKA, type 2, not at goal [facility]) [E11.10]        The patient was seen and examined on day of discharge and this discharge summary is in conjunction with any daily progress note from day of discharge. Hospital Course: The patient is a 54 y.o. male with no significant past medical history who presents to [facility] with increased thirst, urination, nausea, and vomiting. Patient stated that over the past several days, he has had intractable nausea, vomiting, with increased thirst and urination. He denies fever, chills, chest pain, shortness of breath, constipation, diarrhea, and dysuria.     In the ED, labs were significant for a sodium of 123, chloride of 77, bicarb of 4, BUN/Cr of 35/3.2, glucose of 900, lactic acid of 8.5, WBC count of 20.1K with a pH of 6.831 and beta-hydroxyturate > 8. CXR showed no acute disease.         DKA resolved - suspect undiagnosed type 2 DM  -check Hgb A1c - 12.4  -NPO - to full liquid to low fat diet per GI  -DKA protocol completed  -insulin gtt - to SQ insulin  - check CARI and islet cell Abs - both negative.               - lantus 35-->30 BID.             - on 12/18 I switched him to IV Humulin sliding scale and 5 units prand bolus if eating - BG responded very well               - switched back to SQ insulin 12/22 and doing well with this.             Severe interstitial pancreatitis - much improved clinically.   - s/p aggressive IVF              - IV merrem due to ongoing fevers - fever now resolved.    -GI and general surgery consulted  -check RUQ U/S - pancreas not well visualized due to surrounding inflammatory changes.             - pt reports 3 beers and hard liquor shots prior to onset of pancreatitis, but denies chronic alcohol abuse. - repeat CT on 12/16 showed improvement of pancreatitis, but WBC continued to rise              - I resumed him on Vanc, merrem and diflucan and sent repeat blood Cx  On 12/18 - plan for 10 day course - last day of Abx tomorrow.                - WBC now steadily improving and actually returned to normal.             Septic Shock - resolved   Required levophed short term early in admission.   - see abx as above.            CHAPIN - Cr improving slowly  s/p IVF with bicarb - switched to NS per nephrology team.    S/p single dose lasix on 12/17 with good effect. - avoid nephrotoxic medications  - nephrology following  - again showing signs of fluid retention - stopped IVF 12/22.   - lasix IV daily - increase to BID on 12/24 - transition to PO torsemide  - additional torsemide this evening        Coffee ground emesis  -cont PPI  -GI following  -Hgb stabilized.         Obesity - BMI 38.68  -nutritional counseling provided  -weight loss encouraged  -complicating medical management        Hypocalcemia - s/p IV replete. Improved.         Constipation. CT 12/20 concerning for diffuse ileus. Started on reglan per GI team.   Movantik daily - started 12/20. Stop opiate pain control - pain resolved. Added dulcolax 10 daily    Pt refused miralax - makes him nauseated.            Acute Hypox Resp Failure not POA. O2 requirement peaked at 6-7l/min and has been slowly improving  He is down to 2l/min now. He failed home O2 eval today, so I pursued repeat CT chest to reevaluate pulm status. He has basilar consolidative changes and much improved pleural effusion. I think we should be able to wean him off O2 in the next 24-48hrs.    Abx will finish by dc   Encourage ambulation and deep breathing exercises.        Physical Exam Performed:     /66   Pulse 103   Temp 98 °F (36.7 °C) (Oral)   Resp 16   Ht 6' 1\" (1.854 m)   Wt 281 lb 4.9 oz (127.6 kg)   SpO2 95%   BMI 37.11 kg/m²     General appearance: No apparent distress, appears stated age and cooperative. HEENT: Pupils equal, round, and reactive to light. Conjunctivae/corneas clear. Neck: Supple, with full range of motion. No jugular venous distention. Trachea midline. Respiratory:  Normal respiratory effort. Clear to auscultation, bilaterally without Rales/Wheezes/Rhonchi. Cardiovascular: Regular rate and rhythm with normal S1/S2 without murmurs, rubs or gallops. Abdomen: Soft, non-tender, non-distended with normal bowel sounds. Musculoskeletal: No clubbing, cyanosis or edema bilaterally.  Full range of motion without deformity. Skin: Skin color, texture, turgor normal.  No rashes or lesions. Neurologic:  Neurovascularly intact without any focal sensory/motor deficits. Cranial nerves: II-XII intact, grossly non-focal.  Psychiatric: Alert and oriented, thought content appropriate, normal insight  Capillary Refill: Brisk,< 3 seconds   Peripheral Pulses: +2 palpable, equal bilaterally        Labs: For convenience and continuity at follow-up the following most recent labs are provided:      CBC:    Lab Results   Component Value Date    WBC 6.9 12/31/2021    HGB 9.4 12/31/2021    HCT 28.2 12/31/2021     12/31/2021       Renal:    Lab Results   Component Value Date     12/31/2021    K 4.1 12/31/2021    K 4.2 12/12/2021    CL 94 12/31/2021    CO2 28 12/31/2021    BUN 15 12/31/2021    CREATININE 1.9 12/31/2021    CALCIUM 9.0 12/31/2021    PHOS 4.8 12/31/2021         Significant Diagnostic Studies    Radiology:   XR CHEST PORTABLE   Final Result   Bibasilar airspace disease may represent atelectasis and/or pneumonia.          CT CHEST WO CONTRAST   Final Result   Consolidation in the lung bases bilaterally that is similar compared evidence of acute pulmonary disease. Consults:     IP CONSULT TO CRITICAL CARE  IP CONSULT TO GI  IP CONSULT TO GENERAL SURGERY  IP CONSULT TO GENERAL SURGERY  IP CONSULT TO NEPHROLOGY  IP CONSULT TO PSYCHIATRY    Disposition:  home     Condition at Discharge: Stable    Discharge Instructions/Follow-up:  pcp in 1 week    Code Status:  Prior     Activity: activity as tolerated    Diet: regular diet      Discharge Medications:     Discharge Medication List as of 12/31/2021  5:20 PM           Details   Blood Glucose Monitoring Suppl (TRUE METRIX METER) w/Device KIT 1 kit by Does not apply route 2 times daily, Disp-1 kit, R-0Please make brand substitutions for insurance preference. Normal      blood glucose test strips (TRUE METRIX BLOOD GLUCOSE TEST) strip 1 each by In Vitro route 2 times daily As needed. , Disp-100 each, R-3Normal      Lancets MISC 2 TIMES DAILY Starting u 12/30/2021, Disp-200 each, R-0, Normal      torsemide (DEMADEX) 20 MG tablet Take 1 tablet by mouth daily, Disp-30 tablet, R-0Normal      pantoprazole (PROTONIX) 40 MG tablet Take 1 tablet by mouth 2 times daily (before meals), Disp-60 tablet, R-3Normal      fenofibrate (TRIGLIDE) 160 MG tablet Take 1 tablet by mouth daily, Disp-30 tablet, R-3Normal      insulin glargine (LANTUS SOLOSTAR) 100 UNIT/ML injection pen Inject 30 Units into the skin 2 times daily, Disp-5 pen, R-3Normal      Insulin Pen Needle 32G X 4 MM MISC DAILY Starting u 12/30/2021, Disp-100 each, R-3, Normal      metFORMIN (GLUCOPHAGE) 500 MG tablet Take 1 tablet by mouth 2 times daily (with meals), Disp-180 tablet, R-1Normal              Details   paliperidone palmitate ER (INVEGA SUSTENNA) 156 MG/ML MARY KAY IM injection Inject 156 mg into the muscle every 30 days, IntraMUSCular, EVERY 30 DAYS, Historical Med      acetaminophen (TYLENOL) 325 MG tablet Take 650 mg by mouth every 6 hours as needed for PainHistorical Med             Time Spent on discharge is more than 30 minutes in the examination, evaluation, counseling and review of medications and discharge plan. Signed:    Noel Cadet MD   1/24/2022      Thank you [address] for the opportunity to be involved in this patient's care. If you have any questions or concerns please feel free to contact me at [phone].

## 2022-02-11 ENCOUNTER — TELEPHONE (OUTPATIENT)
Dept: PHARMACY | Age: 52
End: 2022-02-11

## 2022-02-11 NOTE — TELEPHONE ENCOUNTER
Left message for Dank Wen with regards to his missed Diabetes visit 2/4/22. I advised that I was leaving it up to him to reach out to us to schedule if he is interested. We have made several attempts to reach him and then he missed his appointment. He is welcome at any time if he is interested.     Lyle Quigley, PharmD  [facility]  Diabetes Service  [phone]

## 2022-04-05 ENCOUNTER — TELEPHONE (OUTPATIENT)
Dept: PHARMACY | Age: 52
End: 2022-04-05

## 2022-04-05 NOTE — TELEPHONE ENCOUNTER
Received a call from the inpatient Diabetes Educator. Froilan Merchant had reached out reporting that he was out of his insulin. They were wondering if we could perhaps set up an appointment and prescribe refills for his insulin. I was asked to please give Froilan Merchant a call.      Caity Murillo, PharmD  [address] [place] [address]  Diabetes Service  [phone]

## 2022-04-05 NOTE — TELEPHONE ENCOUNTER
I reviewed Sandra's chart. Unfortunately, Caryl Reardon was referred by the hospitalist in 2021. Did not respond to our initial 3 phones calls to schedule an appointment. Missed an appointment in February. Our referral has  and we would not be able to prescribe the insulin without an active collaborative agreement. I reached out to Caryl Reardon. I explained the situation. Reports he follows with Dr. Adelita Galeas and has seen him within the last year. I recommended that he reach out to Dr. Adelita Galeas for refills on his insulin. He agrees. I did offer to reach out to get a new referral for Diabetes management from Dr. Adelita Galeas. He will reach out to the Dr. Gertrude Nunes and decide after speaking with him. He is always welcome. I explained our services of providing education, support and making recommendations to his physician. If we have a referral for medication management, we can help adjust his Diabetes medications and order refills on behalf of the referring provider. I will touch base later this week to see if this is resolved.      Nataliya Hernandez, PharmD  [address] [place] [address]  Diabetes Service  [phone]

## 2022-04-07 NOTE — TELEPHONE ENCOUNTER
Spoke with Tejinder Norman. He was able to reach out to Dr. Freitas's office and get refills on his insulin. I asked if he was interested in coming to our outpatient center. He reports he asked Dr. Jasen Montero to refer him to someone. He is not sure who that will be. I explained that it may be an endocrinologist. I advised that it may take some time to get in to see them. If he needs anything in the time being to please reach out. I again described our services to him.      Franck Ely, PharmD  [address] [place] [address]  Diabetes Service  [phone]

## 2022-06-13 NOTE — PROGRESS NOTES
[facility]    Respiratory Therapy   Home Oxygen Evaluation        Name: Mauricio Ormond  Medical Record Number: [id number]  Age: 54 y.o. Gender:  male   : 1970  Today's date: 2021  Room: W7N-5920/5129-01      Assessment        BP (!) 83/57   Pulse 68   Temp 98.2 °F (36.8 °C) (Oral)   Resp 18   Ht 6' 1\" (1.854 m)   Wt 281 lb 4.9 oz (127.6 kg)   SpO2 96%   BMI 37.11 kg/m²     Patient Active Problem List   Diagnosis    Back pain    DKA, type 2, not at goal [facility])    Acute pancreatitis    Acute kidney injury ([facility] [address]    Schizophrenia ([facility] [address]       Social History:  Social History     Tobacco Use    Smoking status: Never Smoker    Smokeless tobacco: Never Used   Substance Use Topics    Alcohol use: Yes     Comment: social    Drug use: Never       Patient Room Air saturation at rest 93  %  Patient Room Air saturation upon ambulation 92 %    Pt does not qualify for home O2 at this time    Patient resting on 1  lmp  with an oxygen saturation of  94 %       In your clinical assessment does the Patient Require Portable Oxygen Tanks? No: Pt does not qualify for home O2              [facility]  home care [employer] contacted to update on patient status.   Patient/caregiver was educated on Home Oxygen process:  Yes      Level of patient/caregiver understanding able to:   [x] Verbalize understanding   [] Demonstrate understanding       [] Teach back        [] Needs reinforcement        []  No available caregiver               []  Other:     Response to education:  Very Good     Time Spent with Home O2 Set Up:  15  minutes     Lopez Goncalves RCP on 2021 at 12:50 PM PAST MEDICAL HISTORY:  Anxiety and depression medically managed. Never Hospitalized.    GERD (gastroesophageal reflux disease)     History of Mendoza's esophagus dx: ' 2012    Murmur, cardiac     Osteoarthritis

## 2023-05-08 ENCOUNTER — HOSPITAL ENCOUNTER (EMERGENCY)
Age: 53
Discharge: HOME | End: 2023-05-08
Attending: EMERGENCY MEDICINE
Payer: COMMERCIAL

## 2023-05-08 ENCOUNTER — APPOINTMENT (OUTPATIENT)
Dept: GENERAL RADIOLOGY | Age: 53
End: 2023-05-08
Payer: COMMERCIAL

## 2023-05-08 VITALS
HEIGHT: 73 IN | BODY MASS INDEX: 40.2 KG/M2 | TEMPERATURE: 99.4 F | RESPIRATION RATE: 13 BRPM | SYSTOLIC BLOOD PRESSURE: 146 MMHG | OXYGEN SATURATION: 92 % | DIASTOLIC BLOOD PRESSURE: 97 MMHG | WEIGHT: 303.35 LBS | HEART RATE: 94 BPM

## 2023-05-08 DIAGNOSIS — G24.01 TARDIVE DYSKINESIA: ICD-10-CM

## 2023-05-08 DIAGNOSIS — G25.3 MYOCLONIC JERKING: Primary | ICD-10-CM

## 2023-05-08 LAB
ALBUMIN SERPL-MCNC: 4.5 G/DL (ref 3.4–5)
ALBUMIN/GLOB SERPL: 1.3 {RATIO} (ref 1.1–2.2)
ALP SERPL-CCNC: 61 U/L (ref 40–129)
ALT SERPL-CCNC: 65 U/L (ref 10–40)
AMPHETAMINES UR QL SCN>1000 NG/ML: NORMAL
ANION GAP SERPL CALCULATED.3IONS-SCNC: 12 MMOL/L (ref 3–16)
AST SERPL-CCNC: 66 U/L (ref 15–37)
BARBITURATES UR QL SCN>200 NG/ML: NORMAL
BASOPHILS # BLD: 0.1 K/UL (ref 0–0.2)
BASOPHILS NFR BLD: 0.8 %
BENZODIAZ UR QL SCN>200 NG/ML: NORMAL
BILIRUB SERPL-MCNC: 0.4 MG/DL (ref 0–1)
BILIRUB UR QL STRIP.AUTO: NEGATIVE
BUN SERPL-MCNC: 12 MG/DL (ref 7–20)
CA-I BLD-SCNC: 1.24 MMOL/L (ref 1.12–1.32)
CALCIUM SERPL-MCNC: 10.3 MG/DL (ref 8.3–10.6)
CANNABINOIDS UR QL SCN>50 NG/ML: NORMAL
CHLORIDE SERPL-SCNC: 103 MMOL/L (ref 99–110)
CK SERPL-CCNC: 373 U/L (ref 39–308)
CLARITY UR: CLEAR
CO2 SERPL-SCNC: 23 MMOL/L (ref 21–32)
COCAINE UR QL SCN: NORMAL
COLOR UR: YELLOW
CREAT SERPL-MCNC: 1.3 MG/DL (ref 0.9–1.3)
DEPRECATED RDW RBC AUTO: 13.7 % (ref 12.4–15.4)
DRUG SCREEN COMMENT UR-IMP: NORMAL
EOSINOPHIL # BLD: 0.2 K/UL (ref 0–0.6)
EOSINOPHIL NFR BLD: 1.8 %
FENTANYL SCREEN, URINE: NORMAL
GFR SERPLBLD CREATININE-BSD FMLA CKD-EPI: >60 ML/MIN/{1.73_M2}
GLUCOSE SERPL-MCNC: 118 MG/DL (ref 70–99)
GLUCOSE UR STRIP.AUTO-MCNC: >=1000 MG/DL
HCT VFR BLD AUTO: 44.6 % (ref 40.5–52.5)
HGB BLD-MCNC: 14.8 G/DL (ref 13.5–17.5)
HGB UR QL STRIP.AUTO: NEGATIVE
INR PPP: 0.98 (ref 0.84–1.16)
KETONES UR STRIP.AUTO-MCNC: NEGATIVE MG/DL
LEUKOCYTE ESTERASE UR QL STRIP.AUTO: NEGATIVE
LYMPHOCYTES # BLD: 3.5 K/UL (ref 1–5.1)
LYMPHOCYTES NFR BLD: 40.5 %
MAGNESIUM SERPL-MCNC: 2 MG/DL (ref 1.8–2.4)
MCH RBC QN AUTO: 30 PG (ref 26–34)
MCHC RBC AUTO-ENTMCNC: 33.2 G/DL (ref 31–36)
MCV RBC AUTO: 90.3 FL (ref 80–100)
METHADONE UR QL SCN>300 NG/ML: NORMAL
MONOCYTES # BLD: 0.6 K/UL (ref 0–1.3)
MONOCYTES NFR BLD: 7.2 %
NEUTROPHILS # BLD: 4.2 K/UL (ref 1.7–7.7)
NEUTROPHILS NFR BLD: 49.7 %
NITRITE UR QL STRIP.AUTO: NEGATIVE
OPIATES UR QL SCN>300 NG/ML: NORMAL
OXYCODONE UR QL SCN: NORMAL
PCP UR QL SCN>25 NG/ML: NORMAL
PH BLDV: 7.33 [PH] (ref 7.35–7.45)
PH UR STRIP.AUTO: 5 [PH] (ref 5–8)
PH UR STRIP: 5 [PH]
PHOSPHATE SERPL-MCNC: 4.5 MG/DL (ref 2.5–4.9)
PLATELET # BLD AUTO: 236 K/UL (ref 135–450)
PMV BLD AUTO: 9.7 FL (ref 5–10.5)
POTASSIUM SERPL-SCNC: 5.1 MMOL/L (ref 3.5–5.1)
PROT SERPL-MCNC: 8.1 G/DL (ref 6.4–8.2)
PROT UR STRIP.AUTO-MCNC: NEGATIVE MG/DL
PROTHROMBIN TIME: 13 SEC (ref 11.5–14.8)
RBC # BLD AUTO: 4.93 M/UL (ref 4.2–5.9)
SODIUM SERPL-SCNC: 138 MMOL/L (ref 136–145)
SP GR UR STRIP.AUTO: 1.04 (ref 1–1.03)
TSH SERPL DL<=0.005 MIU/L-ACNC: 2.33 UIU/ML (ref 0.27–4.2)
UA COMPLETE W REFLEX CULTURE PNL UR: ABNORMAL
UA DIPSTICK W REFLEX MICRO PNL UR: ABNORMAL
URN SPEC COLLECT METH UR: ABNORMAL
UROBILINOGEN UR STRIP-ACNC: 1 E.U./DL
WBC # BLD AUTO: 8.5 K/UL (ref 4–11)

## 2023-05-08 PROCEDURE — 71045 X-RAY EXAM CHEST 1 VIEW: CPT

## 2023-05-08 PROCEDURE — 85610 PROTHROMBIN TIME: CPT

## 2023-05-08 PROCEDURE — 93005 ELECTROCARDIOGRAM TRACING: CPT | Performed by: PHYSICIAN ASSISTANT

## 2023-05-08 PROCEDURE — 96375 TX/PRO/DX INJ NEW DRUG ADDON: CPT

## 2023-05-08 PROCEDURE — 83735 ASSAY OF MAGNESIUM: CPT

## 2023-05-08 PROCEDURE — 2580000003 HC RX 258: Performed by: PHYSICIAN ASSISTANT

## 2023-05-08 PROCEDURE — 85025 COMPLETE CBC W/AUTO DIFF WBC: CPT

## 2023-05-08 PROCEDURE — 80307 DRUG TEST PRSMV CHEM ANLYZR: CPT

## 2023-05-08 PROCEDURE — 82330 ASSAY OF CALCIUM: CPT

## 2023-05-08 PROCEDURE — 84100 ASSAY OF PHOSPHORUS: CPT

## 2023-05-08 PROCEDURE — 83036 HEMOGLOBIN GLYCOSYLATED A1C: CPT

## 2023-05-08 PROCEDURE — 84443 ASSAY THYROID STIM HORMONE: CPT

## 2023-05-08 PROCEDURE — 99285 EMERGENCY DEPT VISIT HI MDM: CPT

## 2023-05-08 PROCEDURE — 82550 ASSAY OF CK (CPK): CPT

## 2023-05-08 PROCEDURE — 96374 THER/PROPH/DIAG INJ IV PUSH: CPT

## 2023-05-08 PROCEDURE — 6360000002 HC RX W HCPCS: Performed by: PHYSICIAN ASSISTANT

## 2023-05-08 PROCEDURE — 81003 URINALYSIS AUTO W/O SCOPE: CPT

## 2023-05-08 PROCEDURE — 80053 COMPREHEN METABOLIC PANEL: CPT

## 2023-05-08 RX ORDER — BENZTROPINE MESYLATE 2 MG/1
2 TABLET ORAL 3 TIMES DAILY
Qty: 90 TABLET | Refills: 0 | Status: SHIPPED | OUTPATIENT
Start: 2023-05-08

## 2023-05-08 RX ORDER — LORAZEPAM 2 MG/ML
1 INJECTION INTRAMUSCULAR ONCE
Status: DISCONTINUED | OUTPATIENT
Start: 2023-05-08 | End: 2023-05-08 | Stop reason: HOSPADM

## 2023-05-08 RX ORDER — BENZTROPINE MESYLATE 1 MG/ML
1 INJECTION INTRAMUSCULAR; INTRAVENOUS ONCE
Status: COMPLETED | OUTPATIENT
Start: 2023-05-08 | End: 2023-05-08

## 2023-05-08 RX ORDER — KETOROLAC TROMETHAMINE 30 MG/ML
15 INJECTION, SOLUTION INTRAMUSCULAR; INTRAVENOUS ONCE
Status: COMPLETED | OUTPATIENT
Start: 2023-05-08 | End: 2023-05-08

## 2023-05-08 RX ORDER — BENZTROPINE MESYLATE 1 MG/1
2 TABLET ORAL 3 TIMES DAILY
Qty: 90 TABLET | Refills: 0 | Status: SHIPPED | OUTPATIENT
Start: 2023-05-08 | End: 2023-05-08 | Stop reason: SDUPTHER

## 2023-05-08 RX ORDER — BENZTROPINE MESYLATE 1 MG/1
1 TABLET ORAL 4 TIMES DAILY
COMMUNITY
End: 2023-05-08

## 2023-05-08 RX ORDER — INSULIN GLARGINE 100 [IU]/ML
40 INJECTION, SOLUTION SUBCUTANEOUS EVERY MORNING
COMMUNITY

## 2023-05-08 RX ORDER — 0.9 % SODIUM CHLORIDE 0.9 %
1000 INTRAVENOUS SOLUTION INTRAVENOUS ONCE
Status: COMPLETED | OUTPATIENT
Start: 2023-05-08 | End: 2023-05-08

## 2023-05-08 RX ADMIN — BENZTROPINE MESYLATE 1 MG: 1 INJECTION INTRAMUSCULAR; INTRAVENOUS at 15:55

## 2023-05-08 RX ADMIN — BENZTROPINE MESYLATE 1 MG: 1 INJECTION INTRAMUSCULAR; INTRAVENOUS at 15:28

## 2023-05-08 RX ADMIN — SODIUM CHLORIDE 1000 ML: 9 INJECTION, SOLUTION INTRAVENOUS at 15:28

## 2023-05-08 RX ADMIN — KETOROLAC TROMETHAMINE 15 MG: 30 INJECTION, SOLUTION INTRAMUSCULAR at 16:56

## 2023-05-08 ASSESSMENT — LIFESTYLE VARIABLES: HOW OFTEN DO YOU HAVE A DRINK CONTAINING ALCOHOL: NEVER

## 2023-05-08 ASSESSMENT — PAIN DESCRIPTION - LOCATION: LOCATION: BACK

## 2023-05-08 ASSESSMENT — PAIN DESCRIPTION - FREQUENCY: FREQUENCY: CONTINUOUS

## 2023-05-08 ASSESSMENT — PAIN DESCRIPTION - ORIENTATION: ORIENTATION: LOWER;UPPER

## 2023-05-08 ASSESSMENT — PAIN - FUNCTIONAL ASSESSMENT: PAIN_FUNCTIONAL_ASSESSMENT: PREVENTS OR INTERFERES WITH ALL ACTIVE AND SOME PASSIVE ACTIVITIES

## 2023-05-08 ASSESSMENT — PAIN DESCRIPTION - PAIN TYPE: TYPE: ACUTE PAIN

## 2023-05-08 ASSESSMENT — PAIN SCALES - GENERAL: PAINLEVEL_OUTOF10: 8

## 2023-05-08 ASSESSMENT — PAIN DESCRIPTION - DESCRIPTORS: DESCRIPTORS: SHARP;SHOOTING

## 2023-05-08 NOTE — DISCHARGE INSTRUCTIONS
In the emergency room the myoclonic jerking was apparently result of your Invega injection otherwise known as tardive dyskinesia. Review information below. In the emergency room I did give you Cogentin 1 mg IV x2 with resolution of your symptoms. Toradol 15 mg IV given for your discomfort. Recommend Tylenol 1000 mg every 6 or 8 hours for few days to control your pain. You did have tachycardia initially and I gave you NaCl 1 L to flush your system. I did send new prescription for Cogentin 2 mg 3 times daily #90 to your local [address] [place] [address]. In the meantime use your Cogentin 1 mg taking 2 tablets 3 times a day.

## 2023-05-08 NOTE — ED TRIAGE NOTES
Patient began with tremoring 3 days ago that started around mouth and has now progressed to all extremities. Patient went to [facility] 2 days ago where they dx him with tardive dyskinesia and started him on Cogentin. States they think it may have been caused from [facility] shot. However, the tremors have continued to worsen.

## 2023-05-08 NOTE — PROGRESS NOTES
Decision to discharge. PIV removed and patient given AVS. Reviewed AVS with patient. All questions answered. Patient sent with all belongings.

## 2023-05-09 LAB
EKG ATRIAL RATE: 117 BPM
EKG DIAGNOSIS: NORMAL
EKG P AXIS: 46 DEGREES
EKG P-R INTERVAL: 144 MS
EKG Q-T INTERVAL: 334 MS
EKG QRS DURATION: 88 MS
EKG QTC CALCULATION (BAZETT): 465 MS
EKG R AXIS: -27 DEGREES
EKG T AXIS: 29 DEGREES
EKG VENTRICULAR RATE: 117 BPM
EST. AVERAGE GLUCOSE BLD GHB EST-MCNC: 185.8 MG/DL
HBA1C MFR BLD: 8.1 %

## 2023-05-09 PROCEDURE — 93010 ELECTROCARDIOGRAM REPORT: CPT | Performed by: INTERNAL MEDICINE

## 2023-05-09 NOTE — ED PROVIDER NOTES
[address] [facility]        Pt Name: Malik Patel  MRN: [id number]  [place] 1970  Date of evaluation: 5/8/2023  Provider: Roxana Maguire  PCP: [id number] [place] [address]  Note Started: 3:33 PM EDT 5/8/23       I have seen and evaluated this patient with my supervising physician No att. providers found. CHIEF COMPLAINT       Chief Complaint   Patient presents with    Tremors     Patient began with tremoring 3 days ago that started around mouth and how now progressed to all extremities. Patient went to [facility] 2 days ago where they dx him with tardive dyskinesia and started him on Cogentin. However, the tremors have continued to worsen. HISTORY OF PRESENT ILLNESS: 1 or more Elements     History From: Patient    Limitations to history : None    Malik Patel is a 48 y.o. male who presents emergency department by EMS. Patient having jerking activities. Began on Friday. Seen yesterday [facility] given Cogentin 1 mg 3 times daily. He picked it up he is taking it. However the mouth and jaw intermittent spasms and myoclonic jerking are noticed today involving entire body. Patient states he is never had this prior. He is on Invega injection and has been on similar product over the past 18 years without similar occurrence. No history of TD. However TD was the diagnosis through [facility] ED. Patient states he has some generalized myalgia due to the myoclonic jerking that is present. He indicates no headache, vertigo, dizziness. No chest pain or shortness of breath. No gastrointestinal or urinary complaints. Nursing Notes were all reviewed and agreed with or any disagreements were addressed in the HPI. REVIEW OF SYSTEMS :      Review of Systems    Positives and Pertinent negatives as per HPI. SURGICAL HISTORY   History reviewed. No pertinent surgical history.     [address]       Discharge
tardive dyskinesia. Patient's metabolic panel within normal limits without any electrolyte disturbances. CK mildly elevated at 373. Patient's ionized calcium level normal at 1.24. Phosphorus 4.5. WBC 8.5, hemoglobin 14.8. Urinalysis negative for infection. TSH 2.33. Urine drug screen negative. Chest x-ray reviewed by myself with no acute cardiopulmonary normality. Confirmed by radiology. Patient was treated with initial dose of 1 mg Cogentin but still was having tremors. Additional 2 mg of Cogentin was given and on reassessment, patient was no longer tremoring. Patient was feeling much better and resting comfortably. Plan for discharge with continued use of Cogentin and outpatient follow-up with psychiatry. Patient agreeable with care plan. Strict return instruction provided. All questions answered prior to discharge. For further details of the patient's emergency department visit, please see the advanced practice provider's documentation. Rosalinda Mcgrath MD     This report has been produced using speech recognition software and may contain errors related to that system including errors in grammar, punctuation, and spelling, as well as words and phrases that may be inappropriate. If there are any questions or concerns please feel free to contact the dictating provider for clarification.         Rosalinda Mcgrath MD  05/08/23 5249

## 2025-01-14 ENCOUNTER — APPOINTMENT (OUTPATIENT)
Dept: CT IMAGING | Age: 55
End: 2025-01-14
Payer: COMMERCIAL

## 2025-01-14 ENCOUNTER — HOSPITAL ENCOUNTER (EMERGENCY)
Age: 55
Discharge: HOME | End: 2025-01-14
Payer: COMMERCIAL

## 2025-01-14 VITALS
WEIGHT: 303.79 LBS | HEART RATE: 77 BPM | SYSTOLIC BLOOD PRESSURE: 123 MMHG | DIASTOLIC BLOOD PRESSURE: 87 MMHG | RESPIRATION RATE: 14 BRPM | OXYGEN SATURATION: 95 % | BODY MASS INDEX: 40.26 KG/M2 | HEIGHT: 73 IN | TEMPERATURE: 98.1 F

## 2025-01-14 DIAGNOSIS — M54.50 LOW BACK PAIN POTENTIALLY ASSOCIATED WITH RADICULOPATHY: Primary | ICD-10-CM

## 2025-01-14 PROCEDURE — 99284 EMERGENCY DEPT VISIT MOD MDM: CPT

## 2025-01-14 PROCEDURE — 6360000002 HC RX W HCPCS: Performed by: PHYSICIAN ASSISTANT

## 2025-01-14 PROCEDURE — 72131 CT LUMBAR SPINE W/O DYE: CPT

## 2025-01-14 PROCEDURE — 96372 THER/PROPH/DIAG INJ SC/IM: CPT

## 2025-01-14 PROCEDURE — 6370000000 HC RX 637 (ALT 250 FOR IP): Performed by: PHYSICIAN ASSISTANT

## 2025-01-14 RX ORDER — METHYLPREDNISOLONE 4 MG/1
TABLET ORAL
Qty: 1 KIT | Refills: 0 | Status: SHIPPED | OUTPATIENT
Start: 2025-01-14

## 2025-01-14 RX ORDER — OXYCODONE AND ACETAMINOPHEN 5; 325 MG/1; MG/1
1 TABLET ORAL ONCE
Status: COMPLETED | OUTPATIENT
Start: 2025-01-14 | End: 2025-01-14

## 2025-01-14 RX ORDER — IBUPROFEN 800 MG/1
800 TABLET, FILM COATED ORAL ONCE
Status: COMPLETED | OUTPATIENT
Start: 2025-01-14 | End: 2025-01-14

## 2025-01-14 RX ORDER — METHOCARBAMOL 750 MG/1
750 TABLET, FILM COATED ORAL ONCE
Status: COMPLETED | OUTPATIENT
Start: 2025-01-14 | End: 2025-01-14

## 2025-01-14 RX ORDER — FUROSEMIDE 10 MG/ML
40 INJECTION INTRAMUSCULAR; INTRAVENOUS ONCE
Status: DISCONTINUED | OUTPATIENT
Start: 2025-01-14 | End: 2025-01-14

## 2025-01-14 RX ORDER — LIDOCAINE 50 MG/G
1 PATCH TOPICAL DAILY
Qty: 20 PATCH | Refills: 0 | Status: SHIPPED | OUTPATIENT
Start: 2025-01-14 | End: 2025-02-03

## 2025-01-14 RX ORDER — OXYCODONE AND ACETAMINOPHEN 5; 325 MG/1; MG/1
1 TABLET ORAL EVERY 6 HOURS PRN
Qty: 10 TABLET | Refills: 0 | Status: SHIPPED | OUTPATIENT
Start: 2025-01-14 | End: 2025-01-17

## 2025-01-14 RX ADMIN — HYDROMORPHONE HYDROCHLORIDE 1 MG: 1 INJECTION, SOLUTION INTRAMUSCULAR; INTRAVENOUS; SUBCUTANEOUS at 16:53

## 2025-01-14 RX ADMIN — OXYCODONE HYDROCHLORIDE AND ACETAMINOPHEN 1 TABLET: 5; 325 TABLET ORAL at 14:52

## 2025-01-14 RX ADMIN — METHOCARBAMOL TABLETS 750 MG: 750 TABLET, COATED ORAL at 14:52

## 2025-01-14 RX ADMIN — IBUPROFEN 800 MG: 800 TABLET, FILM COATED ORAL at 14:52

## 2025-01-14 ASSESSMENT — PAIN SCALES - GENERAL
PAINLEVEL_OUTOF10: 7
PAINLEVEL_OUTOF10: 8
PAINLEVEL_OUTOF10: 7
PAINLEVEL_OUTOF10: 9
PAINLEVEL_OUTOF10: 10
PAINLEVEL_OUTOF10: 9
PAINLEVEL_OUTOF10: 7

## 2025-01-14 ASSESSMENT — PAIN DESCRIPTION - LOCATION
LOCATION: BACK
LOCATION: BACK;LEG

## 2025-01-14 ASSESSMENT — PAIN DESCRIPTION - DESCRIPTORS: DESCRIPTORS: ACHING;SHOOTING

## 2025-01-14 ASSESSMENT — PAIN DESCRIPTION - ORIENTATION
ORIENTATION: RIGHT;LEFT
ORIENTATION: RIGHT;LEFT;LOWER
ORIENTATION: LOWER

## 2025-01-14 ASSESSMENT — PAIN - FUNCTIONAL ASSESSMENT
PAIN_FUNCTIONAL_ASSESSMENT: 0-10
PAIN_FUNCTIONAL_ASSESSMENT: 0-10

## 2025-01-14 NOTE — ED NOTES
Patient DCed from ED at this time. Discussed AVS, follow up, and scripts. They verbalized understanding. Reinforced that should symptoms persist or worsen to return to the ED. They verbalized understanding. Patient ambulated out of ED. RN thanked patient for choosing [facility].

## 2025-01-14 NOTE — DISCHARGE INSTRUCTIONS
Take prescribed medication as prescribed  Follow-up with [facility]  Lidocaine patch only to be worn for 12 hours.  12 hours on and 12 hours off before replacing with another patch as needed  Do not apply ice or heat over the lidocaine patch when using it.  Follow-up primary care provider as needed

## 2025-01-14 NOTE — ED PROVIDER NOTES
**ADVANCED PRACTICE PROVIDER, I HAVE EVALUATED THIS PATIENT**        [facility] EMERGENCY DEPARTMENT  EMERGENCY DEPARTMENT ENCOUNTER      Pt Name: Sandra Silva  MRN:[id number]  Birthdate 1970  Date of evaluation: 1/14/2025  Provider: Dheeraj Sin PA-C  Note Started: 5:01 PM EST 1/14/25        Chief Complaint:    Chief Complaint   Patient presents with    Back Pain     C/o lower back pain starting last night while pt was sleeping. Pt notes he has hx of sciatica and notes that he is having pain in his legs this time          Nursing Notes, Past Medical Hx, Past Surgical Hx, Social Hx, Allergies, and Family Hx were all reviewed and agreed with or any disagreements were addressed in the HPI.    HPI: (Location, Duration, Timing, Severity, Quality, Assoc Sx, Context, Modifying factors)    History From: Patient  Limitations to history : None    Social Determinants Significantly Affecting Health : None    Chief Complaint of left-sided low back pain that radiates down his leg.  Denies any injury.  Said he started having pain yesterday.  And all of a sudden he started having pain and pain in his sleep woke up this morning it was worse.  No numbness or tingling his feet or finger.  Pain does radiate down his left buttock down to his legs.  No falls.  Denies any upper back or neck pain.  He does have a history of sciatica.  No chest pain, no abdominal pain.  No loss of bowel or urinary control.  He is ambulatory.  No other complaints.  No previous or past back surgeries.    This is a  54 y.o. male who presents to the emergency room with the above complaint.    PastMedical/Surgical History:      Diagnosis Date    Diabetes mellitus (HCC)     H/O degenerative disc disease     Schizophrenia (HCC)      History reviewed. No pertinent surgical history.    Medications:  Previous Medications    ACETAMINOPHEN (TYLENOL) 325 MG TABLET    Take 2 tablets by mouth every 6 hours as needed for Pain    BENZTROPINE (COGENTIN) 2

## 2025-01-18 ASSESSMENT — ENCOUNTER SYMPTOMS
EYE PAIN: 0
SHORTNESS OF BREATH: 0
NAUSEA: 0
COUGH: 0
ABDOMINAL PAIN: 0
SORE THROAT: 0
BACK PAIN: 1
VOMITING: 0

## 2025-03-10 NOTE — PROGRESS NOTES
Occupational Therapy  Facility/Department: [address] PROGRESSIVE CARE  Daily Treatment Note  NAME: Emre Uribe  : 1970  MRN: [id number]    Date of Service: 2021    Discharge Recommendations:  Home with assist PRN,Home with Home health OT     Emre Uribe scored a 18/24 on the AM-PAC ADL Inpatient form. Current research shows that an AM-PAC score of 18 or greater is typically associated with a discharge to the patient's home setting. Based on the patient's AM-PAC score, and their current ADL deficits, it is recommended that the patient have 2-3 sessions per week of Occupational Therapy at d/c to increase the patient's independence. At this time, this patient demonstrates the endurance and safety to discharge home with [facility] and a follow up treatment frequency of 2-3x/wk. Please see assessment section for further patient specific details. If patient discharges prior to next session this note will serve as a discharge summary. Please see below for the latest assessment towards goals. Assessment   Performance deficits / Impairments: Decreased functional mobility ; Decreased strength;Decreased endurance;Decreased ADL status; Decreased safe awareness;Decreased high-level IADLs;Decreased balance  Assessment: Discussed with OTR: Pt tolerated session fairly well. Pt on 2L 02 and sats remained >90% with activity. Pt transferred with RW, SB/Supervision and amb with SBA, assist to 02 line management. Pt declined ADL's and anticipate would require overall Min A. Pt reports family will be able to assist with ADL's as needed. Pt issued BUE ex's to increase strength, endurance for functional tasks. Anticipate pt will be able to d/c home with assist prn and HHOT to advance to independence in ADL's.   Prognosis: Good  OT Education: OT Role;Transfer Training;Plan of Care;Energy Conservation;Home Exercise Program  REQUIRES OT FOLLOW UP: Yes  Activity Tolerance  Activity Tolerance: Patient Tolerated treatment well;Patient limited by fatigue  Activity Tolerance: o2 sats WNL on 2L throughout tx. Safety Devices  Safety Devices in place: Yes  Type of devices: Call light within reach;Gait belt;Nurse notified; Left in chair;Chair alarm in place         Patient Diagnosis(es): The primary encounter diagnosis was Diabetic ketoacidosis without coma associated with diabetes mellitus due to underlying condition ([facility] [address] Diagnoses of Metabolic acidosis, Hypotension, unspecified hypotension type, Hyperglycemia, Lymphocytosis, and DKA, type 2, not at goal [facility]) were also pertinent to this visit. has a past medical history of H/O degenerative disc disease. has no past surgical history on file. Restrictions  Restrictions/Precautions  Restrictions/Precautions: Fall Risk  Position Activity Restriction  Other position/activity restrictions: O2 2L via NC  Subjective   General  Chart Reviewed: Yes,Orders,Progress Notes  Patient assessed for rehabilitation services?: Yes  Additional Pertinent Hx: 53 y/o male admitted 12/12/2021 with DKA, severe acute pancreatitis, acute kidney injury, and shock. Response to previous treatment: Patient with no complaints from previous session  Family / Caregiver Present: No  Referring Practitioner: MICKI Lopez CNP  Diagnosis: DKA, severe acute pancreatitis  Subjective  Subjective: Pt met BS, in bed. Pt agreeable to OT with min encouragment. Pt denied pain. General Comment  Comments: Per RN ok for therapy      Orientation  Orientation  Overall Orientation Status: Within Functional Limits  Objective    ADL  Additional Comments: Declined completing. Reports he can have assist if needed for LB ADL's. Balance  Sitting Balance: Supervision  Standing Balance  Time: 1 min or less  Activity: Pt amb from bed, to recliner, 15 ft, with RW and SBA. Pt steady, no LOB.   Bed mobility  Rolling to Right: Modified independent  Sit to Supine: Unable to assess  Transfers  Sit to stand: Stand by assistance;Supervision  Stand to sit: Stand by assistance;Supervision  Transfer Comments: to/from RW, at bed and to [employer]  Overall Cognitive Status: WFL        Type of ROM/Therapeutic Exercise  Type of ROM/Therapeutic Exercise: Resistive Bands  Comment: therabnd ex'(handout) issued and pt completing with BUE without difficulty.          Plan   Plan  Times per week: 3-5  Times per day: Daily  Current Treatment Recommendations: Kristen Commander Training,Gait Training,Functional Mobility Training,Self-Care / ADL,ROM,Patient/Caregiver Education & Training,Safety Education & Training,Equipment Evaluation, Education, & procurement    AM-PAC Score        AM-[facility] Inpatient Daily Activity Raw Score: 18 (12/24/21 1048)  AM-PAC Inpatient ADL T-Scale Score : 38.66 (12/24/21 1048)  ADL Inpatient CMS 0-100% Score: 46.65 (12/24/21 1048)  ADL Inpatient CMS G-Code Modifier : CK (12/24/21 1048)    Goals  Short term goals  Time Frame for Short term goals: Prior to DC: goals ongoing  Short term goal 1: Pt will complete ADL transfers/mobility with supervision  Short term goal 2: Pt will complete toileting with supervision  Short term goal 3: Pt will complete LB dressing with supervision  Short term goal 4: Pt will tolerate standing > 5 min for functional task with supervision  Short term goal 5: Pt will complete UE exercises in all planes to inc strength/endurance  Patient Goals   Patient goals : to return home       Therapy Time   Individual Concurrent Group Co-treatment   Time In 1037         Time Out 1100         Minutes 23                 Derrick ALCANTARA/KLARISSA,515 Progressing: [] Met: [] Not Met: [] Adjusted  Patient will demonstrate increased AROM of L knee extension to 0 degrees B without pain to allow for proper joint functioning to enable patient to ambulate with appropriate heel strike and leg extension during ambulation.   [] Progressing: [] Met: [] Not Met: [] Adjusted  Patient will demonstrate increased Strength of BLE to at least 4/5 throughout without pain to allow for proper functional mobility to enable patient to return to household, work and recreational activities.   [] Progressing: [] Met: [] Not Met: [] Adjusted        Overall Progression Towards Functional goals/ Treatment Progress Update:  [] Patient is progressing as expected towards functional goals listed.    [] Progression is slowed due to complexities/Impairments listed.  [] Progression has been slowed due to co-morbidities.  [x] Plan just implemented, too soon (<30days) to assess goals progression   [] Goals require adjustment due to lack of progress  [] Patient is not progressing as expected and requires additional follow up with physician  [] Other:     TREATMENT PLAN     Frequency/Duration: 2x/week for 6 weeks for the following treatment interventions:    Interventions:  Therapeutic Exercise (19515) including: strength training, ROM, and functional mobility  Therapeutic Activities (80907) including: functional mobility training and education.  Neuromuscular Re-education (42291) activation and proprioception, including postural re-education.    Gait Training (67893) for normalization of ambulation patterns and AD training.   Manual Therapy (43244) as indicated to include: Passive Range of Motion, Gr I-IV mobilizations, Soft Tissue Mobilization, Dry Needling/IASTM, Manual Lymph Drainage, Trigger Point Release, and Myofascial Release  Modalities as needed that may include: Cryotherapy, Electrical Stimulation, and Thermal Agents  Patient education on joint protection, postural re-education, activity

## 2025-08-12 ENCOUNTER — APPOINTMENT (OUTPATIENT)
Dept: CT IMAGING | Age: 55
End: 2025-08-12
Payer: COMMERCIAL

## 2025-08-12 ENCOUNTER — HOSPITAL ENCOUNTER (EMERGENCY)
Age: 55
Discharge: HOME | End: 2025-08-13
Payer: COMMERCIAL

## 2025-08-12 DIAGNOSIS — R10.12 LEFT UPPER QUADRANT ABDOMINAL PAIN: Primary | ICD-10-CM

## 2025-08-12 DIAGNOSIS — K86.2 CYST OF PANCREAS: ICD-10-CM

## 2025-08-12 LAB
ALBUMIN SERPL-MCNC: 4.2 G/DL (ref 3.4–5)
ALP SERPL-CCNC: 84 U/L (ref 40–129)
ALT SERPL-CCNC: 52 U/L (ref 10–40)
ANION GAP SERPL CALCULATED.3IONS-SCNC: 11 MMOL/L (ref 3–16)
AST SERPL-CCNC: 50 U/L (ref 15–37)
BASOPHILS # BLD: 0.1 K/UL (ref 0–0.2)
BASOPHILS NFR BLD: 1.2 %
BILIRUB DIRECT SERPL-MCNC: <0.1 MG/DL (ref 0–0.3)
BILIRUB INDIRECT SERPL-MCNC: 0.2 MG/DL (ref 0–1)
BILIRUB SERPL-MCNC: 0.3 MG/DL (ref 0–1)
BILIRUB UR QL STRIP.AUTO: NEGATIVE
BUN SERPL-MCNC: 13 MG/DL (ref 7–20)
CALCIUM SERPL-MCNC: 9.4 MG/DL (ref 8.3–10.6)
CHLORIDE SERPL-SCNC: 107 MMOL/L (ref 99–110)
CLARITY UR: CLEAR
CO2 SERPL-SCNC: 21 MMOL/L (ref 21–32)
COLOR UR: YELLOW
CREAT SERPL-MCNC: 1.3 MG/DL (ref 0.9–1.3)
DEPRECATED RDW RBC AUTO: 13.9 % (ref 12.4–15.4)
EOSINOPHIL # BLD: 0.1 K/UL (ref 0–0.6)
EOSINOPHIL NFR BLD: 1.6 %
GFR SERPLBLD CREATININE-BSD FMLA CKD-EPI: 66 ML/MIN/{1.73_M2}
GLUCOSE SERPL-MCNC: 151 MG/DL (ref 70–99)
GLUCOSE UR STRIP.AUTO-MCNC: >=1000 MG/DL
HCT VFR BLD AUTO: 41.8 % (ref 40.5–52.5)
HGB BLD-MCNC: 13.9 G/DL (ref 13.5–17.5)
HGB UR QL STRIP.AUTO: NEGATIVE
KETONES UR STRIP.AUTO-MCNC: NEGATIVE MG/DL
LEUKOCYTE ESTERASE UR QL STRIP.AUTO: NEGATIVE
LIPASE SERPL-CCNC: 57 U/L (ref 13–60)
LYMPHOCYTES # BLD: 4 K/UL (ref 1–5.1)
LYMPHOCYTES NFR BLD: 43.2 %
MCH RBC QN AUTO: 29.8 PG (ref 26–34)
MCHC RBC AUTO-ENTMCNC: 33.2 G/DL (ref 31–36)
MCV RBC AUTO: 89.6 FL (ref 80–100)
MONOCYTES # BLD: 0.9 K/UL (ref 0–1.3)
MONOCYTES NFR BLD: 9.5 %
NEUTROPHILS # BLD: 4.1 K/UL (ref 1.7–7.7)
NEUTROPHILS NFR BLD: 44.5 %
NITRITE UR QL STRIP.AUTO: NEGATIVE
PH UR STRIP.AUTO: 5.5 [PH] (ref 5–8)
PLATELET # BLD AUTO: 218 K/UL (ref 135–450)
PMV BLD AUTO: 9.4 FL (ref 5–10.5)
POTASSIUM SERPL-SCNC: 4.5 MMOL/L (ref 3.5–5.1)
PROT SERPL-MCNC: 7.2 G/DL (ref 6.4–8.2)
PROT UR STRIP.AUTO-MCNC: NEGATIVE MG/DL
RBC # BLD AUTO: 4.66 M/UL (ref 4.2–5.9)
SODIUM SERPL-SCNC: 139 MMOL/L (ref 136–145)
SP GR UR STRIP.AUTO: 1.03 (ref 1–1.03)
TROPONIN, HIGH SENSITIVITY: 16 NG/L (ref 0–22)
UA COMPLETE W REFLEX CULTURE PNL UR: ABNORMAL
UA DIPSTICK W REFLEX MICRO PNL UR: ABNORMAL
URN SPEC COLLECT METH UR: ABNORMAL
UROBILINOGEN UR STRIP-ACNC: 1 E.U./DL
WBC # BLD AUTO: 9.2 K/UL (ref 4–11)

## 2025-08-12 PROCEDURE — 96375 TX/PRO/DX INJ NEW DRUG ADDON: CPT

## 2025-08-12 PROCEDURE — 81003 URINALYSIS AUTO W/O SCOPE: CPT

## 2025-08-12 PROCEDURE — 74177 CT ABD & PELVIS W/CONTRAST: CPT

## 2025-08-12 PROCEDURE — 80048 BASIC METABOLIC PNL TOTAL CA: CPT

## 2025-08-12 PROCEDURE — 83690 ASSAY OF LIPASE: CPT

## 2025-08-12 PROCEDURE — 96374 THER/PROPH/DIAG INJ IV PUSH: CPT

## 2025-08-12 PROCEDURE — 6360000002 HC RX W HCPCS

## 2025-08-12 PROCEDURE — 99285 EMERGENCY DEPT VISIT HI MDM: CPT

## 2025-08-12 PROCEDURE — 85025 COMPLETE CBC W/AUTO DIFF WBC: CPT

## 2025-08-12 PROCEDURE — 93005 ELECTROCARDIOGRAM TRACING: CPT

## 2025-08-12 PROCEDURE — 6360000004 HC RX CONTRAST MEDICATION

## 2025-08-12 PROCEDURE — 80076 HEPATIC FUNCTION PANEL: CPT

## 2025-08-12 PROCEDURE — 84484 ASSAY OF TROPONIN QUANT: CPT

## 2025-08-12 RX ORDER — KETOROLAC TROMETHAMINE 15 MG/ML
15 INJECTION, SOLUTION INTRAMUSCULAR; INTRAVENOUS ONCE
Status: COMPLETED | OUTPATIENT
Start: 2025-08-12 | End: 2025-08-12

## 2025-08-12 RX ORDER — IOPAMIDOL 755 MG/ML
75 INJECTION, SOLUTION INTRAVASCULAR
Status: COMPLETED | OUTPATIENT
Start: 2025-08-12 | End: 2025-08-12

## 2025-08-12 RX ORDER — ONDANSETRON 2 MG/ML
4 INJECTION INTRAMUSCULAR; INTRAVENOUS ONCE
Status: COMPLETED | OUTPATIENT
Start: 2025-08-12 | End: 2025-08-12

## 2025-08-12 RX ADMIN — KETOROLAC TROMETHAMINE 15 MG: 15 INJECTION, SOLUTION INTRAMUSCULAR; INTRAVENOUS at 21:40

## 2025-08-12 RX ADMIN — ONDANSETRON 4 MG: 2 INJECTION, SOLUTION INTRAMUSCULAR; INTRAVENOUS at 21:40

## 2025-08-12 RX ADMIN — IOPAMIDOL 75 ML: 755 INJECTION, SOLUTION INTRAVENOUS at 22:49

## 2025-08-12 ASSESSMENT — PAIN DESCRIPTION - ORIENTATION: ORIENTATION: UPPER;LEFT

## 2025-08-12 ASSESSMENT — PAIN - FUNCTIONAL ASSESSMENT: PAIN_FUNCTIONAL_ASSESSMENT: 0-10

## 2025-08-12 ASSESSMENT — PAIN DESCRIPTION - DESCRIPTORS: DESCRIPTORS: SHARP

## 2025-08-12 ASSESSMENT — PAIN DESCRIPTION - LOCATION: LOCATION: ABDOMEN

## 2025-08-12 ASSESSMENT — LIFESTYLE VARIABLES
HOW MANY STANDARD DRINKS CONTAINING ALCOHOL DO YOU HAVE ON A TYPICAL DAY: PATIENT DOES NOT DRINK
HOW OFTEN DO YOU HAVE A DRINK CONTAINING ALCOHOL: NEVER

## 2025-08-12 ASSESSMENT — PAIN SCALES - GENERAL: PAINLEVEL_OUTOF10: 5

## 2025-08-13 VITALS
BODY MASS INDEX: 40.14 KG/M2 | DIASTOLIC BLOOD PRESSURE: 89 MMHG | WEIGHT: 304.24 LBS | OXYGEN SATURATION: 95 % | TEMPERATURE: 98.1 F | HEART RATE: 68 BPM | RESPIRATION RATE: 16 BRPM | SYSTOLIC BLOOD PRESSURE: 129 MMHG

## 2025-08-13 LAB
EKG ATRIAL RATE: 74 BPM
EKG DIAGNOSIS: NORMAL
EKG P AXIS: 41 DEGREES
EKG P-R INTERVAL: 178 MS
EKG Q-T INTERVAL: 378 MS
EKG QRS DURATION: 86 MS
EKG QTC CALCULATION (BAZETT): 419 MS
EKG R AXIS: -10 DEGREES
EKG T AXIS: 1 DEGREES
EKG VENTRICULAR RATE: 74 BPM
TROPONIN, HIGH SENSITIVITY: 16 NG/L (ref 0–22)

## 2025-08-13 PROCEDURE — 6370000000 HC RX 637 (ALT 250 FOR IP)

## 2025-08-13 PROCEDURE — 93010 ELECTROCARDIOGRAM REPORT: CPT | Performed by: INTERNAL MEDICINE

## 2025-08-13 RX ORDER — MAGNESIUM HYDROXIDE/ALUMINUM HYDROXICE/SIMETHICONE 120; 1200; 1200 MG/30ML; MG/30ML; MG/30ML
30 SUSPENSION ORAL ONCE
Status: COMPLETED | OUTPATIENT
Start: 2025-08-13 | End: 2025-08-13

## 2025-08-13 RX ORDER — FAMOTIDINE 20 MG/1
20 TABLET, FILM COATED ORAL 2 TIMES DAILY PRN
Qty: 60 TABLET | Refills: 0 | Status: SHIPPED | OUTPATIENT
Start: 2025-08-13

## 2025-08-13 RX ORDER — FAMOTIDINE 20 MG/1
20 TABLET, FILM COATED ORAL ONCE
Status: COMPLETED | OUTPATIENT
Start: 2025-08-13 | End: 2025-08-13

## 2025-08-13 RX ADMIN — FAMOTIDINE 20 MG: 20 TABLET, FILM COATED ORAL at 02:05

## 2025-08-13 RX ADMIN — ALUMINUM HYDROXIDE, MAGNESIUM HYDROXIDE, AND SIMETHICONE 30 ML: 200; 200; 20 SUSPENSION ORAL at 02:05

## 2025-08-13 ASSESSMENT — HEART SCORE: ECG: NON-SPECIFC REPOLARIZATION DISTURBANCE/LBTB/PM

## 2025-08-13 ASSESSMENT — PAIN - FUNCTIONAL ASSESSMENT: PAIN_FUNCTIONAL_ASSESSMENT: 0-10
